# Patient Record
Sex: MALE | Race: WHITE | NOT HISPANIC OR LATINO | Employment: OTHER | URBAN - METROPOLITAN AREA
[De-identification: names, ages, dates, MRNs, and addresses within clinical notes are randomized per-mention and may not be internally consistent; named-entity substitution may affect disease eponyms.]

---

## 2017-01-12 ENCOUNTER — ANTICOAGULATION THERAPY VISIT (OUTPATIENT)
Dept: NURSING | Facility: CLINIC | Age: 77
End: 2017-01-12
Payer: COMMERCIAL

## 2017-01-12 DIAGNOSIS — Z79.01 LONG-TERM (CURRENT) USE OF ANTICOAGULANTS: Primary | ICD-10-CM

## 2017-01-12 DIAGNOSIS — I48.20 CHRONIC ATRIAL FIBRILLATION (H): ICD-10-CM

## 2017-01-12 LAB — INR POINT OF CARE: 3 (ref 0.86–1.14)

## 2017-01-12 PROCEDURE — 99207 ZZC NO CHARGE NURSE ONLY: CPT

## 2017-01-12 PROCEDURE — 85610 PROTHROMBIN TIME: CPT | Mod: QW

## 2017-01-12 PROCEDURE — 36416 COLLJ CAPILLARY BLOOD SPEC: CPT

## 2017-01-12 NOTE — MR AVS SNAPSHOT
Chidi Dubon   1/12/2017 11:15 AM   Anticoagulation Therapy Visit    Description:  76 year old male   Provider:  JOSE E ANTI COAG   Department:  Be Nurse           INR as of 1/12/2017     Selected INR 3.0 (1/12/2017)      Anticoagulation Summary as of 1/12/2017     INR goal 2.0-3.0   Selected INR 3.0 (1/12/2017)   Full instructions 2.5 mg every day   Next INR check 2/23/2017    Indications   Long-term (current) use of anticoagulants [Z79.01] [Z79.01]  Atrial Fibrillation [I48.2]         Your next Anticoagulation Clinic appointment(s)     Feb 23, 2017 11:15 AM   Anticoagulation Visit with JOSE E ANTI LY   [facility] [place] [facility] [place]    [address]  [place] [address] [phone]              Contact Numbers     [facility]  Please call [phone] with any problems or questions regarding your therapy, or to cancel or reschedule your appointment.          January 2017 Details    Sun Mon Tue Wed Thu Fri Sat     1               2               3               4               5               6               7                 8               9               10               11               12      2.5 mg   See details      13      2.5 mg         14      2.5 mg           15      2.5 mg         16      2.5 mg         17      2.5 mg         18      2.5 mg         19      2.5 mg         20      2.5 mg         21      2.5 mg           22      2.5 mg         23      2.5 mg         24      2.5 mg         25      2.5 mg         26      2.5 mg         27      2.5 mg         28      2.5 mg           29      2.5 mg         30      2.5 mg         31      2.5 mg              Date Details   01/12 This INR check               How to take your warfarin dose     To take:  2.5 mg Take 0.5 of a 5 mg tablet.           February 2017 Details    Sun Mon Tue Wed Thu Fri Sat        1      2.5 mg         2      2.5 mg         3      2.5 mg         4      2.5 mg           5      2.5 mg         6       2.5 mg         7      2.5 mg         8      2.5 mg         9      2.5 mg         10      2.5 mg         11      2.5 mg           12      2.5 mg         13      2.5 mg         14      2.5 mg         15      2.5 mg         16      2.5 mg         17      2.5 mg         18      2.5 mg           19      2.5 mg         20      2.5 mg         21      2.5 mg         22      2.5 mg         23            24               25                 26               27               28                    Date Details   No additional details    Date of next INR:  2/23/2017         How to take your warfarin dose     To take:  2.5 mg Take 0.5 of a 5 mg tablet.

## 2017-01-12 NOTE — PROGRESS NOTES
ANTICOAGULATION FOLLOW-UP CLINIC VISIT    Patient Name:  Chidi Dubon  Date:  1/12/2017  Contact Type:  Face to Face    SUBJECTIVE:     Patient Findings     Positives No Problem Findings           OBJECTIVE    INR PROTIME   Date Value Ref Range Status   01/12/2017 3.0* 0.86 - 1.14 Final       ASSESSMENT / PLAN  INR assessment THER    Recheck INR In: 6 WEEKS    INR Location Clinic      Anticoagulation Summary as of 1/12/2017     INR goal 2.0-3.0   Selected INR 3.0 (1/12/2017)   Maintenance plan 2.5 mg (5 mg x 0.5) every day   Full instructions 2.5 mg every day   Weekly total 17.5 mg   Plan last modified Hyacinth Wilson (1/12/2017)   Next INR check 2/23/2017   Target end date Indefinite    Indications   Long-term (current) use of anticoagulants [Z79.01] [Z79.01]  Atrial Fibrillation [I48.2]         Anticoagulation Episode Summary     INR check location     Preferred lab     Send INR reminders to [facility]    Comments       Anticoagulation Care Providers     Provider Role Specialty Phone number    Martin Schultz PA-C Responsible Physician Assistant [phone]            See the Encounter Report to view Anticoagulation Flowsheet and Dosing Calendar (Go to Encounters tab in chart review, and find the Anticoagulation Therapy Visit)        HYACINTH WILSON

## 2017-02-13 DIAGNOSIS — I10 BENIGN ESSENTIAL HYPERTENSION: Primary | ICD-10-CM

## 2017-02-13 RX ORDER — VALSARTAN AND HYDROCHLOROTHIAZIDE 80; 12.5 MG/1; MG/1
1 TABLET, FILM COATED ORAL DAILY
Qty: 30 TABLET | Refills: 0 | Status: SHIPPED | OUTPATIENT
Start: 2017-02-13 | End: 2017-03-13

## 2017-02-13 NOTE — TELEPHONE ENCOUNTER
Routing refill request to provider for review/approval because:  Labs out of range:  potassium

## 2017-02-13 NOTE — TELEPHONE ENCOUNTER
Valsartan-HCTZ      Last Written Prescription Date: 11/14/16  Last Fill Quantity: 90, # refills: 1  Last Office Visit with [facility], [facility] or [facility] prescribing provider: 10/04/16       Potassium   Date Value Ref Range Status   10/04/2016 3.2 (L) 3.4 - 5.3 mmol/L Final     Creatinine   Date Value Ref Range Status   10/04/2016 0.96 0.66 - 1.25 mg/dL Final     BP Readings from Last 3 Encounters:   10/04/16 128/68   05/31/16 134/80   02/29/16 131/68

## 2017-02-14 NOTE — TELEPHONE ENCOUNTER
LVM for patient to return call to clinic.    Refill request approved. Patient need due for repeat labs. Needs lab appointment.       Zuleyka Duncan CMA

## 2017-02-23 ENCOUNTER — ANTICOAGULATION THERAPY VISIT (OUTPATIENT)
Dept: NURSING | Facility: CLINIC | Age: 77
End: 2017-02-23
Payer: COMMERCIAL

## 2017-02-23 DIAGNOSIS — Z79.01 LONG-TERM (CURRENT) USE OF ANTICOAGULANTS: ICD-10-CM

## 2017-02-23 DIAGNOSIS — I48.20 CHRONIC ATRIAL FIBRILLATION (H): ICD-10-CM

## 2017-02-23 LAB — INR POINT OF CARE: 2.6 (ref 0.86–1.14)

## 2017-02-23 PROCEDURE — 36416 COLLJ CAPILLARY BLOOD SPEC: CPT

## 2017-02-23 PROCEDURE — 85610 PROTHROMBIN TIME: CPT | Mod: QW

## 2017-02-23 PROCEDURE — 99207 ZZC NO CHARGE NURSE ONLY: CPT

## 2017-02-23 NOTE — PROGRESS NOTES
ANTICOAGULATION FOLLOW-UP CLINIC VISIT    Patient Name:  Chidi Dubon  Date:  2/23/2017  Contact Type:  Face to Face    SUBJECTIVE:     Patient Findings     Positives No Problem Findings           OBJECTIVE    INR Protime   Date Value Ref Range Status   02/23/2017 2.6 (A) 0.86 - 1.14 Final       ASSESSMENT / PLAN  INR assessment THER    Recheck INR In: 6 WEEKS    INR Location Clinic      Anticoagulation Summary as of 2/23/2017     INR goal 2.0-3.0   Today's INR 2.6   Maintenance plan 2.5 mg (5 mg x 0.5) every day   Full instructions 2.5 mg every day   Weekly total 17.5 mg   No change documented Mayte Rebolledo RN   Plan last modified Hyacinth Montano (1/12/2017)   Next INR check 4/6/2017   Target end date Indefinite    Indications   Long-term (current) use of anticoagulants [Z79.01] [Z79.01]  Atrial Fibrillation [I48.2]         Anticoagulation Episode Summary     INR check location     Preferred lab     Send INR reminders to [facility]    Comments       Anticoagulation Care Providers     Provider Role Specialty Phone number    Martin Schultz PA-C Responsible Physician Assistant [phone]            See the Encounter Report to view Anticoagulation Flowsheet and Dosing Calendar (Go to Encounters tab in chart review, and find the Anticoagulation Therapy Visit)        Mayte Rebolledo RN

## 2017-02-23 NOTE — MR AVS SNAPSHOT
Chidi Dubon   2/23/2017 11:15 AM   Anticoagulation Therapy Visit    Description:  76 year old male   Provider:  JOSE E ANTI COAG   Department:  Be Nurse           INR as of 2/23/2017     Today's INR 2.6      Anticoagulation Summary as of 2/23/2017     INR goal 2.0-3.0   Today's INR 2.6   Full instructions 2.5 mg every day   Next INR check 4/6/2017    Indications   Long-term (current) use of anticoagulants [Z79.01] [Z79.01]  Atrial Fibrillation [I48.2]         Your next Anticoagulation Clinic appointment(s)     Apr 06, 2017 11:30 AM CDT   Anticoagulation Visit with JOSE E ANTI COARUPERTO   [facility] [place] ([facility])    [address]  [place] [address] [phone]              Contact Numbers     [facility]  Please call [phone] with any problems or questions regarding your therapy, or to cancel or reschedule your appointment.          February 2017 Details    Sun Mon Tue Wed Thu Fri Sat        1               2               3               4                 5               6               7               8               9               10               11                 12               13               14               15               16               17               18                 19               20               21               22               23      2.5 mg   See details      24      2.5 mg         25      2.5 mg           26      2.5 mg         27      2.5 mg         28      2.5 mg              Date Details   02/23 This INR check               How to take your warfarin dose     To take:  2.5 mg Take 0.5 of a 5 mg tablet.           March 2017 Details    Sun Mon Tue Wed Thu Fri Sat        1      2.5 mg         2      2.5 mg         3      2.5 mg         4      2.5 mg           5      2.5 mg         6      2.5 mg         7      2.5 mg         8      2.5 mg         9      2.5 mg         10      2.5 mg         11      2.5 mg           12      2.5 mg         13       2.5 mg         14      2.5 mg         15      2.5 mg         16      2.5 mg         17      2.5 mg         18      2.5 mg           19      2.5 mg         20      2.5 mg         21      2.5 mg         22      2.5 mg         23      2.5 mg         24      2.5 mg         25      2.5 mg           26      2.5 mg         27      2.5 mg         28      2.5 mg         29      2.5 mg         30      2.5 mg         31      2.5 mg           Date Details   No additional details            How to take your warfarin dose     To take:  2.5 mg Take 0.5 of a 5 mg tablet.           April 2017 Details    Sun Mon Tue Wed Thu Fri Sat           1      2.5 mg           2      2.5 mg         3      2.5 mg         4      2.5 mg         5      2.5 mg         6            7               8                 9               10               11               12               13               14               15                 16               17               18               19               20               21               22                 23               24               25               26               27               28               29                 30                      Date Details   No additional details    Date of next INR:  4/6/2017         How to take your warfarin dose     To take:  2.5 mg Take 0.5 of a 5 mg tablet.

## 2017-03-13 DIAGNOSIS — I10 BENIGN ESSENTIAL HYPERTENSION: ICD-10-CM

## 2017-03-13 RX ORDER — VALSARTAN AND HYDROCHLOROTHIAZIDE 80; 12.5 MG/1; MG/1
1 TABLET, FILM COATED ORAL DAILY
Qty: 30 TABLET | Refills: 1 | Status: SHIPPED | OUTPATIENT
Start: 2017-03-13 | End: 2017-04-20

## 2017-03-13 NOTE — TELEPHONE ENCOUNTER
Renee      Last Written Prescription Date: 02/14/17  Last Fill Quantity: 30, # refills: 0  Last Office Visit with [facility], [facility] or [facility] prescribing provider: 10/04/16       Potassium   Date Value Ref Range Status   10/04/2016 3.2 (L) 3.4 - 5.3 mmol/L Final     Creatinine   Date Value Ref Range Status   10/04/2016 0.96 0.66 - 1.25 mg/dL Final     BP Readings from Last 3 Encounters:   10/04/16 128/68   05/31/16 134/80   02/29/16 131/68

## 2017-04-06 ENCOUNTER — ANTICOAGULATION THERAPY VISIT (OUTPATIENT)
Dept: NURSING | Facility: CLINIC | Age: 77
End: 2017-04-06
Payer: COMMERCIAL

## 2017-04-06 DIAGNOSIS — I48.20 CHRONIC ATRIAL FIBRILLATION (H): ICD-10-CM

## 2017-04-06 DIAGNOSIS — Z79.01 LONG-TERM (CURRENT) USE OF ANTICOAGULANTS: ICD-10-CM

## 2017-04-06 LAB — INR POINT OF CARE: 2.5 (ref 0.86–1.14)

## 2017-04-06 PROCEDURE — 36416 COLLJ CAPILLARY BLOOD SPEC: CPT

## 2017-04-06 PROCEDURE — 85610 PROTHROMBIN TIME: CPT | Mod: QW

## 2017-04-06 PROCEDURE — 99207 ZZC NO CHARGE NURSE ONLY: CPT

## 2017-04-06 NOTE — PROGRESS NOTES
ANTICOAGULATION FOLLOW-UP CLINIC VISIT    Patient Name:  Chidi Dbuon  Date:  4/6/2017  Contact Type:  Face to Face    SUBJECTIVE:     Patient Findings     Positives No Problem Findings           OBJECTIVE    INR Protime   Date Value Ref Range Status   04/06/2017 2.5 (A) 0.86 - 1.14 Final       ASSESSMENT / PLAN  INR assessment THER    Recheck INR In: 6 WEEKS    INR Location Clinic      Anticoagulation Summary as of 4/6/2017     INR goal 2.0-3.0   Today's INR 2.5   Maintenance plan 2.5 mg (5 mg x 0.5) every day   Full instructions 2.5 mg every day   Weekly total 17.5 mg   No change documented Hyacinth Wilson   Plan last modified Hyacinth Wilson (1/12/2017)   Next INR check 5/18/2017   Target end date Indefinite    Indications   Long-term (current) use of anticoagulants [Z79.01] [Z79.01]  Atrial Fibrillation [I48.2]         Anticoagulation Episode Summary     INR check location     Preferred lab     Send INR reminders to [facility]    Comments       Anticoagulation Care Providers     Provider Role Specialty Phone number    Martin Schultz PA-C Responsible Physician Assistant [phone]            See the Encounter Report to view Anticoagulation Flowsheet and Dosing Calendar (Go to Encounters tab in chart review, and find the Anticoagulation Therapy Visit)        HYACINTH WILSON

## 2017-04-06 NOTE — MR AVS SNAPSHOT
Chidi Dubon   4/6/2017 11:30 AM   Anticoagulation Therapy Visit    Description:  76 year old male   Provider:  JOSE E ANTI COAG   Department:  Be Nurse           INR as of 4/6/2017     Today's INR 2.5      Anticoagulation Summary as of 4/6/2017     INR goal 2.0-3.0   Today's INR 2.5   Full instructions 2.5 mg every day   Next INR check 5/18/2017    Indications   Long-term (current) use of anticoagulants [Z79.01] [Z79.01]  Atrial Fibrillation [I48.2]         Your next Anticoagulation Clinic appointment(s)     May 18, 2017 11:30 AM CDT   Anticoagulation Visit with BE ANTI COAG   [facility] [place] ([facility]    [address]  [place] [address] [phone]              Contact Numbers     [facility]  Please call [phone] with any problems or questions regarding your therapy, or to cancel or reschedule your appointment.          April 2017 Details    Sun Mon Tue Wed Thu Fri Sat           1                 2               3               4               5               6      2.5 mg   See details      7      2.5 mg         8      2.5 mg           9      2.5 mg         10      2.5 mg         11      2.5 mg         12      2.5 mg         13      2.5 mg         14      2.5 mg         15      2.5 mg           16      2.5 mg         17      2.5 mg         18      2.5 mg         19      2.5 mg         20      2.5 mg         21      2.5 mg         22      2.5 mg           23      2.5 mg         24      2.5 mg         25      2.5 mg         26      2.5 mg         27      2.5 mg         28      2.5 mg         29      2.5 mg           30      2.5 mg                Date Details   04/06 This INR check               How to take your warfarin dose     To take:  2.5 mg Take 0.5 of a 5 mg tablet.           May 2017 Details    Sun Mon Tue Wed Thu Fri Sat      1      2.5 mg         2      2.5 mg         3      2.5 mg         4      2.5 mg         5      2.5 mg         6      2.5 mg            7      2.5 mg         8      2.5 mg         9      2.5 mg         10      2.5 mg         11      2.5 mg         12      2.5 mg         13      2.5 mg           14      2.5 mg         15      2.5 mg         16      2.5 mg         17      2.5 mg         18            19               20                 21               22               23               24               25               26               27                 28               29               30               31                   Date Details   No additional details    Date of next INR:  5/18/2017         How to take your warfarin dose     To take:  2.5 mg Take 0.5 of a 5 mg tablet.

## 2017-04-13 ENCOUNTER — RADIANT APPOINTMENT (OUTPATIENT)
Dept: GENERAL RADIOLOGY | Facility: CLINIC | Age: 77
End: 2017-04-13
Attending: PHYSICIAN ASSISTANT
Payer: COMMERCIAL

## 2017-04-13 ENCOUNTER — OFFICE VISIT (OUTPATIENT)
Dept: ORTHOPEDICS | Facility: CLINIC | Age: 77
End: 2017-04-13
Payer: COMMERCIAL

## 2017-04-13 VITALS — WEIGHT: 283.6 LBS | BODY MASS INDEX: 35.26 KG/M2 | RESPIRATION RATE: 16 BRPM | HEIGHT: 75 IN

## 2017-04-13 DIAGNOSIS — M16.11 PRIMARY OSTEOARTHRITIS OF RIGHT HIP: Primary | ICD-10-CM

## 2017-04-13 DIAGNOSIS — M16.11 PRIMARY OSTEOARTHRITIS OF RIGHT HIP: ICD-10-CM

## 2017-04-13 PROCEDURE — 73502 X-RAY EXAM HIP UNI 2-3 VIEWS: CPT

## 2017-04-13 PROCEDURE — 99214 OFFICE O/P EST MOD 30 MIN: CPT | Performed by: ORTHOPAEDIC SURGERY

## 2017-04-13 ASSESSMENT — PAIN SCALES - GENERAL: PAINLEVEL: SEVERE PAIN (7)

## 2017-04-13 NOTE — LETTER
"4/13/2017      RE: Chidi Dubon  [address] [facility]  [place] [address]       Chief Complaint:   Chief Complaint   Patient presents with     RECHECK     Right hip OA. Has got much worse sine we saw him last July, 2016. He hasn't been in due to 5 eye surgeries after complications from cataract surgery.     Surgical Followup     Right TKA. DOS 7/14/15. Doing well.        HISTORY OF PRESENT ILLNESS    Chidi Dubon is a 76 year old male seen for follow up evaluation of ongoing right hip pain with no known injury. has known advanced, end-stage right hip arthritis. Pain has been present for a couple of years. Today, patient reports severe pain. He rates pain a 7/10. Since last visit, July 2016, he reports pain has worsened. He states that everything is out of \"wack\". Presents with his wife today. Reports quality of life has gone down. He reprots his right total knee is doing great. History of 5 eye surgeries. Total eyesight loss for a period. Cataract surgery. It is because of these surgeries that have prevented him from coming in sooner to discuss his right hip.    Pain with walking, stairs, and bending.     Present symptoms: severe pain right hip.  Pain severity: 7/10  Pain quality: dull  Frequency of symptoms: constant.  Exacerbating Factors: weight bearing, stairs, certain positions: bending  Relieving Factors: not sure  Night Pain: No  Pain while at rest: No   Associated numbness or tingling: No     Orthopedic PMH: right TKA (7/2015), right hip degenerative osteoarthritis.    Other PMH:  has a past medical history of CAD (coronary artery disease); Dyslipidemia; and HTN (hypertension).  Patient Active Problem List    Diagnosis Date Noted     Benign essential hypertension 02/29/2016     Priority: Medium     Long-term (current) use of anticoagulants [Z79.01] 02/16/2016     Priority: Medium     Atrial Fibrillation 05/03/2013     Priority: Medium     Wafarin therapy, managed by [facility]  November 25, 2014 - CHADS2 " 2.8%.  HAS-BLED 1.8%.       Restless leg syndrome 12/09/2014     Begin with gabapentin and supplement with iron daily for borderline ferritin.        Fatigue 05/09/2014     HGB     14.9   10/21/2013. TSH     1.73   8/5/2013 LDL      104   8/20/2013.  TSH     1.73   8/5/2013.        Diverticulosis 08/10/2012     Noted on CT but no inflammation 8/12.       Urolithiasis 08/10/2012     Noted 8/8/12.  Lemon juice as uric acid.  Will hold Flomax as wanting to minimize medications and possibly help fatigue.        Osteoarthritis, knee 07/06/2011     Synvisc with some benefit.  Getting shots.  Trying to lose weight and exercise. Minimal benefit from injections.        CAD S/P percutaneous coronary angioplasty 06/25/2010     Stents x 2.  Stable.  Flipped T's inferiorly.  On good medical management. 8/13 restended. Diffuse disease. Medical management.        Hyperlipidemia LDL goal <70 12/14/2009     Using half dose Crestor due to cost. May benefit from atorvastatin when generic available.  Accepting that goal will not be met. Simvastatin 40 mg prescription.   LDL      100   5/9/2014 - will review benefit for atorvastatin 40 every other day.        Obesity 12/14/2009     Strongly encouraged exercise.  Weight Watchers might help as well. Considering.        Mild major depression (H) 12/14/2009     Increase to 20 mg.          Surgical Hx:  has a past surgical history that includes angiogram (age 61 ); arthroscopy knee rt/lt; angiogram (age 66); and angiogram (11/2013).    Medications:   Current Outpatient Prescriptions:      valsartan-hydrochlorothiazide (DIOVAN-HCT) 80-12.5 MG per tablet, Take 1 tablet by mouth daily, Disp: 30 tablet, Rfl: 1     potassium chloride 20 MEQ TBCR, Take 1 tablet (20 mEq) by mouth daily, Disp: 90 tablet, Rfl: 3     atorvastatin (LIPITOR) 80 MG tablet, Take 1 tablet (80 mg) by mouth every other day, Disp: 90 tablet, Rfl: 1     nitroglycerin (NITROSTAT) 0.4 MG SL tablet, Place 1 tablet (0.4 mg) under  "the tongue every 5 minutes as needed for chest pain, Disp: 25 tablet, Rfl: 0     warfarin (COUMADIN) 5 MG tablet, Take 1 tablet (5 mg) by mouth daily Or as directed by [facility] clinic. Current dose is 2.5 mg daily, Disp: 90 tablet, Rfl: 2     aspirin 81 MG tablet, Take 1 tablet (81 mg) by mouth daily, Disp: 30 tablet, Rfl:      [DISCONTINUED] simvastatin (ZOCOR) 80 MG tablet, Take 0.5 tablets (40 mg) by mouth At Bedtime, Disp: 45 tablet, Rfl: 3    Allergies:   Allergies   Allergen Reactions     No Known Allergies        Social Hx: retired.  reports that he has never smoked. He has never used smokeless tobacco. He reports that he does not drink alcohol or use illicit drugs.    Family Hx: family history includes HEART DISEASE in his paternal grandfather.     This document serves as a record of the services and decisions personally performed and made by Ismael Bowling MD. It was created on his behalf by Rosmery Lewis, a trained medical scribe. The creation of this document is based the provider's statements to the medical scribe.    Scribe Rosmery Lewis 1:59 PM 4/13/2017     REVIEW OF SYSTEMS:  10 point ROS neg other than the symptoms noted above in the HPI and PAST MEDICAL HISTORY. Notables,  CONSTITUTIONAL:NEGATIVE for fever, chills, change in weight  INTEGUMENTARY/SKIN: NEGATIVE for worrisome rashes, moles or lesions  MUSCULOSKELETAL:See HPI above  NEURO: NEGATIVE for weakness, dizziness or paresthesias    PHYSICAL EXAM:  Resp 16  Ht 1.905 m (6' 3\")  Wt 128.6 kg (283 lb 9.6 oz)  BMI 35.45 kg/m2   GENERAL APPEARANCE: healthy, alert, no distress, presents with wife today.  SKIN: no suspicious lesions or rashes  NEURO: Normal strength and tone, mentation intact and speech normal  PSYCH:  mentation appears normal and affect normal  RESPIRATORY: No increased work of breathing.    BILATERAL LOWER EXTREMITIES:  Gait: trendelenberg right.  No gross deformities or masses.  Bilateral Quad atrophy, strength weak bilateral right " more than the left..  Intact sensation deep peroneal nerve, superficial peroneal nerve, med/lat tibial nerve, sural nerve, saphenous nerve  Intact EHL, EDL, TA, FHL, GS, quadriceps hamstrings and hip flexors  Toes warm and well perfused, brisk capillary refill. Palpable 2+ dp pulses.  Bilateral calf soft and nttp or squeeze.  Edema: trace    Lumbar range of motion: flexion to touch mid shin - tightness in back, extension decreased, side bend: adequate, no discomfort.    LEFT HIP EXAM:    Tender:  None.  Nontender: left greater trochanter, left gluteus medius, left ASIS, left iliac crest, left proximal hamstring attachment  Strength:  4+/5 hip flexion, extension  Special tests:  Irritability (flexion/adduction/internal rotation) mild, negative straight leg raise  Hip range of motion: Internal rotation: neutral, External rotation: 35 degrees, Flexion 100 degrees    RIGHT HIP EXAM:     Tender:   Greater trochanter, medius, tensor, lower back/buttock  Nontender: right ASIS, right iliac crest, right proximal hamstring attachment  Strength:  4-/5 hip flexion, extension.  Special tests:  Irritability (flexion/adduction/internal rotation) positive.  Hip range of motion: Internal rotation: lacks 10 degrees from neutral, External rotation: 30, Flexion 95 degrees with obligatory external rotation with hip flexion.    right  KNEE EXAM:    Incision: healed well, Dry. No erythema or ecchymosis.  Skin: intact, no ecchymosis or erythema  ROM: 0 degrees extension to 120 degrees flexion  Effusion: none  Tender:  None.      X-RAY: AP pelvis and AP/Lateral views right hip from 4/13/2017 were reviewed in clinic today. No obvious fractures or dislocations. Advanced degenerative changes in the right hip with bone on bone loss of the joint space. Since the previous study progressive subchondral degenerative cyst formation has developed within the acetabulum and the femoral head, with sclerosis. Marginal osteophytes. Left hip with moderate  "degenerative changes.    Impression: 76 year old male with right hip pain, advanced bone on bone primary osteoarthritis, trochanteric bursitis, muscle pain.       Plan:   * discussed pain in groin/hip likely from advanced hip arthritis, bone on bone. This is wearing of the cartilage within the hip joint either due to normal \"wear and tear\" or following an injury. Any low back / buttock / radiating pain likely coming from the low back.  *  treatment options for hip arthritis and pain include: do nothing, NSAIDS, activity modification, Physical Therapy, injections, total hip arthroplasty. Risks and benefits of each discussed.   * did discuss patient is a candidate for total hip arthroplasty, and offered total hip arthroplasty to patient. Total hip arthroplasty will only attempt to provide pain relief from hip related arthritis only and not any pain from the low back. Any low back pain likely to continue.  *  Discussed risks of surgery include, but not limited to: bleeding, infection, pain, scar, damage to adjacent structures (e.g. Nerves, blood vessels, bone, cartilage), temporary or permanent nerve damage, recurrence of symptoms, implant dislocation, implant failure, implant infection, unequal limb lengths, stiffness, need for further surgery, blood clots, pulmonary embolism, risks of anesthesia, and death.  * patient would like to proceed with surgery: right total hip arthroplasty.  * will arrange right total hip arthroplasty at a mutual convenience in the near future  * discussed will plan hospitalization ~ 2-3 days, with discharge to home or short term rehabilitation facility, depending on postoperative recovery and progress during hospitalization.  * will plan postoperative deep vein thrombosis prophylaxis x4 weeks.  Additionally, graduated compression stockings x4 weeks, and SCDs while in the hospital.  * postoperative pain control will be oral medications upon discharge from hospital  * postoperative Physical " Therapy will be discussed, if needed, at first postoperative visit at 2 weeks  * patient will need longterm prophylactic antibiotics use with dental procedures or other invasive procedures (2 g amoxicilin or 450mg ([id number]) clindamycin) 1 hour prior to dental procedures.  * patient will need preoperative H+P prior to surgery from PCP.  * will see patient 2 weeks postoperative, sooner as needed. Will obtain lowset AP pelvis and lateral right hip xray at that time.  * 3 months of Posterior Hip Precautions: No hip flexion beyond 90 degrees; No hip adduction cross midline; No hip internal rotation.    The information in this document, created by a scribe for me, accurately reflects the services I personally performed and the decisions made by me. I have reviewed and approved this document for accuracy.      Ismael Bowling M.D., M.S.  Dept. of Orthopaedic Surgery  [facility]          Ismael Bowling MD

## 2017-04-13 NOTE — MR AVS SNAPSHOT
"              After Visit Summary   4/13/2017    Chidi Dubon    MRN: [id number]           Patient Information     Date Of Birth          1940        Visit Information        Provider Department      4/13/2017 1:30 PM Ismael Bowling MD [facility] [place]        Today's Diagnoses     Primary osteoarthritis of right hip    -  1       Follow-ups after your visit        Your next 10 appointments already scheduled     May 18, 2017 11:30 AM CDT   Anticoagulation Visit with BE ANTI COAG   [facility] [place] ([facility] [place])    [address] [facility]  [place] [id number] [phone]              Who to contact     If you have questions or need follow up information about today's clinic visit or your schedule please contact [facility] [place] directly at [phone].  Normal or non-critical lab and imaging results will be communicated to you by Tripwolfhart, letter or phone within 4 business days after the clinic has received the results. If you do not hear from us within 7 days, please contact the clinic through MyChart or phone. If you have a critical or abnormal lab result, we will notify you by phone as soon as possible.  Submit refill requests through BigBarn or call your pharmacy and they will forward the refill request to us. Please allow 3 business days for your refill to be completed.          Additional Information About Your Visit        MyChart Information     BigBarn lets you send messages to your doctor, view your test results, renew your prescriptions, schedule appointments and more. To sign up, go to [email or web address] . Click on \"Log in\" on the left side of the screen, which will take you to the Welcome page. Then click on \"Sign up Now\" on the right side of the page.     You will be asked to enter the access code listed below, as well as some personal information. Please follow the directions to create your username and " "password.     Your access code is: [email or web address]  Expires: 2017  1:47 PM     Your access code will  in 90 days. If you need help or a new code, please call your [facility] or [phone].        Care EveryWhere ID     This is your Care EveryWhere ID. This could be used by other organizations to access your [facility] medical records  [id number]        Your Vitals Were     Respirations Height BMI (Body Mass Index)             16 1.905 m (6' 3\") 35.45 kg/m2          Blood Pressure from Last 3 Encounters:   10/04/16 128/68   16 134/80   16 131/68    Weight from Last 3 Encounters:   17 128.6 kg (283 lb 9.6 oz)   10/04/16 124.7 kg (275 lb)   16 121.1 kg (267 lb)               Primary Care Provider Office Phone # Fax #    Martin Schultz PA-C [phone]       [facility] [place] [address]  [place] [address]        Goals        Diet    Reduce portion size     Related Problems    Hyperlipidemia LDL goal <70    Notes - Note created  2014  3:10 PM by Oliver Veliz MD    Everything in moderation.         General    Medication 1 (pt-stated)     Related Problems    Urolithiasis    Notes - Note created  2014  3:11 PM by Oliver Veliz MD    Stop the flomax and pay attention to your urine flow.  If not worse, ok to stay off of it. If worse, restart.       Taper off medication (pt-stated)     Related Problems    Fatigue    Notes - Note created  2014  3:08 PM by Oliver Veliz MD    Tapering Schedule for citalopram;    Week 1:  1/2 alternating with 1 every other day  Week 2:  1/2 daily  Week 3:  1/2 every other day  Week 4:  Stop    If at any point you experience significant side effects from tapering, we may need to slow the taper a bit.    Once you are off of this, pay attention to the mood and if you are not feeling better, we can try using bupropion which is a medication that is less likely to cause fatigue and weight gain.         Thank you! "     Thank you for choosing [facility] [place]  for your care. Our goal is always to provide you with excellent care. Hearing back from our patients is one way we can continue to improve our services. Please take a few minutes to complete the written survey that you may receive in the mail after your visit with us. Thank you!             Your Updated Medication List - Protect others around you: Learn how to safely use, store and throw away your medicines at www.disposemymeds.org.          This list is accurate as of: 4/13/17  1:47 PM.  Always use your most recent med list.                   Brand Name Dispense Instructions for use    aspirin 81 MG tablet     30 tablet    Take 1 tablet (81 mg) by mouth daily       atorvastatin 80 MG tablet    LIPITOR    90 tablet    Take 1 tablet (80 mg) by mouth every other day       nitroglycerin 0.4 MG sublingual tablet    NITROSTAT    25 tablet    Place 1 tablet (0.4 mg) under the tongue every 5 minutes as needed for chest pain       Potassium Chloride ER 20 MEQ Tbcr     90 tablet    Take 1 tablet (20 mEq) by mouth daily       valsartan-hydrochlorothiazide 80-12.5 MG per tablet    DIOVAN-HCT    30 tablet    Take 1 tablet by mouth daily       warfarin 5 MG tablet    COUMADIN    90 tablet    Take 1 tablet (5 mg) by mouth daily Or as directed by [facility]. Current dose is 2.5 mg daily

## 2017-04-13 NOTE — Clinical Note
"  4/13/2017       RE: Chidi Dubon  [address] [facility]  [place] [address]           Dear Colleague,    Thank you for referring your patient, Chidi Dubon, to the [facility]. Please see a copy of my visit note below.    Chief Complaint:   Chief Complaint   Patient presents with     RECHECK     Right hip OA. Has got much worse sine we saw him last July, 2016. He hasn't been in due to 5 eye surgeries after complications from cataract surgery.     Surgical Followup     Right TKA. DOS 7/14/15. Doing well.        HISTORY OF PRESENT ILLNESS    Chidi Dubon is a 76 year old male seen for follow up evaluation of ongoing right hip pain with no known injury. Pain has been present for a couple of years. Today, patient reports severe pain. He rates pain a 7/10. Since last visit, July 2016, he reports pain has worsened. He states that everything is out of \"wack\". Presents with his wife today. Reports quality of life has gone down. He reprots his knee is doing great. History of 5 eye surgeries. Total eyesight loss. Cataract surgery. It is because of these surgeries that have prevented him from coming in sooner to discuss his right hip.    Recall: Notes that pain is more noticeable because he had a right total knee arthroplasty in 7/2015, 8 months ago. Notes that pain is in the outer hip. Pain with walking, stairs, and bending. He had an injection with Martin Schultz on 2/29/2016, 4 weeks ago, this provided relief for about 3 days. He states that he would like to hold off on surgery until his knee is really good. He is still dealing with recovery from his total knee arthroplasty. Doesn't really have much knee pain, just some stiffness.    Present symptoms: mild pain.  Lateral hip.  Pain severity: 7/10  Pain quality: dull  Frequency of symptoms: occasionally  Exacerbating Factors: weight bearing, stairs, certain positions: bending  Relieving Factors: not sure  Night Pain: No  Pain while at " rest: No   Associated numbness or tingling: No     Orthopedic PMH: right TKA (7/2015)    Other PMH:  has a past medical history of CAD (coronary artery disease); Dyslipidemia; and HTN (hypertension).  Patient Active Problem List    Diagnosis Date Noted     Benign essential hypertension 02/29/2016     Priority: Medium     Long-term (current) use of anticoagulants [Z79.01] 02/16/2016     Priority: Medium     Atrial Fibrillation 05/03/2013     Priority: Medium     Wafarin therapy, managed by [facility]  November 25, 2014 - CHADS2 2.8%.  HAS-BLED 1.8%.       Restless leg syndrome 12/09/2014     Begin with gabapentin and supplement with iron daily for borderline ferritin.        Fatigue 05/09/2014     HGB     14.9   10/21/2013. TSH     1.73   8/5/2013 LDL      104   8/20/2013.  TSH     1.73   8/5/2013.        Diverticulosis 08/10/2012     Noted on CT but no inflammation 8/12.       Urolithiasis 08/10/2012     Noted 8/8/12.  Lemon juice as uric acid.  Will hold Flomax as wanting to minimize medications and possibly help fatigue.        Osteoarthritis, knee 07/06/2011     Synvisc with some benefit.  Getting shots.  Trying to lose weight and exercise. Minimal benefit from injections.        CAD S/P percutaneous coronary angioplasty 06/25/2010     Stents x 2.  Stable.  Flipped T's inferiorly.  On good medical management. 8/13 restended. Diffuse disease. Medical management.        Hyperlipidemia LDL goal <70 12/14/2009     Using half dose Crestor due to cost. May benefit from atorvastatin when generic available.  Accepting that goal will not be met. Simvastatin 40 mg prescription.   LDL      100   5/9/2014 - will review benefit for atorvastatin 40 every other day.        Obesity 12/14/2009     Strongly encouraged exercise.  Weight Watchers might help as well. Considering.        Mild major depression (H) 12/14/2009     Increase to 20 mg.          Surgical Hx:  has a past surgical history that includes angiogram (age 61 );  arthroscopy knee rt/lt; angiogram (age 66); and angiogram (11/2013).    Medications:   Current Outpatient Prescriptions:      valsartan-hydrochlorothiazide (DIOVAN-HCT) 80-12.5 MG per tablet, Take 1 tablet by mouth daily, Disp: 30 tablet, Rfl: 1     potassium chloride 20 MEQ TBCR, Take 1 tablet (20 mEq) by mouth daily, Disp: 90 tablet, Rfl: 3     atorvastatin (LIPITOR) 80 MG tablet, Take 1 tablet (80 mg) by mouth every other day, Disp: 90 tablet, Rfl: 1     nitroglycerin (NITROSTAT) 0.4 MG SL tablet, Place 1 tablet (0.4 mg) under the tongue every 5 minutes as needed for chest pain, Disp: 25 tablet, Rfl: 0     warfarin (COUMADIN) 5 MG tablet, Take 1 tablet (5 mg) by mouth daily Or as directed by [facility] clinic. Current dose is 2.5 mg daily, Disp: 90 tablet, Rfl: 2     aspirin 81 MG tablet, Take 1 tablet (81 mg) by mouth daily, Disp: 30 tablet, Rfl:      [DISCONTINUED] simvastatin (ZOCOR) 80 MG tablet, Take 0.5 tablets (40 mg) by mouth At Bedtime, Disp: 45 tablet, Rfl: 3    Allergies:   Allergies   Allergen Reactions     No Known Allergies        Social Hx:  reports that he has never smoked. He has never used smokeless tobacco. He reports that he does not drink alcohol or use illicit drugs.    Family Hx: family history includes HEART DISEASE in his paternal grandfather.     This document serves as a record of the services and decisions personally performed and made by Ismael Bowling MD. It was created on his behalf by Rosmery Lewis, a trained medical scribe. The creation of this document is based the provider's statements to the medical scribe.    Scribe Rosmery Lewis 1:59 PM 4/13/2017     REVIEW OF SYSTEMS:  10 point ROS neg other than the symptoms noted above in the HPI and PAST MEDICAL HISTORY. Notables,  CONSTITUTIONAL:NEGATIVE for fever, chills, change in weight  INTEGUMENTARY/SKIN: NEGATIVE for worrisome rashes, moles or lesions  MUSCULOSKELETAL:See HPI above  NEURO: NEGATIVE for weakness, dizziness or  "paresthesias    PHYSICAL EXAM:  Resp 16  Ht 1.905 m (6' 3\")  Wt 128.6 kg (283 lb 9.6 oz)  BMI 35.45 kg/m2   GENERAL APPEARANCE: healthy, alert, no distress, presents with wife today.  SKIN: no suspicious lesions or rashes  NEURO: Normal strength and tone, mentation intact and speech normal  PSYCH:  mentation appears normal and affect normal/bright  RESPIRATORY: No increased work of breathing.    BILATERAL LOWER EXTREMITIES:  Gait: slight quad avoidance on right  Neutral slight valgus on right slight varus on left  No gross deformities or masses.  Bilateral Quad atrophy, strength weak.  Intact sensation deep peroneal nerve, superficial peroneal nerve, med/lat tibial nerve, sural nerve, saphenous nerve  Intact EHL, EDL, TA, FHL, GS, quadriceps hamstrings and hip flexors  Toes warm and well perfused, brisk capillary refill. Palpable 2+ dp pulses.  Bilateral calf soft and nttp or squeeze.  Edema: trace    Lumbar range of motion: flexion to touch mid shin - tightness in back, extension decreased, side bend: adequate, no discomfort.    LEFT HIP EXAM:    Tender:  None.  Nontender: left greater trochanter, left gluteus medius, left ASIS, left iliac crest, left proximal hamstring attachment  Strength:  4+/5 hip flexion, extension  Special tests:  Irritability (flexion/adduction/internal rotation) mild, negative straight leg raise  Hip range of motion: Internal rotation: neutral, External rotation: 35 degrees, Flexion 100 degrees    RIGHT HIP EXAM:     Tender:   Greater trochanter, medius tensor, lower back  Nontender: right ASIS, right iliac crest, right proximal hamstring attachment  Strength:  4/5 hip flexion, extension.  Special tests:  Irritability (flexion/adduction/internal rotation) positive.  Hip range of motion: Internal rotation: lacks 10 degrees from neutral, External rotation: 30, Flexion 95 degrees with obligatory external rotation with hip flexion.    right  KNEE EXAM:    Incision: healed well, Dry. No " "erythema or ecchymosis.  Skin: intact, no ecchymosis or erythema  ROM: 0 degrees extension to 120 degrees flexion  Effusion: none  Tender: over anterior incision.  NTTP posterior knee      X-RAY: AP pelvis and AP/Lateral views right hip from 4/13/2017 were reviewed in clinic today. No obvious fractures or dislocations. Advanced degenerative changes in the right hip with marked narrowing of the joint space. Since the previous study progressive subchondral degenerative cyst formation has developed within the acetabulum and the femoral head. Left hip with mild degenerative changes.    Impression: 76 year old male with right hip pain, advanced primary osteoarthritis, trochanteric bursitis, muscle pain.       Plan:   * discussed pain in groin/hip likely from advanced hip arthritis. This is wearing of the cartilage within the hip joint either due to normal \"wear and tear\" or following an injury. Any low back / buttock / radiating pain likely coming from the low back.  *  treatment options for hip arthritis and pain include: do nothing, NSAIDS, activity modification, Physical Therapy, injections, total hip arthroplasty. Risks and benefits of each discussed.   * did discuss patient is a candidate for total hip arthroplasty, and offered total hip arthroplasty to patient. Total hip arthroplasty will only attempt to provide pain relief from hip related arthritis only and not any pain from the low back. Any low back pain likely to continue.  *  Discussed risks of surgery include, but not limited to: bleeding, infection, pain, scar, damage to adjacent structures (e.g. Nerves, blood vessels, bone, cartilage), temporary or permanent nerve damage, recurrence of symptoms, implant dislocation, implant failure, implant infection, unequal limb lengths, stiffness, need for further surgery, blood clots, pulmonary embolism, risks of anesthesia, and death.  * patient would like to proceed with surgery: right total hip arthroplasty.  * will " arrange right total hip arthroplasty at a mutual convenience in the near future  * discussed will plan hospitalization ~ 3 days, with discharge to home or short term rehabilitation facility, depending on postoperative recovery and progress during hospitalization.  * will plan postoperative deep vein thrombosis prophylaxis x4 weeks.  Additionally, graduated compression stockings x4 weeks, and SCDs while in the hospital.  * postoperative pain control will be oral medications upon discharge from hospital  * postoperative Physical Therapy will be discussed, if needed, at first postoperative visit at 2 weeks  * patient will need longterm prophylactic antibiotics use with dental procedures or other invasive procedures (2 g amoxicilin or 450mg ([id number]) clindamycin) 1 hour prior to dental procedures.  * patient will need preoperative H+P prior to surgery from PCP.  * will see patient 2 weeks postoperative, sooner as needed. Will obtain lowset AP pelvis and lateral right hip xray at that time.  * 3 months of Posterior Hip Precautions: No hip flexion beyond 90 degrees; No hip adduction cross midline; No hip internal rotation.    The information in this document, created by a scribe for me, accurately reflects the services I personally performed and the decisions made by me. I have reviewed and approved this document for accuracy.      Ismael Bowling M.D., M.S.  Dept. of Orthopaedic Surgery  [facility]    Again, thank you for allowing me to participate in the care of your patient.        Sincerely,              Ismael Bowling MD

## 2017-04-13 NOTE — PROGRESS NOTES
"Chief Complaint:   Chief Complaint   Patient presents with     RECHECK     Right hip OA. Has got much worse sine we saw him last July, 2016. He hasn't been in due to 5 eye surgeries after complications from cataract surgery.     Surgical Followup     Right TKA. DOS 7/14/15. Doing well.        HISTORY OF PRESENT ILLNESS    Chidi Dubon is a 76 year old male seen for follow up evaluation of ongoing right hip pain with no known injury. has known advanced, end-stage right hip arthritis. Pain has been present for a couple of years. Today, patient reports severe pain. He rates pain a 7/10. Since last visit, July 2016, he reports pain has worsened. He states that everything is out of \"wack\". Presents with his wife today. Reports quality of life has gone down. He reprots his right total knee is doing great. History of 5 eye surgeries. Total eyesight loss for a period. Cataract surgery. It is because of these surgeries that have prevented him from coming in sooner to discuss his right hip.    Pain with walking, stairs, and bending.     Present symptoms: severe pain right hip.  Pain severity: 7/10  Pain quality: dull  Frequency of symptoms: constant.  Exacerbating Factors: weight bearing, stairs, certain positions: bending  Relieving Factors: not sure  Night Pain: No  Pain while at rest: No   Associated numbness or tingling: No     Orthopedic PMH: right TKA (7/2015), right hip degenerative osteoarthritis.    Other PMH:  has a past medical history of CAD (coronary artery disease); Dyslipidemia; and HTN (hypertension).  Patient Active Problem List    Diagnosis Date Noted     Benign essential hypertension 02/29/2016     Priority: Medium     Long-term (current) use of anticoagulants [Z79.01] 02/16/2016     Priority: Medium     Atrial Fibrillation 05/03/2013     Priority: Medium     Wafarin therapy, managed by [facility]  November 25, 2014 - CHADS2 2.8%.  HAS-BLED 1.8%.       Restless leg syndrome 12/09/2014     Begin with gabapentin " and supplement with iron daily for borderline ferritin.        Fatigue 05/09/2014     HGB     14.9   10/21/2013. TSH     1.73   8/5/2013 LDL      104   8/20/2013.  TSH     1.73   8/5/2013.        Diverticulosis 08/10/2012     Noted on CT but no inflammation 8/12.       Urolithiasis 08/10/2012     Noted 8/8/12.  Lemon juice as uric acid.  Will hold Flomax as wanting to minimize medications and possibly help fatigue.        Osteoarthritis, knee 07/06/2011     Synvisc with some benefit.  Getting shots.  Trying to lose weight and exercise. Minimal benefit from injections.        CAD S/P percutaneous coronary angioplasty 06/25/2010     Stents x 2.  Stable.  Flipped T's inferiorly.  On good medical management. 8/13 restended. Diffuse disease. Medical management.        Hyperlipidemia LDL goal <70 12/14/2009     Using half dose Crestor due to cost. May benefit from atorvastatin when generic available.  Accepting that goal will not be met. Simvastatin 40 mg prescription.   LDL      100   5/9/2014 - will review benefit for atorvastatin 40 every other day.        Obesity 12/14/2009     Strongly encouraged exercise.  Weight Watchers might help as well. Considering.        Mild major depression (H) 12/14/2009     Increase to 20 mg.          Surgical Hx:  has a past surgical history that includes angiogram (age 61 ); arthroscopy knee rt/lt; angiogram (age 66); and angiogram (11/2013).    Medications:   Current Outpatient Prescriptions:      valsartan-hydrochlorothiazide (DIOVAN-HCT) 80-12.5 MG per tablet, Take 1 tablet by mouth daily, Disp: 30 tablet, Rfl: 1     potassium chloride 20 MEQ TBCR, Take 1 tablet (20 mEq) by mouth daily, Disp: 90 tablet, Rfl: 3     atorvastatin (LIPITOR) 80 MG tablet, Take 1 tablet (80 mg) by mouth every other day, Disp: 90 tablet, Rfl: 1     nitroglycerin (NITROSTAT) 0.4 MG SL tablet, Place 1 tablet (0.4 mg) under the tongue every 5 minutes as needed for chest pain, Disp: 25 tablet, Rfl: 0      "warfarin (COUMADIN) 5 MG tablet, Take 1 tablet (5 mg) by mouth daily Or as directed by [facility]. Current dose is 2.5 mg daily, Disp: 90 tablet, Rfl: 2     aspirin 81 MG tablet, Take 1 tablet (81 mg) by mouth daily, Disp: 30 tablet, Rfl:      [DISCONTINUED] simvastatin (ZOCOR) 80 MG tablet, Take 0.5 tablets (40 mg) by mouth At Bedtime, Disp: 45 tablet, Rfl: 3    Allergies:   Allergies   Allergen Reactions     No Known Allergies        Social Hx: retired.  reports that he has never smoked. He has never used smokeless tobacco. He reports that he does not drink alcohol or use illicit drugs.    Family Hx: family history includes HEART DISEASE in his paternal grandfather.     This document serves as a record of the services and decisions personally performed and made by Ismael Bowling MD. It was created on his behalf by Rosmery Lewis, a trained medical scribe. The creation of this document is based the provider's statements to the medical scribe.    Scribe Rosmery Lewis 1:59 PM 4/13/2017     REVIEW OF SYSTEMS:  10 point ROS neg other than the symptoms noted above in the HPI and PAST MEDICAL HISTORY. Notables,  CONSTITUTIONAL:NEGATIVE for fever, chills, change in weight  INTEGUMENTARY/SKIN: NEGATIVE for worrisome rashes, moles or lesions  MUSCULOSKELETAL:See HPI above  NEURO: NEGATIVE for weakness, dizziness or paresthesias    PHYSICAL EXAM:  Resp 16  Ht 1.905 m (6' 3\")  Wt 128.6 kg (283 lb 9.6 oz)  BMI 35.45 kg/m2   GENERAL APPEARANCE: healthy, alert, no distress, presents with wife today.  SKIN: no suspicious lesions or rashes  NEURO: Normal strength and tone, mentation intact and speech normal  PSYCH:  mentation appears normal and affect normal  RESPIRATORY: No increased work of breathing.    BILATERAL LOWER EXTREMITIES:  Gait: trendelenberg right.  No gross deformities or masses.  Bilateral Quad atrophy, strength weak bilateral right more than the left..  Intact sensation deep peroneal nerve, superficial peroneal " nerve, med/lat tibial nerve, sural nerve, saphenous nerve  Intact EHL, EDL, TA, FHL, GS, quadriceps hamstrings and hip flexors  Toes warm and well perfused, brisk capillary refill. Palpable 2+ dp pulses.  Bilateral calf soft and nttp or squeeze.  Edema: trace    Lumbar range of motion: flexion to touch mid shin - tightness in back, extension decreased, side bend: adequate, no discomfort.    LEFT HIP EXAM:    Tender:  None.  Nontender: left greater trochanter, left gluteus medius, left ASIS, left iliac crest, left proximal hamstring attachment  Strength:  4+/5 hip flexion, extension  Special tests:  Irritability (flexion/adduction/internal rotation) mild, negative straight leg raise  Hip range of motion: Internal rotation: neutral, External rotation: 35 degrees, Flexion 100 degrees    RIGHT HIP EXAM:     Tender:   Greater trochanter, medius, tensor, lower back/buttock  Nontender: right ASIS, right iliac crest, right proximal hamstring attachment  Strength:  4-/5 hip flexion, extension.  Special tests:  Irritability (flexion/adduction/internal rotation) positive.  Hip range of motion: Internal rotation: lacks 10 degrees from neutral, External rotation: 30, Flexion 95 degrees with obligatory external rotation with hip flexion.    right  KNEE EXAM:    Incision: healed well, Dry. No erythema or ecchymosis.  Skin: intact, no ecchymosis or erythema  ROM: 0 degrees extension to 120 degrees flexion  Effusion: none  Tender:  None.      X-RAY: AP pelvis and AP/Lateral views right hip from 4/13/2017 were reviewed in clinic today. No obvious fractures or dislocations. Advanced degenerative changes in the right hip with bone on bone loss of the joint space. Since the previous study progressive subchondral degenerative cyst formation has developed within the acetabulum and the femoral head, with sclerosis. Marginal osteophytes. Left hip with moderate degenerative changes.    Impression: 76 year old male with right hip pain,  "advanced bone on bone primary osteoarthritis, trochanteric bursitis, muscle pain.       Plan:   * discussed pain in groin/hip likely from advanced hip arthritis, bone on bone. This is wearing of the cartilage within the hip joint either due to normal \"wear and tear\" or following an injury. Any low back / buttock / radiating pain likely coming from the low back.  *  treatment options for hip arthritis and pain include: do nothing, NSAIDS, activity modification, Physical Therapy, injections, total hip arthroplasty. Risks and benefits of each discussed.   * did discuss patient is a candidate for total hip arthroplasty, and offered total hip arthroplasty to patient. Total hip arthroplasty will only attempt to provide pain relief from hip related arthritis only and not any pain from the low back. Any low back pain likely to continue.  *  Discussed risks of surgery include, but not limited to: bleeding, infection, pain, scar, damage to adjacent structures (e.g. Nerves, blood vessels, bone, cartilage), temporary or permanent nerve damage, recurrence of symptoms, implant dislocation, implant failure, implant infection, unequal limb lengths, stiffness, need for further surgery, blood clots, pulmonary embolism, risks of anesthesia, and death.  * patient would like to proceed with surgery: right total hip arthroplasty.  * will arrange right total hip arthroplasty at a mutual convenience in the near future  * discussed will plan hospitalization ~ 2-3 days, with discharge to home or short term rehabilitation facility, depending on postoperative recovery and progress during hospitalization.  * will plan postoperative deep vein thrombosis prophylaxis x4 weeks.  Additionally, graduated compression stockings x4 weeks, and SCDs while in the hospital.  * postoperative pain control will be oral medications upon discharge from hospital  * postoperative Physical Therapy will be discussed, if needed, at first postoperative visit at 2 " weeks  * patient will need longterm prophylactic antibiotics use with dental procedures or other invasive procedures (2 g amoxicilin or 450mg ([id number]) clindamycin) 1 hour prior to dental procedures.  * patient will need preoperative H+P prior to surgery from PCP.  * will see patient 2 weeks postoperative, sooner as needed. Will obtain lowset AP pelvis and lateral right hip xray at that time.  * 3 months of Posterior Hip Precautions: No hip flexion beyond 90 degrees; No hip adduction cross midline; No hip internal rotation.    The information in this document, created by a scribe for me, accurately reflects the services I personally performed and the decisions made by me. I have reviewed and approved this document for accuracy.      Ismael Bowling M.D., M.S.  Dept. of Orthopaedic Surgery  [facility]

## 2017-04-20 ENCOUNTER — RADIANT APPOINTMENT (OUTPATIENT)
Dept: ULTRASOUND IMAGING | Facility: CLINIC | Age: 77
End: 2017-04-20
Attending: PHYSICIAN ASSISTANT
Payer: COMMERCIAL

## 2017-04-20 ENCOUNTER — OFFICE VISIT (OUTPATIENT)
Dept: FAMILY MEDICINE | Facility: CLINIC | Age: 77
End: 2017-04-20
Payer: COMMERCIAL

## 2017-04-20 VITALS
DIASTOLIC BLOOD PRESSURE: 78 MMHG | WEIGHT: 283 LBS | SYSTOLIC BLOOD PRESSURE: 128 MMHG | BODY MASS INDEX: 35.19 KG/M2 | TEMPERATURE: 98 F | RESPIRATION RATE: 18 BRPM | HEIGHT: 75 IN | HEART RATE: 80 BPM | OXYGEN SATURATION: 97 %

## 2017-04-20 DIAGNOSIS — I10 BENIGN ESSENTIAL HYPERTENSION: ICD-10-CM

## 2017-04-20 DIAGNOSIS — Z79.01 LONG-TERM (CURRENT) USE OF ANTICOAGULANTS: ICD-10-CM

## 2017-04-20 DIAGNOSIS — R09.89 DECREASED PULSES IN FEET: ICD-10-CM

## 2017-04-20 DIAGNOSIS — M16.11 PRIMARY OSTEOARTHRITIS OF RIGHT HIP: ICD-10-CM

## 2017-04-20 DIAGNOSIS — F33.0 MAJOR DEPRESSIVE DISORDER, RECURRENT EPISODE, MILD (H): ICD-10-CM

## 2017-04-20 DIAGNOSIS — I48.20 CHRONIC ATRIAL FIBRILLATION (H): ICD-10-CM

## 2017-04-20 DIAGNOSIS — E78.5 HYPERLIPIDEMIA LDL GOAL <70: ICD-10-CM

## 2017-04-20 DIAGNOSIS — I25.10 CAD S/P PERCUTANEOUS CORONARY ANGIOPLASTY: ICD-10-CM

## 2017-04-20 DIAGNOSIS — Z01.818 PREOP GENERAL PHYSICAL EXAM: Primary | ICD-10-CM

## 2017-04-20 DIAGNOSIS — Z98.61 CAD S/P PERCUTANEOUS CORONARY ANGIOPLASTY: ICD-10-CM

## 2017-04-20 LAB
ANION GAP SERPL CALCULATED.3IONS-SCNC: 9 MMOL/L (ref 3–14)
BASOPHILS # BLD AUTO: 0 10E9/L (ref 0–0.2)
BASOPHILS NFR BLD AUTO: 0.5 %
BUN SERPL-MCNC: 11 MG/DL (ref 7–30)
CALCIUM SERPL-MCNC: 9.1 MG/DL (ref 8.5–10.1)
CHLORIDE SERPL-SCNC: 105 MMOL/L (ref 94–109)
CO2 SERPL-SCNC: 27 MMOL/L (ref 20–32)
CREAT SERPL-MCNC: 0.97 MG/DL (ref 0.66–1.25)
DIFFERENTIAL METHOD BLD: NORMAL
EOSINOPHIL # BLD AUTO: 0.1 10E9/L (ref 0–0.7)
EOSINOPHIL NFR BLD AUTO: 1.8 %
ERYTHROCYTE [DISTWIDTH] IN BLOOD BY AUTOMATED COUNT: 14.4 % (ref 10–15)
GFR SERPL CREATININE-BSD FRML MDRD: 76 ML/MIN/1.7M2
GLUCOSE SERPL-MCNC: 104 MG/DL (ref 70–99)
HCT VFR BLD AUTO: 47.1 % (ref 40–53)
HGB BLD-MCNC: 16.3 G/DL (ref 13.3–17.7)
INR PPP: 2.4 (ref 0.86–1.14)
LYMPHOCYTES # BLD AUTO: 1.8 10E9/L (ref 0.8–5.3)
LYMPHOCYTES NFR BLD AUTO: 29.8 %
MCH RBC QN AUTO: 29.6 PG (ref 26.5–33)
MCHC RBC AUTO-ENTMCNC: 34.6 G/DL (ref 31.5–36.5)
MCV RBC AUTO: 86 FL (ref 78–100)
MONOCYTES # BLD AUTO: 0.5 10E9/L (ref 0–1.3)
MONOCYTES NFR BLD AUTO: 7.5 %
NEUTROPHILS # BLD AUTO: 3.6 10E9/L (ref 1.6–8.3)
NEUTROPHILS NFR BLD AUTO: 60.4 %
PLATELET # BLD AUTO: 207 10E9/L (ref 150–450)
POTASSIUM SERPL-SCNC: 3.9 MMOL/L (ref 3.4–5.3)
RBC # BLD AUTO: 5.51 10E12/L (ref 4.4–5.9)
SODIUM SERPL-SCNC: 141 MMOL/L (ref 133–144)
WBC # BLD AUTO: 6 10E9/L (ref 4–11)

## 2017-04-20 PROCEDURE — 99214 OFFICE O/P EST MOD 30 MIN: CPT | Performed by: PHYSICIAN ASSISTANT

## 2017-04-20 PROCEDURE — 36415 COLL VENOUS BLD VENIPUNCTURE: CPT | Performed by: PHYSICIAN ASSISTANT

## 2017-04-20 PROCEDURE — 85610 PROTHROMBIN TIME: CPT | Performed by: PHYSICIAN ASSISTANT

## 2017-04-20 PROCEDURE — 93922 UPR/L XTREMITY ART 2 LEVELS: CPT

## 2017-04-20 PROCEDURE — 80048 BASIC METABOLIC PNL TOTAL CA: CPT | Performed by: PHYSICIAN ASSISTANT

## 2017-04-20 PROCEDURE — 85025 COMPLETE CBC W/AUTO DIFF WBC: CPT | Performed by: PHYSICIAN ASSISTANT

## 2017-04-20 PROCEDURE — 93000 ELECTROCARDIOGRAM COMPLETE: CPT | Performed by: PHYSICIAN ASSISTANT

## 2017-04-20 RX ORDER — VALSARTAN AND HYDROCHLOROTHIAZIDE 80; 12.5 MG/1; MG/1
1 TABLET, FILM COATED ORAL DAILY
Qty: 90 TABLET | Refills: 3 | Status: SHIPPED | OUTPATIENT
Start: 2017-04-20 | End: 2018-05-02

## 2017-04-20 ASSESSMENT — ANXIETY QUESTIONNAIRES
2. NOT BEING ABLE TO STOP OR CONTROL WORRYING: NOT AT ALL
6. BECOMING EASILY ANNOYED OR IRRITABLE: SEVERAL DAYS
IF YOU CHECKED OFF ANY PROBLEMS ON THIS QUESTIONNAIRE, HOW DIFFICULT HAVE THESE PROBLEMS MADE IT FOR YOU TO DO YOUR WORK, TAKE CARE OF THINGS AT HOME, OR GET ALONG WITH OTHER PEOPLE: SOMEWHAT DIFFICULT
5. BEING SO RESTLESS THAT IT IS HARD TO SIT STILL: NOT AT ALL
3. WORRYING TOO MUCH ABOUT DIFFERENT THINGS: SEVERAL DAYS
1. FEELING NERVOUS, ANXIOUS, OR ON EDGE: SEVERAL DAYS
7. FEELING AFRAID AS IF SOMETHING AWFUL MIGHT HAPPEN: NOT AT ALL
GAD7 TOTAL SCORE: 3

## 2017-04-20 ASSESSMENT — PATIENT HEALTH QUESTIONNAIRE - PHQ9: 5. POOR APPETITE OR OVEREATING: NOT AT ALL

## 2017-04-20 NOTE — PROGRESS NOTES
[facility]  [address]  [place] [address]  [phone]  Dept: [phone]    PRE-OP EVALUATION:  Today's date: 2017    Chidi Dubon (: 1940) presents for pre-operative evaluation assessment as requested by Dr. Bowling.  He requires evaluation and anesthesia risk assessment prior to undergoing surgery/procedure for treatment of hip pain right .  Proposed procedure: right hip total replacement    Date of Surgery/ Procedure: 17  Time of Surgery/ Procedure: [facility]  Hospital/Surgical Facility: [place]  Fax number for surgical facility:   Primary Physician: Martin Schultz  Type of Anesthesia Anticipated: to be determined    Patient has a Health Care Directive or Living Will:  NO    1. YES - DO YOU HAVE A HISTORY OF HEART ATTACK, STROKE, STENT, BYPASS OR SURGERY ON AN ARTERY IN THE HEAD, NECK, HEART OR LEG? Stent placement  2. NO - Do you ever have any pain or discomfort in your chest?  3. NO - Do you have a history of  Heart Failure?  4. NO - Are you troubled by shortness of breath when: walking on the level, up a slight hill or at night?  5. NO - Do you currently have a cold, bronchitis or other respiratory infection?  6. NO - Do you have a cough, shortness of breath or wheezing?  7. NO - Do you sometimes get pains in the calves of your legs when you walk?  8. NO - Do you or anyone in your family have previous history of blood clots?  9. NO - Do you or does anyone in your family have a serious bleeding problem such as prolonged bleeding following surgeries or cuts?  10. NO - Have you ever had problems with anemia or been told to take iron pills?  11. NO - Have you had any abnormal blood loss such as black, tarry or bloody stools, or abnormal vaginal bleeding?  12. YES - HAVE YOU EVER HAD A BLOOD TRANSFUSION? Due to kidney stones-  13. NO - Have you or any of your relatives ever had problems with anesthesia?  14. NO - Do you have sleep apnea, excessive  snoring or daytime drowsiness?  15. NO - Do you have any prosthetic heart valves?  16. NO - Do you have prosthetic joints?  17. NO - Is there any chance that you may be pregnant?      HPI:                                                            See problem list for active medical problems.  Problems all longstanding and stable, except as noted/documented.  See ROS for pertinent symptoms related to these conditions.                                                                                                  .    MEDICAL HISTORY:                                                      Patient Active Problem List    Diagnosis Date Noted     Benign essential hypertension 02/29/2016     Priority: Medium     Long-term (current) use of anticoagulants [Z79.01] 02/16/2016     Priority: Medium     Restless leg syndrome 12/09/2014     Priority: Medium     Begin with gabapentin and supplement with iron daily for borderline ferritin.        Fatigue 05/09/2014     Priority: Medium     HGB     14.9   10/21/2013. TSH     1.73   8/5/2013 LDL      104   8/20/2013.  TSH     1.73   8/5/2013.        Atrial Fibrillation 05/03/2013     Priority: Medium     Wafarin therapy, managed by [facility]  November 25, 2014 - CHADS2 2.8%.  HAS-BLED 1.8%.       Diverticulosis 08/10/2012     Priority: Medium     Noted on CT but no inflammation 8/12.       Urolithiasis 08/10/2012     Priority: Medium     Noted 8/8/12.  Lemon juice as uric acid.  Will hold Flomax as wanting to minimize medications and possibly help fatigue.        Osteoarthritis, knee 07/06/2011     Priority: Medium     Synvisc with some benefit.  Getting shots.  Trying to lose weight and exercise. Minimal benefit from injections.        CAD S/P percutaneous coronary angioplasty 06/25/2010     Priority: Medium     Stents x 2.  Stable.  Flipped T's inferiorly.  On good medical management. 8/13 restended. Diffuse disease. Medical management.        Hyperlipidemia LDL goal <70 12/14/2009      Priority: Medium     Using half dose Crestor due to cost. May benefit from atorvastatin when generic available.  Accepting that goal will not be met. Simvastatin 40 mg prescription.   LDL      100   5/9/2014 - will review benefit for atorvastatin 40 every other day.        Obesity 12/14/2009     Priority: Medium     Strongly encouraged exercise.  Weight Watchers might help as well. Considering.        Mild major depression (H) 12/14/2009     Priority: Medium     Increase to 20 mg.         Past Medical History:   Diagnosis Date     CAD (coronary artery disease)      Dyslipidemia      HTN (hypertension)      Past Surgical History:   Procedure Laterality Date     ANGIOGRAM  age 61     3 cornary artery stents, [facility]     ANGIOGRAM  age 66    1 coronary artery stent, [facility]     ANGIOGRAM  11/2013    1 coronary artery stent, [facility]      ARTHROSCOPY KNEE RT/LT       Current Outpatient Prescriptions   Medication Sig Dispense Refill     valsartan-hydrochlorothiazide (DIOVAN-HCT) 80-12.5 MG per tablet Take 1 tablet by mouth daily 90 tablet 3     atorvastatin (LIPITOR) 80 MG tablet Take 1 tablet (80 mg) by mouth every other day 90 tablet 1     nitroglycerin (NITROSTAT) 0.4 MG SL tablet Place 1 tablet (0.4 mg) under the tongue every 5 minutes as needed for chest pain 25 tablet 0     warfarin (COUMADIN) 5 MG tablet Take 1 tablet (5 mg) by mouth daily Or as directed by [facility]. Current dose is 2.5 mg daily 90 tablet 2     aspirin 81 MG tablet Take 1 tablet (81 mg) by mouth daily 30 tablet      [DISCONTINUED] valsartan-hydrochlorothiazide (DIOVAN-HCT) 80-12.5 MG per tablet Take 1 tablet by mouth daily 30 tablet 1     [DISCONTINUED] simvastatin (ZOCOR) 80 MG tablet Take 0.5 tablets (40 mg) by mouth At Bedtime 45 tablet 3     OTC products: None, except as noted above    Allergies   Allergen Reactions     No Known Allergies       Latex Allergy: NO    Social History   Substance Use Topics     Smoking status:  "Never Smoker     Smokeless tobacco: Never Used      Comment: never smoker; non-smoking household     Alcohol use No      Comment: none     History   Drug Use No       REVIEW OF SYSTEMS:                                                    Constitutional, neuro, ENT, endocrine, pulmonary, cardiac, gastrointestinal, genitourinary, musculoskeletal, integument and psychiatric systems are negative, except as otherwise noted.    EXAM:                                                    /78  Pulse 80  Temp 98  F (36.7  C) (Tympanic)  Resp 18  Ht 6' 3\" (1.905 m)  Wt 283 lb (128.4 kg)  SpO2 97%  BMI 35.37 kg/m2    GENERAL APPEARANCE: healthy, alert and no distress     EYES: EOMI,  PERRL     HENT: ear canals and TM's normal and nose and mouth without ulcers or lesions     NECK: no adenopathy, no asymmetry, masses, or scars and thyroid normal to palpation     RESP: lungs clear to auscultation - no rales, rhonchi or wheezes     CV: no murmur, click or rub and irregularly irregular rhythm     ABDOMEN:  soft, nontender, no HSM or masses and bowel sounds normal     MS: extremities normal- no gross deformities noted, no evidence of inflammation in joints, FROM in all extremities.     SKIN: no suspicious lesions or rashes     NEURO: Normal strength and tone, sensory exam grossly normal, mentation intact and speech normal     PSYCH: mentation appears normal. and affect normal/bright     LYMPHATICS: No axillary, cervical, or supraclavicular nodes  Modestly decreased pulses in both feet.   DIAGNOSTICS:                                                        Recent Labs   Lab Test 04/06/17 02/23/17   10/04/16   1144   02/29/16   1214   07/03/15   1344   10/21/13   0841   HGB   --    --    --    --    --    --    --   16.5   --   14.9   PLT   --    --    --    --    --    --    --   210   --   176   INR  2.5*  2.6*   < >  2.30*   < >   --    < >   --    < >  1.86*   NA   --    --    --   138   --   138   --   139   < >  139 "   POTASSIUM   --    --    --   3.2*   --   3.6   --   3.8   < >  3.5   CR   --    --    --   0.96   --   1.08   --   1.01   < >  0.89    < > = values in this interval not displayed.        IMPRESSION:                                                    Chidi was seen today for pre-op exam.    Diagnoses and all orders for this visit:    Preop general physical exam  -     CBC with platelets and differential  -     EKG 12-lead complete w/read - Clinics    Atrial Fibrillation  -     CARDIOLOGY EVAL ADULT REFERRAL  -     Discontinue: enoxaparin (LOVENOX) 150 MG/ML injection; Administer 1 ml daily    Long-term (current) use of anticoagulants [Z79.01]  -     INR    Benign essential hypertension  -     DEPRESSION ACTION PLAN (DAP)  -     Basic metabolic panel  (Ca, Cl, CO2, Creat, Gluc, K, Na, BUN)  -     valsartan-hydrochlorothiazide (DIOVAN-HCT) 80-12.5 MG per tablet; Take 1 tablet by mouth daily    Hyperlipidemia LDL goal <70    Mild major depression (H)    CAD S/P percutaneous coronary angioplasty  -     CARDIOLOGY EVAL ADULT REFERRAL    Primary osteoarthritis of right hip    Decreased pulses in feet  -     US SOUTH Doppler No Exercise; Future          The proposed surgical procedure is considered INTERMEDIATE risk.        INTERPRETATION: 3 risks: Class IV (high risk - >11% complication rate)    The patient has the following additional risks for perioperative complications:  The 10-year ASCVD risk score (Lynn SHERLY Jr, et al., 2013) is: 28%    Values used to calculate the score:      Age: 76 years      Sex: Male      Is Non- : No      Diabetic: No      Tobacco smoker: No      Systolic Blood Pressure: 128 mmHg      Is BP treated: Yes      HDL Cholesterol: 41 mg/dL      Total Cholesterol: 157 mg/dL      ICD-10-CM    1. Preop general physical exam Z01.818 CBC with platelets and differential     EKG 12-lead complete w/read - Clinics   2. Atrial Fibrillation I48.2 CARDIOLOGY EVAL ADULT REFERRAL      DISCONTINUED: enoxaparin (LOVENOX) 150 MG/ML injection   3. Long-term (current) use of anticoagulants [Z79.01] Z79.01 INR   4. Benign essential hypertension I10 DEPRESSION ACTION PLAN (DAP)     Basic metabolic panel  (Ca, Cl, CO2, Creat, Gluc, K, Na, BUN)     valsartan-hydrochlorothiazide (DIOVAN-HCT) 80-12.5 MG per tablet   5. Hyperlipidemia LDL goal <70 E78.5    6. Mild major depression (H) F32.0    7. CAD S/P percutaneous coronary angioplasty I25.10 CARDIOLOGY EVAL ADULT REFERRAL    Z98.61    8. Primary osteoarthritis of right hip M16.11    9. Decreased pulses in feet R09.89 US SOUTH Doppler No Exercise       RECOMMENDATIONS:                                                      --Consult hospital rounder / IM to assist post-op medical management        APPROVAL GIVEN to proceed with proposed procedure, without further diagnostic evaluation       Signed Electronically by: Martin Schultz PA-C    Copy of this evaluation report is provided to requesting physician.    [facility] Preop Guidelines

## 2017-04-20 NOTE — PATIENT INSTRUCTIONS
Before Your Surgery      Call your surgeon if there is any change in your health. This includes signs of a cold or flu (such as a sore throat, runny nose, cough, rash or fever).    Do not smoke, drink alcohol or take over the counter medicine (unless your surgeon or primary care doctor tells you to) for the 24 hours before and after surgery.    If you take prescribed drugs: Follow your doctor s orders about which medicines to take and which to stop until after surgery.    Eating and drinking prior to surgery: follow the instructions from your surgeon    Take a shower or bath the night before surgery. Use the soap your surgeon gave you to gently clean your skin. If you do not have soap from your surgeon, use your regular soap. Do not shave or scrub the surgery site.  Wear clean pajamas and have clean sheets on your bed.           Hold your warfarin 5 days prior to surgery. No bridging with lovenox, unless cardiology recommends it.  Hold aspirin 7 days prior to surgery.

## 2017-04-20 NOTE — MR AVS SNAPSHOT
After Visit Summary   4/20/2017    Chidi Dubon    MRN: [id number]           Patient Information     Date Of Birth          1940        Visit Information        Provider Department      4/20/2017 1:00 PM Martin Schultz PA-C [facility] [place]        Today's Diagnoses     Preop general physical exam    -  1    Atrial Fibrillation        Long-term (current) use of anticoagulants [Z79.01]        Benign essential hypertension        Hyperlipidemia LDL goal <70        Mild major depression (H)        CAD S/P percutaneous coronary angioplasty        Primary osteoarthritis of right hip        Decreased pulses in feet          Care Instructions      Before Your Surgery      Call your surgeon if there is any change in your health. This includes signs of a cold or flu (such as a sore throat, runny nose, cough, rash or fever).    Do not smoke, drink alcohol or take over the counter medicine (unless your surgeon or primary care doctor tells you to) for the 24 hours before and after surgery.    If you take prescribed drugs: Follow your doctor s orders about which medicines to take and which to stop until after surgery.    Eating and drinking prior to surgery: follow the instructions from your surgeon    Take a shower or bath the night before surgery. Use the soap your surgeon gave you to gently clean your skin. If you do not have soap from your surgeon, use your regular soap. Do not shave or scrub the surgery site.  Wear clean pajamas and have clean sheets on your bed.           Hold your warfarin 5 days prior to surgery. No bridging with lovenox, unless cardiology recommends it.  Hold aspirin 7 days prior to surgery.        Follow-ups after your visit        Additional Services     CARDIOLOGY EVAL ADULT REFERRAL       Your provider has referred you to:  [facility]: [facility] [phone]   [email or web address]    Please be aware  that coverage of these services is subject to the terms and limitations of your health insurance plan.  Call member services at your health plan with any benefit or coverage questions.      Type of Referral:  Cardiology Follow Up  History of cad. Requesting cardiac clearance for hip replacement surgery.  Timeframe requested:  1 Week    Please bring the following to your appointment:  >>   Any x-rays, CTs or MRIs which have been performed.  Contact the facility where they were done to arrange for  prior to your scheduled appointment.    >>   List of current medications  >>   This referral request   >>   Any documents/labs given to you for this referral                  Your next 10 appointments already scheduled     May 18, 2017 11:30 AM CDT   Anticoagulation Visit with BE ANTI COAG   [facility] [place] ([facility]    [address]  [place] [address] [phone]            May 24, 2017 10:45 AM CDT   Return Visit with Ismael Bowling MD   [facility]    [address]  [place] [address] [phone]              Future tests that were ordered for you today     Open Future Orders        Priority Expected Expires Ordered    US SOUTH Doppler No Exercise Routine  4/20/2018 4/20/2017            Who to contact     Normal or non-critical lab and imaging results will be communicated to you by Lee Silberhart, letter or phone within 4 business days after the clinic has received the results. If you do not hear from us within 7 days, please contact the clinic through Lee Silberhart or phone. If you have a critical or abnormal lab result, we will notify you by phone as soon as possible.  Submit refill requests through [employer] or call your pharmacy and they will forward the refill request to us. Please allow 3 business days for your refill to be completed.          If you need to speak with a  for additional information , please  "call: [phone]             Additional Information About Your Visit        [employer] Information     [employer] lets you send messages to your doctor, view your test results, renew your prescriptions, schedule appointments and more. To sign up, go to [email or web address] . Click on \"Log in\" on the left side of the screen, which will take you to the Welcome page. Then click on \"Sign up Now\" on the right side of the page.     You will be asked to enter the access code listed below, as well as some personal information. Please follow the directions to create your username and password.     Your access code is: [email or web address]  Expires: 2017  1:47 PM     Your access code will  in 90 days. If you need help or a new code, please call your [facility] clinic or [phone].        Care EveryWhere ID     This is your [facility] EveryWhere ID. This could be used by other organizations to access your [facility] medical records  [id number]        Your Vitals Were     Pulse Temperature Respirations Height Pulse Oximetry BMI (Body Mass Index)    80 98  F (36.7  C) (Tympanic) 18 6' 3\" (1.905 m) 97% 35.37 kg/m2       Blood Pressure from Last 3 Encounters:   17 128/78   10/04/16 128/68   16 134/80    Weight from Last 3 Encounters:   17 283 lb (128.4 kg)   17 283 lb 9.6 oz (128.6 kg)   10/04/16 275 lb (124.7 kg)              We Performed the Following     Basic metabolic panel  (Ca, Cl, CO2, Creat, Gluc, K, Na, BUN)     CARDIOLOGY EVAL ADULT REFERRAL     CBC with platelets and differential     DEPRESSION ACTION PLAN (DAP)     EKG 12-lead complete w/read - Clinics     INR          Today's Medication Changes          These changes are accurate as of: 17  2:19 PM.  If you have any questions, ask your nurse or doctor.               Stop taking these medicines if you haven't already. Please contact your care team if you have questions.     Potassium Chloride ER 20 MEQ Tbcr   Stopped by:  Martin Schultz, " MINDY                Where to get your medicines      These medications were sent to [id number] - [place], [place] - [address]  [address], [place] [address]     Phone:  [phone]     valsartan-hydrochlorothiazide 80-12.5 MG per tablet                Primary Care Provider Office Phone # Fax #    Martin Rashid MINDY Schultz [phone]       [facility] [address]  [place] [address]        Goals        Diet    Reduce portion size     Related Problems    Hyperlipidemia LDL goal <70    Notes - Note created  5/9/2014  3:10 PM by Oliver Veliz MD    Everything in moderation.         General    Medication 1 (pt-stated)     Related Problems    Urolithiasis    Notes - Note created  5/9/2014  3:11 PM by Oliver Veliz MD    Stop the flomax and pay attention to your urine flow.  If not worse, ok to stay off of it. If worse, restart.       Taper off medication (pt-stated)     Related Problems    Fatigue    Notes - Note created  5/9/2014  3:08 PM by Oliver Veliz MD    Tapering Schedule for citalopram;    Week 1:  1/2 alternating with 1 every other day  Week 2:  1/2 daily  Week 3:  1/2 every other day  Week 4:  Stop    If at any point you experience significant side effects from tapering, we may need to slow the taper a bit.    Once you are off of this, pay attention to the mood and if you are not feeling better, we can try using bupropion which is a medication that is less likely to cause fatigue and weight gain.         Thank you!     Thank you for choosing [facility]  for your care. Our goal is always to provide you with excellent care. Hearing back from our patients is one way we can continue to improve our services. Please take a few minutes to complete the written survey that you may receive in the mail after your visit with us. Thank you!             Your Updated Medication List - Protect others around you: Learn how to safely use, store  and throw away your medicines at www.disposemymeds.org.          This list is accurate as of: 4/20/17  2:19 PM.  Always use your most recent med list.                   Brand Name Dispense Instructions for use    aspirin 81 MG tablet     30 tablet    Take 1 tablet (81 mg) by mouth daily       atorvastatin 80 MG tablet    LIPITOR    90 tablet    Take 1 tablet (80 mg) by mouth every other day       nitroglycerin 0.4 MG sublingual tablet    NITROSTAT    25 tablet    Place 1 tablet (0.4 mg) under the tongue every 5 minutes as needed for chest pain       valsartan-hydrochlorothiazide 80-12.5 MG per tablet    DIOVAN-HCT    90 tablet    Take 1 tablet by mouth daily       warfarin 5 MG tablet    COUMADIN    90 tablet    Take 1 tablet (5 mg) by mouth daily Or as directed by [facility]. Current dose is 2.5 mg daily

## 2017-04-20 NOTE — LETTER
My Depression Action Plan  Name: Chidi Dubon   Date of Birth 1940  Date: 4/20/2017    My doctor: Martin Schultz   My clinic: [facility]  [address]  [place] [id number] [phone]          GREEN    ZONE   Good Control    What it looks like:     Things are going generally well. You have normal up s and down s. You may even feel depressed from time to time, but bad moods usually last less than a day.   What you need to do:  1. Continue to care for yourself (see self care plan)  2. Check your depression survival kit and update it as needed  3. Follow your physician s recommendations including any medication.  4. Do not stop taking medication unless you consult with your physician first.           YELLOW         ZONE Getting Worse    What it looks like:     Depression is starting to interfere with your life.     It may be hard to get out of bed; you may be starting to isolate yourself from others.    Symptoms of depression are starting to last most all day and this has happened for several days.     You may have suicidal thoughts but they are not constant.   What you need to do:     1. Call your care team, your response to treatment will improve if you keep your care team informed of your progress. Yellow periods are signs an adjustment may need to be made.     2. Continue your self-care, even if you have to fake it!    3. Talk to someone in your support network    4. Open up your depression survival kit           RED    ZONE Medical Alert - Get Help    What it looks like:     Depression is seriously interfering with your life.     You may experience these or other symptoms: You can t get out of bed most days, can t work or engage in other necessary activities, you have trouble taking care of basic hygiene, or basic responsibilities, thoughts of suicide or death that will not go away, self-injurious behavior.     What you need to do:  1. Call your care team  and request a same-day appointment. If they are not available (weekends or after hours) call your local crisis line, emergency room or 911.      Electronically signed by: Kerry Osborn, April 20, 2017    Depression Self Care Plan / Survival Kit    Self-Care for Depression  Here s the deal. Your body and mind are really not as separate as most people think.  What you do and think affects how you feel and how you feel influences what you do and think. This means if you do things that people who feel good do, it will help you feel better.  Sometimes this is all it takes.  There is also a place for medication and therapy depending on how severe your depression is, so be sure to consult with your medical provider and/ or Behavioral Health Consultant if your symptoms are worsening or not improving.     In order to better manage my stress, I will:    Exercise  Get some form of exercise, every day. This will help reduce pain and release endorphins, the  feel good  chemicals in your brain. This is almost as good as taking antidepressants!  This is not the same as joining a gym and then never going! (they count on that by the way ) It can be as simple as just going for a walk or doing some gardening, anything that will get you moving.      Hygiene   Maintain good hygiene (Get out of bed in the morning, Make your bed, Brush your teeth, Take a shower, and Get dressed like you were going to work, even if you are unemployed).  If your clothes don't fit try to get ones that do.    Diet  I will strive to eat foods that are good for me, drink plenty of water, and avoid excessive sugar, caffeine, alcohol, and other mood-altering substances.  Some foods that are helpful in depression are: complex carbohydrates, B vitamins, flaxseed, fish or fish oil, fresh fruits and vegetables.    Psychotherapy  I agree to participate in Individual Therapy (if recommended).    Medication  If prescribed medications, I agree to take them.  Missing  doses can result in serious side effects.  I understand that drinking alcohol, or other illicit drug use, may cause potential side effects.  I will not stop my medication abruptly without first discussing it with my provider.    Staying Connected With Others  I will stay in touch with my friends, family members, and my primary care provider/team.    Use your imagination  Be creative.  We all have a creative side; it doesn t matter if it s oil painting, sand castles, or mud pies! This will also kick up the endorphins.    Witness Beauty  (AKA stop and smell the roses) Take a look outside, even in mid-winter. Notice colors, textures. Watch the squirrels and birds.     Service to others  Be of service to others.  There is always someone else in need.  By helping others we can  get out of ourselves  and remember the really important things.  This also provides opportunities for practicing all the other parts of the program.    Humor  Laugh and be silly!  Adjust your TV habits for less news and crime-drama and more comedy.    Control your stress  Try breathing deep, massage therapy, biofeedback, and meditation. Find time to relax each day.     My support system    Clinic Contact:  Phone number:    Contact 1:  Phone number:    Contact 2:  Phone number:    [facility]/:  Phone number:    Therapist:  Phone number:    Local crisis center:    Phone number:    Other community support:  Phone number:

## 2017-04-20 NOTE — LETTER
[facility] ([facility] [place]  [address] [place] [id number] [phone]        May 1, 2017      Chidi Dubon  [address] [place] [address]        Chidi,     Your recent preop lab work came back nice and stable.       Results for orders placed or performed in visit on 04/20/17   CBC with platelets and differential   Result Value Ref Range    WBC 6.0 4.0 - 11.0 10e9/L    RBC Count 5.51 4.4 - 5.9 10e12/L    Hemoglobin 16.3 13.3 - 17.7 g/dL    Hematocrit 47.1 40.0 - 53.0 %    MCV 86 78 - 100 fl    MCH 29.6 26.5 - 33.0 pg    MCHC 34.6 31.5 - 36.5 g/dL    RDW 14.4 10.0 - 15.0 %    Platelet Count 207 150 - 450 10e9/L    Diff Method Automated Method     % Neutrophils 60.4 %    % Lymphocytes 29.8 %    % Monocytes 7.5 %    % Eosinophils 1.8 %    % Basophils 0.5 %    Absolute Neutrophil 3.6 1.6 - 8.3 10e9/L    Absolute Lymphocytes 1.8 0.8 - 5.3 10e9/L    Absolute Monocytes 0.5 0.0 - 1.3 10e9/L    Absolute Eosinophils 0.1 0.0 - 0.7 10e9/L    Absolute Basophils 0.0 0.0 - 0.2 10e9/L   Basic metabolic panel  (Ca, Cl, CO2, Creat, Gluc, K, Na, BUN)   Result Value Ref Range    Sodium 141 133 - 144 mmol/L    Potassium 3.9 3.4 - 5.3 mmol/L    Chloride 105 94 - 109 mmol/L    Carbon Dioxide 27 20 - 32 mmol/L    Anion Gap 9 3 - 14 mmol/L    Glucose 104 (H) 70 - 99 mg/dL    Urea Nitrogen 11 7 - 30 mg/dL    Creatinine 0.97 0.66 - 1.25 mg/dL    GFR Estimate 76 >60 mL/min/1.7m2    GFR Estimate If Black >90   GFR Calc   >60 mL/min/1.7m2    Calcium 9.1 8.5 - 10.1 mg/dL   INR   Result Value Ref Range    INR 2.40 (H) 0.86 - 1.14           If you have any questions or concerns, please call myself or my nurse at [phone].    Sincerely,    Martin Schultz PA-C/jocelyn

## 2017-04-21 ASSESSMENT — PATIENT HEALTH QUESTIONNAIRE - PHQ9: SUM OF ALL RESPONSES TO PHQ QUESTIONS 1-9: 3

## 2017-04-21 ASSESSMENT — ANXIETY QUESTIONNAIRES: GAD7 TOTAL SCORE: 3

## 2017-04-26 ENCOUNTER — OFFICE VISIT (OUTPATIENT)
Dept: CARDIOLOGY | Facility: CLINIC | Age: 77
End: 2017-04-26
Payer: COMMERCIAL

## 2017-04-26 VITALS
WEIGHT: 282 LBS | BODY MASS INDEX: 35.25 KG/M2 | DIASTOLIC BLOOD PRESSURE: 82 MMHG | HEART RATE: 76 BPM | OXYGEN SATURATION: 95 % | SYSTOLIC BLOOD PRESSURE: 133 MMHG

## 2017-04-26 DIAGNOSIS — Z13.6 SCREENING FOR CARDIOVASCULAR CONDITION: Primary | ICD-10-CM

## 2017-04-26 DIAGNOSIS — I48.21 PERMANENT ATRIAL FIBRILLATION (H): ICD-10-CM

## 2017-04-26 PROCEDURE — 99214 OFFICE O/P EST MOD 30 MIN: CPT | Mod: 25 | Performed by: INTERNAL MEDICINE

## 2017-04-26 PROCEDURE — 93000 ELECTROCARDIOGRAM COMPLETE: CPT | Performed by: INTERNAL MEDICINE

## 2017-04-26 ASSESSMENT — PAIN SCALES - GENERAL: PAINLEVEL: MODERATE PAIN (4)

## 2017-04-26 NOTE — NURSING NOTE
Cardiac Testing: Patient given instructions regarding Lexiscan at [facility]. Discussed purpose, preparation, procedure and when to expect results reported back to the patient. Patient demonstrated understanding of this information and agreed to call with further questions or concerns.    Med Reconcile: Reviewed and verified all current medications with the patient. The updated medication list was printed and given to the patient.    Return Appointment: Patient given instructions regarding scheduling next clinic visit, depending on results of lexiscan. Patient demonstrated understanding of this information and agreed to call with further questions or concerns.    Patient stated he understood all health information given and agreed to call with further questions or concerns.    Harjinder Camacho RN

## 2017-04-26 NOTE — NURSING NOTE
"Chief Complaint   Patient presents with     Pre Op Exam     Former Dr. Mckeon pt, here for Cardiac clearance for hip surgery on 5-9-2017. Pt reports some chest \"pinch\" feeling will last a feew seconds then go away, not there now.  Is less active now until hip gets fixed.       Initial /82 (BP Location: Right arm, Patient Position: Chair, Cuff Size: Adult Large)  Pulse 76  Wt 282 lb (127.9 kg)  SpO2 95%  BMI 35.25 kg/m2 Estimated body mass index is 35.25 kg/(m^2) as calculated from the following:    Height as of 4/20/17: 6' 3\" (1.905 m).    Weight as of this encounter: 282 lb (127.9 kg)..  BP completed using cuff size: kusum Ann CMA    "

## 2017-04-26 NOTE — PATIENT INSTRUCTIONS
1.  Prep for obdulio-nuc stress test: This is scheduled at [facility] on Thursday, May 4th, 2017 at 8:30am.    PREP:  NPO 3 hours prior, Water is ok and encouraged.  NO alcohol, smoking, caffeine, or decaf products 12 hours prior.  TAKE ALL medications as prescribed, hold the following medications if they pertain to you:   If you take Aggrenox or dipyridamole (Persantine, Permole), stop taking it 48 hours before your test.   If you take Viagra, Cialis or Levitra, stop taking it 48 hours before your test.   If you take theophylline or aminophylline, stop taking it 12 hours before your test.   For patients with diabetes:If you take insulin, call your diabetes care team. Ask if you should take a 1/2 dose the morning of your test.   If you take diabetes medicine by mouth, don t take it on the morning of your test. Bring it with you to take after the test. (If you have questions, call your diabetes care team.)  Do not take nitrates on the day of your test. Do not wear your Nitro-Patch    2. We will follow up with you regarding the results and determine your clearance for surgery.    [facility] - [place] [facility]    If you have any questions regarding to your visit please contact your care team:     Cardiology  Telephone Number   Harjinder Jerry  Cardiology RN's.    Alanna Ann CMA [phone]    After hours: [phone].  [phone]   For scheduling appts:     [phone] or  [phone]    After hours: [phone]   For the Device Clinic (Pacemakers and ICD's)  RN's :  Angela Paz   During business hours: [phone]  After business hours:  [phone]- select option 4.      If you need a medication refill please contact your pharmacy.  Please allow 3 business days for your refill to be completed.    As always, Thank you for trusting us with your health care needs!  _____________________________________________________________________

## 2017-04-26 NOTE — PROGRESS NOTES
SUBJECTIVE:  Chidi Dubon is a 76 year old male who presents for pre-op evaluation.Planning for right hip surgery on May 9th. Had Rt knee replacement in 2015.    Chronic afib on coumadin. Stent to LCx in 2003 and D2 in 2013. No symptoms prior to stent. HTN+. On statin.    Not very active. Denied symptoms.    Patient Active Problem List    Diagnosis Date Noted     Major depressive disorder, recurrent episode, mild (H) 04/20/2017     Priority: Medium     Benign essential hypertension 02/29/2016     Priority: Medium     Long-term (current) use of anticoagulants [Z79.01] 02/16/2016     Priority: Medium     Restless leg syndrome 12/09/2014     Priority: Medium     Begin with gabapentin and supplement with iron daily for borderline ferritin.        Fatigue 05/09/2014     Priority: Medium     HGB     14.9   10/21/2013. TSH     1.73   8/5/2013 LDL      104   8/20/2013.  TSH     1.73   8/5/2013.        Atrial Fibrillation 05/03/2013     Priority: Medium     Wafarin therapy, managed by [facility]  November 25, 2014 - CHADS2 2.8%.  HAS-BLED 1.8%.       Diverticulosis 08/10/2012     Priority: Medium     Noted on CT but no inflammation 8/12.       Urolithiasis 08/10/2012     Priority: Medium     Noted 8/8/12.  Lemon juice as uric acid.  Will hold Flomax as wanting to minimize medications and possibly help fatigue.        Osteoarthritis, knee 07/06/2011     Priority: Medium     Synvisc with some benefit.  Getting shots.  Trying to lose weight and exercise. Minimal benefit from injections.        CAD S/P percutaneous coronary angioplasty 06/25/2010     Priority: Medium     Stents x 2.  Stable.  Flipped T's inferiorly.  On good medical management. 8/13 restended. Diffuse disease. Medical management.        Hyperlipidemia LDL goal <70 12/14/2009     Priority: Medium     Using half dose Crestor due to cost. May benefit from atorvastatin when generic available.  Accepting that goal will not be met. Simvastatin 40 mg prescription.    LDL      100   5/9/2014 - will review benefit for atorvastatin 40 every other day.        Obesity 12/14/2009     Priority: Medium     Strongly encouraged exercise.  Weight Watchers might help as well. Considering.        Mild major depression (H) 12/14/2009     Priority: Medium     Increase to 20 mg.       .  Current Outpatient Prescriptions   Medication Sig     valsartan-hydrochlorothiazide (DIOVAN-HCT) 80-12.5 MG per tablet Take 1 tablet by mouth daily     atorvastatin (LIPITOR) 80 MG tablet Take 1 tablet (80 mg) by mouth every other day     warfarin (COUMADIN) 5 MG tablet Take 1 tablet (5 mg) by mouth daily Or as directed by [facility] clinic. Current dose is 2.5 mg daily     aspirin 81 MG tablet Take 1 tablet (81 mg) by mouth daily     nitroglycerin (NITROSTAT) 0.4 MG SL tablet Place 1 tablet (0.4 mg) under the tongue every 5 minutes as needed for chest pain (Patient not taking: Reported on 4/26/2017)     [DISCONTINUED] simvastatin (ZOCOR) 80 MG tablet Take 0.5 tablets (40 mg) by mouth At Bedtime     No current facility-administered medications for this visit.      Past Medical History:   Diagnosis Date     CAD (coronary artery disease)      Dyslipidemia      HTN (hypertension)      Past Surgical History:   Procedure Laterality Date     ANGIOGRAM  age 61     3 cornary artery stents, [facility]     ANGIOGRAM  age 66    1 coronary artery stent, [facility]     ANGIOGRAM  11/2013    1 coronary artery stent, [facility]      ARTHROSCOPY KNEE RT/LT       Allergies   Allergen Reactions     No Known Allergies      Social History     Social History     Marital status:      Spouse name: N/A     Number of children: N/A     Years of education: N/A     Occupational History     Not on file.     Social History Main Topics     Smoking status: Never Smoker     Smokeless tobacco: Never Used      Comment: never smoker; non-smoking household     Alcohol use No      Comment: none     Drug use: No     Sexual activity: Yes      Partners: Female     Other Topics Concern     Parent/Sibling W/ Cabg, Mi Or Angioplasty Before 65f 55m? No     Social History Narrative     Family History   Problem Relation Age of Onset     HEART DISEASE Paternal Grandfather           REVIEW OF SYSTEMS:  General: negative, fever, chills, night sweats  Skin: negative, acne, rash and scaling  Eyes: negative, double vision, eye pain and photophobia  Ears/Nose/Throat: negative, nasal congestion and purulent rhinorrhea  Respiratory: No dyspnea on exertion, No cough, No hemoptysis and negative  Cardiovascular: negative, palpitations, tachycardia, irregular heart beat, chest pain, exertional chest pain or pressure, paroxysmal nocturnal dyspnea, dyspnea on exertion and orthopnea         OBJECTIVE:  Blood pressure 133/82, pulse 76, weight 127.9 kg (282 lb), SpO2 95 %.  General Appearance: alert and no distress  Head: Normocephalic. No masses, lesions, tenderness or abnormalities  Eyes: conjuctiva clear, PERRL, EOM intact  Ears: External ears normal. Canals clear. TM's normal.  Nose: Nares normal  Mouth: normal  Neck: Supple, no cervical adenopathy, no thyromegaly  Lungs: clear to auscultation  Cardiac: regular rate and rhythm, normal S1 and S2, no murmur       ASSESSMENT/PLAN:  76 yr old male for pre-op eval prior to rt hip replacement.  Not very active. No complaints.  Stent to LCx in 2003 and D2 in 2013.  Afib on coumadin.  HTN. HLD.  EKG done today reviewed. Afib. Controlled rate. LVH with strain. RBBB.  Due to prior stents and uinactivity, will plan for a stress MPI.  Hold ASA 7 days prior to surgery if needed.  Hold coumadin for 4 days prior to surgery and restart ASAP post surgery.  Per orders.   Return to Clinic PRN.    Reviewed stress MPI. Normal. Patient may proceed with surgery. Hold coumadin for 4 days and re-start ASAP post surgery.

## 2017-04-26 NOTE — LETTER
4/26/2017      RE: Chidi Dubon  [address]  [place] [address]       Dear Colleague,    Thank you for the opportunity to participate in the care of your patient, Chidi Dubon, at the [facility]. Please see a copy of my visit note below.       SUBJECTIVE:  Chidi Dubon is a 76 year old male who presents for pre-op evaluation.Planning for right hip surgery on May 9th. Had Rt knee replacement in 2015.    Chronic afib on coumadin. Stent to LCx in 2003 and D2 in 2013. No symptoms prior to stent. HTN+. On statin.    Not very active. Denied symptoms.    Patient Active Problem List    Diagnosis Date Noted     Major depressive disorder, recurrent episode, mild (H) 04/20/2017     Priority: Medium     Benign essential hypertension 02/29/2016     Priority: Medium     Long-term (current) use of anticoagulants [Z79.01] 02/16/2016     Priority: Medium     Restless leg syndrome 12/09/2014     Priority: Medium     Begin with gabapentin and supplement with iron daily for borderline ferritin.        Fatigue 05/09/2014     Priority: Medium     HGB     14.9   10/21/2013. TSH     1.73   8/5/2013 LDL      104   8/20/2013.  TSH     1.73   8/5/2013.        Atrial Fibrillation 05/03/2013     Priority: Medium     Wafarin therapy, managed by [facility]  November 25, 2014 - CHADS2 2.8%.  HAS-BLED 1.8%.       Diverticulosis 08/10/2012     Priority: Medium     Noted on CT but no inflammation 8/12.       Urolithiasis 08/10/2012     Priority: Medium     Noted 8/8/12.  Lemon juice as uric acid.  Will hold Flomax as wanting to minimize medications and possibly help fatigue.        Osteoarthritis, knee 07/06/2011     Priority: Medium     Synvisc with some benefit.  Getting shots.  Trying to lose weight and exercise. Minimal benefit from injections.        CAD S/P percutaneous coronary angioplasty 06/25/2010     Priority: Medium      Stents x 2.  Stable.  Flipped T's inferiorly.  On good medical management. 8/13 restended. Diffuse disease. Medical management.        Hyperlipidemia LDL goal <70 12/14/2009     Priority: Medium     Using half dose Crestor due to cost. May benefit from atorvastatin when generic available.  Accepting that goal will not be met. Simvastatin 40 mg prescription.   LDL      100   5/9/2014 - will review benefit for atorvastatin 40 every other day.        Obesity 12/14/2009     Priority: Medium     Strongly encouraged exercise.  Weight Watchers might help as well. Considering.        Mild major depression (H) 12/14/2009     Priority: Medium     Increase to 20 mg.       .  Current Outpatient Prescriptions   Medication Sig     valsartan-hydrochlorothiazide (DIOVAN-HCT) 80-12.5 MG per tablet Take 1 tablet by mouth daily     atorvastatin (LIPITOR) 80 MG tablet Take 1 tablet (80 mg) by mouth every other day     warfarin (COUMADIN) 5 MG tablet Take 1 tablet (5 mg) by mouth daily Or as directed by [facility]. Current dose is 2.5 mg daily     aspirin 81 MG tablet Take 1 tablet (81 mg) by mouth daily     nitroglycerin (NITROSTAT) 0.4 MG SL tablet Place 1 tablet (0.4 mg) under the tongue every 5 minutes as needed for chest pain (Patient not taking: Reported on 4/26/2017)     [DISCONTINUED] simvastatin (ZOCOR) 80 MG tablet Take 0.5 tablets (40 mg) by mouth At Bedtime     No current facility-administered medications for this visit.      Past Medical History:   Diagnosis Date     CAD (coronary artery disease)      Dyslipidemia      HTN (hypertension)      Past Surgical History:   Procedure Laterality Date     ANGIOGRAM  age 61     3 cornary artery stents, [facility]     ANGIOGRAM  age 66    1 coronary artery stent, [facility]     ANGIOGRAM  11/2013    1 coronary artery stent, [facility]      ARTHROSCOPY KNEE RT/LT       Allergies   Allergen Reactions     No Known Allergies      Social History     Social History     Marital  status:      Spouse name: N/A     Number of children: N/A     Years of education: N/A     Occupational History     Not on file.     Social History Main Topics     Smoking status: Never Smoker     Smokeless tobacco: Never Used      Comment: never smoker; non-smoking household     Alcohol use No      Comment: none     Drug use: No     Sexual activity: Yes     Partners: Female     Other Topics Concern     Parent/Sibling W/ Cabg, Mi Or Angioplasty Before 65f 55m? No     Social History Narrative     Family History   Problem Relation Age of Onset     HEART DISEASE Paternal Grandfather           REVIEW OF SYSTEMS:  General: negative, fever, chills, night sweats  Skin: negative, acne, rash and scaling  Eyes: negative, double vision, eye pain and photophobia  Ears/Nose/Throat: negative, nasal congestion and purulent rhinorrhea  Respiratory: No dyspnea on exertion, No cough, No hemoptysis and negative  Cardiovascular: negative, palpitations, tachycardia, irregular heart beat, chest pain, exertional chest pain or pressure, paroxysmal nocturnal dyspnea, dyspnea on exertion and orthopnea         OBJECTIVE:  Blood pressure 133/82, pulse 76, weight 127.9 kg (282 lb), SpO2 95 %.  General Appearance: alert and no distress  Head: Normocephalic. No masses, lesions, tenderness or abnormalities  Eyes: conjuctiva clear, PERRL, EOM intact  Ears: External ears normal. Canals clear. TM's normal.  Nose: Nares normal  Mouth: normal  Neck: Supple, no cervical adenopathy, no thyromegaly  Lungs: clear to auscultation  Cardiac: regular rate and rhythm, normal S1 and S2, no murmur       ASSESSMENT/PLAN:  76 yr old male for pre-op eval prior to rt hip replacement.  Not very active. No complaints.  Stent to LCx in 2003 and D2 in 2013.  Afib on coumadin.  HTN. HLD.  EKG done today reviewed. Afib. Controlled rate. LVH with strain. RBBB.  Due to prior stents and uinactivity, will plan for a stress MPI.  Hold ASA 7 days prior to surgery if  needed.  Hold coumadin for 4 days prior to surgery and restart ASAP post surgery.  Per orders.   Return to Clinic PRN.    Please do not hesitate to contact me if you have any questions/concerns.     Sincerely,     GEORGINA Pierre MD

## 2017-04-26 NOTE — MR AVS SNAPSHOT
After Visit Summary   4/26/2017    Chidi Dubon    MRN: [id number]           Patient Information     Date Of Birth          1940        Visit Information        Provider Department      4/26/2017 11:30 AM GEORGINA Pierre MD [facility]YS[facility] AT [facility]        Today's Diagnoses     Atrial fibrillation (H)    -  1    Screening for cardiovascular condition          Care Instructions    1.  Prep for obdulio-nuc stress test: This is scheduled at [facility] on Thursday, May 4th, 2017 at 8:30am.    PREP:  NPO 3 hours prior, Water is ok and encouraged.  NO alcohol, smoking, caffeine, or decaf products 12 hours prior.  TAKE ALL medications as prescribed, hold the following medications if they pertain to you:   If you take Aggrenox or dipyridamole (Persantine, Permole), stop taking it 48 hours before your test.   If you take Viagra, Cialis or Levitra, stop taking it 48 hours before your test.   If you take theophylline or aminophylline, stop taking it 12 hours before your test.   For patients with diabetes:If you take insulin, call your diabetes care team. Ask if you should take a 1/2 dose the morning of your test.   If you take diabetes medicine by mouth, don t take it on the morning of your test. Bring it with you to take after the test. (If you have questions, call your diabetes care team.)  Do not take nitrates on the day of your test. Do not wear your Nitro-Patch    2. We will follow up with you regarding the results and determine your clearance for surgery.    [facility] Cardiology - [place] Location    If you have any questions regarding to your visit please contact your care team:     Cardiology  Telephone Number   Harjinder Jerry  Cardiology RN's.    Alnana Ann CMA [phone]    After hours: [phone].  [phone]   For scheduling appts:     [phone] or  [phone]    After hours: [phone]   For the Device Clinic  (Pacemakers and ICD's)  RN's :  Angela Paz   During business hours: [phone]  After business hours:  [phone]- select option 4.      If you need a medication refill please contact your pharmacy.  Please allow 3 business days for your refill to be completed.    As always, Thank you for trusting us with your health care needs!  _____________________________________________________________________            Follow-ups after your visit        Your next 10 appointments already scheduled     May 04, 2017  8:30 AM CDT   NM INJECTION with MGNMINJ1   [facility])    [address] [place], [place] [address]   [phone]            May 04, 2017  9:15 AM CDT   NM SCAN3 with MGNM1   [facility])    [address] [place], [place] [address]   [phone]            May 04, 2017  9:45 AM CDT   Ekg Stress Nm Lexiscan with MG STRESS RM, MG IMAGING NURSE, MG CARD, MG CV TECH   [facility])    [address] [place] [address]   [phone]            May 04, 2017 10:15 AM CDT   NM MPI WITH LEXISCAN with MGNM1   [facility])    [address] [place] [address]   [phone]           For a ONE day exam: Allow 3-4 hours for test. For a TWO day exam: Allow 2 hours PER day for test.  You may need to stop some medicines before the test. Follow your doctor s orders. - If you take a beta blocker: Follow your doctor s specific instructions on taking it prior to and on the day of your exam. - If you take Aggrenox or dipyridamole (Persantine, Permole), stop taking it 48 hours before your test. - If you take Viagra, Cialis or Levitra, stop taking it 48 hours before your test. - If you take theophylline or aminophylline, stop taking it 12 hours before your test.  For patients with diabetes: - If you  take insulin, call your diabetes care team. Ask if you should take a 1/2 dose the morning of your test. - If you take diabetes medicine by mouth, don t take it on the morning of your test. Bring it with you to take after the test. (If you have questions, call your diabetes care team.)  Do not take nitrates on the day of your test. Do not wear your Nitro-Patch.  Stop all caffeine 12 hours before the test. This includes coffee, tea, soda pop, chocolate and certain medicines (such as Anacin, Excedrin and NoDoz). Also avoid decaf coffee and tea, as these contain small amounts of caffeine.  No alcohol, smoking or other tobacco for 12 hours before the test.  Stop eating 3 hours before the test. You may drink water.  Please wear a loose two-piece outfit. If you will have an exercise test, bring rubber-soled walking shoes.  When you arrive, please tell us if you: - Have diabetes - Are breastfeeding - May be pregnant - Have a pacemaker of ICD (implantable defibrillator).  Please call your Imaging Department at your exam site with any questions.            May 24, 2017 10:45 AM CDT   Return Visit with Ismael Bowling MD   [facility] ([facility])    [address] [address] [phone]              Future tests that were ordered for you today     Open Future Orders        Priority Expected Expires Ordered    Stress NM Lexiscan Routine  4/26/2018 4/26/2017            Who to contact     If you have questions or need follow up information about today's clinic visit or your schedule please contact [facility] PHYSICIANS HEART AT [facility] directly at [phone].  Normal or non-critical lab and imaging results will be communicated to you by MyChart, letter or phone within 4 business days after the clinic has received the results. If you do not hear from us within 7 days, please contact the clinic through MyChart or phone. If you have a critical or abnormal lab  "result, we will notify you by phone as soon as possible.  Submit refill requests through [employer] or call your pharmacy and they will forward the refill request to us. Please allow 3 business days for your refill to be completed.          Additional Information About Your Visit        Clinkedhart Information     Nanalysis lets you send messages to your doctor, view your test results, renew your prescriptions, schedule appointments and more. To sign up, go to [email or web address] . Click on \"Log in\" on the left side of the screen, which will take you to the Welcome page. Then click on \"Sign up Now\" on the right side of the page.     You will be asked to enter the access code listed below, as well as some personal information. Please follow the directions to create your username and password.     Your access code is: [email or web address]  Expires: 2017  1:47 PM     Your access code will  in 90 days. If you need help or a new code, please call your [place] clinic or [phone].        Care EveryWhere ID     This is your Care EveryWhere ID. This could be used by other organizations to access your [place] medical records  [id number]        Your Vitals Were     Pulse Pulse Oximetry BMI (Body Mass Index)             76 95% 35.25 kg/m2          Blood Pressure from Last 3 Encounters:   17 133/82   17 128/78   10/04/16 128/68    Weight from Last 3 Encounters:   17 127.9 kg (282 lb)   17 128.4 kg (283 lb)   17 128.6 kg (283 lb 9.6 oz)              We Performed the Following     EKG 12-lead complete w/read - Clinics        Primary Care Provider Office Phone # Fax #    Martin Schultz PA-C [phone]       [facility] [place] [address] [place] [address]        Goals        Diet    Reduce portion size     Related Problems    Hyperlipidemia LDL goal <70    Notes - Note created  2014  3:10 PM by Oliver Veliz MD    Everything in moderation.         General    " Medication 1 (pt-stated)     Related Problems    Urolithiasis    Notes - Note created  5/9/2014  3:11 PM by Oliver Veliz MD    Stop the flomax and pay attention to your urine flow.  If not worse, ok to stay off of it. If worse, restart.       Taper off medication (pt-stated)     Related Problems    Fatigue    Notes - Note created  5/9/2014  3:08 PM by Oliver Veliz MD    Tapering Schedule for citalopram;    Week 1:  1/2 alternating with 1 every other day  Week 2:  1/2 daily  Week 3:  1/2 every other day  Week 4:  Stop    If at any point you experience significant side effects from tapering, we may need to slow the taper a bit.    Once you are off of this, pay attention to the mood and if you are not feeling better, we can try using bupropion which is a medication that is less likely to cause fatigue and weight gain.         Thank you!     Thank you for choosing [facility] AT [facility]  for your care. Our goal is always to provide you with excellent care. Hearing back from our patients is one way we can continue to improve our services. Please take a few minutes to complete the written survey that you may receive in the mail after your visit with us. Thank you!             Your Updated Medication List - Protect others around you: Learn how to safely use, store and throw away your medicines at www.disposemymeds.org.          This list is accurate as of: 4/26/17 12:18 PM.  Always use your most recent med list.                   Brand Name Dispense Instructions for use    aspirin 81 MG tablet     30 tablet    Take 1 tablet (81 mg) by mouth daily       atorvastatin 80 MG tablet    LIPITOR    90 tablet    Take 1 tablet (80 mg) by mouth every other day       nitroglycerin 0.4 MG sublingual tablet    NITROSTAT    25 tablet    Place 1 tablet (0.4 mg) under the tongue every 5 minutes as needed for chest pain       valsartan-hydrochlorothiazide 80-12.5 MG per tablet    DIOVAN-HCT     90 tablet    Take 1 tablet by mouth daily       warfarin 5 MG tablet    COUMADIN    90 tablet    Take 1 tablet (5 mg) by mouth daily Or as directed by [facility] clinic. Current dose is 2.5 mg daily

## 2017-05-02 ENCOUNTER — TELEPHONE (OUTPATIENT)
Dept: FAMILY MEDICINE | Facility: CLINIC | Age: 77
End: 2017-05-02

## 2017-05-02 ENCOUNTER — TELEPHONE (OUTPATIENT)
Dept: NEUROSURGERY | Facility: CLINIC | Age: 77
End: 2017-05-02

## 2017-05-02 DIAGNOSIS — I48.20 CHRONIC ATRIAL FIBRILLATION (H): Primary | ICD-10-CM

## 2017-05-02 DIAGNOSIS — R07.89 ATYPICAL CHEST PAIN: ICD-10-CM

## 2017-05-02 NOTE — TELEPHONE ENCOUNTER
Called and left a vm for the patient letting him know that his surgery has been moved up to 8 am on 5/9/17, need to arrive by 6:30 AM. I left our number for call back if he had questions.  Kaelyn Keller Certified Medical Assistant

## 2017-05-02 NOTE — TELEPHONE ENCOUNTER
No insurance referral needed for this insurance. Maybe they are just looking for and order from a provider.     Thank you,   Yudelka Pettit   Referral Department  [phone]

## 2017-05-02 NOTE — TELEPHONE ENCOUNTER
Current order for a NM stress test will not be covered with the Dx as listed. Please place a new order for a Stress NM Lexiscan [[id number]] with a Dx of I48.2 and sign order.

## 2017-05-02 NOTE — TELEPHONE ENCOUNTER
Patient calling, he has a Nuclear stress test scheduled for Thursday, insurance is requiring a referral.    They have been talking with Sheeba from [place] ph. [phone]. They aren't sure which office she is from. She has been trying to get in touch with the cardiologist for the referral, but has been unable to reach him, wondering if Martin Schultz can sign off on the referral for nuclear stress test.

## 2017-05-04 ENCOUNTER — RADIANT APPOINTMENT (OUTPATIENT)
Dept: NUCLEAR MEDICINE | Facility: CLINIC | Age: 77
End: 2017-05-04
Attending: INTERNAL MEDICINE
Payer: COMMERCIAL

## 2017-05-04 ENCOUNTER — OFFICE VISIT (OUTPATIENT)
Dept: CARDIOLOGY | Facility: CLINIC | Age: 77
End: 2017-05-04
Attending: INTERNAL MEDICINE
Payer: COMMERCIAL

## 2017-05-04 VITALS — SYSTOLIC BLOOD PRESSURE: 153 MMHG | DIASTOLIC BLOOD PRESSURE: 88 MMHG | HEART RATE: 73 BPM

## 2017-05-04 DIAGNOSIS — Z13.6 SCREENING FOR CARDIOVASCULAR CONDITION: ICD-10-CM

## 2017-05-04 DIAGNOSIS — I25.119 CORONARY ARTERY DISEASE INVOLVING NATIVE HEART WITH ANGINA PECTORIS, UNSPECIFIED VESSEL OR LESION TYPE (H): Primary | ICD-10-CM

## 2017-05-04 DIAGNOSIS — I48.21 PERMANENT ATRIAL FIBRILLATION (H): ICD-10-CM

## 2017-05-04 PROCEDURE — 93017 CV STRESS TEST TRACING ONLY: CPT | Performed by: INTERNAL MEDICINE

## 2017-05-04 PROCEDURE — 93016 CV STRESS TEST SUPVJ ONLY: CPT | Performed by: INTERNAL MEDICINE

## 2017-05-04 PROCEDURE — A9502 TC99M TETROFOSMIN: HCPCS | Performed by: INTERNAL MEDICINE

## 2017-05-04 PROCEDURE — 78452 HT MUSCLE IMAGE SPECT MULT: CPT | Performed by: INTERNAL MEDICINE

## 2017-05-04 PROCEDURE — 93018 CV STRESS TEST I&R ONLY: CPT | Performed by: INTERNAL MEDICINE

## 2017-05-04 RX ORDER — REGADENOSON 0.08 MG/ML
0.4 INJECTION, SOLUTION INTRAVENOUS ONCE
Status: COMPLETED | OUTPATIENT
Start: 2017-05-04 | End: 2017-05-04

## 2017-05-04 RX ADMIN — REGADENOSON 0.4 MG: 0.08 INJECTION, SOLUTION INTRAVENOUS at 09:41

## 2017-05-04 NOTE — PROGRESS NOTES
IV was started in the left AC with a 22g Jelco catheter and resting images were completed.      Patient's IV site was injected by RN with 0.4mg's of Lexiscan (Regadenoson) over a 15 second period, followed by a 5-mL saline flush.  The Nuclear Tech injected the Myoview tracer through the IV site 20 seconds later.      Patient offered C/O: short of breath      Total dose of Lexiscan was 0.4mg's.   Lexiscan NDC# 2119-1780-35   Total dose of Aminophylline was 0 mg  Aminophylline NDC# 4821-5764-88  Total dose of Metoprolol was 0 mg  Metoprolol NDC#  [address]-10  Total dose of Labetalol was 0 mg   Labetalol NDC# 6364-0857-96    Dr. De Oliveira provided supervision of the tests performed today.

## 2017-05-04 NOTE — MR AVS SNAPSHOT
MRN:[id number]                      After Visit Summary   5/4/2017    Chidi Dubon    MRN: [id number]           Visit Information        Provider Department      5/4/2017 9:45 AM MG CV TECH; MG CARD; MG IMAGING NURSE; MG STRESS RM [facility]        Your next 10 appointments already scheduled     May 04, 2017 10:15 AM CDT   NM MPI WITH LEXISCAN with MGNM1   [facility] ([facility])    [address] [address] [place] [address] [phone]           For a ONE day exam: Allow 3-4 hours for test. For a TWO day exam: Allow 2 hours PER day for test.  You may need to stop some medicines before the test. Follow your doctor s orders. - If you take a beta blocker: Follow your doctor s specific instructions on taking it prior to and on the day of your exam. - If you take Aggrenox or dipyridamole (Persantine, Permole), stop taking it 48 hours before your test. - If you take Viagra, Cialis or Levitra, stop taking it 48 hours before your test. - If you take theophylline or aminophylline, stop taking it 12 hours before your test.  For patients with diabetes: - If you take insulin, call your diabetes care team. Ask if you should take a 1/2 dose the morning of your test. - If you take diabetes medicine by mouth, don t take it on the morning of your test. Bring it with you to take after the test. (If you have questions, call your diabetes care team.)  Do not take nitrates on the day of your test. Do not wear your Nitro-Patch.  Stop all caffeine 12 hours before the test. This includes coffee, tea, soda pop, chocolate and certain medicines (such as Anacin, Excedrin and NoDoz). Also avoid decaf coffee and tea, as these contain small amounts of caffeine.  No alcohol, smoking or other tobacco for 12 hours before the test.  Stop eating 3 hours before the test. You may drink water.  Please wear a loose two-piece outfit. If you will have an exercise test, bring  rubber-soled walking shoes.  When you arrive, please tell us if you: - Have diabetes - Are breastfeeding - May be pregnant - Have a pacemaker of ICD (implantable defibrillator).  Please call your Imaging Department at your exam site with any questions.            May 24, 2017 10:45 AM CDT   [facility]   [facility]    [address] [address] [phone]              MyChart Information     Digidentity is an electronic gateway that provides easy, online access to your medical records. With Dig[employer], you can request a clinic appointment, read your test results, renew a prescription or communicate with your care team.     To sign up for RooTt visit the website at [email or web address]   You will be asked to enter the access code listed below, as well as some personal information. Please follow the directions to create your username and password.     Your access code is: [email or web address]  Expires: 2017  1:47 PM     Your access code will  in 90 days. If you need help or a new code, please contact your [facility] or call [phone] for assistance.        Care EveryWhere ID     This is your Care EveryWhere ID. This could be used by other organizations to access your [place] medical records  [id number]

## 2017-05-05 ENCOUNTER — TELEPHONE (OUTPATIENT)
Dept: CARDIOLOGY | Facility: CLINIC | Age: 77
End: 2017-05-05

## 2017-05-05 NOTE — TELEPHONE ENCOUNTER
Reviewed stress test results with Dr. Palmer. Pt cleared for upcoming hip surgery.    Pt to hold Coumadin for 4 days prior to surgery.    Left message for pt to call clinic back to discuss.    Harjinder Camacho RN

## 2017-05-05 NOTE — TELEPHONE ENCOUNTER
Discussed info below, per Dr. ALEXANDREA Palmer.   Patient verbalizes understanding and will comply.  States he received a print out from his other doc to stop Coumadin 7 days prior, so he states he has already stopped coumadin and aspirin for a couple of days now.  Will call back with any other questions.    Shi Ann CMA.

## 2017-05-09 ENCOUNTER — TRANSFERRED RECORDS (OUTPATIENT)
Dept: HEALTH INFORMATION MANAGEMENT | Facility: CLINIC | Age: 77
End: 2017-05-09

## 2017-05-24 ENCOUNTER — OFFICE VISIT (OUTPATIENT)
Dept: ORTHOPEDICS | Facility: CLINIC | Age: 77
End: 2017-05-24
Payer: COMMERCIAL

## 2017-05-24 ENCOUNTER — RADIANT APPOINTMENT (OUTPATIENT)
Dept: GENERAL RADIOLOGY | Facility: CLINIC | Age: 77
End: 2017-05-24
Attending: PHYSICIAN ASSISTANT
Payer: COMMERCIAL

## 2017-05-24 VITALS — HEIGHT: 75 IN | WEIGHT: 281 LBS | RESPIRATION RATE: 16 BRPM | BODY MASS INDEX: 34.94 KG/M2

## 2017-05-24 DIAGNOSIS — Z96.641 S/P HIP REPLACEMENT, RIGHT: ICD-10-CM

## 2017-05-24 DIAGNOSIS — Z96.641 S/P HIP REPLACEMENT, RIGHT: Primary | ICD-10-CM

## 2017-05-24 PROCEDURE — 99024 POSTOP FOLLOW-UP VISIT: CPT | Performed by: ORTHOPAEDIC SURGERY

## 2017-05-24 PROCEDURE — 73502 X-RAY EXAM HIP UNI 2-3 VIEWS: CPT | Mod: RT

## 2017-05-24 RX ORDER — OXYCODONE AND ACETAMINOPHEN 5; 325 MG/1; MG/1
1-2 TABLET ORAL EVERY 8 HOURS PRN
Qty: 40 TABLET | Refills: 0 | Status: SHIPPED | OUTPATIENT
Start: 2017-05-24 | End: 2018-05-01

## 2017-05-24 ASSESSMENT — PAIN SCALES - GENERAL: PAINLEVEL: MILD PAIN (2)

## 2017-05-24 NOTE — LETTER
5/24/2017       RE: Chidi Dubon  [address]  [place] [address]           Dear Colleague,    Thank you for referring your patient, Chidi Dubon, to the [facility]. Please see a copy of my visit note below.    Chief Complaint   Patient presents with     Surgical Followup     Right JUSTO. DOS: 5/9/17 doing well with walker.        SURGERY: Total hip arthroplasty, right hip ( [facility])    DATE OF SURGERY: 5/9/2017    HISTORY OF PRESENT ILLNESS: Chidi Dubon is a 76 year old male seen for postoperative evaluation of right total hip arthroplasty. It has been 2 weeks since surgery. Returns today stating he is doing well. Pain is mild today, rated 2/10. While at [facility], he had a hard time with urinary and bowel movements. Has returned home. Patient is drinking a lot of water. Urinating frequently now. Flomax really helped him. Patient notes he still does not have his appetite back. He is doing well with his walker today. He notes that he does still have swelling in his right foot, but both legs actually. Denies any wound problems. Denies numbness, tingling, or weakness in the affected extremity. Denies fevers chills night sweats. Continues to take warfarin for anti-coagulation for deep vein thrombosis prophylaxis as well for his heart conditions longterm. Use of pain medications has been minimal oxycodone. Has been following posterior hip precautions. Concerns today include: none.       Present symptoms: mild pain.    Pain severity: 3/10  Pain quality: dull and aching  Frequency of symptoms: frequently  Exacerbating Factors: weight bearing  Relieving Factors: rest, sitting  Night Pain: No  Pain while at rest: No   Associated numbness or tingling: No       PAST MEDICAL HISTORY:   Past Medical History:   Diagnosis Date     CAD (coronary artery disease)      Dyslipidemia      HTN (hypertension)      Patient Active Problem List    Diagnosis Date Noted     Major depressive  disorder, recurrent episode, mild (H) 04/20/2017     Priority: Medium     Benign essential hypertension 02/29/2016     Priority: Medium     Long-term (current) use of anticoagulants [Z79.01] 02/16/2016     Priority: Medium     Restless leg syndrome 12/09/2014     Priority: Medium     Begin with gabapentin and supplement with iron daily for borderline ferritin.        Fatigue 05/09/2014     Priority: Medium     HGB     14.9   10/21/2013. TSH     1.73   8/5/2013 LDL      104   8/20/2013.  TSH     1.73   8/5/2013.        Atrial Fibrillation 05/03/2013     Priority: Medium     Wafarin therapy, managed by [facility]  November 25, 2014 - CHADS2 2.8%.  HAS-BLED 1.8%.       Diverticulosis 08/10/2012     Priority: Medium     Noted on CT but no inflammation 8/12.       Urolithiasis 08/10/2012     Priority: Medium     Noted 8/8/12.  Lemon juice as uric acid.  Will hold Flomax as wanting to minimize medications and possibly help fatigue.        Osteoarthritis, knee 07/06/2011     Priority: Medium     Synvisc with some benefit.  Getting shots.  Trying to lose weight and exercise. Minimal benefit from injections.        CAD S/P percutaneous coronary angioplasty 06/25/2010     Priority: Medium     Stents x 2.  Stable.  Flipped T's inferiorly.  On good medical management. 8/13 restended. Diffuse disease. Medical management.        Hyperlipidemia LDL goal <70 12/14/2009     Priority: Medium     Using half dose Crestor due to cost. May benefit from atorvastatin when generic available.  Accepting that goal will not be met. Simvastatin 40 mg prescription.   LDL      100   5/9/2014 - will review benefit for atorvastatin 40 every other day.        Obesity 12/14/2009     Priority: Medium     Strongly encouraged exercise.  Weight Watchers might help as well. Considering.        Mild major depression (H) 12/14/2009     Priority: Medium     Increase to 20 mg.          PAST SURGICAL HISTORY:   Past Surgical History:   Procedure Laterality Date      "ANGIOGRAM  age 61     3 cornary artery stents, [facility]     ANGIOGRAM  age 66    1 coronary artery stent, [facility]     ANGIOGRAM  11/2013    1 coronary artery stent, [facility]      ARTHROSCOPY KNEE RT/LT         Medications:   Current Outpatient Prescriptions:      valsartan-hydrochlorothiazide (DIOVAN-HCT) 80-12.5 MG per tablet, Take 1 tablet by mouth daily, Disp: 90 tablet, Rfl: 3     atorvastatin (LIPITOR) 80 MG tablet, Take 1 tablet (80 mg) by mouth every other day, Disp: 90 tablet, Rfl: 1     nitroglycerin (NITROSTAT) 0.4 MG SL tablet, Place 1 tablet (0.4 mg) under the tongue every 5 minutes as needed for chest pain (Patient not taking: Reported on 4/26/2017), Disp: 25 tablet, Rfl: 0     warfarin (COUMADIN) 5 MG tablet, Take 1 tablet (5 mg) by mouth daily Or as directed by [facility] clinic. Current dose is 2.5 mg daily, Disp: 90 tablet, Rfl: 2     aspirin 81 MG tablet, Take 1 tablet (81 mg) by mouth daily, Disp: 30 tablet, Rfl:      [DISCONTINUED] simvastatin (ZOCOR) 80 MG tablet, Take 0.5 tablets (40 mg) by mouth At Bedtime, Disp: 45 tablet, Rfl: 3    Allergies:   Allergies   Allergen Reactions     No Known Allergies      This document serves as a record of the services and decisions personally performed and made by Ismael Bowling MD. It was created on his behalf by Rosmery Lewis, a trained medical scribe. The creation of this document is based the provider's statements to the medical scribe.    Scribe Rosmery Lewis 11:14 AM 5/24/2017     REVIEW OF SYSTEMS:  CONSTITUTIONAL:NEGATIVE for fever, chills, night sweats, change in weight  INTEGUMENTARY/SKIN: NEGATIVE for worrisome rashes, moles or lesions  MUSCULOSKELETAL:See HPI above  NEURO: NEGATIVE for weakness, dizziness or paresthesias    PHYSICAL EXAM:  Resp 16  Ht 1.905 m (6' 3\")  Wt 127.5 kg (281 lb)  BMI 35.12 kg/m2   GENERAL APPEARANCE: healthy, alert, no distress; accompanied by his wife  SKIN: no suspicious lesions or rashes  NEURO: Normal strength " and tone, mentation intact and speech normal  PSYCH:  mentation appears normal and affect normal  RESPIRATORY: No increased work of breathing.    BILATERAL LOWER EXTREMITIES:  Gait: using walker for support  No gross deformities or masses.  No Quad atrophy, strength normal.  Intact sensation deep peroneal nerve, superficial peroneal nerve, med/lat tibial nerve, sural nerve, saphenous nerve  Intact EHL, EDL, TA, FHL, GS, quadriceps hamstrings and hip flexors  Toes warm and well perfused, brisk capillary refill. Palpable 2+ dp pulses.  Bilateral calf soft and nttp or squeeze.  Edema: pitting, bilateral, 1-2+      RIGHT HIP EXAM:    Incision: healing well, skin glue, dry blood.  Palpation: Tender:  Nontender to palpation   Strength: intact. Weak.  Hip range of motion: normal, minimal discomfort.  Leg lengths: grossly symmetric at medial malleolus.      X-RAY:  AP pelvis, lateral views right hip from 5/24/2017 were reviewed in clinic today. No obvious fractures or dislocations. Hip arthroplasty components in place without obvious failure, complication, fracture, or dislocation.    Impression: 76 year old male seen for postoperative evaluation of right total hip arthroplasty. It has been 2 weeks since surgery. Doing well.    Plan:   * reviewed xrays with patient today showing total hip arthroplasty implants.  * also keep an eye on scab of knee.   * continue warfarin for your heart longterm as prior, regular INR check as prior.  * continue with posterior hip precautions x3 months postoperative  * physical therapy for stretching, range of motion and strengthening.  * wean off all pain medications as tolerated  * xrays next visit: lowset AP pelvis and lateral hip  * return to clinic 4 weeks   * all questions addressed and answered prior to discharge from clinic today to patient's satisfaction.  * patient will need longterm prophylactic antibiotics use with dental procedures or other invasive procedures (2 g amoxicilin or  450mg ([id number]) clindamycin) 1 hour prior to dental procedures.    The information in this document, created by a scribe for me, accurately reflects the services I personally performed and the decisions made by me. I have reviewed and approved this document for accuracy.       Ismael Bowling M.D., M.S.  Dept. of Orthopaedic Surgery  [facility]            Again, thank you for allowing me to participate in the care of your patient.        Sincerely,              Ismael Bowling MD

## 2017-05-24 NOTE — PROGRESS NOTES
Chief Complaint   Patient presents with     Surgical Followup     Right JUSTO. DOS: 5/9/17 doing well with walker.        SURGERY: Total hip arthroplasty, right hip ( [facility])    DATE OF SURGERY: 5/9/2017    HISTORY OF PRESENT ILLNESS: Chidi Dubon is a 76 year old male seen for postoperative evaluation of right total hip arthroplasty. It has been 2 weeks since surgery. Returns today stating he is doing well. Pain is mild today, rated 2/10. While at [facility], he had a hard time with urinary and bowel movements. Has returned home. Patient is drinking a lot of water. Urinating frequently now. Flomax really helped him. Patient notes he still does not have his appetite back. He is doing well with his walker today. He notes that he does still have swelling in his right foot, but both legs actually. Denies any wound problems. Denies numbness, tingling, or weakness in the affected extremity. Denies fevers chills night sweats. Continues to take warfarin for anti-coagulation for deep vein thrombosis prophylaxis as well for his heart conditions longterm. Use of pain medications has been minimal oxycodone. Has been following posterior hip precautions. Concerns today include: none.       Present symptoms: mild pain.    Pain severity: 3/10  Pain quality: dull and aching  Frequency of symptoms: frequently  Exacerbating Factors: weight bearing  Relieving Factors: rest, sitting  Night Pain: No  Pain while at rest: No   Associated numbness or tingling: No       PAST MEDICAL HISTORY:   Past Medical History:   Diagnosis Date     CAD (coronary artery disease)      Dyslipidemia      HTN (hypertension)      Patient Active Problem List    Diagnosis Date Noted     Major depressive disorder, recurrent episode, mild (H) 04/20/2017     Priority: Medium     Benign essential hypertension 02/29/2016     Priority: Medium     Long-term (current) use of anticoagulants [Z79.01] 02/16/2016     Priority: Medium     Restless leg  syndrome 12/09/2014     Priority: Medium     Begin with gabapentin and supplement with iron daily for borderline ferritin.        Fatigue 05/09/2014     Priority: Medium     HGB     14.9   10/21/2013. TSH     1.73   8/5/2013 LDL      104   8/20/2013.  TSH     1.73   8/5/2013.        Atrial Fibrillation 05/03/2013     Priority: Medium     Wafarin therapy, managed by [facility]  November 25, 2014 - CHADS2 2.8%.  HAS-BLED 1.8%.       Diverticulosis 08/10/2012     Priority: Medium     Noted on CT but no inflammation 8/12.       Urolithiasis 08/10/2012     Priority: Medium     Noted 8/8/12.  Lemon juice as uric acid.  Will hold Flomax as wanting to minimize medications and possibly help fatigue.        Osteoarthritis, knee 07/06/2011     Priority: Medium     Synvisc with some benefit.  Getting shots.  Trying to lose weight and exercise. Minimal benefit from injections.        CAD S/P percutaneous coronary angioplasty 06/25/2010     Priority: Medium     Stents x 2.  Stable.  Flipped T's inferiorly.  On good medical management. 8/13 restended. Diffuse disease. Medical management.        Hyperlipidemia LDL goal <70 12/14/2009     Priority: Medium     Using half dose Crestor due to cost. May benefit from atorvastatin when generic available.  Accepting that goal will not be met. Simvastatin 40 mg prescription.   LDL      100   5/9/2014 - will review benefit for atorvastatin 40 every other day.        Obesity 12/14/2009     Priority: Medium     Strongly encouraged exercise.  Weight Watchers might help as well. Considering.        Mild major depression (H) 12/14/2009     Priority: Medium     Increase to 20 mg.          PAST SURGICAL HISTORY:   Past Surgical History:   Procedure Laterality Date     ANGIOGRAM  age 61     3 cornary artery stents, [facility]     ANGIOGRAM  age 66    1 coronary artery stent, [facility]     ANGIOGRAM  11/2013    1 coronary artery stent, [facility]      ARTHROSCOPY KNEE RT/LT         Medications:  "  Current Outpatient Prescriptions:      valsartan-hydrochlorothiazide (DIOVAN-HCT) 80-12.5 MG per tablet, Take 1 tablet by mouth daily, Disp: 90 tablet, Rfl: 3     atorvastatin (LIPITOR) 80 MG tablet, Take 1 tablet (80 mg) by mouth every other day, Disp: 90 tablet, Rfl: 1     nitroglycerin (NITROSTAT) 0.4 MG SL tablet, Place 1 tablet (0.4 mg) under the tongue every 5 minutes as needed for chest pain (Patient not taking: Reported on 4/26/2017), Disp: 25 tablet, Rfl: 0     warfarin (COUMADIN) 5 MG tablet, Take 1 tablet (5 mg) by mouth daily Or as directed by [facility]. Current dose is 2.5 mg daily, Disp: 90 tablet, Rfl: 2     aspirin 81 MG tablet, Take 1 tablet (81 mg) by mouth daily, Disp: 30 tablet, Rfl:      [DISCONTINUED] simvastatin (ZOCOR) 80 MG tablet, Take 0.5 tablets (40 mg) by mouth At Bedtime, Disp: 45 tablet, Rfl: 3    Allergies:   Allergies   Allergen Reactions     No Known Allergies      This document serves as a record of the services and decisions personally performed and made by Ismael Bowling MD. It was created on his behalf by Rosmery Lewis, a trained medical scribe. The creation of this document is based the provider's statements to the medical scribe.    Mone Lewis 11:14 AM 5/24/2017     REVIEW OF SYSTEMS:  CONSTITUTIONAL:NEGATIVE for fever, chills, night sweats, change in weight  INTEGUMENTARY/SKIN: NEGATIVE for worrisome rashes, moles or lesions  MUSCULOSKELETAL:See HPI above  NEURO: NEGATIVE for weakness, dizziness or paresthesias    PHYSICAL EXAM:  Resp 16  Ht 1.905 m (6' 3\")  Wt 127.5 kg (281 lb)  BMI 35.12 kg/m2   GENERAL APPEARANCE: healthy, alert, no distress; accompanied by his wife  SKIN: no suspicious lesions or rashes  NEURO: Normal strength and tone, mentation intact and speech normal  PSYCH:  mentation appears normal and affect normal  RESPIRATORY: No increased work of breathing.    BILATERAL LOWER EXTREMITIES:  Gait: using walker for support  No gross deformities or " masses.  No Quad atrophy, strength normal.  Intact sensation deep peroneal nerve, superficial peroneal nerve, med/lat tibial nerve, sural nerve, saphenous nerve  Intact EHL, EDL, TA, FHL, GS, quadriceps hamstrings and hip flexors  Toes warm and well perfused, brisk capillary refill. Palpable 2+ dp pulses.  Bilateral calf soft and nttp or squeeze.  Edema: pitting, bilateral, 1-2+      RIGHT HIP EXAM:    Incision: healing well, skin glue, dry blood.  Palpation: Tender:  Nontender to palpation   Strength: intact. Weak.  Hip range of motion: normal, minimal discomfort.  Leg lengths: grossly symmetric at medial malleolus.      X-RAY:  AP pelvis, lateral views right hip from 5/24/2017 were reviewed in clinic today. No obvious fractures or dislocations. Hip arthroplasty components in place without obvious failure, complication, fracture, or dislocation.    Impression: 76 year old male seen for postoperative evaluation of right total hip arthroplasty. It has been 2 weeks since surgery. Doing well.    Plan:   * reviewed xrays with patient today showing total hip arthroplasty implants.  * also keep an eye on scab of knee.   * continue warfarin for your heart longterm as prior, regular INR check as prior.  * continue with posterior hip precautions x3 months postoperative  * physical therapy for stretching, range of motion and strengthening.  * wean off all pain medications as tolerated  * xrays next visit: lowset AP pelvis and lateral hip  * return to clinic 4 weeks   * all questions addressed and answered prior to discharge from clinic today to patient's satisfaction.  * patient will need longterm prophylactic antibiotics use with dental procedures or other invasive procedures (2 g amoxicilin or 450mg ([id number]) clindamycin) 1 hour prior to dental procedures.    The information in this document, created by a scribe for me, accurately reflects the services I personally performed and the decisions made by me. I have reviewed and  approved this document for accuracy.       Ismael Bowling M.D., M.S.  Dept. of Orthopaedic Surgery  [facility]

## 2017-05-24 NOTE — NURSING NOTE
"No chief complaint on file.      Initial Resp 16  Ht 1.905 m (6' 3\")  Wt 127.5 kg (281 lb)  BMI 35.12 kg/m2 Estimated body mass index is 35.12 kg/(m^2) as calculated from the following:    Height as of this encounter: 1.905 m (6' 3\").    Weight as of this encounter: 127.5 kg (281 lb).  Medication Reconciliation: complete   Nikolai Boyer PA-C, CAQ (Ortho)  Supervising Physician: Ismael Bowling M.D., M.S.  Dept. of Orthopaedic Surgery  [facility]      "

## 2017-05-24 NOTE — MR AVS SNAPSHOT
"              After Visit Summary   2017    Chidi Dubon    MRN: [id number]           Patient Information     Date Of Birth          1940        Visit Information        Provider Department      2017 10:45 AM Ismael Bowling MD [facility]        Today's Diagnoses     S/P hip replacement, right    -  1       Follow-ups after your visit        Who to contact     If you have questions or need follow up information about today's clinic visit or your schedule please contact [facility] directly at [phone].  Normal or non-critical lab and imaging results will be communicated to you by [employer], letter or phone within 4 business days after the clinic has received the results. If you do not hear from us within 7 days, please contact the clinic through [employer] or phone. If you have a critical or abnormal lab result, we will notify you by phone as soon as possible.  Submit refill requests through [employer] or call your pharmacy and they will forward the refill request to us. Please allow 3 business days for your refill to be completed.          Additional Information About Your Visit        MyChart Information     [employer] lets you send messages to your doctor, view your test results, renew your prescriptions, schedule appointments and more. To sign up, go to [email or web address] . Click on \"Log in\" on the left side of the screen, which will take you to the Welcome page. Then click on \"Sign up Now\" on the right side of the page.     You will be asked to enter the access code listed below, as well as some personal information. Please follow the directions to create your username and password.     Your access code is: [email or web address]  Expires: 2017  1:47 PM     Your access code will  in 90 days. If you need help or a new code, please call your [facility] or [phone].        Care EveryWhere ID     This is your Care EveryWhere ID. This could be used by other " "organizations to access your [place] medical records  [id number]        Your Vitals Were     Respirations Height BMI (Body Mass Index)             16 1.905 m (6' 3\") 35.12 kg/m2          Blood Pressure from Last 3 Encounters:   05/04/17 153/88   04/26/17 133/82   04/20/17 128/78    Weight from Last 3 Encounters:   05/24/17 127.5 kg (281 lb)   04/26/17 127.9 kg (282 lb)   04/20/17 128.4 kg (283 lb)               Primary Care Provider Office Phone # Fax #    Martin Gay Schultz PA-C [phone]       [facility] [place] [address]  [place] [address]        Goals        Diet    Reduce portion size     Related Problems    Hyperlipidemia LDL goal <70    Notes - Note created  5/9/2014  3:10 PM by Oliver Veliz MD    Everything in moderation.         General    Medication 1 (pt-stated)     Related Problems    Urolithiasis    Notes - Note created  5/9/2014  3:11 PM by Oliver Veliz MD    Stop the flomax and pay attention to your urine flow.  If not worse, ok to stay off of it. If worse, restart.       Taper off medication (pt-stated)     Related Problems    Fatigue    Notes - Note created  5/9/2014  3:08 PM by Oliver Veliz MD    Tapering Schedule for citalopram;    Week 1:  1/2 alternating with 1 every other day  Week 2:  1/2 daily  Week 3:  1/2 every other day  Week 4:  Stop    If at any point you experience significant side effects from tapering, we may need to slow the taper a bit.    Once you are off of this, pay attention to the mood and if you are not feeling better, we can try using bupropion which is a medication that is less likely to cause fatigue and weight gain.         Thank you!     Thank you for choosing [facility]  for your care. Our goal is always to provide you with excellent care. Hearing back from our patients is one way we can continue to improve our services. Please take a few minutes to complete the written survey that you may receive in the mail " after your visit with us. Thank you!             Your Updated Medication List - Protect others around you: Learn how to safely use, store and throw away your medicines at www.disposemymeds.org.          This list is accurate as of: 5/24/17 11:01 AM.  Always use your most recent med list.                   Brand Name Dispense Instructions for use    aspirin 81 MG tablet     30 tablet    Take 1 tablet (81 mg) by mouth daily       atorvastatin 80 MG tablet    LIPITOR    90 tablet    Take 1 tablet (80 mg) by mouth every other day       nitroglycerin 0.4 MG sublingual tablet    NITROSTAT    25 tablet    Place 1 tablet (0.4 mg) under the tongue every 5 minutes as needed for chest pain       valsartan-hydrochlorothiazide 80-12.5 MG per tablet    DIOVAN-HCT    90 tablet    Take 1 tablet by mouth daily       warfarin 5 MG tablet    COUMADIN    90 tablet    Take 1 tablet (5 mg) by mouth daily Or as directed by [facility]. Current dose is 2.5 mg daily

## 2017-05-26 ENCOUNTER — ANTICOAGULATION THERAPY VISIT (OUTPATIENT)
Dept: NURSING | Facility: CLINIC | Age: 77
End: 2017-05-26
Payer: COMMERCIAL

## 2017-05-26 DIAGNOSIS — Z79.01 LONG-TERM (CURRENT) USE OF ANTICOAGULANTS: ICD-10-CM

## 2017-05-26 DIAGNOSIS — I48.20 CHRONIC ATRIAL FIBRILLATION (H): ICD-10-CM

## 2017-05-26 LAB — INR POINT OF CARE: 2.7 (ref 0.86–1.14)

## 2017-05-26 PROCEDURE — 99207 ZZC NO CHARGE NURSE ONLY: CPT

## 2017-05-26 PROCEDURE — 85610 PROTHROMBIN TIME: CPT | Mod: QW

## 2017-05-26 PROCEDURE — 36416 COLLJ CAPILLARY BLOOD SPEC: CPT

## 2017-05-26 NOTE — MR AVS SNAPSHOT
Chidi Dubon   5/26/2017 1:00 PM   Anticoagulation Therapy Visit    Description:  76 year old male   Provider:  JOSE E ANTI COAG   Department:  Be Nurse           INR as of 5/26/2017     Today's INR 2.7      Anticoagulation Summary as of 5/26/2017     INR goal 2.0-3.0   Today's INR 2.7   Full instructions 2.5 mg every day   Next INR check 6/27/2017    Indications   Long-term (current) use of anticoagulants [Z79.01] [Z79.01]  Atrial Fibrillation [I48.2]         Your next Anticoagulation Clinic appointment(s)     Jun 27, 2017 11:30 AM CDT   Anticoagulation Visit with BE ANTI COARUPERTO   [place] Mindy Walker ([facility] [place])    [address]  [place] [address] [phone]              Contact Numbers     [facility]  Please call [phone] with any problems or questions regarding your therapy, or to cancel or reschedule your appointment.          May 2017 Details    Sun Mon Tue Wed Thu Fri Sat      1               2               3               4               5               6                 7               8               9               10               11               12               13                 14               15               16               17               18               19               20                 21               22               23               24               25               26      2.5 mg   See details      27      2.5 mg           28      2.5 mg         29      2.5 mg         30      2.5 mg         31      2.5 mg             Date Details   05/26 This INR check               How to take your warfarin dose     To take:  2.5 mg Take 0.5 of a 5 mg tablet.           June 2017 Details    Sun Mon Tue Wed Thu Fri Sat         1      2.5 mg         2      2.5 mg         3      2.5 mg           4      2.5 mg         5      2.5 mg         6      2.5 mg         7      2.5 mg         8      2.5 mg         9      2.5 mg         10      2.5 mg           11       2.5 mg         12      2.5 mg         13      2.5 mg         14      2.5 mg         15      2.5 mg         16      2.5 mg         17      2.5 mg           18      2.5 mg         19      2.5 mg         20      2.5 mg         21      2.5 mg         22      2.5 mg         23      2.5 mg         24      2.5 mg           25      2.5 mg         26      2.5 mg         27            28               29               30                 Date Details   No additional details    Date of next INR:  6/27/2017         How to take your warfarin dose     To take:  2.5 mg Take 0.5 of a 5 mg tablet.

## 2017-05-26 NOTE — PROGRESS NOTES
ANTICOAGULATION FOLLOW-UP CLINIC VISIT    Patient Name:  Chidi Dubon  Date:  5/26/2017  Contact Type:  Face to Face    SUBJECTIVE:     Patient Findings     Positives No Problem Findings           OBJECTIVE    INR [facility]time   Date Value Ref Range Status   05/26/2017 2.7 (A) 0.86 - 1.14 Final       ASSESSMENT / PLAN  INR assessment THER    Recheck INR In: 4 WEEKS    INR Location Clinic      Anticoagulation Summary as of 5/26/2017     INR goal 2.0-3.0   Today's INR 2.7   Maintenance plan 2.5 mg (5 mg x 0.5) every day   Full instructions 2.5 mg every day   Weekly total 17.5 mg   No change documented Lissy Nichols, RN   Plan last modified Hyacinth Montano (1/12/2017)   Next INR check 6/27/2017   Target end date Indefinite    Indications   Long-term (current) use of anticoagulants [Z79.01] [Z79.01]  Atrial Fibrillation [I48.2]         Anticoagulation Episode Summary     INR check location     Preferred lab     Send INR reminders to [facility]    Comments       Anticoagulation Care Providers     Provider Role Specialty Phone number    Martin Schultz PA-C Responsible Physician Assistant [phone]            See the Encounter Report to view Anticoagulation Flowsheet and Dosing Calendar (Go to Encounters tab in chart review, and find the Anticoagulation Therapy Visit)        Lissy Nichols RN

## 2017-06-06 ENCOUNTER — THERAPY VISIT (OUTPATIENT)
Dept: PHYSICAL THERAPY | Facility: CLINIC | Age: 77
End: 2017-06-06
Payer: COMMERCIAL

## 2017-06-06 DIAGNOSIS — M25.551 HIP PAIN, RIGHT: Primary | ICD-10-CM

## 2017-06-06 DIAGNOSIS — Z47.1 AFTERCARE FOLLOWING RIGHT HIP JOINT REPLACEMENT SURGERY: ICD-10-CM

## 2017-06-06 DIAGNOSIS — Z96.641 AFTERCARE FOLLOWING RIGHT HIP JOINT REPLACEMENT SURGERY: ICD-10-CM

## 2017-06-06 DIAGNOSIS — Z96.649 HIP JOINT REPLACEMENT STATUS: ICD-10-CM

## 2017-06-06 DIAGNOSIS — I48.91 NEW ONSET ATRIAL FIBRILLATION (H): ICD-10-CM

## 2017-06-06 PROCEDURE — 97110 THERAPEUTIC EXERCISES: CPT | Mod: GP | Performed by: PHYSICAL THERAPIST

## 2017-06-06 PROCEDURE — 97161 PT EVAL LOW COMPLEX 20 MIN: CPT | Mod: GP | Performed by: PHYSICAL THERAPIST

## 2017-06-06 RX ORDER — WARFARIN SODIUM 5 MG/1
5 TABLET ORAL DAILY
Qty: 45 TABLET | Refills: 0 | Status: SHIPPED | OUTPATIENT
Start: 2017-06-06 | End: 2017-10-04

## 2017-06-06 NOTE — MR AVS SNAPSHOT
After Visit Summary   6/6/2017    Chidi Dubon    MRN: [id number]           Patient Information     Date Of Birth          1940        Visit Information        Provider Department      6/6/2017 11:40 AM Jean Marie Bermudez PT [facility] [place] [facility]        Today's Diagnoses     Hip pain, right    -  1    Hip joint replacement status        Aftercare following right hip joint replacement surgery           Follow-ups after your visit        Your next 10 appointments already scheduled     Jun 08, 2017  3:10 PM CDT   TANK Extremity with Jean Marie Hinojosa PTA   [facility] [place] [facility] ([facility] [place])    [address]  [address]  [place] [address]   [phone]            Jun 12, 2017  1:20 PM CDT   TANK Extremity with Jean Marie Bermudez PT   [facility] [place] [facility] ([facility] [place])    [address]  [address]  [place] [address]   [phone]            Jose 15, 2017  3:10 PM CDT   TANK Extremity with Jean Marie Bermudez, PT   [facility] [place] [facility] ([facility] [place])    [address]  [address]  [place] [address]   [phone]            Jun 19, 2017  1:20 PM CDT   TANK Extremity with Jean Marie Bermudez, PT   [facility] [place] [facility] ([facility] [place])    [address]  [address]  [place] [address]   [phone]            Jun 22, 2017 12:30 PM CDT   TANK Extremity with Jean Marie Hinojosa PTA   [facility] [place] [facility] ([facility] [place])    [address]  [address]  [place] [address]   [phone]            Jun 26, 2017 12:10 PM CDT   TANK Extremity with Jean Marie Hinojosa PTA   [facility] [place] [facility] ([facility] [place])    [address]  [address]  [place] [address]   [phone]            Jun 27, 2017 11:30 AM CDT   Anticoagulation Visit with [facility]  "[place] ([facility] [place])    [address]  [place] [address]   [phone]            2017 12:40 PM CDT   TANK Extremity with Jean Marie Bermudez PT   [facility] [place] [facility] ([facility] [place])    [address]  [address]  [place] [address]   [phone]              Who to contact     If you have questions or need follow up information about today's clinic visit or your schedule please contact [facility] [place] [facility] directly at [phone].  Normal or non-critical lab and imaging results will be communicated to you by MyChart, letter or phone within 4 business days after the clinic has received the results. If you do not hear from us within 7 days, please contact the clinic through MyChart or phone. If you have a critical or abnormal lab result, we will notify you by phone as soon as possible.  Submit refill requests through [employer] or call your pharmacy and they will forward the refill request to us. Please allow 3 business days for your refill to be completed.          Additional Information About Your Visit        [employer] Information     [employer] lets you send messages to your doctor, view your test results, renew your prescriptions, schedule appointments and more. To sign up, go to [email or web address] . Click on \"Log in\" on the left side of the screen, which will take you to the Welcome page. Then click on \"Sign up Now\" on the right side of the page.     You will be asked to enter the access code listed below, as well as some personal information. Please follow the directions to create your username and password.     Your access code is: [email or web address]  Expires: 2017  1:47 PM     Your access code will  in 90 days. If you need help or a new code, please call your [place] clinic or [phone].        Care EveryWhere ID     This is your Care EveryWhere ID. This could be used by other organizations to access your " [facility] medical records  [id number]         Blood Pressure from Last 3 Encounters:   05/04/17 153/88   04/26/17 133/82   04/20/17 128/78    Weight from Last 3 Encounters:   05/24/17 127.5 kg (281 lb)   04/26/17 127.9 kg (282 lb)   04/20/17 128.4 kg (283 lb)              We Performed the Following     HC PT EVAL, LOW COMPLEXITY     THERAPEUTIC EXERCISES          Where to get your medicines      These medications were sent to [id number] - [place], [place] - [address]  [address] [place] [address]     Phone:  [phone]     warfarin 5 MG tablet          Primary Care Provider Office Phone # Fax #    Martin Schultz PA-C [phone]       [facility] [place] [address]  [place] [address]        Goals        Diet    Reduce portion size     Related Problems    Hyperlipidemia LDL goal <70    Notes - Note created  5/9/2014  3:10 PM by Oliver Veliz MD    Everything in moderation.         General    Medication 1 (pt-stated)     Related Problems    Urolithiasis    Notes - Note created  5/9/2014  3:11 PM by Oliver Veliz MD    Stop the flomax and pay attention to your urine flow.  If not worse, ok to stay off of it. If worse, restart.       Taper off medication (pt-stated)     Related Problems    Fatigue    Notes - Note created  5/9/2014  3:08 PM by Oliver Veliz MD    Tapering Schedule for citalopram;    Week 1:  1/2 alternating with 1 every other day  Week 2:  1/2 daily  Week 3:  1/2 every other day  Week 4:  Stop    If at any point you experience significant side effects from tapering, we may need to slow the taper a bit.    Once you are off of this, pay attention to the mood and if you are not feeling better, we can try using bupropion which is a medication that is less likely to cause fatigue and weight gain.         Thank you!     Thank you for choosing [facility] [place] [facility]  for your care. Our goal is always to provide you with  excellent care. Hearing back from our patients is one way we can continue to improve our services. Please take a few minutes to complete the written survey that you may receive in the mail after your visit with us. Thank you!             Your Updated Medication List - Protect others around you: Learn how to safely use, store and throw away your medicines at www.disposemymeds.org.          This list is accurate as of: 6/6/17  3:21 PM.  Always use your most recent med list.                   Brand Name Dispense Instructions for use    aspirin 81 MG tablet     30 tablet    Take 1 tablet (81 mg) by mouth daily       atorvastatin 80 MG tablet    LIPITOR    90 tablet    Take 1 tablet (80 mg) by mouth every other day       nitroglycerin 0.4 MG sublingual tablet    NITROSTAT    25 tablet    Place 1 tablet (0.4 mg) under the tongue every 5 minutes as needed for chest pain       oxyCODONE-acetaminophen 5-325 MG per tablet    PERCOCET    40 tablet    Take 1-2 tablets by mouth every 8 hours as needed for pain maximum 6 tablet(s) per day       valsartan-hydrochlorothiazide 80-12.5 MG per tablet    DIOVAN-HCT    90 tablet    Take 1 tablet by mouth daily       warfarin 5 MG tablet    COUMADIN    45 tablet    Take 1 tablet (5 mg) by mouth daily Or as directed by [facility] clinic. Current dose is 2.5 mg daily

## 2017-06-06 NOTE — PROGRESS NOTES
[facility] Initial Evaluation    Subjective:    Patient is a 76 year old male presenting with rehab right hip hpi.   Chidi Dubon is a 76 year old male with a right hip condition.  Condition occurred with:  Degenerative joint disease.  Condition occurred: for unknown reasons.  This is a chronic condition  S/p JUSTO- DOS: 5/9/17.    Patient reports pain:  Lateral.    Pain is described as aching and is intermittent and reported as 2/10.  Associated symptoms:  Loss of strength and loss of motion/stiffness. Pain is the same all the time.  Symptoms are exacerbated by weight bearing, walking, ascending stairs and descending stairs and relieved by rest.  Since onset symptoms are gradually improving.  Special tests:  X-ray.  Previous treatment includes surgery.  There was significant improvement following previous treatment.  General health as reported by patient is good.  Pertinent medical history includes:  Overweight, depression and heart problems.  Medical allergies: no.  Other surgeries include:  Orthopedic surgery.  Current medications:  Pain medication and heparin/coumadin.  Current occupation is retired.        Barriers include:  None as reported by the patient.    Red flags:  None as reported by the patient.                        Objective:      Gait:    Weight Bearing Status:  WBAT   Assistive Devices:  Walker  Deviations:  Lumbar:  Trunk flexionHip:  Decr dynamic control R                                                 Hip Evaluation  Hip PROM:    Flexion: Left: 110   Right: 85        Internal Rotation: Left: 15    Right: NT  External Rotation: Left: 35    Right: 30              Hip Strength:  : Poor control of SLR, almost 10 deg ext lag.                                   Knee Evaluation:  ROM:            Strength:         Quad Set Left:  Good    Pain: -   Quad Set Right:  Fair    Pain: -                  General     ROS    Assessment/Plan:      Patient is a 76 year old male with right  hip complaints.    Patient has the following significant findings with corresponding treatment plan.                Diagnosis 1:  R JUSTO  Pain -  self management, education and home program  Decreased ROM/flexibility - therapeutic exercise and home program  Decreased strength - therapeutic exercise, therapeutic activities and home program  Impaired gait - gait training and home program  Impaired muscle performance - neuro re-education and home program  Decreased function - therapeutic activities and home program    Therapy Evaluation Codes:   1) History comprised of:   Personal factors that impact the plan of care:      Coping style, Overall behavior pattern and Past/current experiences.    Comorbidity factors that impact the plan of care are:      Osteoarthritis, Overweight and Pain at night/rest.     Medications impacting care: None.  2) Examination of Body Systems comprised of:   Body structures and functions that impact the plan of care:      Hip and Knee.   Activity limitations that impact the plan of care are:      Driving, Dressing, Squatting/kneeling, Stairs and Walking.  3) Clinical presentation characteristics are:   Stable/Uncomplicated.  4) Decision-Making    Low complexity using standardized patient assessment instrument and/or measureable assessment of functional outcome.  Cumulative Therapy Evaluation is: Low complexity.    Previous and current functional limitations:  (See Goal Flow Sheet for this information)    Short term and Long term goals: (See Goal Flow Sheet for this information)     Communication ability:  Patient appears to be able to clearly communicate and understand verbal and written communication and follow directions correctly.  Treatment Explanation - The following has been discussed with the patient:   RX ordered/plan of care  Anticipated outcomes  Possible risks and side effects  This patient would benefit from PT intervention to resume normal activities.   Rehab potential is  good.    Frequency:  2 X week, once daily  Duration:  for 4 weeks  Discharge Plan:  Achieve all LTG.  Independent in home treatment program.  Reach maximal therapeutic benefit.    Please refer to the daily flowsheet for treatment today, total treatment time and time spent performing 1:1 timed codes.

## 2017-06-06 NOTE — TELEPHONE ENCOUNTER
Prescription approved per [facility] Refill Protocol.  Hyacinth Montano RN

## 2017-06-06 NOTE — TELEPHONE ENCOUNTER
Warfarin    Last Written Prescription Date: 2/13/17  Last Fill Qty: 90, # refills: 2  Last Office Visit with [facility], [facility] or [facility] prescribing provider: 4/20/17       Date and Result of Last PT/INR:   Lab Results   Component Value Date    INR 2.7 05/26/2017    INR 2.40 04/20/2017    INR 2.5 04/06/2017    INR 2.30 10/04/2016

## 2017-06-08 ENCOUNTER — THERAPY VISIT (OUTPATIENT)
Dept: PHYSICAL THERAPY | Facility: CLINIC | Age: 77
End: 2017-06-08
Payer: COMMERCIAL

## 2017-06-08 DIAGNOSIS — M25.551 HIP PAIN, RIGHT: ICD-10-CM

## 2017-06-08 DIAGNOSIS — Z47.1 AFTERCARE FOLLOWING RIGHT HIP JOINT REPLACEMENT SURGERY: ICD-10-CM

## 2017-06-08 DIAGNOSIS — Z96.641 AFTERCARE FOLLOWING RIGHT HIP JOINT REPLACEMENT SURGERY: ICD-10-CM

## 2017-06-08 DIAGNOSIS — Z96.649 HIP JOINT REPLACEMENT STATUS: ICD-10-CM

## 2017-06-08 PROCEDURE — 97110 THERAPEUTIC EXERCISES: CPT | Mod: GP | Performed by: PHYSICAL THERAPY ASSISTANT

## 2017-06-12 ENCOUNTER — THERAPY VISIT (OUTPATIENT)
Dept: PHYSICAL THERAPY | Facility: CLINIC | Age: 77
End: 2017-06-12
Payer: COMMERCIAL

## 2017-06-12 DIAGNOSIS — Z47.1 AFTERCARE FOLLOWING RIGHT HIP JOINT REPLACEMENT SURGERY: ICD-10-CM

## 2017-06-12 DIAGNOSIS — M25.551 HIP PAIN, RIGHT: ICD-10-CM

## 2017-06-12 DIAGNOSIS — Z96.649 HIP JOINT REPLACEMENT STATUS: ICD-10-CM

## 2017-06-12 DIAGNOSIS — Z96.641 AFTERCARE FOLLOWING RIGHT HIP JOINT REPLACEMENT SURGERY: ICD-10-CM

## 2017-06-12 PROCEDURE — 97530 THERAPEUTIC ACTIVITIES: CPT | Mod: GP | Performed by: PHYSICAL THERAPIST

## 2017-06-12 PROCEDURE — 97110 THERAPEUTIC EXERCISES: CPT | Mod: GP | Performed by: PHYSICAL THERAPIST

## 2017-06-12 NOTE — PROGRESS NOTES
Subjective:    HPI                    Objective:    System    Physical Exam    General     ROS    Assessment/Plan:      SUBJECTIVE  Subjective: Just started trying to use a cane and it's going well so far.   Current Pain level: 2/10   Changes in function:  Yes (See Goal flowsheet attached for changes in current functional level)     Adverse reaction to treatment or activity:  None    OBJECTIVE  Objective: Pt able to safely use cane instead of walker for household distances based on amount ambulated in clinic today     ASSESSMENT  Chidi continues to require intervention to meet STG and LTG's: PT  Patient is progressing as expected.  Response to therapy has shown an improvement in  strength and gait  Progress made towards STG/LTG?  Yes (See Goal flowsheet attached for updates on achievement of STG and LTG)    PLAN  Current treatment program is being advanced to more complex exercises.    PTA/ATC plan:  N/A    Please refer to the daily flowsheet for treatment today, total treatment time and time spent performing 1:1 timed codes.

## 2017-06-12 NOTE — MR AVS SNAPSHOT
After Visit Summary   6/12/2017    Chidi Dubon    MRN: [id number]           Patient Information     Date Of Birth          1940        Visit Information        Provider Department      6/12/2017 1:20 PM Jean Marie Bermudez PT [facility] [place] [facility]        Today's Diagnoses     Aftercare following right hip joint replacement surgery        Hip pain, right        Hip joint replacement status           Follow-ups after your visit        Your next 10 appointments already scheduled     Jose 15, 2017  3:10 PM CDT   TANK Extremity with Jean Marie Bermudez PT   [facility] [place] [facility] ([facility] [place])    [address]  [place] [address]   [phone]            Jun 19, 2017  1:20 PM CDT   TANK Extremity with Jean Marie Bermudez PT   [facility] [place] [facility] ([facility] [place])    [address]  [place] [address]   [phone]            Jun 22, 2017 12:30 PM CDT   TANK Extremity with Jean Marie Hinojosa PTA   [facility] [place] [facility] ([facility] [place])    [address]  [address]  [place] [address]   [phone]            Jun 26, 2017 12:10 PM CDT   TANK Extremity with Jean Marie Hinojosa PTA   [facility] [place] [facility] ([facility] [place])    [address]  [address]  [place] [address]   [phone]            Jun 27, 2017 11:30 AM CDT   Anticoagulation Visit with BE ANTI COAG   [facility] [place] [facility] [place]    [address]  [place] [address]   [phone]            Jun 29, 2017 12:40 PM CDT   TANK Extremity with Jean Marie Bermudez PT   [facility] [place] [facility] ([facility] [place])    [address]  [address]  [place] [address]   [phone]              Who to contact     If you have questions or need follow up information about today's clinic visit or your  "schedule please contact [facility] directly at [phone].  Normal or non-critical lab and imaging results will be communicated to you by MyChart, letter or phone within 4 business days after the clinic has received the results. If you do not hear from us within 7 days, please contact the clinic through MyChart or phone. If you have a critical or abnormal lab result, we will notify you by phone as soon as possible.  Submit refill requests through [employer] or call your pharmacy and they will forward the refill request to us. Please allow 3 business days for your refill to be completed.          Additional Information About Your Visit        [employer] [facility] Information     [employer] lets you send messages to your doctor, view your test results, renew your prescriptions, schedule appointments and more. To sign up, go to [email or web address] . Click on \"Log in\" on the left side of the screen, which will take you to the Welcome page. Then click on \"Sign up Now\" on the right side of the page.     You will be asked to enter the access code listed below, as well as some personal information. Please follow the directions to create your username and password.     Your access code is: [email or web address]  Expires: 2017  1:47 PM     Your access code will  in 90 days. If you need help or a new code, please call your [facility] clinic or [phone].        Care EveryWhere ID     This is your Care EveryWhere ID. This could be used by other organizations to access your [facility] medical records  [id number]         Blood Pressure from Last 3 Encounters:   17 153/88   17 133/82   17 128/78    Weight from Last 3 Encounters:   17 127.5 kg (281 lb)   17 127.9 kg (282 lb)   17 128.4 kg (283 lb)              We Performed the Following     THERAPEUTIC ACTIVITIES     THERAPEUTIC EXERCISES        Primary Care Provider Office Phone # Fax #    Martin cShultz PA-C [phone] " [phone]       [facility] [place] [address]  [place] [address]        Goals        Diet    Reduce portion size     Related Problems    Hyperlipidemia LDL goal <70    Notes - Note created  5/9/2014  3:10 PM by Oliver Veliz MD    Everything in moderation.         General    Medication 1 (pt-stated)     Related Problems    Urolithiasis    Notes - Note created  5/9/2014  3:11 PM by Oliver Veliz MD    Stop the flomax and pay attention to your urine flow.  If not worse, ok to stay off of it. If worse, restart.       Taper off medication (pt-stated)     Related Problems    Fatigue    Notes - Note created  5/9/2014  3:08 PM by Oliver Veliz MD    Tapering Schedule for citalopram;    Week 1:  1/2 alternating with 1 every other day  Week 2:  1/2 daily  Week 3:  1/2 every other day  Week 4:  Stop    If at any point you experience significant side effects from tapering, we may need to slow the taper a bit.    Once you are off of this, pay attention to the mood and if you are not feeling better, we can try using bupropion which is a medication that is less likely to cause fatigue and weight gain.         Thank you!     Thank you for choosing [facility]  for your care. Our goal is always to provide you with excellent care. Hearing back from our patients is one way we can continue to improve our services. Please take a few minutes to complete the written survey that you may receive in the mail after your visit with us. Thank you!             Your Updated Medication List - Protect others around you: Learn how to safely use, store and throw away your medicines at www.disposemymeds.org.          This list is accurate as of: 6/12/17  3:10 PM.  Always use your most recent med list.                   Brand Name Dispense Instructions for use    aspirin 81 MG tablet     30 tablet    Take 1 tablet (81 mg) by mouth daily       atorvastatin 80 MG tablet    LIPITOR    90 tablet     Take 1 tablet (80 mg) by mouth every other day       nitroglycerin 0.4 MG sublingual tablet    NITROSTAT    25 tablet    Place 1 tablet (0.4 mg) under the tongue every 5 minutes as needed for chest pain       oxyCODONE-acetaminophen 5-325 MG per tablet    PERCOCET    40 tablet    Take 1-2 tablets by mouth every 8 hours as needed for pain maximum 6 tablet(s) per day       valsartan-hydrochlorothiazide 80-12.5 MG per tablet    DIOVAN-HCT    90 tablet    Take 1 tablet by mouth daily       warfarin 5 MG tablet    COUMADIN    45 tablet    Take 1 tablet (5 mg) by mouth daily Or as directed by [facility] clinic. Current dose is 2.5 mg daily

## 2017-06-15 ENCOUNTER — THERAPY VISIT (OUTPATIENT)
Dept: PHYSICAL THERAPY | Facility: CLINIC | Age: 77
End: 2017-06-15
Payer: COMMERCIAL

## 2017-06-15 DIAGNOSIS — Z47.1 AFTERCARE FOLLOWING RIGHT HIP JOINT REPLACEMENT SURGERY: ICD-10-CM

## 2017-06-15 DIAGNOSIS — M25.551 HIP PAIN, RIGHT: ICD-10-CM

## 2017-06-15 DIAGNOSIS — Z96.641 AFTERCARE FOLLOWING RIGHT HIP JOINT REPLACEMENT SURGERY: ICD-10-CM

## 2017-06-15 DIAGNOSIS — Z96.649 HIP JOINT REPLACEMENT STATUS: ICD-10-CM

## 2017-06-15 PROCEDURE — 97110 THERAPEUTIC EXERCISES: CPT | Mod: GP | Performed by: PHYSICAL THERAPY ASSISTANT

## 2017-06-15 PROCEDURE — 97530 THERAPEUTIC ACTIVITIES: CPT | Mod: GP | Performed by: PHYSICAL THERAPY ASSISTANT

## 2017-06-19 ENCOUNTER — THERAPY VISIT (OUTPATIENT)
Dept: PHYSICAL THERAPY | Facility: CLINIC | Age: 77
End: 2017-06-19
Payer: COMMERCIAL

## 2017-06-19 DIAGNOSIS — Z96.649 HIP JOINT REPLACEMENT STATUS: ICD-10-CM

## 2017-06-19 DIAGNOSIS — M25.551 HIP PAIN, RIGHT: ICD-10-CM

## 2017-06-19 DIAGNOSIS — Z47.1 AFTERCARE FOLLOWING RIGHT HIP JOINT REPLACEMENT SURGERY: ICD-10-CM

## 2017-06-19 DIAGNOSIS — Z96.641 AFTERCARE FOLLOWING RIGHT HIP JOINT REPLACEMENT SURGERY: ICD-10-CM

## 2017-06-19 PROCEDURE — 97530 THERAPEUTIC ACTIVITIES: CPT | Mod: GP | Performed by: PHYSICAL THERAPIST

## 2017-06-19 PROCEDURE — 97110 THERAPEUTIC EXERCISES: CPT | Mod: GP | Performed by: PHYSICAL THERAPIST

## 2017-06-19 NOTE — MR AVS SNAPSHOT
After Visit Summary   6/19/2017    Chiid Dubon    MRN: [id number]           Patient Information     Date Of Birth          1940        Visit Information        Provider Department      6/19/2017 1:20 PM Jean Marie Bermudez, PT [facility] [place] [facility]        Today's Diagnoses     Aftercare following right hip joint replacement surgery        Hip pain, right        Hip joint replacement status           Follow-ups after your visit        Your next 10 appointments already scheduled     Jun 22, 2017 12:30 PM CDT   TANK Extremity with Jean Marie Hinojosa PTA   [facility] [place] [facility] ([facility] [place])    [address]  [place] [address]   [phone]            Jun 26, 2017 12:10 PM CDT   TANK Extremity with Jean Marie Hinojosa PTA   [facility] [place] [facility] ([facility] [place])    [address]  [place] [address]   [phone]            Jun 27, 2017 11:30 AM CDT   Anticoagulation Visit with BE ANTI COAG   [facility] [place] ([facility] [place])    [address]  [place] [address]   [phone]            Jun 29, 2017 12:40 PM CDT   TANK Extremity with Jean Marie Bermudez PT   [facility] [place] [facility] ([facility] [place])    [address]  [place] [address]   [phone]              Who to contact     If you have questions or need follow up information about today's clinic visit or your schedule please contact [facility] directly at [phone].  Normal or non-critical lab and imaging results will be communicated to you by MyChart, letter or phone within 4 business days after the clinic has received the results. If you do not hear from us within 7 days, please contact the clinic through MyChart or phone. If you have a critical or abnormal lab result, we will notify you by phone as soon as  "possible.  Submit refill requests through "[employer] or call your pharmacy and they will forward the refill request to us. Please allow 3 business days for your refill to be completed.          Additional Information About Your Visit        TSAT Gr[employer]ph[employer]less Information     "[employer] lets you send messages to your doctor, view your test results, renew your prescriptions, schedule appointments and more. To sign up, go to [email or web address]/"[employer] . Click on \"Log in\" on the left side of the screen, which will take you to the Welcome page. Then click on \"Sign up Now\" on the right side of the page.     You will be asked to enter the access code listed below, as well as some personal information. Please follow the directions to create your username and password.     Your access code is: [email or web address]  Expires: 2017  1:47 PM     Your access code will  in 90 days. If you need help or a new code, please call your [facility] clinic or [phone].        Care EveryWhere ID     This is your Care EveryWhere ID. This could be used by other organizations to access your [facility] medical records  [id number]         Blood Pressure from Last 3 Encounters:   17 153/88   17 133/82   17 128/78    Weight from Last 3 Encounters:   17 127.5 kg (281 lb)   17 127.9 kg (282 lb)   17 128.4 kg (283 lb)              We Performed the Following     THERAPEUTIC ACTIVITIES     THERAPEUTIC EXERCISES        Primary Care Provider Office Phone # Fax #    Martin Schultz PA-C [phone]       [facility] [place] [address]  [place] [address]        Goals        Diet    Reduce portion size     Related Problems    Hyperlipidemia LDL goal <70    Notes - Note created  2014  3:10 PM by Oliver Veliz MD    Everything in moderation.         General    Medication 1 (pt-stated)     Related Problems    Urolithiasis    Notes - Note created  2014  3:11 PM by Oliver Veliz MD    Stop the flomax " and pay attention to your urine flow.  If not worse, ok to stay off of it. If worse, restart.       Taper off medication (pt-stated)     Related Problems    Fatigue    Notes - Note created  5/9/2014  3:08 PM by Oliver Veliz MD    Tapering Schedule for citalopram;    Week 1:  1/2 alternating with 1 every other day  Week 2:  1/2 daily  Week 3:  1/2 every other day  Week 4:  Stop    If at any point you experience significant side effects from tapering, we may need to slow the taper a bit.    Once you are off of this, pay attention to the mood and if you are not feeling better, we can try using bupropion which is a medication that is less likely to cause fatigue and weight gain.         Thank you!     Thank you for choosing [facility]  for your care. Our goal is always to provide you with excellent care. Hearing back from our patients is one way we can continue to improve our services. Please take a few minutes to complete the written survey that you may receive in the mail after your visit with us. Thank you!             Your Updated Medication List - Protect others around you: Learn how to safely use, store and throw away your medicines at www.disposemymeds.org.          This list is accurate as of: 6/19/17  2:35 PM.  Always use your most recent med list.                   Brand Name Dispense Instructions for use    aspirin 81 MG tablet     30 tablet    Take 1 tablet (81 mg) by mouth daily       atorvastatin 80 MG tablet    LIPITOR    90 tablet    Take 1 tablet (80 mg) by mouth every other day       nitroglycerin 0.4 MG sublingual tablet    NITROSTAT    25 tablet    Place 1 tablet (0.4 mg) under the tongue every 5 minutes as needed for chest pain       oxyCODONE-acetaminophen 5-325 MG per tablet    PERCOCET    40 tablet    Take 1-2 tablets by mouth every 8 hours as needed for pain maximum 6 tablet(s) per day       valsartan-hydrochlorothiazide 80-12.5 MG per tablet    DIOVAN-HCT     90 tablet    Take 1 tablet by mouth daily       warfarin 5 MG tablet    COUMADIN    45 tablet    Take 1 tablet (5 mg) by mouth daily Or as directed by [facility] clinic. Current dose is 2.5 mg daily

## 2017-06-26 ENCOUNTER — THERAPY VISIT (OUTPATIENT)
Dept: PHYSICAL THERAPY | Facility: CLINIC | Age: 77
End: 2017-06-26
Payer: COMMERCIAL

## 2017-06-26 DIAGNOSIS — Z96.641 AFTERCARE FOLLOWING RIGHT HIP JOINT REPLACEMENT SURGERY: ICD-10-CM

## 2017-06-26 DIAGNOSIS — M25.551 HIP PAIN, RIGHT: ICD-10-CM

## 2017-06-26 DIAGNOSIS — Z96.649 HIP JOINT REPLACEMENT STATUS: ICD-10-CM

## 2017-06-26 DIAGNOSIS — Z47.1 AFTERCARE FOLLOWING RIGHT HIP JOINT REPLACEMENT SURGERY: ICD-10-CM

## 2017-06-26 PROCEDURE — 97530 THERAPEUTIC ACTIVITIES: CPT | Mod: GP | Performed by: PHYSICAL THERAPY ASSISTANT

## 2017-06-26 PROCEDURE — 97110 THERAPEUTIC EXERCISES: CPT | Mod: GP | Performed by: PHYSICAL THERAPY ASSISTANT

## 2017-06-26 NOTE — PROGRESS NOTES
Subjective:    HPI                    Objective:    System    Physical Exam    General     ROS    Assessment/Plan:      SUBJECTIVE  Subjective changes as noted by pt:   Pt was ill last week with sinus infection. Not been doing his HEP like he could due to the sinus infection.    Current pain level:  1/10   Changes in function:  Yes (See Goal flowsheet attached for changes in current functional level)     Adverse reaction to treatment or activity:  None    OBJECTIVE  Changes in objective findings:  Pt arrives into PT with cane on left side but, does not fully bend the right knee during swing phase, still hip hikes on the right.      ASSESSMENT  Chidi continues to require intervention to meet STG and LTG's: PT  Patient's symptoms are resolving.  Patient is progressing as expected.  Response to therapy has shown an improvement in  pain level  Progress made towards STG/LTG?  Yes (See Goal flowsheet attached for updates on achievement of STG and LTG)    PLAN  Continue current treatment plan until patient demonstrates readiness to progress to higher level exercises.    PTA/ATC plan:  Will continue with present plan of care.    Please refer to the daily flowsheet for treatment today, total treatment time and time spent performing 1:1 timed codes.

## 2017-06-26 NOTE — MR AVS SNAPSHOT
"              After Visit Summary   6/26/2017    Chidi Dubon    MRN: [id number]           Patient Information     Date Of Birth          1940        Visit Information        Provider Department      6/26/2017 12:10 PM Jean Marie Hinojosa PTA [facility] [place] [facility]        Today's Diagnoses     Aftercare following right hip joint replacement surgery        Hip pain, right        Hip joint replacement status           Follow-ups after your visit        Your next 10 appointments already scheduled     Jun 27, 2017 11:30 AM CDT   Anticoagulation Visit with BE ANTI COAG   [facility] [place] [facility] [place]    [address]  [place] [address]   [phone]            Jun 29, 2017 12:40 PM CDT   TANK Extremity with Jean Marie Bermudez, PT   [facility] [place] [facility] ([facility] [place])    [address]  [address]  [place] [address] [phone]              Who to contact     If you have questions or need follow up information about today's clinic visit or your schedule please contact [facility] directly at [phone].  Normal or non-critical lab and imaging results will be communicated to you by [employer], letter or phone within 4 business days after the clinic has received the results. If you do not hear from us within 7 days, please contact the clinic through [employer] or phone. If you have a critical or abnormal lab result, we will notify you by phone as soon as possible.  Submit refill requests through [employer] or call your pharmacy and they will forward the refill request to us. Please allow 3 business days for your refill to be completed.          Additional Information About Your Visit        MyChart Information     [employer] lets you send messages to your doctor, view your test results, renew your prescriptions, schedule appointments and more. To sign up, go to [email or web address] . Click on \"Log " "in\" on the left side of the screen, which will take you to the Welcome page. Then click on \"Sign up Now\" on the right side of the page.     You will be asked to enter the access code listed below, as well as some personal information. Please follow the directions to create your username and password.     Your access code is: [email or web address]  Expires: 2017  1:47 PM     Your access code will  in 90 days. If you need help or a new code, please call your [facility] or [phone].        Care EveryWhere ID     This is your Care EveryWhere ID. This could be used by other organizations to access your [facility] medical records  [id number]         Blood Pressure from Last 3 Encounters:   17 153/88   17 133/82   17 128/78    Weight from Last 3 Encounters:   17 127.5 kg (281 lb)   17 127.9 kg (282 lb)   17 128.4 kg (283 lb)              We Performed the Following     Therapeutic Activities     Therapeutic Exercises        Primary Care Provider Office Phone # Fax #    Martin Schultz PA-C [phone]       [facility] [place] [address] [facility] [address]        Goals        Diet    Reduce portion size     Related Problems    Hyperlipidemia LDL goal <70    Notes - Note created  2014  3:10 PM by Oliver Veliz MD    Everything in moderation.         General    Medication 1 (pt-stated)     Related Problems    Urolithiasis    Notes - Note created  2014  3:11 PM by Oliver Veliz MD    Stop the flomax and pay attention to your urine flow.  If not worse, ok to stay off of it. If worse, restart.       Taper off medication (pt-stated)     Related Problems    Fatigue    Notes - Note created  2014  3:08 PM by Oliver Vleiz MD    Tapering Schedule for citalopram;    Week 1:  1/2 alternating with 1 every other day  Week 2:  1/2 daily  Week 3:  1/2 every other day  Week 4:  Stop    If at any point you experience significant side effects from " tapering, we may need to slow the taper a bit.    Once you are off of this, pay attention to the mood and if you are not feeling better, we can try using bupropion which is a medication that is less likely to cause fatigue and weight gain.         Equal Access to Services     NORMAN MILNER : Melly Herrera, waanette anguiano, qacarolynjennyfer kaaniagurpreet blankenship, darin meneses. So [facility] [phone].    ATENCIÓN: Si habla español, tiene a brown disposición servicios gratuitos de asistencia lingüística. Llame al [phone].    We comply with applicable federal civil rights laws and [place] laws. We do not discriminate on the basis of race, color, national origin, age, disability sex, sexual orientation or gender identity.            Thank you!     Thank you for choosing [facility] [place]   for your care. Our goal is always to provide you with excellent care. Hearing back from our patients is one way we can continue to improve our services. Please take a few minutes to complete the written survey that you may receive in the mail after your visit with us. Thank you!             Your Updated Medication List - Protect others around you: Learn how to safely use, store and throw away your medicines at www.disposemymeds.org.          This list is accurate as of: 6/26/17  1:10 PM.  Always use your most recent med list.                   Brand Name Dispense Instructions for use Diagnosis    aspirin 81 MG tablet     30 tablet    Take 1 tablet (81 mg) by mouth daily    CAD (coronary artery disease)       atorvastatin 80 MG tablet    LIPITOR    90 tablet    Take 1 tablet (80 mg) by mouth every other day    Hyperlipidemia LDL goal <70       nitroglycerin 0.4 MG sublingual tablet    NITROSTAT    25 tablet    Place 1 tablet (0.4 mg) under the tongue every 5 minutes as needed for chest pain    Hyperlipidemia LDL goal <70       oxyCODONE-acetaminophen 5-325 MG per tablet    PERCOCET     40 tablet    Take 1-2 tablets by mouth every 8 hours as needed for pain maximum 6 tablet(s) per day    S/P hip replacement, right       valsartan-hydrochlorothiazide 80-12.5 MG per tablet    DIOVAN-HCT    90 tablet    Take 1 tablet by mouth daily    Benign essential hypertension       warfarin 5 MG tablet    COUMADIN    45 tablet    Take 1 tablet (5 mg) by mouth daily Or as directed by [facility] clinic. Current dose is 2.5 mg daily    New onset atrial fibrillation (H)

## 2017-06-27 ENCOUNTER — ANTICOAGULATION THERAPY VISIT (OUTPATIENT)
Dept: NURSING | Facility: CLINIC | Age: 77
End: 2017-06-27
Payer: COMMERCIAL

## 2017-06-27 DIAGNOSIS — I48.20 CHRONIC ATRIAL FIBRILLATION (H): ICD-10-CM

## 2017-06-27 DIAGNOSIS — Z79.01 LONG-TERM (CURRENT) USE OF ANTICOAGULANTS: ICD-10-CM

## 2017-06-27 LAB — INR POINT OF CARE: 2.7 (ref 0.86–1.14)

## 2017-06-27 PROCEDURE — 85610 PROTHROMBIN TIME: CPT | Mod: QW

## 2017-06-27 PROCEDURE — 99207 ZZC NO CHARGE NURSE ONLY: CPT

## 2017-06-27 PROCEDURE — 36416 COLLJ CAPILLARY BLOOD SPEC: CPT

## 2017-06-27 NOTE — PROGRESS NOTES
ANTICOAGULATION FOLLOW-UP CLINIC VISIT    Patient Name:  Chidi Dubon  Date:  6/27/2017  Contact Type:  Face to Face    SUBJECTIVE:     Patient Findings     Positives No Problem Findings           OBJECTIVE    INR [facility]   Date Value Ref Range Status   06/27/2017 2.7 (A) 0.86 - 1.14 Final       ASSESSMENT / PLAN  INR assessment THER    Recheck INR In: 4 WEEKS    INR Location Clinic      Anticoagulation Summary as of 6/27/2017     INR goal 2.0-3.0   Today's INR 2.7   Maintenance plan 2.5 mg (5 mg x 0.5) every day   Full instructions 2.5 mg every day   Weekly total 17.5 mg   No change documented Jill Bearden   Plan last modified Hyacinth Montano (1/12/2017)   Next INR check 7/25/2017   Target end date Indefinite    Indications   Long-term (current) use of anticoagulants [Z79.01] [Z79.01]  Atrial Fibrillation [I48.2]         Anticoagulation Episode Summary     INR check location     Preferred lab     Send INR reminders to [facility]    Comments       Anticoagulation Care Providers     Provider Role Specialty Phone number    Martin Schultz PA-C Responsible Physician Assistant [phone]            See the Encounter Report to view Anticoagulation Flowsheet and Dosing Calendar (Go to Encounters tab in chart review, and find the Anticoagulation Therapy Visit)        Jill Bearden

## 2017-06-27 NOTE — MR AVS SNAPSHOT
Chidi Dubon   6/27/2017 11:30 AM   Anticoagulation Therapy Visit    Description:  76 year old male   Provider:  JOSE E ANTI COAG   Department:  [facility]           INR as of 6/27/2017     Today's INR 2.7      Anticoagulation Summary as of 6/27/2017     INR goal 2.0-3.0   Today's INR 2.7   Full instructions 2.5 mg every day   Next INR check 7/25/2017    Indications   Long-term (current) use of anticoagulants [Z79.01] [Z79.01]  Atrial Fibrillation [I48.2]         Your next Anticoagulation Clinic appointment(s)     Jul 25, 2017 11:30 AM CDT   Anticoagulation Visit with JOSE E ANTI COARUPERTO   [facility] [place] ([facility])    [address]  [place] [address] [phone]              Contact Numbers     [facility]  Please call [phone] with any problems or questions regarding your therapy, or to cancel or reschedule your appointment.          June 2017 Details    Sun Mon Tue Wed Thu Fri Sat         1               2               3                 4               5               6               7               8               9               10                 11               12               13               14               15               16               17                 18               19               20               21               22               23               24                 25               26               27      2.5 mg   See details      28      2.5 mg         29      2.5 mg         30      2.5 mg           Date Details   06/27 This INR check               How to take your warfarin dose     To take:  2.5 mg Take 0.5 of a 5 mg tablet.           July 2017 Details    Sun Mon Tue Wed Thu Fri Sat           1      2.5 mg           2      2.5 mg         3      2.5 mg         4      2.5 mg         5      2.5 mg         6      2.5 mg         7      2.5 mg         8      2.5 mg           9      2.5 mg         10      2.5 mg         11      2.5 mg         12       2.5 mg         13      2.5 mg         14      2.5 mg         15      2.5 mg           16      2.5 mg         17      2.5 mg         18      2.5 mg         19      2.5 mg         20      2.5 mg         21      2.5 mg         22      2.5 mg           23      2.5 mg         24      2.5 mg         25            26               27               28               29                 30               31                     Date Details   No additional details    Date of next INR:  7/25/2017         How to take your warfarin dose     To take:  2.5 mg Take 0.5 of a 5 mg tablet.

## 2017-06-29 ENCOUNTER — THERAPY VISIT (OUTPATIENT)
Dept: PHYSICAL THERAPY | Facility: CLINIC | Age: 77
End: 2017-06-29
Payer: COMMERCIAL

## 2017-06-29 DIAGNOSIS — Z96.641 AFTERCARE FOLLOWING RIGHT HIP JOINT REPLACEMENT SURGERY: ICD-10-CM

## 2017-06-29 DIAGNOSIS — Z96.649 HIP JOINT REPLACEMENT STATUS: ICD-10-CM

## 2017-06-29 DIAGNOSIS — M25.551 HIP PAIN, RIGHT: ICD-10-CM

## 2017-06-29 DIAGNOSIS — Z47.1 AFTERCARE FOLLOWING RIGHT HIP JOINT REPLACEMENT SURGERY: ICD-10-CM

## 2017-06-29 PROCEDURE — 97110 THERAPEUTIC EXERCISES: CPT | Mod: GP | Performed by: PHYSICAL THERAPIST

## 2017-06-29 PROCEDURE — 97530 THERAPEUTIC ACTIVITIES: CPT | Mod: GP | Performed by: PHYSICAL THERAPIST

## 2017-07-06 ENCOUNTER — OFFICE VISIT (OUTPATIENT)
Dept: FAMILY MEDICINE | Facility: CLINIC | Age: 77
End: 2017-07-06
Payer: COMMERCIAL

## 2017-07-06 DIAGNOSIS — M54.50 ACUTE RIGHT-SIDED LOW BACK PAIN WITHOUT SCIATICA: Primary | ICD-10-CM

## 2017-07-06 PROCEDURE — 99213 OFFICE O/P EST LOW 20 MIN: CPT | Performed by: PHYSICIAN ASSISTANT

## 2017-07-06 RX ORDER — CYCLOBENZAPRINE HCL 5 MG
2.5-5 TABLET ORAL 3 TIMES DAILY PRN
Qty: 30 TABLET | Refills: 0 | Status: SHIPPED | OUTPATIENT
Start: 2017-07-06 | End: 2018-05-01

## 2017-07-06 RX ORDER — PREDNISONE 20 MG/1
20 TABLET ORAL 2 TIMES DAILY
Qty: 14 TABLET | Refills: 0 | Status: SHIPPED | OUTPATIENT
Start: 2017-07-06 | End: 2017-07-13

## 2017-07-06 NOTE — MR AVS SNAPSHOT
After Visit Summary   7/6/2017    Chidi Dubon    MRN: [id number]           Patient Information     Date Of Birth          1940        Visit Information        Provider Department      7/6/2017 2:40 PM Martin Schultz PA-C [facility] [place]        Today's Diagnoses     Acute right-sided low back pain without sciatica    -  1       Follow-ups after your visit        Your next 10 appointments already scheduled     Jul 13, 2017 12:40 PM CDT   TANK Extremity with Jean Marie Bermudez PT   [facility] [place] [facility] ([facility] [place])    [address]  [place] [address]   [phone]            Jul 20, 2017 12:40 PM CDT   TANK Extremity with Jean Marie Bermudez PT   [facility] [place] [facility] ([facility] [place])    [address]  [place] [address]   [phone]            Jul 25, 2017 11:30 AM CDT   Anticoagulation Visit with BE ANTI COAG   [facility] [place] ([facility] [place])    [address]  [place] [address]   [phone]            Jul 27, 2017 12:40 PM CDT   TANK Extremity with Jean Marie Bermudez PT   [facility] [place] [facility] ([facility] [place])    [address]  [place] [address]   [phone]              Who to contact     Normal or non-critical lab and imaging results will be communicated to you by MyChart, letter or phone within 4 business days after the clinic has received the results. If you do not hear from us within 7 days, please contact the clinic through MyChart or phone. If you have a critical or abnormal lab result, we will notify you by phone as soon as possible.  Submit refill requests through Zenph or call your pharmacy and they will forward the refill request to us. Please allow 3 business days for your refill to be completed.          If you need to speak with a  for additional information ,  "please call: [phone]             Additional Information About Your Visit        [employer] Information     [employer] lets you send messages to your doctor, view your test results, renew your prescriptions, schedule appointments and more. To sign up, go to [email or web address] . Click on \"Log in\" on the left side of the screen, which will take you to the Welcome page. Then click on \"Sign up Now\" on the right side of the page.     You will be asked to enter the access code listed below, as well as some personal information. Please follow the directions to create your username and password.     Your access code is: [email or web address]  Expires: 2017  1:47 PM     Your access code will  in 90 days. If you need help or a new code, please call your [place] clinic or [phone].        Care EveryWhere ID     This is your Care EveryWhere ID. This could be used by other organizations to access your [place] medical records  [id number]         Blood Pressure from Last 3 Encounters:   17 153/88   17 133/82   17 128/78    Weight from Last 3 Encounters:   17 281 lb (127.5 kg)   17 282 lb (127.9 kg)   17 283 lb (128.4 kg)              Today, you had the following     No orders found for display         Today's Medication Changes          These changes are accurate as of: 17  3:11 PM.  If you have any questions, ask your nurse or doctor.               Start taking these medicines.        Dose/Directions    cyclobenzaprine 5 MG tablet   Commonly known as:  FLEXERIL   Used for:  Acute right-sided low back pain without sciatica   Started by:  Martin Schultz PA-C        Dose:  2.5-5 mg   Take 0.5-1 tablets (2.5-5 mg) by mouth 3 times daily as needed for muscle spasms   Quantity:  30 tablet   Refills:  0       predniSONE 20 MG tablet   Commonly known as:  DELTASONE   Used for:  Acute right-sided low back pain without sciatica   Started by:  Martin Schultz PA-C        Dose:  20 " mg   Take 1 tablet (20 mg) by mouth 2 times daily for 7 days   Quantity:  14 tablet   Refills:  0            Where to get your medicines      These medications were sent to [id number] - [place], [place] - [address]  [address] [place] [address]     Phone:  [phone]     predniSONE 20 MG tablet         Call your pharmacy to confirm that your medication is ready for pickup. It may take up to 24 hours for them to receive the prescription. If the prescription is not ready within 3 business days, please contact your clinic or your provider.     We will let you know when these medications are ready. If you don't hear back within 3 business days, please contact us.     cyclobenzaprine 5 MG tablet                Primary Care Provider Office Phone # Fax #    Martin Schultz PA-C [phone]       [facility] [place] [address]  [place] [address]        Goals        Diet    Reduce portion size     Related Problems    Hyperlipidemia LDL goal <70    Notes - Note created  5/9/2014  3:10 PM by Oliver Veliz MD    Everything in moderation.         General    Medication 1 (pt-stated)     Related Problems    Urolithiasis    Notes - Note created  5/9/2014  3:11 PM by Oliver Veliz MD    Stop the flomax and pay attention to your urine flow.  If not worse, ok to stay off of it. If worse, restart.       Taper off medication (pt-stated)     Related Problems    Fatigue    Notes - Note created  5/9/2014  3:08 PM by Oliver Veliz MD    Tapering Schedule for citalopram;    Week 1:  1/2 alternating with 1 every other day  Week 2:  1/2 daily  Week 3:  1/2 every other day  Week 4:  Stop    If at any point you experience significant side effects from tapering, we may need to slow the taper a bit.    Once you are off of this, pay attention to the mood and if you are not feeling better, we can try using bupropion which is a medication that is less likely to cause fatigue and  weight gain.         Equal Access to Services     [facility] : Hadii santana norton skylarmichael Harveyali, waaxda luqadaha, qaybta kaalmada pattie, darin meneses. So [facility] [phone].    ATENCIÓN: Si habla español, tiene a brown disposición servicios gratuitos de asistencia lingüística. Llame al [phone].    We comply with applicable federal civil rights laws and [place] laws. We do not discriminate on the basis of race, color, national origin, age, disability sex, sexual orientation or gender identity.            Thank you!     Thank you for choosing [facility]  for your care. Our goal is always to provide you with excellent care. Hearing back from our patients is one way we can continue to improve our services. Please take a few minutes to complete the written survey that you may receive in the mail after your visit with us. Thank you!             Your Updated Medication List - Protect others around you: Learn how to safely use, store and throw away your medicines at www.disposemymeds.org.          This list is accurate as of: 7/6/17  3:11 PM.  Always use your most recent med list.                   Brand Name Dispense Instructions for use Diagnosis    aspirin 81 MG tablet     30 tablet    Take 1 tablet (81 mg) by mouth daily    CAD (coronary artery disease)       atorvastatin 80 MG tablet    LIPITOR    90 tablet    Take 1 tablet (80 mg) by mouth every other day    Hyperlipidemia LDL goal <70       cyclobenzaprine 5 MG tablet    FLEXERIL    30 tablet    Take 0.5-1 tablets (2.5-5 mg) by mouth 3 times daily as needed for muscle spasms    Acute right-sided low back pain without sciatica       nitroGLYcerin 0.4 MG sublingual tablet    NITROSTAT    25 tablet    Place 1 tablet (0.4 mg) under the tongue every 5 minutes as needed for chest pain    Hyperlipidemia LDL goal <70       oxyCODONE-acetaminophen 5-325 MG per tablet    PERCOCET    40 tablet    Take 1-2 tablets by mouth every 8  hours as needed for pain maximum 6 tablet(s) per day    S/P hip replacement, right       predniSONE 20 MG tablet    DELTASONE    14 tablet    Take 1 tablet (20 mg) by mouth 2 times daily for 7 days    Acute right-sided low back pain without sciatica       valsartan-hydrochlorothiazide 80-12.5 MG per tablet    DIOVAN-HCT    90 tablet    Take 1 tablet by mouth daily    Benign essential hypertension       warfarin 5 MG tablet    COUMADIN    45 tablet    Take 1 tablet (5 mg) by mouth daily Or as directed by [facility]. Current dose is 2.5 mg daily    New onset atrial fibrillation (H)

## 2017-07-06 NOTE — PROGRESS NOTES
SUBJECTIVE:                                                    Chidi Dubon is a 76 year old male who presents to clinic today for the following health issues:      Back Pain       Duration: 2 days        Specific cause: none    Description:   Location of pain: middle of back right  Character of pain: spasm  Pain radiation:none  New numbness or weakness in legs, not attributed to pain:  no     Intensity: Currently 10/10    History:   Pain interferes with job: Not applicable  History of back problems: no prior back problems  Any previous MRI or X-rays: None  Sees a specialist for back pain:  No  Therapies tried without relief:     Alleviating factors:   Improved by: pain medication helped slightly      Precipitating factors:  Worsened by: Lifting, Bending, Standing, Sitting, Lying Flat and Walking    Functional and Psychosocial Screen (Jd STarT Back):      Not performed today          Accompanying Signs & Symptoms:  Risk of Fracture:  None  Risk of Cauda Equina:  None  Risk of Infection:  None  Risk of Cancer:  None  Risk of Ankylosing Spondylitis:  Onset at age <35, male, AND morning back stiffness. no         Right low back pain . No rash. Pain with movement. No bowel or bladder symptoms. No radicular pain symptoms.           Problem list and histories reviewed & adjusted, as indicated.  Additional history: as documented        Reviewed and updated as needed this visit by clinical staff  Allergies       Reviewed and updated as needed this visit by Provider         All other systems negative except as outline above  OBJECTIVE:  BACK: Lumbosacral spine area reveals local tenderness.  Painful and reduced LS ROM noted. Straight leg raise is negative  DTR's, motor strength and sensation normal, including heel and toe gait.  Perifpheral pulses are palpable.  Hipes and knees have full range of motion without pain.  No abdominal tenderness, mass or organomegaly.    Chidi was seen today for back  pain.    Diagnoses and all orders for this visit:    Acute right-sided low back pain without sciatica  -     predniSONE (DELTASONE) 20 MG tablet; Take 1 tablet (20 mg) by mouth 2 times daily for 7 days  -     cyclobenzaprine (FLEXERIL) 5 MG tablet; Take 0.5-1 tablets (2.5-5 mg) by mouth 3 times daily as needed for muscle spasms      Advised supportive and symptomatic treatment.  Follow up with Provider - if condition persists or worsens.

## 2017-07-13 ENCOUNTER — THERAPY VISIT (OUTPATIENT)
Dept: PHYSICAL THERAPY | Facility: CLINIC | Age: 77
End: 2017-07-13
Payer: COMMERCIAL

## 2017-07-13 DIAGNOSIS — Z96.641 AFTERCARE FOLLOWING RIGHT HIP JOINT REPLACEMENT SURGERY: ICD-10-CM

## 2017-07-13 DIAGNOSIS — Z96.649 HIP JOINT REPLACEMENT STATUS: ICD-10-CM

## 2017-07-13 DIAGNOSIS — M25.551 HIP PAIN, RIGHT: ICD-10-CM

## 2017-07-13 DIAGNOSIS — Z47.1 AFTERCARE FOLLOWING RIGHT HIP JOINT REPLACEMENT SURGERY: ICD-10-CM

## 2017-07-13 PROCEDURE — 97530 THERAPEUTIC ACTIVITIES: CPT | Mod: GP | Performed by: PHYSICAL THERAPIST

## 2017-07-13 PROCEDURE — 97110 THERAPEUTIC EXERCISES: CPT | Mod: GP | Performed by: PHYSICAL THERAPIST

## 2017-07-13 NOTE — MR AVS SNAPSHOT
After Visit Summary   7/13/2017    Chidi Dubon    MRN: [id number]           Patient Information     Date Of Birth          1940        Visit Information        Provider Department      7/13/2017 12:40 PM Jean Marie Bermudez PT [facility] [place] [facility]        Today's Diagnoses     Aftercare following right hip joint replacement surgery        Hip pain, right        Hip joint replacement status           Follow-ups after your visit        Your next 10 appointments already scheduled     Jul 20, 2017 12:40 PM CDT   TANK Extremity with Jean Marie Bermudez PT   [facility] [place] [facility] ([facility] [place]    [address]  [place] [address]   [phone]            Jul 25, 2017 11:30 AM CDT   Anticoagulation Visit with BE ANTI COAG   [facility] [place] ([facility] [place])    [address]  [place] [address]   [phone]            Jul 27, 2017 12:40 PM CDT   TANK Extremity with Jean Marie Bermudez PT   [facility] [place] [facility] ([facility] [place])    [address]  [place] [address]   [phone]              Who to contact     If you have questions or need follow up information about today's clinic visit or your schedule please contact [facility] [place] [facility] directly at [phone].  Normal or non-critical lab and imaging results will be communicated to you by MyChart, letter or phone within 4 business days after the clinic has received the results. If you do not hear from us within 7 days, please contact the clinic through MyChart or phone. If you have a critical or abnormal lab result, we will notify you by phone as soon as possible.  Submit refill requests through [employer]Pump or call your pharmacy and they will forward the refill request to us. Please allow 3 business days for your refill to be completed.          Additional Information About Your  "Visit        [employer] Information     [employer] lets you send messages to your doctor, view your test results, renew your prescriptions, schedule appointments and more. To sign up, go to [email or web address] . Click on \"Log in\" on the left side of the screen, which will take you to the Welcome page. Then click on \"Sign up Now\" on the right side of the page.     You will be asked to enter the access code listed below, as well as some personal information. Please follow the directions to create your username and password.     Your access code is: [email or web address]  Expires: 10/11/2017  1:37 PM     Your access code will  in 90 days. If you need help or a new code, please call your [place] clinic or [phone].        Care EveryWhere ID     This is your Care EveryWhere ID. This could be used by other organizations to access your [place] medical records  [id number]         Blood Pressure from Last 3 Encounters:   17 153/88   17 133/82   17 128/78    Weight from Last 3 Encounters:   17 127.5 kg (281 lb)   17 127.9 kg (282 lb)   17 128.4 kg (283 lb)              We Performed the Following     THERAPEUTIC ACTIVITIES     THERAPEUTIC EXERCISES        Primary Care Provider Office Phone # Fax #    Martinmagi Schultz PA-C [phone]       [facility] [place] [address]  [place] [address]        Goals        Diet    Reduce portion size     Related Problems    Hyperlipidemia LDL goal <70    Notes - Note created  2014  3:10 PM by Oliver Veliz MD    Everything in moderation.         General    Medication 1 (pt-stated)     Related Problems    Urolithiasis    Notes - Note created  2014  3:11 PM by Oliver Veliz MD    Stop the flomax and pay attention to your urine flow.  If not worse, ok to stay off of it. If worse, restart.       Taper off medication (pt-stated)     Related Problems    Fatigue    Notes - Note created  2014  3:08 PM by Jose Alfredo" MD Oliver    Tapering Schedule for citalopram;    Week 1:  1/2 alternating with 1 every other day  Week 2:  1/2 daily  Week 3:  1/2 every other day  Week 4:  Stop    If at any point you experience significant side effects from tapering, we may need to slow the taper a bit.    Once you are off of this, pay attention to the mood and if you are not feeling better, we can try using bupropion which is a medication that is less likely to cause fatigue and weight gain.         Equal Access to Services     NORMAN MIL[facility] : Hadii santana chengo [place], waaxda luqadaha, qaybta kaalmada pattie, darin meneses. So [facility] [phone].    ATENCIÓN: Si habla español, tiene a brown disposición servicios gratuitos de asistencia lingüística. Ll[facility] [phone].    We comply with applicable federal civil rights laws and [place] laws. We do not discriminate on the basis of race, color, national origin, age, disability sex, sexual orientation or gender identity.            Thank you!     Thank you for choosing [facility] [place] [facility]  for your care. Our goal is always to provide you with excellent care. Hearing back from our patients is one way we can continue to improve our services. Please take a few minutes to complete the written survey that you may receive in the mail after your visit with us. Thank you!             Your Updated Medication List - Protect others around you: Learn how to safely use, store and throw away your medicines at www.disposemymeds.org.          This list is accurate as of: 7/13/17  1:37 PM.  Always use your most recent med list.                   Brand Name Dispense Instructions for use Diagnosis    aspirin 81 MG tablet     30 tablet    Take 1 tablet (81 mg) by mouth daily    CAD (coronary artery disease)       atorvastatin 80 MG tablet    LIPITOR    90 tablet    Take 1 tablet (80 mg) by mouth every other day    Hyperlipidemia LDL goal <70        cyclobenzaprine 5 MG tablet    FLEXERIL    30 tablet    Take 0.5-1 tablets (2.5-5 mg) by mouth 3 times daily as needed for muscle spasms    Acute right-sided low back pain without sciatica       nitroGLYcerin 0.4 MG sublingual tablet    NITROSTAT    25 tablet    Place 1 tablet (0.4 mg) under the tongue every 5 minutes as needed for chest pain    Hyperlipidemia LDL goal <70       oxyCODONE-acetaminophen 5-325 MG per tablet    PERCOCET    40 tablet    Take 1-2 tablets by mouth every 8 hours as needed for pain maximum 6 tablet(s) per day    S/P hip replacement, right       predniSONE 20 MG tablet    DELTASONE    14 tablet    Take 1 tablet (20 mg) by mouth 2 times daily for 7 days    Acute right-sided low back pain without sciatica       valsartan-hydrochlorothiazide 80-12.5 MG per tablet    DIOVAN-HCT    90 tablet    Take 1 tablet by mouth daily    Benign essential hypertension       warfarin 5 MG tablet    COUMADIN    45 tablet    Take 1 tablet (5 mg) by mouth daily Or as directed by [facility]. Current dose is 2.5 mg daily    New onset atrial fibrillation (H)

## 2017-07-13 NOTE — PROGRESS NOTES
Subjective:    HPI                    Objective:    System    Physical Exam    General     ROS    Assessment/Plan:      PROGRESS  REPORT    Progress reporting period is from 6/6/17 to 7/13/17.       SUBJECTIVE  Subjective: Had a little bit of a setback last week when he strained his back.  Got muscle relaxers and that helped. But he had to back off on his exercises so he feels he didn't gain any ground    Current Pain level: 1/10.     Initial Pain level: 2/10.   Changes in function:  Yes (See Goal flowsheet attached for changes in current functional level)  Adverse reaction to treatment or activity: None    OBJECTIVE  Objective: Gait: without cane limp is only mild now.  Stairs: Doing well with reciprocal pattern with railing- ascending is harder than descending     ASSESSMENT/PLAN  Updated problem list and treatment plan: Diagnosis 1:  R JUSTO  Pain -  self management, education and home program  Decreased strength - therapeutic exercise, therapeutic activities and home program  Impaired gait - gait training and home program  Decreased function - therapeutic activities and home program  STG/LTGs have been met or progress has been made towards goals:  Yes (See Goal flow sheet completed today.)  Assessment of Progress: The patient's condition is improving.  Patient is meeting short term goals and is progressing towards long term goals.  Self Management Plans:  Patient has been instructed in a home treatment program.  Chidi continues to require the following intervention to meet STG and LTG's:  PT    Recommendations:  This patient would benefit from continued therapy.     Frequency:  1 X week, once daily  Duration:  for 2 weeks        Please refer to the daily flowsheet for treatment today, total treatment time and time spent performing 1:1 timed codes.

## 2017-07-20 ENCOUNTER — THERAPY VISIT (OUTPATIENT)
Dept: PHYSICAL THERAPY | Facility: CLINIC | Age: 77
End: 2017-07-20
Payer: COMMERCIAL

## 2017-07-20 DIAGNOSIS — Z96.641 AFTERCARE FOLLOWING RIGHT HIP JOINT REPLACEMENT SURGERY: ICD-10-CM

## 2017-07-20 DIAGNOSIS — Z47.1 AFTERCARE FOLLOWING RIGHT HIP JOINT REPLACEMENT SURGERY: ICD-10-CM

## 2017-07-20 DIAGNOSIS — Z96.649 HIP JOINT REPLACEMENT STATUS: ICD-10-CM

## 2017-07-20 DIAGNOSIS — M25.551 HIP PAIN, RIGHT: ICD-10-CM

## 2017-07-20 PROCEDURE — 97530 THERAPEUTIC ACTIVITIES: CPT | Mod: GP | Performed by: PHYSICAL THERAPY ASSISTANT

## 2017-07-20 PROCEDURE — 97110 THERAPEUTIC EXERCISES: CPT | Mod: GP | Performed by: PHYSICAL THERAPY ASSISTANT

## 2017-07-20 NOTE — PROGRESS NOTES
Subjective:    HPI                    Objective:    System    Physical Exam    General     ROS    Assessment/Plan:      SUBJECTIVE  Subjective changes as noted by pt:  Pt had back spasms last week but, only along the R side now.   He stopped using the muscle relaxers now, states he was getting too constipated.    Current pain level: 1/10   Changes in function:  None     Adverse reaction to treatment or activity:  None    OBJECTIVE  Changes in objective findings:  Gait: pt ambulates on level surfaces (trying to not use the cane) mild limp on R leg 25-50% of the time. Ascends regular flight of stairs with use of rail on R side. For descending regular flight of stairs, at times he does a 1-step technique. Descending: If he leads with left leg first, he then can alternate steps.     ASSESSMENT  Chidi continues to require intervention to meet STG and LTG's: PT  Patient is progressing as expected.  Response to therapy has shown an improvement in  gait  Progress made towards STG/LTG?  STG made for stairs and gait distances, LTG met for stairs.   LTG not fully met for gait distances.     PLAN  Current treatment program is being advanced to more complex exercises.    PTA/ATC plan:  Will continue with present plan of care.    Please refer to the daily flowsheet for treatment today, total treatment time and time spent performing 1:1 timed codes.

## 2017-07-20 NOTE — MR AVS SNAPSHOT
"              After Visit Summary   7/20/2017    Chidi Dubon    MRN: [id number]           Patient Information     Date Of Birth          1940        Visit Information        Provider Department      7/20/2017 12:30 PM Jean Marie Hinojosa PTA [facility] [place] [facility]        Today's Diagnoses     Aftercare following right hip joint replacement surgery        Hip pain, right        Hip joint replacement status           Follow-ups after your visit        Your next 10 appointments already scheduled     Jul 25, 2017 11:30 AM CDT   Anticoagulation Visit with BE ANTI COAG   [facility] [place] ([facility] [place])    [address]  [place] [address]   [phone]            Jul 27, 2017 12:40 PM CDT   TANK Extremity with Jean Marie Bermudez, PT   [facility] [place] [facility] ([facility] [place])    [address]  [address]  [place] [address] [phone]              Who to contact     If you have questions or need follow up information about today's clinic visit or your schedule please contact [facility] directly at [phone].  Normal or non-critical lab and imaging results will be communicated to you by MyChart, letter or phone within 4 business days after the clinic has received the results. If you do not hear from us within 7 days, please contact the clinic through Conductivhart or phone. If you have a critical or abnormal lab result, we will notify you by phone as soon as possible.  Submit refill requests through Project 10K or call your pharmacy and they will forward the refill request to us. Please allow 3 business days for your refill to be completed.          Additional Information About Your Visit        MyChart Information     Project 10K lets you send messages to your doctor, view your test results, renew your prescriptions, schedule appointments and more. To sign up, go to [email or web address] . Click on \"Log " "in\" on the left side of the screen, which will take you to the Welcome page. Then click on \"Sign up Now\" on the right side of the page.     You will be asked to enter the access code listed below, as well as some personal information. Please follow the directions to create your username and password.     Your access code is: [email or web address]  Expires: 10/11/2017  1:37 PM     Your access code will  in 90 days. If you need help or a new code, please call your [place] clinic or [phone].        Care EveryWhere ID     This is your Care EveryWhere ID. This could be used by other organizations to access your [place] medical records  [id number]         Blood Pressure from Last 3 Encounters:   17 153/88   17 133/82   17 128/78    Weight from Last 3 Encounters:   17 127.5 kg (281 lb)   17 127.9 kg (282 lb)   17 128.4 kg (283 lb)              We Performed the Following     Therapeutic Activities     Therapeutic Exercises        Primary Care Provider Office Phone # Fax #    Martin Schultz PA-C [phone]       [facility] [place] [address]  [place] [address]        Goals        Diet    Reduce portion size     Related Problems    Hyperlipidemia LDL goal <70    Notes - Note created  2014  3:10 PM by Oliver Veliz MD    Everything in moderation.         General    Medication 1 (pt-stated)     Related Problems    Urolithiasis    Notes - Note created  2014  3:11 PM by Oliver Veliz MD    Stop the flomax and pay attention to your urine flow.  If not worse, ok to stay off of it. If worse, restart.       Taper off medication (pt-stated)     Related Problems    Fatigue    Notes - Note created  2014  3:08 PM by Oliver Veliz MD    Tapering Schedule for citalopram;    Week 1:  1/2 alternating with 1 every other day  Week 2:  1/2 daily  Week 3:  1/2 every other day  Week 4:  Stop    If at any point you experience significant side effects from " tapering, we may need to slow the taper a bit.    Once you are off of this, pay attention to the mood and if you are not feeling better, we can try using bupropion which is a medication that is less likely to cause fatigue and weight gain.         Equal Access to Services     NORMAN MILNER : Melly Herrera, marifer anguiano, mayjennyfer kaaniagurpreet blankenship, darin meneses. So [facility] [phone].    ATENCIÓN: Si habla español, tiene a brown disposición servicios gratuitos de asistencia lingüística. Llame al [phone].    We comply with applicable federal civil rights laws and [place] laws. We do not discriminate on the basis of race, color, national origin, age, disability sex, sexual orientation or gender identity.            Thank you!     Thank you for choosing [facility]   for your care. Our goal is always to provide you with excellent care. Hearing back from our patients is one way we can continue to improve our services. Please take a few minutes to complete the written survey that you may receive in the mail after your visit with us. Thank you!             Your Updated Medication List - Protect others around you: Learn how to safely use, store and throw away your medicines at www.disposemymeds.org.          This list is accurate as of: 7/20/17  1:41 PM.  Always use your most recent med list.                   Brand Name Dispense Instructions for use Diagnosis    aspirin 81 MG tablet     30 tablet    Take 1 tablet (81 mg) by mouth daily    CAD (coronary artery disease)       atorvastatin 80 MG tablet    LIPITOR    90 tablet    Take 1 tablet (80 mg) by mouth every other day    Hyperlipidemia LDL goal <70       cyclobenzaprine 5 MG tablet    FLEXERIL    30 tablet    Take 0.5-1 tablets (2.5-5 mg) by mouth 3 times daily as needed for muscle spasms    Acute right-sided low back pain without sciatica       nitroGLYcerin 0.4 MG sublingual tablet     NITROSTAT    25 tablet    Place 1 tablet (0.4 mg) under the tongue every 5 minutes as needed for chest pain    Hyperlipidemia LDL goal <70       oxyCODONE-acetaminophen 5-325 MG per tablet    PERCOCET    40 tablet    Take 1-2 tablets by mouth every 8 hours as needed for pain maximum 6 tablet(s) per day    S/P hip replacement, right       valsartan-hydrochlorothiazide 80-12.5 MG per tablet    DIOVAN-HCT    90 tablet    Take 1 tablet by mouth daily    Benign essential hypertension       warfarin 5 MG tablet    COUMADIN    45 tablet    Take 1 tablet (5 mg) by mouth daily Or as directed by [facility] clinic. Current dose is 2.5 mg daily    New onset atrial fibrillation (H)

## 2017-07-25 ENCOUNTER — ANTICOAGULATION THERAPY VISIT (OUTPATIENT)
Dept: NURSING | Facility: CLINIC | Age: 77
End: 2017-07-25
Payer: COMMERCIAL

## 2017-07-25 DIAGNOSIS — I48.20 CHRONIC ATRIAL FIBRILLATION (H): ICD-10-CM

## 2017-07-25 DIAGNOSIS — Z79.01 LONG-TERM (CURRENT) USE OF ANTICOAGULANTS: ICD-10-CM

## 2017-07-25 LAB — INR POINT OF CARE: 3 (ref 0.86–1.14)

## 2017-07-25 PROCEDURE — 85610 PROTHROMBIN TIME: CPT | Mod: QW

## 2017-07-25 PROCEDURE — 99207 ZZC NO CHARGE NURSE ONLY: CPT

## 2017-07-25 PROCEDURE — 36416 COLLJ CAPILLARY BLOOD SPEC: CPT

## 2017-07-25 NOTE — PROGRESS NOTES
ANTICOAGULATION FOLLOW-UP CLINIC VISIT    Patient Name:  Chidi Dubon  Date:  7/25/2017  Contact Type:  Face to Face    SUBJECTIVE:     Patient Findings     Positives No Problem Findings           OBJECTIVE    INR Protime   Date Value Ref Range Status   07/25/2017 3.0 (A) 0.86 - 1.14 Final       ASSESSMENT / PLAN  INR assessment THER    Recheck INR In: 4 WEEKS    INR Location Clinic      Anticoagulation Summary as of 7/25/2017     INR goal 2.0-3.0   Today's INR 3.0   Maintenance plan 2.5 mg (5 mg x 0.5) every day   Full instructions 2.5 mg every day   Weekly total 17.5 mg   No change documented Hyacinth Wilson   Plan last modified Hyacinth Wilson (1/12/2017)   Next INR check 8/22/2017   Target end date Indefinite    Indications   Long-term (current) use of anticoagulants [Z79.01] [Z79.01]  Atrial Fibrillation [I48.2]         Anticoagulation Episode Summary     INR check location     Preferred lab     Send INR reminders to [facility]    Comments       Anticoagulation Care Providers     Provider Role Specialty Phone number    Martni Schultz PA-C Responsible Physician Assistant [phone]            See the Encounter Report to view Anticoagulation Flowsheet and Dosing Calendar (Go to Encounters tab in chart review, and find the Anticoagulation Therapy Visit)        HYACINTH WILSON

## 2017-07-25 NOTE — MR AVS SNAPSHOT
Chidi Dubon   7/25/2017 11:30 AM   Anticoagulation Therapy Visit    Description:  76 year old male   Provider:  JOSE E ANTI COAG   Department:  Be Nurse           INR as of 7/25/2017     Today's INR 3.0      Anticoagulation Summary as of 7/25/2017     INR goal 2.0-3.0   Today's INR 3.0   Full instructions 2.5 mg every day   Next INR check 8/22/2017    Indications   Long-term (current) use of anticoagulants [Z79.01] [Z79.01]  Atrial Fibrillation [I48.2]         Your next Anticoagulation Clinic appointment(s)     Aug 22, 2017 11:30 AM CDT   Anticoagulation Visit with JOSE E ANTI COARUPERTO   [facility] [place] ([facility])    [address]  [place] [address] [phone]              Contact Numbers     [facility]  Please call [phone] with any problems or questions regarding your therapy, or to cancel or reschedule your appointment.          July 2017 Details    Sun Mon Tue Wed Thu Fri Sat           1                 2               3               4               5               6               7               8                 9               10               11               12               13               14               15                 16               17               18               19               20               21               22                 23               24               25      2.5 mg   See details      26      2.5 mg         27      2.5 mg         28      2.5 mg         29      2.5 mg           30      2.5 mg         31      2.5 mg               Date Details   07/25 This INR check               How to take your warfarin dose     To take:  2.5 mg Take 0.5 of a 5 mg tablet.           August 2017 Details    Sun Mon Tue Wed Thu Fri Sat       1      2.5 mg         2      2.5 mg         3      2.5 mg         4      2.5 mg         5      2.5 mg           6      2.5 mg         7      2.5 mg         8      2.5 mg         9      2.5 mg         10      2.5 mg          11      2.5 mg         12      2.5 mg           13      2.5 mg         14      2.5 mg         15      2.5 mg         16      2.5 mg         17      2.5 mg         18      2.5 mg         19      2.5 mg           20      2.5 mg         21      2.5 mg         22            23               24               25               26                 27               28               29               30               31                  Date Details   No additional details    Date of next INR:  8/22/2017         How to take your warfarin dose     To take:  2.5 mg Take 0.5 of a 5 mg tablet.

## 2017-07-27 ENCOUNTER — THERAPY VISIT (OUTPATIENT)
Dept: PHYSICAL THERAPY | Facility: CLINIC | Age: 77
End: 2017-07-27
Payer: COMMERCIAL

## 2017-07-27 DIAGNOSIS — Z96.641 AFTERCARE FOLLOWING RIGHT HIP JOINT REPLACEMENT SURGERY: ICD-10-CM

## 2017-07-27 DIAGNOSIS — M25.551 HIP PAIN, RIGHT: ICD-10-CM

## 2017-07-27 DIAGNOSIS — Z96.649 HIP JOINT REPLACEMENT STATUS: ICD-10-CM

## 2017-07-27 DIAGNOSIS — Z47.1 AFTERCARE FOLLOWING RIGHT HIP JOINT REPLACEMENT SURGERY: ICD-10-CM

## 2017-07-27 PROCEDURE — 97530 THERAPEUTIC ACTIVITIES: CPT | Mod: GP | Performed by: PHYSICAL THERAPIST

## 2017-07-27 PROCEDURE — 97112 NEUROMUSCULAR REEDUCATION: CPT | Mod: GP | Performed by: PHYSICAL THERAPIST

## 2017-07-27 PROCEDURE — 97110 THERAPEUTIC EXERCISES: CPT | Mod: GP | Performed by: PHYSICAL THERAPIST

## 2017-07-27 NOTE — PROGRESS NOTES
Subjective:    HPI                    Objective:    System    Physical Exam    General     ROS    Assessment/Plan:      DISCHARGE REPORT    Progress reporting period is from 6/6/17 to 7/26/17.       SUBJECTIVE  Subjective: Can tell the hip is still weak at times but it's getting progressively better.  Can walk distance around his house and neighborhood without the cane    Current Pain level: 1/10.     Initial Pain level: 2/10.   Changes in function:  Yes (See Goal flowsheet attached for changes in current functional level)  Adverse reaction to treatment or activity: None    OBJECTIVE  Objective: Still has a mild limp without cane but not like it used to be.  Looks to be more related to happen than a significant weakness too.  With use of a railing pt is safe and demonstrates good control on stairs     ASSESSMENT/PLAN  Updated problem list and treatment plan: Diagnosis 1:  R JUSTO    STG/LTGs have been met or progress has been made towards goals:  Yes (See Goal flow sheet completed today.)  Assessment of Progress: The patient's condition is improving.  The patient has met all of their long term goals.  Self Management Plans:  Patient is independent in a home treatment program.  Chidi continues to require the following intervention to meet STG and LTG's:  PT intervention is no longer required to meet STG/LTG.    Recommendations:  This patient is ready to be discharged from therapy and continue their home treatment program.    Please refer to the daily flowsheet for treatment today, total treatment time and time spent performing 1:1 timed codes.

## 2017-07-27 NOTE — MR AVS SNAPSHOT
"              After Visit Summary   7/27/2017    Chidi Dubon    MRN: [id number]           Patient Information     Date Of Birth          1940        Visit Information        Provider Department      7/27/2017 12:40 PM Jean Marie Bermudez PT [facility] [place] [facility]        Today's Diagnoses     Aftercare following right hip joint replacement surgery        Hip pain, right        Hip joint replacement status           Follow-ups after your visit        Your next 10 appointments already scheduled     Aug 22, 2017 11:30 AM CDT   Anticoagulation Visit with BE ANTI COAG   [facility] [place] ([facility])    [address]  [place] [address] [phone]              Who to contact     If you have questions or need follow up information about today's clinic visit or your schedule please contact [facility] directly at [phone].  Normal or non-critical lab and imaging results will be communicated to you by MyChart, letter or phone within 4 business days after the clinic has received the results. If you do not hear from us within 7 days, please contact the clinic through MyChart or phone. If you have a critical or abnormal lab result, we will notify you by phone as soon as possible.  Submit refill requests through Dyyno or call your pharmacy and they will forward the refill request to us. Please allow 3 business days for your refill to be completed.          Additional Information About Your Visit        MyChart Information     Dyyno lets you send messages to your doctor, view your test results, renew your prescriptions, schedule appointments and more. To sign up, go to [email or web address] . Click on \"Log in\" on the left side of the screen, which will take you to the Welcome page. Then click on \"Sign up Now\" on the right side of the page.     You will be asked to enter the access code listed below, as well as some " personal information. Please follow the directions to create your username and password.     Your access code is: [email or web address]  Expires: 10/11/2017  1:37 PM     Your access code will  in 90 days. If you need help or a new code, please call your [place] clinic or [phone].        Care EveryWhere ID     This is your Care EveryWhere ID. This could be used by other organizations to access your [place] medical records  [id number]         Blood Pressure from Last 3 Encounters:   17 153/88   17 133/82   17 128/78    Weight from Last 3 Encounters:   17 127.5 kg (281 lb)   17 127.9 kg (282 lb)   17 128.4 kg (283 lb)              We Performed the Following     NEUROMUSCULAR RE-EDUCATION     THERAPEUTIC ACTIVITIES     THERAPEUTIC EXERCISES        Primary Care Provider Office Phone # Fax #    Martin Gay Schultz PA-C [phone]       [facility] [place] [address]  [place] [address]        Goals        Diet    Reduce portion size     Related Problems    Hyperlipidemia LDL goal <70    Notes - Note created  2014  3:10 PM by Oliver Veliz MD    Everything in moderation.         General    Medication 1 (pt-stated)     Related Problems    Urolithiasis    Notes - Note created  2014  3:11 PM by Oliver Veliz MD    Stop the flomax and pay attention to your urine flow.  If not worse, ok to stay off of it. If worse, restart.       Taper off medication (pt-stated)     Related Problems    Fatigue    Notes - Note created  2014  3:08 PM by Oliver Veliz MD    Tapering Schedule for citalopram;    Week 1:  1/2 alternating with 1 every other day  Week 2:  1/2 daily  Week 3:  1/2 every other day  Week 4:  Stop    If at any point you experience significant side effects from tapering, we may need to slow the taper a bit.    Once you are off of this, pay attention to the mood and if you are not feeling better, we can try using bupropion which is a  medication that is less likely to cause fatigue and weight gain.         Equal Access to Services     NORMAN MILNER : Hadii santana Herrera, marifer anguiano, noelleallison stewardmadarin smith. So [facility] [phone].    ATENCIÓN: Si habla español, tiene a brown disposición servicios gratuitos de asistencia lingüística. Ll[facility] [phone].    We comply with applicable federal civil rights laws and [place] laws. We do not discriminate on the basis of race, color, national origin, age, disability sex, sexual orientation or gender identity.            Thank you!     Thank you for choosing [facility]  for your care. Our goal is always to provide you with excellent care. Hearing back from our patients is one way we can continue to improve our services. Please take a few minutes to complete the written survey that you may receive in the mail after your visit with us. Thank you!             Your Updated Medication List - Protect others around you: Learn how to safely use, store and throw away your medicines at www.disposemymeds.org.          This list is accurate as of: 7/27/17  1:21 PM.  Always use your most recent med list.                   Brand Name Dispense Instructions for use Diagnosis    aspirin 81 MG tablet     30 tablet    Take 1 tablet (81 mg) by mouth daily    CAD (coronary artery disease)       atorvastatin 80 MG tablet    LIPITOR    90 tablet    Take 1 tablet (80 mg) by mouth every other day    Hyperlipidemia LDL goal <70       cyclobenzaprine 5 MG tablet    FLEXERIL    30 tablet    Take 0.5-1 tablets (2.5-5 mg) by mouth 3 times daily as needed for muscle spasms    Acute right-sided low back pain without sciatica       nitroGLYcerin 0.4 MG sublingual tablet    NITROSTAT    25 tablet    Place 1 tablet (0.4 mg) under the tongue every 5 minutes as needed for chest pain    Hyperlipidemia LDL goal <70       oxyCODONE-acetaminophen 5-325 MG  per tablet    PERCOCET    40 tablet    Take 1-2 tablets by mouth every 8 hours as needed for pain maximum 6 tablet(s) per day    S/P hip replacement, right       valsartan-hydrochlorothiazide 80-12.5 MG per tablet    DIOVAN-HCT    90 tablet    Take 1 tablet by mouth daily    Benign essential hypertension       warfarin 5 MG tablet    COUMADIN    45 tablet    Take 1 tablet (5 mg) by mouth daily Or as directed by [facility] clinic. Current dose is 2.5 mg daily    New onset atrial fibrillation (H)

## 2017-08-22 ENCOUNTER — ANTICOAGULATION THERAPY VISIT (OUTPATIENT)
Dept: NURSING | Facility: CLINIC | Age: 77
End: 2017-08-22
Payer: COMMERCIAL

## 2017-08-22 DIAGNOSIS — Z79.01 LONG-TERM (CURRENT) USE OF ANTICOAGULANTS: ICD-10-CM

## 2017-08-22 DIAGNOSIS — I48.20 CHRONIC ATRIAL FIBRILLATION (H): ICD-10-CM

## 2017-08-22 LAB — INR POINT OF CARE: 2.2 (ref 0.86–1.14)

## 2017-08-22 PROCEDURE — 99207 ZZC NO CHARGE NURSE ONLY: CPT

## 2017-08-22 PROCEDURE — 36416 COLLJ CAPILLARY BLOOD SPEC: CPT

## 2017-08-22 PROCEDURE — 85610 PROTHROMBIN TIME: CPT | Mod: QW

## 2017-08-22 NOTE — PROGRESS NOTES
ANTICOAGULATION FOLLOW-UP CLINIC VISIT    Patient Name:  Chidi Dubon  Date:  8/22/2017  Contact Type:  Face to Face    SUBJECTIVE:     Patient Findings     Positives No Problem Findings           OBJECTIVE    INR Protime   Date Value Ref Range Status   08/22/2017 2.2 (A) 0.86 - 1.14 Final       ASSESSMENT / PLAN  INR assessment THER    Recheck INR In: 4 WEEKS    INR Location Clinic      Anticoagulation Summary as of 8/22/2017     INR goal 2.0-3.0   Today's INR 2.2   Maintenance plan 2.5 mg (5 mg x 0.5) every day   Full instructions 2.5 mg every day   Weekly total 17.5 mg   No change documented Jill Bearden   Plan last modified Hyacinth Montano (1/12/2017)   Next INR check 9/19/2017   Target end date Indefinite    Indications   Long-term (current) use of anticoagulants [Z79.01] [Z79.01]  Atrial Fibrillation [I48.2]         Anticoagulation Episode Summary     INR check location     Preferred lab     Send INR reminders to [facility]    Comments       Anticoagulation Care Providers     Provider Role Specialty Phone number    Martin Schultz PA-C Responsible Physician Assistant [phone]            See the Encounter Report to view Anticoagulation Flowsheet and Dosing Calendar (Go to Encounters tab in chart review, and find the Anticoagulation Therapy Visit)        Jill Bearden

## 2017-08-22 NOTE — MR AVS SNAPSHOT
Chidi Dubon   8/22/2017 11:30 AM   Anticoagulation Therapy Visit    Description:  76 year old male   Provider:  JOSE E ANTI COAG   Department:  Be Nurse           INR as of 8/22/2017     Today's INR 2.2      Anticoagulation Summary as of 8/22/2017     INR goal 2.0-3.0   Today's INR 2.2   Full instructions 2.5 mg every day   Next INR check 9/19/2017    Indications   Long-term (current) use of anticoagulants [Z79.01] [Z79.01]  Atrial Fibrillation [I48.2]         Your next Anticoagulation Clinic appointment(s)     Sep 19, 2017 11:30 AM CDT   Anticoagulation Visit with BE ANTI COARUPERTO   [facility] [place] [facility] [place]    [address]  [place] [address] [phone]              Contact Numbers     [facility]  Please call [phone] with any problems or questions regarding your therapy, or to cancel or reschedule your appointment.          August 2017 Details    Sun Mon Tue Wed Thu Fri Sat       1               2               3               4               5                 6               7               8               9               10               11               12                 13               14               15               16               17               18               19                 20               21               22      2.5 mg   See details      23      2.5 mg         24      2.5 mg         25      2.5 mg         26      2.5 mg           27      2.5 mg         28      2.5 mg         29      2.5 mg         30      2.5 mg         31      2.5 mg            Date Details   08/22 This INR check               How to take your warfarin dose     To take:  2.5 mg Take 0.5 of a 5 mg tablet.           September 2017 Details    Sun Mon Tue Wed Thu Fri Sat          1      2.5 mg         2      2.5 mg           3      2.5 mg         4      2.5 mg         5      2.5 mg         6      2.5 mg         7      2.5 mg         8      2.5 mg         9      2.5 mg            10      2.5 mg         11      2.5 mg         12      2.5 mg         13      2.5 mg         14      2.5 mg         15      2.5 mg         16      2.5 mg           17      2.5 mg         18      2.5 mg         19            20               21               22               23                 24               25               26               27               28               29               30                Date Details   No additional details    Date of next INR:  9/19/2017         How to take your warfarin dose     To take:  2.5 mg Take 0.5 of a 5 mg tablet.

## 2017-09-19 ENCOUNTER — TELEPHONE (OUTPATIENT)
Dept: NURSING | Facility: CLINIC | Age: 77
End: 2017-09-19

## 2017-09-19 ENCOUNTER — ANTICOAGULATION THERAPY VISIT (OUTPATIENT)
Dept: NURSING | Facility: CLINIC | Age: 77
End: 2017-09-19
Payer: COMMERCIAL

## 2017-09-19 DIAGNOSIS — Z79.01 LONG-TERM (CURRENT) USE OF ANTICOAGULANTS: ICD-10-CM

## 2017-09-19 DIAGNOSIS — I48.20 CHRONIC ATRIAL FIBRILLATION (H): Primary | ICD-10-CM

## 2017-09-19 DIAGNOSIS — I48.20 CHRONIC ATRIAL FIBRILLATION (H): ICD-10-CM

## 2017-09-19 LAB — INR POINT OF CARE: 2.2 (ref 0.86–1.14)

## 2017-09-19 PROCEDURE — 85610 PROTHROMBIN TIME: CPT | Mod: QW

## 2017-09-19 PROCEDURE — 36416 COLLJ CAPILLARY BLOOD SPEC: CPT

## 2017-09-19 PROCEDURE — 99207 ZZC NO CHARGE NURSE ONLY: CPT

## 2017-09-19 NOTE — PROGRESS NOTES
ANTICOAGULATION FOLLOW-UP CLINIC VISIT    Patient Name:  Chidi Dubon  Date:  9/19/2017  Contact Type:  Face to Face    SUBJECTIVE:     Patient Findings     Positives No Problem Findings           OBJECTIVE    INR Protime   Date Value Ref Range Status   09/19/2017 2.2 (A) 0.86 - 1.14 Final       ASSESSMENT / PLAN  INR assessment THER    Recheck INR In: 5 WEEKS    INR Location Clinic      Anticoagulation Summary as of 9/19/2017     INR goal 2.0-3.0   Today's INR 2.2   Maintenance plan 2.5 mg (5 mg x 0.5) every day   Full instructions 2.5 mg every day   Weekly total 17.5 mg   No change documented Hyacinth Wilson   Plan last modified Hyacinth Wilson (1/12/2017)   Next INR check 10/24/2017   Target end date Indefinite    Indications   Long-term (current) use of anticoagulants [Z79.01] [Z79.01]  Atrial Fibrillation [I48.2]         Anticoagulation Episode Summary     INR check location     Preferred lab     Send INR reminders to [facility]    Comments       Anticoagulation Care Providers     Provider Role Specialty Phone number    Martin Schultz PA-C Responsible Physician Assistant [phone]            See the Encounter Report to view Anticoagulation Flowsheet and Dosing Calendar (Go to Encounters tab in chart review, and find the Anticoagulation Therapy Visit)        HYACINTH WILSON

## 2017-09-19 NOTE — MR AVS SNAPSHOT
Chidi Dubon   9/19/2017 11:30 AM   Anticoagulation Therapy Visit    Description:  76 year old male   Provider:  BE ANTI COAG   Department:  Be Nurse           INR as of 9/19/2017     Today's INR 2.2      Anticoagulation Summary as of 9/19/2017     INR goal 2.0-3.0   Today's INR 2.2   Full instructions 2.5 mg every day   Next INR check 10/24/2017    Indications   Long-term (current) use of anticoagulants [Z79.01] [Z79.01]  Atrial Fibrillation [I48.2]         Your next Anticoagulation Clinic appointment(s)     Sep 19, 2017 11:30 AM CDT   Anticoagulation Visit with BE ANTI COAG   [facility] [place] ([facility])    [address]  [place] [address] [phone]            Oct 24, 2017 11:30 AM CDT   Anticoagulation Visit with BE ANTI COAG   [facility] [place] ([facility])    [address] [place] [address] [phone]              Contact Numbers     [facility]  Please call [phone] with any problems or questions regarding your therapy, or to cancel or reschedule your appointment.          September 2017 Details    Sun Mon Tue Wed Thu Fri Sat          1               2                 3               4               5               6               7               8               9                 10               11               12               13               14               15               16                 17               18               19      2.5 mg   See details      20      2.5 mg         21      2.5 mg         22      2.5 mg         23      2.5 mg           24      2.5 mg         25      2.5 mg         26      2.5 mg         27      2.5 mg         28      2.5 mg         29      2.5 mg         30      2.5 mg          Date Details   09/19 This INR check               How to take your warfarin dose     To take:  2.5 mg Take 0.5 of a 5 mg tablet.           October 2017 Details    Sun Mon Tue Wed Thu Fri Sat     1       2.5 mg         2      2.5 mg         3      2.5 mg         4      2.5 mg         5      2.5 mg         6      2.5 mg         7      2.5 mg           8      2.5 mg         9      2.5 mg         10      2.5 mg         11      2.5 mg         12      2.5 mg         13      2.5 mg         14      2.5 mg           15      2.5 mg         16      2.5 mg         17      2.5 mg         18      2.5 mg         19      2.5 mg         20      2.5 mg         21      2.5 mg           22      2.5 mg         23      2.5 mg         24            25               26               27               28                 29               30               31                    Date Details   No additional details    Date of next INR:  10/24/2017         How to take your warfarin dose     To take:  2.5 mg Take 0.5 of a 5 mg tablet.

## 2017-10-04 DIAGNOSIS — I48.91 NEW ONSET ATRIAL FIBRILLATION (H): ICD-10-CM

## 2017-10-05 RX ORDER — WARFARIN SODIUM 5 MG/1
TABLET ORAL
Qty: 45 TABLET | Refills: 3 | Status: SHIPPED | OUTPATIENT
Start: 2017-10-05 | End: 2018-11-20

## 2017-10-05 NOTE — TELEPHONE ENCOUNTER
WARFARIN    Last Written Prescription Date: 6-6-17  Last Fill Qty: 45, # refills: 0  Last Office Visit with [facility], [facility] or [facility] prescribing provider: 7-6-17       Date and Result of Last PT/INR:   Lab Results   Component Value Date    INR 2.2 09/19/2017    INR 2.2 08/22/2017    INR 2.40 04/20/2017    INR 2.30 10/04/2016

## 2017-10-24 ENCOUNTER — ALLIED HEALTH/NURSE VISIT (OUTPATIENT)
Dept: NURSING | Facility: CLINIC | Age: 77
End: 2017-10-24
Payer: COMMERCIAL

## 2017-10-24 ENCOUNTER — ANTICOAGULATION THERAPY VISIT (OUTPATIENT)
Dept: NURSING | Facility: CLINIC | Age: 77
End: 2017-10-24
Payer: COMMERCIAL

## 2017-10-24 DIAGNOSIS — I48.20 CHRONIC ATRIAL FIBRILLATION (H): ICD-10-CM

## 2017-10-24 DIAGNOSIS — Z79.01 LONG-TERM (CURRENT) USE OF ANTICOAGULANTS: ICD-10-CM

## 2017-10-24 DIAGNOSIS — Z23 NEED FOR PROPHYLACTIC VACCINATION AND INOCULATION AGAINST INFLUENZA: Primary | ICD-10-CM

## 2017-10-24 LAB — INR POINT OF CARE: 2.3 (ref 0.86–1.14)

## 2017-10-24 PROCEDURE — 99207 ZZC NO CHARGE NURSE ONLY: CPT

## 2017-10-24 PROCEDURE — 90662 IIV NO PRSV INCREASED AG IM: CPT

## 2017-10-24 PROCEDURE — 36416 COLLJ CAPILLARY BLOOD SPEC: CPT

## 2017-10-24 PROCEDURE — G0008 ADMIN INFLUENZA VIRUS VAC: HCPCS

## 2017-10-24 PROCEDURE — 85610 PROTHROMBIN TIME: CPT | Mod: QW

## 2017-10-24 NOTE — PROGRESS NOTES

## 2017-10-24 NOTE — MR AVS SNAPSHOT
"              After Visit Summary   10/24/2017    Chidi Dubon    MRN: [id number]           Patient Information     Date Of Birth          1940        Visit Information        Provider Department      10/24/2017 11:45 AM BE ANCILLARY [facility] [place]        Today's Diagnoses     Need for prophylactic vaccination and inoculation against influenza    -  1       Follow-ups after your visit        Your next 10 appointments already scheduled     Oct 24, 2017 11:45 AM CDT   Nurse Only with BE ANCILLARY   [facility] [place] ([facility]    [address]  [place] [address] [phone]              Who to contact     If you have questions or need follow up information about today's clinic visit or your schedule please contact [facility] directly at [phone].  Normal or non-critical lab and imaging results will be communicated to you by MyChart, letter or phone within 4 business days after the clinic has received the results. If you do not hear from us within 7 days, please contact the clinic through MyChart or phone. If you have a critical or abnormal lab result, we will notify you by phone as soon as possible.  Submit refill requests through [employer] or call your pharmacy and they will forward the refill request to us. Please allow 3 business days for your refill to be completed.          Additional Information About Your Visit        MyChart Information     [employer] lets you send messages to your doctor, view your test results, renew your prescriptions, schedule appointments and more. To sign up, go to [email or web address] . Click on \"Log in\" on the left side of the screen, which will take you to the Welcome page. Then click on \"Sign up Now\" on the right side of the page.     You will be asked to enter the access code listed below, as well as some personal information. Please follow the directions to create your username and password.     Your access " code is: [email or web address]  Expires: 2018 11:40 AM     Your access code will  in 90 days. If you need help or a new code, please call your [facility] clinic or [phone].        Care EveryWhere ID     This is your Care EveryWhere ID. This could be used by other organizations to access your [facility] medical records  [id number]         Blood Pressure from Last 3 Encounters:   17 153/88   17 133/82   17 128/78    Weight from Last 3 Encounters:   17 281 lb (127.5 kg)   17 282 lb (127.9 kg)   17 283 lb (128.4 kg)              We Performed the Following     ADMIN INFLUENZA (For MEDICARE Patients ONLY) []     FLU VACCINE, INCREASED ANTIGEN, PRESV FREE, AGE 65+ [[address]]        Primary Care Provider Office Phone # Fax #    Martin Schultz PA-C [phone]       [address]  [facility] [address]        Goals        Diet    Reduce portion size     Related Problems    Hyperlipidemia LDL goal <70    Notes - Note created  2014  3:10 PM by Oliver Veliz MD    Everything in moderation.         General    Medication 1 (pt-stated)     Related Problems    Urolithiasis    Notes - Note created  2014  3:11 PM by Oliver Veliz MD    Stop the flomax and pay attention to your urine flow.  If not worse, ok to stay off of it. If worse, restart.       Taper off medication (pt-stated)     Related Problems    Fatigue    Notes - Note created  2014  3:08 PM by Oliver Veliz MD    Tapering Schedule for citalopram;    Week 1:  1/2 alternating with 1 every other day  Week 2:  1/2 daily  Week 3:  1/2 every other day  Week 4:  Stop    If at any point you experience significant side effects from tapering, we may need to slow the taper a bit.    Once you are off of this, pay attention to the mood and if you are not feeling better, we can try using bupropion which is a medication that is less likely to cause fatigue and weight gain.         Equal Access to  Services     [facility]: Hadii aad ku hadtoshiamichael Solary, waaxda luqadaha, qaybta kaalmada abebefrankigurpreet, darin jordan . So [facility] [phone].    ATENCIÓN: Si domitila claire, tiene a brown disposición servicios gratuitos de asistencia lingüística. Llame al [phone].    We comply with applicable federal civil rights laws and [place] laws. We do not discriminate on the basis of race, color, national origin, age, disability, sex, sexual orientation, or gender identity.            Thank you!     Thank you for choosing [facility]  for your care. Our goal is always to provide you with excellent care. Hearing back from our patients is one way we can continue to improve our services. Please take a few minutes to complete the written survey that you may receive in the mail after your visit with us. Thank you!             Your Updated Medication List - Protect others around you: Learn how to safely use, store and throw away your medicines at www.disposemymeds.org.          This list is accurate as of: 10/24/17 11:40 AM.  Always use your most recent med list.                   Brand Name Dispense Instructions for use Diagnosis    aspirin 81 MG tablet     30 tablet    Take 1 tablet (81 mg) by mouth daily    CAD (coronary artery disease)       atorvastatin 80 MG tablet    LIPITOR    90 tablet    Take 1 tablet (80 mg) by mouth every other day    Hyperlipidemia LDL goal <70       cyclobenzaprine 5 MG tablet    FLEXERIL    30 tablet    Take 0.5-1 tablets (2.5-5 mg) by mouth 3 times daily as needed for muscle spasms    Acute right-sided low back pain without sciatica       nitroGLYcerin 0.4 MG sublingual tablet    NITROSTAT    25 tablet    Place 1 tablet (0.4 mg) under the tongue every 5 minutes as needed for chest pain    Hyperlipidemia LDL goal <70       oxyCODONE-acetaminophen 5-325 MG per tablet    PERCOCET    40 tablet    Take 1-2 tablets by mouth every 8 hours as needed for pain maximum  6 tablet(s) per day    S/P hip replacement, right       valsartan-hydrochlorothiazide 80-12.5 MG per tablet    DIOVAN-HCT    90 tablet    Take 1 tablet by mouth daily    Benign essential hypertension       warfarin 5 MG tablet    COUMADIN    45 tablet    TAKE BY MOUTH ONCE DAILY OR AS DIRECTED BY PROTIME CLINIC. CURRENT DOSE IS 2.5 MG DAILY    New onset atrial fibrillation (H)

## 2017-12-05 ENCOUNTER — ANTICOAGULATION THERAPY VISIT (OUTPATIENT)
Dept: NURSING | Facility: CLINIC | Age: 77
End: 2017-12-05
Payer: COMMERCIAL

## 2017-12-05 DIAGNOSIS — I48.20 CHRONIC ATRIAL FIBRILLATION (H): ICD-10-CM

## 2017-12-05 DIAGNOSIS — Z79.01 LONG-TERM (CURRENT) USE OF ANTICOAGULANTS: ICD-10-CM

## 2017-12-05 LAB — INR POINT OF CARE: 2.6 (ref 0.86–1.14)

## 2017-12-05 PROCEDURE — 85610 PROTHROMBIN TIME: CPT | Mod: QW

## 2017-12-05 PROCEDURE — 99207 ZZC NO CHARGE NURSE ONLY: CPT

## 2017-12-05 PROCEDURE — 36416 COLLJ CAPILLARY BLOOD SPEC: CPT

## 2017-12-05 NOTE — PROGRESS NOTES
ANTICOAGULATION FOLLOW-UP CLINIC VISIT    Patient Name:  Chidi Dubon  Date:  12/5/2017  Contact Type:  Face to Face    SUBJECTIVE:     Patient Findings     Positives No Problem Findings           OBJECTIVE    INR Protime   Date Value Ref Range Status   12/05/2017 2.6 (A) 0.86 - 1.14 Final       ASSESSMENT / PLAN  INR assessment THER    Recheck INR In: 4 WEEKS    INR Location Clinic      Anticoagulation Summary as of 12/5/2017     INR goal 2.0-3.0   Today's INR 2.6   Maintenance plan 2.5 mg (5 mg x 0.5) every day   Full instructions 2.5 mg every day   Weekly total 17.5 mg   No change documented Jill Bearden   Plan last modified Hyacinth Montano (1/12/2017)   Next INR check 1/2/2018   Target end date Indefinite    Indications   Long-term (current) use of anticoagulants [Z79.01] [Z79.01]  Atrial Fibrillation [I48.2]         Anticoagulation Episode Summary     INR check location     Preferred lab     Send INR reminders to [facility] CL[facility]    Comments       Anticoagulation Care Providers     Provider Role Specialty Phone number    Martin Schultz PA-C Responsible Physician Assistant [phone]            See the Encounter Report to view Anticoagulation Flowsheet and Dosing Calendar (Go to Encounters tab in chart review, and find the Anticoagulation Therapy Visit)        Jill Bearden

## 2017-12-05 NOTE — MR AVS SNAPSHOT
Chidi Dubon   12/5/2017 11:30 AM   Anticoagulation Therapy Visit    Description:  77 year old male   Provider:  JOSE E ANTI COAG   Department:  Be Nurse           INR as of 12/5/2017     Today's INR 2.6      Anticoagulation Summary as of 12/5/2017     INR goal 2.0-3.0   Today's INR 2.6   Full instructions 2.5 mg every day   Next INR check 1/2/2018    Indications   Long-term (current) use of anticoagulants [Z79.01] [Z79.01]  Atrial Fibrillation [I48.2]         Your next Anticoagulation Clinic appointment(s)     Jan 04, 2018 11:30 AM CST   Anticoagulation Visit with JOSE E ANTI COARUPERTO   [facility] [place] ([facility])    [address]  [place] [address] [phone]              Contact Numbers     [facility]  Please call [phone] with any problems or questions regarding your therapy, or to cancel or reschedule your appointment.          December 2017 Details    Sun Mon Tue Wed Thu Fri Sat          1               2                 3               4               5      2.5 mg   See details      6      2.5 mg         7      2.5 mg         8      2.5 mg         9      2.5 mg           10      2.5 mg         11      2.5 mg         12      2.5 mg         13      2.5 mg         14      2.5 mg         15      2.5 mg         16      2.5 mg           17      2.5 mg         18      2.5 mg         19      2.5 mg         20      2.5 mg         21      2.5 mg         22      2.5 mg         23      2.5 mg           24      2.5 mg         25      2.5 mg         26      2.5 mg         27      2.5 mg         28      2.5 mg         29      2.5 mg         30      2.5 mg           31      2.5 mg                Date Details   12/05 This INR check               How to take your warfarin dose     To take:  2.5 mg Take 0.5 of a 5 mg tablet.           January 2018 Details    Sun Mon Tue Wed Thu Fri Sat      1      2.5 mg         2            3               4               5               6                  7               8               9               10               11               12               13                 14               15               16               17               18               19               20                 21               22               23               24               25               26               27                 28               29               30               31                   Date Details   No additional details    Date of next INR:  1/2/2018         How to take your warfarin dose     To take:  2.5 mg Take 0.5 of a 5 mg tablet.

## 2017-12-28 DIAGNOSIS — E78.5 HYPERLIPIDEMIA LDL GOAL <70: ICD-10-CM

## 2017-12-28 DIAGNOSIS — J10.1 INFLUENZA A: Primary | ICD-10-CM

## 2017-12-28 RX ORDER — OSELTAMIVIR PHOSPHATE 75 MG/1
75 CAPSULE ORAL 2 TIMES DAILY
Qty: 10 CAPSULE | Refills: 0 | Status: SHIPPED | OUTPATIENT
Start: 2017-12-28 | End: 2018-02-20

## 2017-12-28 NOTE — PROGRESS NOTES
Patient  Wife was in ED DX flu A, they advised him to get Rx for tamiflu  For 5 days , he does have some same sx but not as bad, NKDA,she is being treated with Tamiflu also   Rx pended will consult Timur for ok  Ok to treat per Timur RX miguel Aguilar  Clinic  RN/Felix Rojas

## 2018-01-03 DIAGNOSIS — E78.5 HYPERLIPIDEMIA LDL GOAL <70: ICD-10-CM

## 2018-01-03 RX ORDER — ATORVASTATIN CALCIUM 80 MG/1
80 TABLET, FILM COATED ORAL EVERY OTHER DAY
Qty: 90 TABLET | Refills: 1 | Status: CANCELLED | OUTPATIENT
Start: 2018-01-03

## 2018-01-03 NOTE — TELEPHONE ENCOUNTER
Last Written Prescription Date:  6-12-17  Last Fill Quantity: 90,  # refills: 1   Last Office Visit with G, P or [facility] prescribing provider:  7-6-17   Future Office Visit:

## 2018-01-03 NOTE — TELEPHONE ENCOUNTER
Routing refill request to provider for review/approval because:  Labs out of range:    Labs not current:    Labs needed, possible break in medication, patient may not have increased dose? Old directions still on script  Hyacinth Montano RN      Notes Recorded by Martin Schultz PA-C on 10/10/2016 at 6:40 AM  Chidi,   Your recent labs looked pretty good. Your potassium was just a shade low. i want you to start a low dose potassium supplement (this was sent into your pharmacy). Also, your cholesterol numbers aren't quite to goal range. i'd like you to increase your lipitor to 1 full tab daily. Also, continue to work on a Lower fat, higher fiber diet and consistent exercise.    Martin

## 2018-01-04 ENCOUNTER — ANTICOAGULATION THERAPY VISIT (OUTPATIENT)
Dept: NURSING | Facility: CLINIC | Age: 78
End: 2018-01-04
Payer: COMMERCIAL

## 2018-01-04 DIAGNOSIS — Z79.01 LONG-TERM (CURRENT) USE OF ANTICOAGULANTS: ICD-10-CM

## 2018-01-04 DIAGNOSIS — I48.20 CHRONIC ATRIAL FIBRILLATION (H): ICD-10-CM

## 2018-01-04 LAB — INR POINT OF CARE: 3.2 (ref 0.86–1.14)

## 2018-01-04 PROCEDURE — 85610 PROTHROMBIN TIME: CPT | Mod: QW

## 2018-01-04 PROCEDURE — 36416 COLLJ CAPILLARY BLOOD SPEC: CPT

## 2018-01-04 PROCEDURE — 99207 ZZC NO CHARGE NURSE ONLY: CPT

## 2018-01-04 RX ORDER — ATORVASTATIN CALCIUM 80 MG/1
40 TABLET, FILM COATED ORAL DAILY
Qty: 15 TABLET | Refills: 0 | Status: SHIPPED | OUTPATIENT
Start: 2018-01-04 | End: 2018-02-05

## 2018-01-04 NOTE — MR AVS SNAPSHOT
Chidi Dubon   1/4/2018 11:30 AM   Anticoagulation Therapy Visit    Description:  77 year old male   Provider:  JOSE E ANTI COAG   Department:  Be Nurse           INR as of 1/4/2018     Today's INR 3.2!      Anticoagulation Summary as of 1/4/2018     INR goal 2.0-3.0   Today's INR 3.2!   Full instructions 2.5 mg every day   Next INR check 1/18/2018    Indications   Long-term (current) use of anticoagulants [Z79.01] [Z79.01]  Atrial Fibrillation [I48.2]         Your next Anticoagulation Clinic appointment(s)     Jan 18, 2018 11:15 AM CST   Anticoagulation Visit with JOSE E ANTI LY   [facility] [place] ([facility])    [address]  [place] [address] [phone]              Contact Numbers     [facility]  Please call [phone] with any problems or questions regarding your therapy, or to cancel or reschedule your appointment.          January 2018 Details    Sun Mon Tue Wed Thu Fri Sat      1               2               3               4      2.5 mg   See details      5      2.5 mg         6      2.5 mg           7      2.5 mg         8      2.5 mg         9      2.5 mg         10      2.5 mg         11      2.5 mg         12      2.5 mg         13      2.5 mg           14      2.5 mg         15      2.5 mg         16      2.5 mg         17      2.5 mg         18            19               20                 21               22               23               24               25               26               27                 28               29               30               31                   Date Details   01/04 This INR check       Date of next INR:  1/18/2018         How to take your warfarin dose     To take:  2.5 mg Take 0.5 of a 5 mg tablet.

## 2018-01-04 NOTE — PROGRESS NOTES
ANTICOAGULATION FOLLOW-UP CLINIC VISIT    Patient Name:  Chidi Dubon  Date:  1/4/2018  Contact Type:  Face to Face    SUBJECTIVE:     Patient Findings     Positives No Problem Findings           OBJECTIVE    INR Protime   Date Value Ref Range Status   01/04/2018 3.2 (A) 0.86 - 1.14 Final       ASSESSMENT / PLAN  INR assessment SUPRA    Recheck INR In: 2 WEEKS    INR Location Clinic      Anticoagulation Summary as of 1/4/2018     INR goal 2.0-3.0   Today's INR 3.2!   Maintenance plan 2.5 mg (5 mg x 0.5) every day   Full instructions 2.5 mg every day   Weekly total 17.5 mg   No change documented Hyacinth Wilson   Plan last modified Hyacinth Wilson (1/12/2017)   Next INR check 1/18/2018   Target end date Indefinite    Indications   Long-term (current) use of anticoagulants [Z79.01] [Z79.01]  Atrial Fibrillation [I48.2]         Anticoagulation Episode Summary     INR check location     Preferred lab     Send INR reminders to [facility]    Comments       Anticoagulation Care Providers     Provider Role Specialty Phone number    Martin Schultz PA-C Responsible Physician Assistant [phone]            See the Encounter Report to view Anticoagulation Flowsheet and Dosing Calendar (Go to Encounters tab in chart review, and find the Anticoagulation Therapy Visit)    Just finished tamiflu, feeling much better.     HYACINTH WILSON

## 2018-01-04 NOTE — TELEPHONE ENCOUNTER
Spoke with patient and he has been taking 1/2 tab tab (40 mg ) every other day he did not increase.  Please review last lipid (in goal range)done 10/2016, so due for labs.  Patient would like to stay at current dose of 40 mg every other day

## 2018-01-17 ENCOUNTER — TELEPHONE (OUTPATIENT)
Dept: FAMILY MEDICINE | Facility: CLINIC | Age: 78
End: 2018-01-17

## 2018-01-17 DIAGNOSIS — I10 BENIGN ESSENTIAL HYPERTENSION: ICD-10-CM

## 2018-01-17 DIAGNOSIS — E78.5 HYPERLIPIDEMIA LDL GOAL <70: Primary | ICD-10-CM

## 2018-01-18 ENCOUNTER — ANTICOAGULATION THERAPY VISIT (OUTPATIENT)
Dept: NURSING | Facility: CLINIC | Age: 78
End: 2018-01-18
Payer: COMMERCIAL

## 2018-01-18 DIAGNOSIS — I48.20 CHRONIC ATRIAL FIBRILLATION (H): ICD-10-CM

## 2018-01-18 DIAGNOSIS — Z79.01 LONG-TERM (CURRENT) USE OF ANTICOAGULANTS: ICD-10-CM

## 2018-01-18 LAB — INR POINT OF CARE: 3.5 (ref 0.86–1.14)

## 2018-01-18 PROCEDURE — 85610 PROTHROMBIN TIME: CPT | Mod: QW

## 2018-01-18 PROCEDURE — 36416 COLLJ CAPILLARY BLOOD SPEC: CPT

## 2018-01-18 PROCEDURE — 99207 ZZC NO CHARGE NURSE ONLY: CPT

## 2018-01-18 NOTE — MR AVS SNAPSHOT
Chidi Dubon   1/18/2018 11:15 AM   Anticoagulation Therapy Visit    Description:  77 year old male   Provider:  JOSE E ANTI COAG   Department:  Be Nurse           INR as of 1/18/2018     Today's INR 3.5!      Anticoagulation Summary as of 1/18/2018     INR goal 2.0-3.0   Today's INR 3.5!   Full instructions 1/18: Hold; Otherwise 2.5 mg every day   Next INR check 1/29/2018    Indications   Long-term (current) use of anticoagulants [Z79.01] [Z79.01]  Atrial Fibrillation [I48.2]         Your next Anticoagulation Clinic appointment(s)     Jan 29, 2018 11:15 AM CST   Anticoagulation Visit with BE ANTI COAG   [facility] [place] ([facility])    [address]  [place] [address] [phone]              Contact Numbers     [facility]  Please call [phone] with any problems or questions regarding your therapy, or to cancel or reschedule your appointment.          January 2018 Details    Sun Mon Tue Wed Thu Fri Sat      1               2               3               4               5               6                 7               8               9               10               11               12               13                 14               15               16               17               18      Hold   See details      19      2.5 mg         20      2.5 mg           21      2.5 mg         22      2.5 mg         23      2.5 mg         24      2.5 mg         25      2.5 mg         26      2.5 mg         27      2.5 mg           28      2.5 mg         29            30               31                   Date Details   01/18 This INR check       Date of next INR:  1/29/2018         How to take your warfarin dose     To take:  2.5 mg Take 0.5 of a 5 mg tablet.    Hold Do not take your warfarin dose. See the Details table to the right for additional instructions.

## 2018-01-18 NOTE — PROGRESS NOTES
ANTICOAGULATION FOLLOW-UP CLINIC VISIT    Patient Name:  Chidi Dubon  Date:  1/18/2018  Contact Type:  Face to Face    SUBJECTIVE:     Patient Findings     Positives Unexplained INR or factor level change (still recovering from flu)           OBJECTIVE    INR Protime   Date Value Ref Range Status   01/18/2018 3.5 (A) 0.86 - 1.14 Final       ASSESSMENT / PLAN  INR assessment SUPRA    Recheck INR In: 10 DAYS    INR Location Clinic      Anticoagulation Summary as of 1/18/2018     INR goal 2.0-3.0   Today's INR 3.5!   Maintenance plan 2.5 mg (5 mg x 0.5) every day   Full instructions 1/18: Hold; Otherwise 2.5 mg every day   Weekly total 17.5 mg   Plan last modified Hyacinth Wilson (1/12/2017)   Next INR check 1/29/2018   Target end date Indefinite    Indications   Long-term (current) use of anticoagulants [Z79.01] [Z79.01]  Atrial Fibrillation [I48.2]         Anticoagulation Episode Summary     INR check location     Preferred lab     Send INR reminders to [facility]    Comments       Anticoagulation Care Providers     Provider Role Specialty Phone number    Martin Schultz PA-C Responsible Physician Assistant [phone]            See the Encounter Report to view Anticoagulation Flowsheet and Dosing Calendar (Go to Encounters tab in chart review, and find the Anticoagulation Therapy Visit)        HYACINTH WILSON

## 2018-01-23 DIAGNOSIS — I10 BENIGN ESSENTIAL HYPERTENSION: ICD-10-CM

## 2018-01-23 LAB
ANION GAP SERPL CALCULATED.3IONS-SCNC: 8 MMOL/L (ref 3–14)
BUN SERPL-MCNC: 11 MG/DL (ref 7–30)
CALCIUM SERPL-MCNC: 8.8 MG/DL (ref 8.5–10.1)
CHLORIDE SERPL-SCNC: 104 MMOL/L (ref 94–109)
CO2 SERPL-SCNC: 29 MMOL/L (ref 20–32)
CREAT SERPL-MCNC: 1.05 MG/DL (ref 0.66–1.25)
GFR SERPL CREATININE-BSD FRML MDRD: 69 ML/MIN/1.7M2
GLUCOSE SERPL-MCNC: 112 MG/DL (ref 70–99)
POTASSIUM SERPL-SCNC: 3 MMOL/L (ref 3.4–5.3)
SODIUM SERPL-SCNC: 141 MMOL/L (ref 133–144)

## 2018-01-23 PROCEDURE — 36415 COLL VENOUS BLD VENIPUNCTURE: CPT | Performed by: PHYSICIAN ASSISTANT

## 2018-01-23 PROCEDURE — 80048 BASIC METABOLIC PNL TOTAL CA: CPT | Performed by: PHYSICIAN ASSISTANT

## 2018-01-29 ENCOUNTER — ANTICOAGULATION THERAPY VISIT (OUTPATIENT)
Dept: NURSING | Facility: CLINIC | Age: 78
End: 2018-01-29
Payer: COMMERCIAL

## 2018-01-29 DIAGNOSIS — Z79.01 LONG-TERM (CURRENT) USE OF ANTICOAGULANTS: ICD-10-CM

## 2018-01-29 DIAGNOSIS — I48.20 CHRONIC ATRIAL FIBRILLATION (H): ICD-10-CM

## 2018-01-29 LAB — INR POINT OF CARE: 3.1 (ref 0.86–1.14)

## 2018-01-29 PROCEDURE — 99207 ZZC NO CHARGE NURSE ONLY: CPT

## 2018-01-29 PROCEDURE — 36416 COLLJ CAPILLARY BLOOD SPEC: CPT

## 2018-01-29 PROCEDURE — 85610 PROTHROMBIN TIME: CPT | Mod: QW

## 2018-01-29 NOTE — MR AVS SNAPSHOT
Chidi Dubon   1/29/2018 11:15 AM   Anticoagulation Therapy Visit    Description:  77 year old male   Provider:  JOSE E ANTI COAG   Department:  Be Nurse           INR as of 1/29/2018     Today's INR 3.1!      Anticoagulation Summary as of 1/29/2018     INR goal 2.0-3.0   Today's INR 3.1!   Full instructions 2.5 mg every day   Next INR check 3/1/2018    Indications   Long-term (current) use of anticoagulants [Z79.01] [Z79.01]  Atrial Fibrillation [I48.2]         Your next Anticoagulation Clinic appointment(s)     Mar 01, 2018 11:15 AM CST   Anticoagulation Visit with JOSE E ANTI LY   [facility] [place] ([facility])    [address]  [place] [id number] [phone]              Contact Numbers     [facility]  Please call [phone] with any problems or questions regarding your therapy, or to cancel or reschedule your appointment.          January 2018 Details    Sun Mon Tue Wed Thu Fri Sat      1               2               3               4               5               6                 7               8               9               10               11               12               13                 14               15               16               17               18               19               20                 21               22               23               24               25               26               27                 28               29      2.5 mg   See details      30      2.5 mg         31      2.5 mg             Date Details   01/29 This INR check               How to take your warfarin dose     To take:  2.5 mg Take 0.5 of a 5 mg tablet.           February 2018 Details    Sun Mon Tue Wed Thu Fri Sat         1      2.5 mg         2      2.5 mg         3      2.5 mg           4      2.5 mg         5      2.5 mg         6      2.5 mg         7      2.5 mg         8      2.5 mg         9      2.5 mg         10      2.5 mg           11      2.5 mg          12      2.5 mg         13      2.5 mg         14      2.5 mg         15      2.5 mg         16      2.5 mg         17      2.5 mg           18      2.5 mg         19      2.5 mg         20      2.5 mg         21      2.5 mg         22      2.5 mg         23      2.5 mg         24      2.5 mg           25      2.5 mg         26      2.5 mg         27      2.5 mg         28      2.5 mg             Date Details   No additional details            How to take your warfarin dose     To take:  2.5 mg Take 0.5 of a 5 mg tablet.           March 2018 Details    Sun Mon Tue Wed Thu Fri Sat         1            2               3                 4               5               6               7               8               9               10                 11               12               13               14               15               16               17                 18               19               20               21               22               23               24                 25               26               27               28               29               30               31                Date Details   No additional details    Date of next INR:  3/1/2018         How to take your warfarin dose     To take:  2.5 mg Take 0.5 of a 5 mg tablet.

## 2018-01-29 NOTE — PROGRESS NOTES
ANTICOAGULATION FOLLOW-UP CLINIC VISIT    Patient Name:  Chidi Dubon  Date:  1/29/2018  Contact Type:  Face to Face    SUBJECTIVE:     Patient Findings     Positives No Problem Findings           OBJECTIVE    INR Protime   Date Value Ref Range Status   01/29/2018 3.1 (A) 0.86 - 1.14 Final       ASSESSMENT / PLAN  INR assessment THER    Recheck INR In: 4 WEEKS    INR Location Clinic      Anticoagulation Summary as of 1/29/2018     INR goal 2.0-3.0   Today's INR 3.1!   Maintenance plan 2.5 mg (5 mg x 0.5) every day   Full instructions 2.5 mg every day   Weekly total 17.5 mg   No change documented Hyacinth Wilson   Plan last modified Hyacinth Wilson (1/12/2017)   Next INR check 3/1/2018   Target end date Indefinite    Indications   Long-term (current) use of anticoagulants [Z79.01] [Z79.01]  Atrial Fibrillation [I48.2]         Anticoagulation Episode Summary     INR check location     Preferred lab     Send INR reminders to [facility]    Comments       Anticoagulation Care Providers     Provider Role Specialty Phone number    Martin Schultz PA-C Responsible Physician Assistant [phone]            See the Encounter Report to view Anticoagulation Flowsheet and Dosing Calendar (Go to Encounters tab in chart review, and find the Anticoagulation Therapy Visit)        HYACINTH WILSON

## 2018-02-05 DIAGNOSIS — E78.5 HYPERLIPIDEMIA LDL GOAL <70: ICD-10-CM

## 2018-02-05 NOTE — TELEPHONE ENCOUNTER
"Requested Prescriptions   Pending Prescriptions Disp Refills     atorvastatin (LIPITOR) 80 MG tablet [Pharmacy Med Name: Atorvastatin Calcium Oral Tablet 80 MG] 15 tablet 0    Last Written Prescription Date:  01/04/18  Last Fill Quantity: 15,  # refills: 0   Last Office Visit with [facility] provider:  07/06/17  Future Office Visit:   none   Sig: Take 0.5 tablets (40 mg) by mouth daily    Statins Protocol Failed    2/5/2018  3:14 PM       Failed - LDL on file in past 12 months    Recent Labs   Lab Test  10/04/16   1144   LDL  90            Passed - No abnormal creatine kinase in past 12 months    No lab results found.         Passed - Recent or future visit with authorizing provider    Patient had office visit in the last year or has a visit in the next 30 days with authorizing provider.  See \"Patient Info\" tab in inbasket, or \"Choose Columns\" in Meds & Orders section of the refill encounter.            Passed - Patient is age 18 or older          "

## 2018-02-06 RX ORDER — ATORVASTATIN CALCIUM 80 MG/1
TABLET, FILM COATED ORAL
Qty: 15 TABLET | Refills: 0 | Status: SHIPPED | OUTPATIENT
Start: 2018-02-06 | End: 2018-02-20

## 2018-02-20 ENCOUNTER — OFFICE VISIT (OUTPATIENT)
Dept: INTERNAL MEDICINE | Facility: CLINIC | Age: 78
End: 2018-02-20
Payer: COMMERCIAL

## 2018-02-20 VITALS
WEIGHT: 276 LBS | RESPIRATION RATE: 16 BRPM | DIASTOLIC BLOOD PRESSURE: 75 MMHG | BODY MASS INDEX: 34.32 KG/M2 | HEIGHT: 75 IN | TEMPERATURE: 97 F | HEART RATE: 67 BPM | SYSTOLIC BLOOD PRESSURE: 134 MMHG | OXYGEN SATURATION: 93 %

## 2018-02-20 DIAGNOSIS — M54.50 CHRONIC RIGHT-SIDED LOW BACK PAIN WITHOUT SCIATICA: ICD-10-CM

## 2018-02-20 DIAGNOSIS — G89.29 CHRONIC RIGHT-SIDED LOW BACK PAIN WITHOUT SCIATICA: ICD-10-CM

## 2018-02-20 DIAGNOSIS — R60.0 LEG EDEMA, LEFT: ICD-10-CM

## 2018-02-20 DIAGNOSIS — E78.5 HYPERLIPIDEMIA LDL GOAL <70: Primary | ICD-10-CM

## 2018-02-20 PROCEDURE — 99214 OFFICE O/P EST MOD 30 MIN: CPT | Performed by: INTERNAL MEDICINE

## 2018-02-20 RX ORDER — ATORVASTATIN CALCIUM 80 MG/1
TABLET, FILM COATED ORAL
Qty: 90 TABLET | Refills: 3 | Status: SHIPPED | OUTPATIENT
Start: 2018-02-20 | End: 2018-12-05

## 2018-02-20 NOTE — PROGRESS NOTES
"  SUBJECTIVE:   Chidi Dubon is a 77 year old male who presents to clinic today for the following health issues:  Chief Complaint   Patient presents with     Establish Care     Recheck Medication     Edema     Musculoskeletal Problem       Medication Followup     Taking Medication as prescribed: yes    Side Effects:  None    Medication Helping Symptoms:  Yes    Discuss lipitor dosage -- prescribed 80 mg tabs and told to take 0.5 tab daily in the past but had been taking every other day -- no recent LDL (\"bad\") cholesterol measurements.  Tolerating well.     Concern - Edema  Onset: 2 months    Description:   Left ankle swollen     Intensity: mild    Progression of Symptoms:  improving and constant    Accompanying Signs & Symptoms:  Blue veins    Previous history of similar problem:   none    Precipitating factors:   Worsened by: none known    Alleviating factors:  Improved by: ibuprofen    Therapies Tried and outcome: none  Low back       Duration: 6 months    Description (location/character/radiation): low back right side -- goes walking at hardware store and does do shoveling when wife thinks he shouldn't -- not interested in physical therapy     Intensity:  mild    Accompanying signs and symptoms: none    History (similar episodes/previous evaluation): None    Precipitating or alleviating factors: None    Therapies tried and outcome: has leg and hip stretches       1. Hyperlipidemia LDL goal <70        PMH: Updated and/or reviewed in chart.    PSH: Updated and/or reviewed in chart.    ROS:  Constitutional, HEENT, cardiovascular, pulmonary, gi and gu systems are otherwise negative.    OBJECTIVE:                                                    /75 (BP Location: Left arm, Patient Position: Sitting, Cuff Size: Adult Large)  Pulse 67  Temp 97  F (36.1  C) (Tympanic)  Resp 16  Ht 6' 3\" (1.905 m)  Wt 276 lb (125.2 kg)  SpO2 93%  BMI 34.5 kg/m2    GEN: No acute distress  EYES: No conjunctival injection " "or icterus, EOMI grossly intact  RESP: Unlabored, regular  NEURO: Normal gait, MAEx4, light touch sensation grossly intact  PSYCH: Normal mood and affect  CV/MSK/SKIN: normal skin, trace bilateral lower extremity edema, otherwise normal, no calf tenderness to palpation     Results for orders placed or performed in visit on 01/29/18   INR point of care   Result Value Ref Range    INR Protime 3.1 (A) 0.86 - 1.14      ASSESSMENT/PLAN:                                                    1. Hyperlipidemia LDL goal <70  Continue 40 mg daily TDD for now, check LDL (\"bad\") cholesterol and modify as indicated.  - Lipid panel reflex to direct LDL Fasting; Future  - atorvastatin (LIPITOR) 80 MG tablet; Take 0.5 tablets (40 mg) by mouth daily  Dispense: 90 tablet; Refill: 3    2. Leg edema, left  Minimal today -- likely dependent.  Low concern for other pathology (CHF, DVT, etc.).  Follow symptoms and follow-up in 3 months otherwise.  Patient agreed with plan and demonstrated understanding to contact us for help if not improving or sooner if worsening or if other questions or concerns arise.    3. Chronic right-sided low back pain without sciatica  I recommended acetaminophen and physical therapy.  Patient declined.  History right-sided s/p TKA and JUSTO.       Orders Placed This Encounter     Lipid panel reflex to direct LDL Fasting     atorvastatin (LIPITOR) 80 MG tablet              Phu Gonzalez MD      "

## 2018-02-20 NOTE — NURSING NOTE
"Chief Complaint   Patient presents with     Establish Care     Recheck Medication     Edema     Musculoskeletal Problem       Initial /75 (BP Location: Left arm, Patient Position: Sitting, Cuff Size: Adult Large)  Pulse 67  Temp 97  F (36.1  C) (Tympanic)  Resp 16  Ht 6' 3\" (1.905 m)  Wt 276 lb (125.2 kg)  SpO2 93%  BMI 34.5 kg/m2 Estimated body mass index is 34.5 kg/(m^2) as calculated from the following:    Height as of this encounter: 6' 3\" (1.905 m).    Weight as of this encounter: 276 lb (125.2 kg).  Medication Reconciliation: complete    "

## 2018-02-20 NOTE — MR AVS SNAPSHOT
"              After Visit Summary   2/20/2018    Chidi Dubon    MRN: [id number]           Patient Information     Date Of Birth          1940        Visit Information        Provider Department      2/20/2018 1:00 PM Phu Gonzalez MD [facility] [place]        Today's Diagnoses     Hyperlipidemia LDL goal <70    -  1       Follow-ups after your visit        Your next 10 appointments already scheduled     Mar 01, 2018 11:15 AM CST   Anticoagulation Visit with BE ANTI COAG   [facility] [place] ([facility])    [address]  [place] [address] [phone]              Future tests that were ordered for you today     Open Future Orders        Priority Expected Expires Ordered    Lipid panel reflex to direct LDL Fasting Routine 2/20/2018 2/20/2019 2/20/2018            Who to contact     Normal or non-critical lab and imaging results will be communicated to you by "[employer]hart, letter or phone within 4 business days after the clinic has received the results. If you do not hear from us within 7 days, please contact the clinic through MyChart or phone. If you have a critical or abnormal lab result, we will notify you by phone as soon as possible.  Submit refill requests through [employer] or call your pharmacy and they will forward the refill request to us. Please allow 3 business days for your refill to be completed.          If you need to speak with a  for additional information , please call: [phone]             Additional Information About Your Visit        [employer] Information     [employer] lets you send messages to your doctor, view your test results, renew your prescriptions, schedule appointments and more. To sign up, go to [email or web address] . Click on \"Log in\" on the left side of the screen, which will take you to the Welcome page. Then click on \"Sign up Now\" on the right side of the page.     You will be asked to enter the access code listed " "below, as well as some personal information. Please follow the directions to create your username and password.     Your access code is: [email or web address]  Expires: 2018  2:33 PM     Your access code will  in 90 days. If you need help or a new code, please call your [facility] or [phone].        Care EveryWhere ID     This is your Care EveryWhere ID. This could be used by other organizations to access your [facility] medical records  [id number]        Your Vitals Were     Pulse Temperature Respirations Height Pulse Oximetry BMI (Body Mass Index)    67 97  F (36.1  C) (Tympanic) 16 6' 3\" (1.905 m) 93% 34.5 kg/m2       Blood Pressure from Last 3 Encounters:   18 134/75   17 153/88   17 133/82    Weight from Last 3 Encounters:   18 276 lb (125.2 kg)   17 281 lb (127.5 kg)   17 282 lb (127.9 kg)                 Today's Medication Changes          These changes are accurate as of 18  2:33 PM.  If you have any questions, ask your nurse or doctor.               These medicines have changed or have updated prescriptions.        Dose/Directions    atorvastatin 80 MG tablet   Commonly known as:  LIPITOR   This may have changed:  See the new instructions.   Used for:  Hyperlipidemia LDL goal <70   Changed by:  Phu Gonzalez MD        Take 0.5 tablets (40 mg) by mouth daily   Quantity:  90 tablet   Refills:  3            Where to get your medicines      These medications were sent to [id number] - [place], [place] - [address]  [address] [facility] [place] [address]     Phone:  [phone]     atorvastatin 80 MG tablet                Primary Care Provider Office Phone # Fax #    Martin Schultz PA-C [phone]       [address] [place] [address]        Goals        Diet    Reduce portion size     Related Problems    Hyperlipidemia LDL goal <70    Notes - Note created  2014  3:10 PM by Oliver Veliz MD    Everything in " moderation.         General    Medication 1 (pt-stated)     Related Problems    Urolithiasis    Notes - Note created  5/9/2014  3:11 PM by Oliver Veliz MD    Stop the flomax and pay attention to your urine flow.  If not worse, ok to stay off of it. If worse, restart.       Taper off medication (pt-stated)     Related Problems    Fatigue    Notes - Note created  5/9/2014  3:08 PM by Oliver Veliz MD    Tapering Schedule for citalopram;    Week 1:  1/2 alternating with 1 every other day  Week 2:  1/2 daily  Week 3:  1/2 every other day  Week 4:  Stop    If at any point you experience significant side effects from tapering, we may need to slow the taper a bit.    Once you are off of this, pay attention to the mood and if you are not feeling better, we can try using bupropion which is a medication that is less likely to cause fatigue and weight gain.         Equal Access to Services     [facility] : Melly Herrera, marifer anguiano, dalia blankenship, darin jordan . So [facility] [phone].    ATENCIÓN: Si habla español, tiene a brown disposición servicios gratuitos de asistencia lingüística. Llame al [phone].    We comply with applicable federal civil rights laws and [place] laws. We do not discriminate on the basis of race, color, national origin, age, disability, sex, sexual orientation, or gender identity.            Thank you!     Thank you for choosing [facility]  for your care. Our goal is always to provide you with excellent care. Hearing back from our patients is one way we can continue to improve our services. Please take a few minutes to complete the written survey that you may receive in the mail after your visit with us. Thank you!             Your Updated Medication List - Protect others around you: Learn how to safely use, store and throw away your medicines at www.disposemymeds.org.          This list is accurate as of 2/20/18   2:33 PM.  Always use your most recent med list.                   Brand Name Dispense Instructions for use Diagnosis    aspirin 81 MG tablet     30 tablet    Take 1 tablet (81 mg) by mouth daily    CAD (coronary artery disease)       atorvastatin 80 MG tablet    LIPITOR    90 tablet    Take 0.5 tablets (40 mg) by mouth daily    Hyperlipidemia LDL goal <70       cyclobenzaprine 5 MG tablet    FLEXERIL    30 tablet    Take 0.5-1 tablets (2.5-5 mg) by mouth 3 times daily as needed for muscle spasms    Acute right-sided low back pain without sciatica       nitroGLYcerin 0.4 MG sublingual tablet    NITROSTAT    25 tablet    Place 1 tablet (0.4 mg) under the tongue every 5 minutes as needed for chest pain    Hyperlipidemia LDL goal <70       oxyCODONE-acetaminophen 5-325 MG per tablet    PERCOCET    40 tablet    Take 1-2 tablets by mouth every 8 hours as needed for pain maximum 6 tablet(s) per day    S/P hip replacement, right       valsartan-hydrochlorothiazide 80-12.5 MG per tablet    DIOVAN-HCT    90 tablet    Take 1 tablet by mouth daily    Benign essential hypertension       warfarin 5 MG tablet    COUMADIN    45 tablet    TAKE BY MOUTH ONCE DAILY OR AS DIRECTED BY PROTIME CLINIC. CURRENT DOSE IS 2.5 MG DAILY    New onset atrial fibrillation (H)

## 2018-03-01 ENCOUNTER — ANTICOAGULATION THERAPY VISIT (OUTPATIENT)
Dept: NURSING | Facility: CLINIC | Age: 78
End: 2018-03-01
Payer: COMMERCIAL

## 2018-03-01 DIAGNOSIS — I48.20 CHRONIC ATRIAL FIBRILLATION (H): ICD-10-CM

## 2018-03-01 DIAGNOSIS — Z79.01 LONG-TERM (CURRENT) USE OF ANTICOAGULANTS: ICD-10-CM

## 2018-03-01 LAB — INR POINT OF CARE: 2.7 (ref 0.86–1.14)

## 2018-03-01 PROCEDURE — 99207 ZZC NO CHARGE NURSE ONLY: CPT

## 2018-03-01 PROCEDURE — 85610 PROTHROMBIN TIME: CPT | Mod: QW

## 2018-03-01 PROCEDURE — 36416 COLLJ CAPILLARY BLOOD SPEC: CPT

## 2018-03-01 NOTE — MR AVS SNAPSHOT
Chidi Dubon   3/1/2018 11:15 AM   Anticoagulation Therapy Visit    Description:  77 year old male   Provider:  JOSE E ANTI COAG   Department:  Be Nurse           INR as of 3/1/2018     Today's INR 2.7      Anticoagulation Summary as of 3/1/2018     INR goal 2.0-3.0   Today's INR 2.7   Full instructions 2.5 mg every day   Next INR check 4/5/2018    Indications   Long-term (current) use of anticoagulants [Z79.01] [Z79.01]  Atrial Fibrillation [I48.2]         Your next Anticoagulation Clinic appointment(s)     Apr 05, 2018 11:15 AM CDT   Anticoagulation Visit with JOSE E ANTI COARUPERTO   [facility] [place] ([facility])    [address]  [place] [address] [phone]              Contact Numbers     [facility]  Please call [phone] with any problems or questions regarding your therapy, or to cancel or reschedule your appointment.          March 2018 Details    Sun Mon Tue Wed Thu Fri Sat         1      2.5 mg   See details      2      2.5 mg         3      2.5 mg           4      2.5 mg         5      2.5 mg         6      2.5 mg         7      2.5 mg         8      2.5 mg         9      2.5 mg         10      2.5 mg           11      2.5 mg         12      2.5 mg         13      2.5 mg         14      2.5 mg         15      2.5 mg         16      2.5 mg         17      2.5 mg           18      2.5 mg         19      2.5 mg         20      2.5 mg         21      2.5 mg         22      2.5 mg         23      2.5 mg         24      2.5 mg           25      2.5 mg         26      2.5 mg         27      2.5 mg         28      2.5 mg         29      2.5 mg         30      2.5 mg         31      2.5 mg          Date Details   03/01 This INR check               How to take your warfarin dose     To take:  2.5 mg Take 0.5 of a 5 mg tablet.           April 2018 Details    Sun Mon Tue Wed Thu Fri Sat     1      2.5 mg         2      2.5 mg         3      2.5 mg         4      2.5 mg          5            6               7                 8               9               10               11               12               13               14                 15               16               17               18               19               20               21                 22               23               24               25               26               27               28                 29               30                     Date Details   No additional details    Date of next INR:  4/5/2018         How to take your warfarin dose     To take:  2.5 mg Take 0.5 of a 5 mg tablet.

## 2018-03-01 NOTE — PROGRESS NOTES
ANTICOAGULATION FOLLOW-UP CLINIC VISIT    Patient Name:  Chidi Dubon  Date:  3/1/2018  Contact Type:  Face to Face    SUBJECTIVE:     Patient Findings     Positives No Problem Findings           OBJECTIVE    INR Protime   Date Value Ref Range Status   03/01/2018 2.7 (A) 0.86 - 1.14 Final       ASSESSMENT / PLAN  INR assessment THER    Recheck INR In: 5 WEEKS    INR Location Clinic      Anticoagulation Summary as of 3/1/2018     INR goal 2.0-3.0   Today's INR 2.7   Maintenance plan 2.5 mg (5 mg x 0.5) every day   Full instructions 2.5 mg every day   Weekly total 17.5 mg   No change documented Hyacinth Wilson   Plan last modified Hyacinth Wilson (1/12/2017)   Next INR check 4/5/2018   Target end date Indefinite    Indications   Long-term (current) use of anticoagulants [Z79.01] [Z79.01]  Atrial Fibrillation [I48.2]         Anticoagulation Episode Summary     INR check location     Preferred lab     Send INR reminders to [facility]    Comments       Anticoagulation Care Providers     Provider Role Specialty Phone number    Martin Schultz PA-C Responsible Physician Assistant [phone]            See the Encounter Report to view Anticoagulation Flowsheet and Dosing Calendar (Go to Encounters tab in chart review, and find the Anticoagulation Therapy Visit)        HYACINTH WILSON

## 2018-04-11 ENCOUNTER — ANTICOAGULATION THERAPY VISIT (OUTPATIENT)
Dept: NURSING | Facility: CLINIC | Age: 78
End: 2018-04-11
Payer: COMMERCIAL

## 2018-04-11 DIAGNOSIS — Z79.01 LONG-TERM (CURRENT) USE OF ANTICOAGULANTS: ICD-10-CM

## 2018-04-11 DIAGNOSIS — I48.20 CHRONIC ATRIAL FIBRILLATION (H): ICD-10-CM

## 2018-04-11 LAB — INR POINT OF CARE: 3.5 (ref 0.86–1.14)

## 2018-04-11 PROCEDURE — 85610 PROTHROMBIN TIME: CPT | Mod: QW

## 2018-04-11 PROCEDURE — 99207 ZZC NO CHARGE NURSE ONLY: CPT

## 2018-04-11 PROCEDURE — 36416 COLLJ CAPILLARY BLOOD SPEC: CPT

## 2018-04-11 NOTE — PROGRESS NOTES
ANTICOAGULATION FOLLOW-UP CLINIC VISIT    Patient Name:  Chidi Dubon  Date:  4/11/2018  Contact Type:  Face to Face    SUBJECTIVE:     Patient Findings     Comments Patient has a cold and his leg is stiff today.  INR 3.5 will hold today's dose of coumadin and bring him back in 10 days to recheck           OBJECTIVE    INR Protime   Date Value Ref Range Status   04/11/2018 3.5 (A) 0.86 - 1.14 Final       ASSESSMENT / PLAN  INR assessment SUPRA    Recheck INR In: 10 DAYS    INR Location Clinic      Anticoagulation Summary as of 4/11/2018     INR goal 2.0-3.0   Today's INR 3.5!   Maintenance plan 2.5 mg (5 mg x 0.5) every day   Full instructions 4/11: Hold; Otherwise 2.5 mg every day   Weekly total 17.5 mg   Plan last modified Hyacinth Montano (1/12/2017)   Next INR check 4/23/2018   Target end date Indefinite    Indications   Long-term (current) use of anticoagulants [Z79.01] [Z79.01]  Atrial Fibrillation [I48.2]         Anticoagulation Episode Summary     INR check location     Preferred lab     Send INR reminders to [facility]    Comments       Anticoagulation Care Providers     Provider Role Specialty Phone number    Martin Schultz PA-C Responsible Physician Assistant [phone]            See the Encounter Report to view Anticoagulation Flowsheet and Dosing Calendar (Go to Encounters tab in chart review, and find the Anticoagulation Therapy Visit)        Lissy Nichols RN

## 2018-04-11 NOTE — MR AVS SNAPSHOT
Chidi Dubon   4/11/2018 11:30 AM   Anticoagulation Therapy Visit    Description:  77 year old male   Provider:  JOSE E ANTI COAG   Department:  Be Nurse           INR as of 4/11/2018     Today's INR 3.5!      Anticoagulation Summary as of 4/11/2018     INR goal 2.0-3.0   Today's INR 3.5!   Full instructions 4/11: Hold; Otherwise 2.5 mg every day   Next INR check 4/23/2018    Indications   Long-term (current) use of anticoagulants [Z79.01] [Z79.01]  Atrial Fibrillation [I48.2]         Your next Anticoagulation Clinic appointment(s)     Apr 23, 2018 11:15 AM CDT   Anticoagulation Visit with BE ANTI COAG   [facility] [place] ([facility] [place])    [address]  [place] [id number] [phone]              Contact Numbers     [facility]  Please call [phone] with any problems or questions regarding your therapy, or to cancel or reschedule your appointment.          April 2018 Details    Sun Mon Tue Wed Thu Fri Sat     1               2               3               4               5               6               7                 8               9               10               11      Hold   See details      12      2.5 mg         13      2.5 mg         14      2.5 mg           15      2.5 mg         16      2.5 mg         17      2.5 mg         18      2.5 mg         19      2.5 mg         20      2.5 mg         21      2.5 mg           22      2.5 mg         23            24               25               26               27               28                 29               30                     Date Details   04/11 This INR check       Date of next INR:  4/23/2018         How to take your warfarin dose     To take:  2.5 mg Take 0.5 of a 5 mg tablet.    Hold Do not take your warfarin dose. See the Details table to the right for additional instructions.

## 2018-04-23 ENCOUNTER — ANTICOAGULATION THERAPY VISIT (OUTPATIENT)
Dept: NURSING | Facility: CLINIC | Age: 78
End: 2018-04-23
Payer: COMMERCIAL

## 2018-04-23 DIAGNOSIS — Z79.01 LONG-TERM (CURRENT) USE OF ANTICOAGULANTS: ICD-10-CM

## 2018-04-23 DIAGNOSIS — I48.20 CHRONIC ATRIAL FIBRILLATION (H): ICD-10-CM

## 2018-04-23 LAB — INR POINT OF CARE: 3.4 (ref 0.86–1.14)

## 2018-04-23 PROCEDURE — 36416 COLLJ CAPILLARY BLOOD SPEC: CPT

## 2018-04-23 PROCEDURE — 85610 PROTHROMBIN TIME: CPT | Mod: QW

## 2018-04-23 PROCEDURE — 99207 ZZC NO CHARGE NURSE ONLY: CPT

## 2018-04-23 RX ORDER — WARFARIN SODIUM 2.5 MG/1
2.5 TABLET ORAL DAILY
Qty: 80 TABLET | Refills: 0 | Status: SHIPPED | OUTPATIENT
Start: 2018-04-23 | End: 2018-04-23

## 2018-04-23 RX ORDER — WARFARIN SODIUM 2.5 MG/1
2.5 TABLET ORAL DAILY
Qty: 80 TABLET | Refills: 0 | Status: SHIPPED | OUTPATIENT
Start: 2018-04-23 | End: 2018-10-03

## 2018-04-23 NOTE — MR AVS SNAPSHOT
Chidi Dubon   4/23/2018 11:15 AM   Anticoagulation Therapy Visit    Description:  77 year old male   Provider:  JOSE E ANTI COAG   Department:  Be Nurse           INR as of 4/23/2018     Today's INR 3.4!      Anticoagulation Summary as of 4/23/2018     INR goal 2.0-3.0   Today's INR 3.4!   Full instructions 1.25 mg on Mon; 2.5 mg all other days   Next INR check 5/8/2018    Indications   Long-term (current) use of anticoagulants [Z79.01] [Z79.01]  Atrial Fibrillation [I48.2]         Your next Anticoagulation Clinic appointment(s)     May 08, 2018 11:15 AM CDT   Anticoagulation Visit with BE ANTI COAG   [facility] [place] ([facility] [place])    [address]  [place] [address] [phone]              Contact Numbers     [facility]  Please call [phone] with any problems or questions regarding your therapy, or to cancel or reschedule your appointment.          April 2018 Details    Sun Mon Tue Wed Thu Fri Sat     1               2               3               4               5               6               7                 8               9               10               11               12               13               14                 15               16               17               18               19               20               21                 22               23      1.25 mg   See details      24      2.5 mg         25      2.5 mg         26      2.5 mg         27      2.5 mg         28      2.5 mg           29      2.5 mg         30      1.25 mg               Date Details   04/23 This INR check               How to take your warfarin dose     To take:  1.25 mg Take 0.5 of a 2.5 mg tablet.    To take:  2.5 mg Take 1 of the 2.5 mg tablets.           May 2018 Details    Sun Mon Tue Wed Thu Fri Sat       1      2.5 mg         2      2.5 mg         3      2.5 mg         4      2.5 mg         5      2.5 mg           6      2.5 mg         7      1.25 mg         8             9               10               11               12                 13               14               15               16               17               18               19                 20               21               22               23               24               25               26                 27               28               29               30               31                  Date Details   No additional details    Date of next INR:  5/8/2018         How to take your warfarin dose     To take:  1.25 mg Take 0.5 of a 2.5 mg tablet.    To take:  2.5 mg Take 1 of the 2.5 mg tablets.

## 2018-04-23 NOTE — PROGRESS NOTES
ANTICOAGULATION FOLLOW-UP CLINIC VISIT    Patient Name:  Chidi Dubon  Date:  4/23/2018  Contact Type:  Face to Face    SUBJECTIVE:     Patient Findings     Positives No Problem Findings, Unexplained INR or factor level change           OBJECTIVE    INR Protime   Date Value Ref Range Status   04/23/2018 3.4 (A) 0.86 - 1.14 Final       ASSESSMENT / PLAN  INR assessment SUPRA unknown   Recheck INR In: 2 WEEKS maintenence dose changed   INR Location Clinic      Anticoagulation Summary as of 4/23/2018     INR goal 2.0-3.0   Today's INR 3.4!   Maintenance plan 1.25 mg (2.5 mg x 0.5) on Mon; 2.5 mg (2.5 mg x 1) all other days   Full instructions 1.25 mg on Mon; 2.5 mg all other days   Weekly total 16.25 mg   Plan last modified Hyacinth Wilson (4/23/2018)   Next INR check 5/8/2018   Target end date Indefinite    Indications   Long-term (current) use of anticoagulants [Z79.01] [Z79.01]  Atrial Fibrillation [I48.2]         Anticoagulation Episode Summary     INR check location     Preferred lab     Send INR reminders to [facility]    Comments       Anticoagulation Care Providers     Provider Role Specialty Phone number    Martin Schultz PA-C Responsible Physician Assistant [phone]            See the Encounter Report to view Anticoagulation Flowsheet and Dosing Calendar (Go to Encounters tab in chart review, and find the Anticoagulation Therapy Visit)        HYACINTH WILSON

## 2018-05-01 ENCOUNTER — OFFICE VISIT (OUTPATIENT)
Dept: INTERNAL MEDICINE | Facility: CLINIC | Age: 78
End: 2018-05-01
Payer: COMMERCIAL

## 2018-05-01 VITALS
HEIGHT: 75 IN | TEMPERATURE: 97.8 F | RESPIRATION RATE: 16 BRPM | SYSTOLIC BLOOD PRESSURE: 140 MMHG | OXYGEN SATURATION: 96 % | WEIGHT: 281 LBS | BODY MASS INDEX: 34.94 KG/M2 | HEART RATE: 66 BPM | DIASTOLIC BLOOD PRESSURE: 86 MMHG

## 2018-05-01 DIAGNOSIS — H26.493 POSTERIOR CAPSULAR OPACIFICATION OF BOTH EYES, OBSCURING VISION: ICD-10-CM

## 2018-05-01 DIAGNOSIS — Z01.818 PREOP GENERAL PHYSICAL EXAM: Primary | ICD-10-CM

## 2018-05-01 DIAGNOSIS — Z71.89 ADVANCED DIRECTIVES, COUNSELING/DISCUSSION: ICD-10-CM

## 2018-05-01 PROCEDURE — 99214 OFFICE O/P EST MOD 30 MIN: CPT | Performed by: INTERNAL MEDICINE

## 2018-05-01 ASSESSMENT — ANXIETY QUESTIONNAIRES
3. WORRYING TOO MUCH ABOUT DIFFERENT THINGS: SEVERAL DAYS
2. NOT BEING ABLE TO STOP OR CONTROL WORRYING: NOT AT ALL
GAD7 TOTAL SCORE: 3
1. FEELING NERVOUS, ANXIOUS, OR ON EDGE: SEVERAL DAYS
5. BEING SO RESTLESS THAT IT IS HARD TO SIT STILL: NOT AT ALL
6. BECOMING EASILY ANNOYED OR IRRITABLE: SEVERAL DAYS
IF YOU CHECKED OFF ANY PROBLEMS ON THIS QUESTIONNAIRE, HOW DIFFICULT HAVE THESE PROBLEMS MADE IT FOR YOU TO DO YOUR WORK, TAKE CARE OF THINGS AT HOME, OR GET ALONG WITH OTHER PEOPLE: EXTREMELY DIFFICULT
7. FEELING AFRAID AS IF SOMETHING AWFUL MIGHT HAPPEN: NOT AT ALL

## 2018-05-01 ASSESSMENT — PATIENT HEALTH QUESTIONNAIRE - PHQ9: 5. POOR APPETITE OR OVEREATING: NOT AT ALL

## 2018-05-01 NOTE — PROGRESS NOTES
"  [facility]  [address]  [place] [address]  [phone]  Dept: [phone]    PRE-OP EVALUATION:  Today's date: 2018    Chidi Dubon (: 1940) presents for pre-operative evaluation assessment as requested by Dr. Malena Garcia.  He requires evaluation and anesthesia risk assessment prior to undergoing surgery/procedure for treatment of eye .    Proposed Surgery/ Procedure: YAG Laser Posterior Capsulotomy  Date of Surgery/ Procedure: 2018  Time of Surgery/ Procedure: 12:30  Hospital/Surgical Facility: [facility] office  {SURGERY FAX NUMBER:[id number]::\"Fax number for surgical facility: [phone]  Primary Physician: Martin Schultz  Type of Anesthesia Anticipated:     Patient has a Health Care Directive or Living Will:  NO    1. YES - DO YOU HAVE A HISTORY OF HEART ATTACK, STROKE, STENT, BYPASS OR SURGERY ON AN ARTERY IN THE HEAD, NECK, HEART OR LEG? stent  2. NO - Do you ever have any pain or discomfort in your chest?  3. NO - Do you have a history of  Heart Failure?  4. YES - ARE YOUR TROUBLED BY SHORTNESS OF BREATH WHEN WALKING ON THE LEVEL, UP A SLIGHT HILL OR AT NIGHT?   5. YES - DO YOU CURRENTLY HAVE A COLD, BRONCHITIS OR OTHER RESPIRATORY INFECTION? Sinus infection  6. NO - Do you have a cough, shortness of breath or wheezing?  7. YES - DO YOU SOMETIMES GET PAINS IN THE CALVES OF YOUR LEGS WHEN YOU WALK?   8. NO - Do you or anyone in your family have previous history of blood clots?  9. NO - Do you or does anyone in your family have a serious bleeding problem such as prolonged bleeding following surgeries or cuts?  10. NO - Have you ever had problems with anemia or been told to take iron pills?  11. NO - Have you had any abnormal blood loss such as black, tarry or bloody stools, or abnormal vaginal bleeding?  12. NO - Have you ever had a blood transfusion?  13. NO - Have you or any of your relatives ever had problems with anesthesia?  14. " NO - Do you have sleep apnea, excessive snoring or daytime drowsiness?  15. NO - Do you have any prosthetic heart valves?  16. YES - DO YOU HAVE PROSTHETIC JOINTS? Right knee and right hip  17. NO - Is there any chance that you may be pregnant?      HPI:     HPI related to upcoming procedure: some blurring of vision over time -- rough winter for mood just hibernating here instead of in [place] but now that it's warm he's otherwise doing well.  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      A-FIB - Patient has a longstanding history of chronic A-fib currently on rate control. Current treatment regimen includes Warfarin and Aspirin for stroke prevention and denies significant symptoms of lightheadedness, palpitations or dyspnea.    Lower risk for PAD given deconditioning.                                                                                                                                                                 .  CAD - Patient has a longstanding history of stable CAD s/p stenting. Patient denies recent chest pain or NTG use, denies exercise induced dyspnea or PND.                                                                                                                                                    .  HYPERLIPIDEMIA - Patient has a long history of significant Hyperlipidemia requiring medication for treatment with recent good control. Patient reports no problems or side effects with the medication.                                                                                                                                                       .  HYPERTENSION - Patient has longstanding history of HTN , currently denies any symptoms referable to elevated blood pressure. Specifically denies chest pain, palpitations, dyspnea, orthopnea, PND or peripheral edema. Blood pressure readings have been in normal range. Current medication regimen is as  listed below. Patient denies any side effects of medication.                                                                                                                                                                                          .    MEDICAL HISTORY:     Patient Active Problem List    Diagnosis Date Noted     Posterior capsular opacification of both eyes, obscuring vision 05/01/2018     Priority: Medium     Aftercare following right hip joint replacement surgery 06/06/2017     Priority: Medium     Hip pain, right 06/06/2017     Priority: Medium     Hip joint replacement status 06/06/2017     Priority: Medium     Major depressive disorder, recurrent episode, mild (H) 04/20/2017     Priority: Medium     Benign essential hypertension 02/29/2016     Priority: Medium     Long-term (current) use of anticoagulants [Z79.01] 02/16/2016     Priority: Medium     Restless leg syndrome 12/09/2014     Priority: Medium     Begin with gabapentin and supplement with iron daily for borderline ferritin.        Fatigue 05/09/2014     Priority: Medium     HGB     14.9   10/21/2013. TSH     1.73   8/5/2013 LDL      104   8/20/2013.  TSH     1.73   8/5/2013.        Atrial Fibrillation 05/03/2013     Priority: Medium     Wafarin therapy, managed by [facility]  November 25, 2014 - CHADS2 2.8%.  HAS-BLED 1.8%.       Diverticulosis 08/10/2012     Priority: Medium     Noted on CT but no inflammation 8/12.       Urolithiasis 08/10/2012     Priority: Medium     Noted 8/8/12.  Lemon juice as uric acid.  Will hold Flomax as wanting to minimize medications and possibly help fatigue.        Osteoarthritis, knee 07/06/2011     Priority: Medium     Synvisc with some benefit.  Getting shots.  Trying to lose weight and exercise. Minimal benefit from injections.        CAD S/P percutaneous coronary angioplasty 06/25/2010     Priority: Medium     Stents x 2.  Stable.  Flipped T's inferiorly.  On good medical management. 8/13 restended. Diffuse  disease. Medical management.        Hyperlipidemia LDL goal <70 12/14/2009     Priority: Medium     Using half dose Crestor due to cost. May benefit from atorvastatin when generic available.  Accepting that goal will not be met. Simvastatin 40 mg prescription.   LDL      100   5/9/2014 - will review benefit for atorvastatin 40 every other day.        Obesity 12/14/2009     Priority: Medium     Strongly encouraged exercise.  Weight Watchers might help as well. Considering.        Mild major depression (H) 12/14/2009     Priority: Medium     Increase to 20 mg.         Past Medical History:   Diagnosis Date     CAD (coronary artery disease)      Dyslipidemia      HTN (hypertension)      Past Surgical History:   Procedure Laterality Date     ANGIOGRAM  age 61     3 cornary artery stents, [facility]     ANGIOGRAM  age 66    1 coronary artery stent, [facility]     ANGIOGRAM  11/2013    1 coronary artery stent, [facility]      ARTHROSCOPY KNEE RT/LT       Current Outpatient Prescriptions   Medication Sig Dispense Refill     aspirin 81 MG tablet Take 1 tablet (81 mg) by mouth daily 30 tablet      atorvastatin (LIPITOR) 80 MG tablet Take 0.5 tablets (40 mg) by mouth daily 90 tablet 3     valsartan-hydrochlorothiazide (DIOVAN-HCT) 80-12.5 MG per tablet Take 1 tablet by mouth daily 90 tablet 3     warfarin (COUMADIN) 2.5 MG tablet Take 1 tablet (2.5 mg) by mouth daily Or as directed by INR. Current dose is 1.25 mg Monday and 2.5 mg daily rest of week 80 tablet 0     nitroglycerin (NITROSTAT) 0.4 MG SL tablet Place 1 tablet (0.4 mg) under the tongue every 5 minutes as needed for chest pain (Patient not taking: Reported on 2/20/2018) 25 tablet 0     warfarin (COUMADIN) 5 MG tablet TAKE BY MOUTH ONCE DAILY OR AS DIRECTED BY PROTIME CLINIC. CURRENT DOSE IS 2.5 MG DAILY 45 tablet 3     [DISCONTINUED] simvastatin (ZOCOR) 80 MG tablet Take 0.5 tablets (40 mg) by mouth At Bedtime 45 tablet 3     OTC products: Aspirin    Allergies  "  Allergen Reactions     No Known Allergies       Latex Allergy: NO    Social History   Substance Use Topics     Smoking status: Never Smoker     Smokeless tobacco: Never Used      Comment: never smoker; non-smoking household     Alcohol use No      Comment: none     History   Drug Use No       REVIEW OF SYSTEMS:   CONSTITUTIONAL: NEGATIVE for fever, chills, change in weight  ENT/MOUTH: NEGATIVE for ear, mouth and throat problems  RESP: NEGATIVE for significant cough or SOB  CV: NEGATIVE for chest pain, palpitations or peripheral edema    EXAM:   /76  Pulse 86  Temp 97.8  F (36.6  C) (Tympanic)  Resp 16  Ht 6' 3\" (1.905 m)  Wt 281 lb (127.5 kg)  SpO2 96%  BMI 35.12 kg/m2  GENERAL APPEARANCE: healthy, alert and no distress  HENT: ear canals and TM's normal and nose and mouth without ulcers or lesions, Mallampati III  RESP: lungs clear to auscultation - no rales, rhonchi or wheezes  CV: ireg irregular rate and rhythm, normal S1 S2, no S3 or S4 and no murmur, click or rub   ABDOMEN: soft, nontender, no HSM or masses and bowel sounds normal  NEURO: Normal strength and tone, sensory exam grossly normal, mentation intact and speech normal    DIAGNOSTICS:   No labs or EKG required for low risk surgery (cataract, skin procedure, breast biopsy, etc)    Recent Labs   Lab Test 04/23/18 04/11/18 01/23/18   1126   04/20/17   1342   07/03/15   1344   HGB   --    --    --    --    --   16.3   --   16.5   PLT   --    --    --    --    --   207   --   210   INR  3.4*  3.5*   < >   --    < >  2.40*   < >   --    NA   --    --    --   141   --   141   < >  139   POTASSIUM   --    --    --   3.0*   --   3.9   < >  3.8   CR   --    --    --   1.05   --   0.97   < >  1.01    < > = values in this interval not displayed.        IMPRESSION:   Reason for surgery/procedure: secondary cataract  Diagnosis/reason for consult: preoperative management     The proposed surgical procedure is considered LOW risk.    REVISED CARDIAC " RISK INDEX  The patient has the following serious cardiovascular risks for perioperative complications such as (MI, PE, VFib and 3  AV Block):  No serious cardiac risks  INTERPRETATION: 0 risks: Class I (very low risk - 0.4% complication rate)    The patient has the following additional risks for perioperative complications:  No identified additional risks      ICD-10-CM    1. Preop general physical exam Z01.818    2. Posterior capsular opacification of both eyes, obscuring vision H26.493    3. Advanced directives, counseling/discussion Z71.89 HONORING CHOICES REFERRAL       RECOMMENDATIONS:     --Patient is to take all scheduled medications on the day of surgery.    APPROVAL GIVEN to proceed with proposed procedure, without further diagnostic evaluation       Signed Electronically by: Phu Gonzalez MD    Copy of this evaluation report is provided to requesting physician.    [facility] Preop Guidelines    Revised Cardiac Risk Index

## 2018-05-01 NOTE — MR AVS SNAPSHOT
After Visit Summary   5/1/2018    Chidi Dubon    MRN: [id number]           Patient Information     Date Of Birth          1940        Visit Information        Provider Department      5/1/2018 12:30 PM Phu Gonzalez MD [facility]        Today's Diagnoses     Preop general physical exam    -  1    Posterior capsular opacification of both eyes, obscuring vision        Advanced directives, counseling/discussion          Care Instructions      Before Your Surgery      Call your surgeon if there is any change in your health. This includes signs of a cold or flu (such as a sore throat, runny nose, cough, rash or fever).    Do not smoke, drink alcohol or take over the counter medicine (unless your surgeon or primary care doctor tells you to) for the 24 hours before and after surgery.    If you take prescribed drugs: Follow your doctor s orders about which medicines to take and which to stop until after surgery.    Eating and drinking prior to surgery: follow the instructions from your surgeon    Take a shower or bath the night before surgery. Use the soap your surgeon gave you to gently clean your skin. If you do not have soap from your surgeon, use your regular soap. Do not shave or scrub the surgery site.  Wear clean pajamas and have clean sheets on your bed.           Follow-ups after your visit        Additional Services     HONORING CHOICES REFERRAL       Your provider has referred you to Outpatient [facility] Advance Care Planning Facilitator or Serious illness clinic support staff. The facilitator or support staff will contact you to schedule the appointment or for the follow up call    Reason for Referral: Basic Advance Care Planning - 1:1 need                  Follow-up notes from your care team     Return in about 3 months (around 8/1/2018).      Your next 10 appointments already scheduled     May 08, 2018 11:15 AM CDT   Anticoagulation Visit with BE ANTI COAG  "  [facility] [place] ([facility])    [address]  [place] [id number] [phone]              Who to contact     Normal or non-critical lab and imaging results will be communicated to you by Film Freshhart, letter or phone within 4 business days after the clinic has received the results. If you do not hear from us within 7 days, please contact the clinic through MyChart or phone. If you have a critical or abnormal lab result, we will notify you by phone as soon as possible.  Submit refill requests through [facility] or call your pharmacy and they will forward the refill request to us. Please allow 3 business days for your refill to be completed.          If you need to speak with a  for additional information , please call: [phone]             Additional Information About Your Visit        [facility] Information     [facility] lets you send messages to your doctor, view your test results, renew your prescriptions, schedule appointments and more. To sign up, go to [email or web address] . Click on \"Log in\" on the left side of the screen, which will take you to the Welcome page. Then click on \"Sign up Now\" on the right side of the page.     You will be asked to enter the access code listed below, as well as some personal information. Please follow the directions to create your username and password.     Your access code is: [email or web address]  Expires: 2018  3:33 PM     Your access code will  in 90 days. If you need help or a new code, please call your [facility] clinic or [phone].        Care EveryWhere ID     This is your Care EveryWhere ID. This could be used by other organizations to access your [facility] medical records  [id number]        Your Vitals Were     Pulse Temperature Respirations Height Pulse Oximetry BMI (Body Mass Index)    86 97.8  F (36.6  C) (Tympanic) 16 6' 3\" (1.905 m) 96% 35.12 kg/m2       Blood Pressure from Last 3 Encounters:   18 " 143/76   02/20/18 134/75   05/04/17 153/88    Weight from Last 3 Encounters:   05/01/18 281 lb (127.5 kg)   02/20/18 276 lb (125.2 kg)   05/24/17 281 lb (127.5 kg)              We Performed the Following     HONORING CHOICES REFERRAL          Today's Medication Changes          These changes are accurate as of 5/1/18  1:20 PM.  If you have any questions, ask your nurse or doctor.               Stop taking these medicines if you haven't already. Please contact your care team if you have questions.     cyclobenzaprine 5 MG tablet   Commonly known as:  FLEXERIL   Stopped by:  Phu Gonzalez MD           oxyCODONE-acetaminophen 5-325 MG per tablet   Commonly known as:  PERCOCET   Stopped by:  Phu Gonzalez MD                    Primary Care Provider Office Phone # Fax #    Martin Gay Schultz PA-C [phone]       [address] [facility] [address]        Goals        Diet    Reduce portion size     Related Problems    Hyperlipidemia LDL goal <70    Notes - Note created  5/9/2014  3:10 PM by Oliver Veliz MD    Everything in moderation.         General    Medication 1 (pt-stated)     Related Problems    Urolithiasis    Notes - Note created  5/9/2014  3:11 PM by Oliver Veliz MD    Stop the flomax and pay attention to your urine flow.  If not worse, ok to stay off of it. If worse, restart.       Taper off medication (pt-stated)     Related Problems    Fatigue    Notes - Note created  5/9/2014  3:08 PM by Oliver Veliz MD    Tapering Schedule for citalopram;    Week 1:  1/2 alternating with 1 every other day  Week 2:  1/2 daily  Week 3:  1/2 every other day  Week 4:  Stop    If at any point you experience significant side effects from tapering, we may need to slow the taper a bit.    Once you are off of this, pay attention to the mood and if you are not feeling better, we can try using bupropion which is a medication that is less likely to cause fatigue and weight gain.         Equal Access  to Services     NORMAN MILNER : Melly Herrera, wafarrahda luqadaha, qaybta kaalmagurpreet blankenship, darin meneses. So [facility] [phone].    ATENCIÓN: Si adairla dakotah, tiene a brown disposición servicios gratuitos de asistencia lingüística. Llame al [phone].    We comply with applicable federal civil rights laws and [place] laws. We do not discriminate on the basis of race, color, national origin, age, disability, sex, sexual orientation, or gender identity.            Thank you!     Thank you for choosing [facility]  for your care. Our goal is always to provide you with excellent care. Hearing back from our patients is one way we can continue to improve our services. Please take a few minutes to complete the written survey that you may receive in the mail after your visit with us. Thank you!             Your Updated Medication List - Protect others around you: Learn how to safely use, store and throw away your medicines at www.disposemymeds.org.          This list is accurate as of 5/1/18  1:20 PM.  Always use your most recent med list.                   Brand Name Dispense Instructions for use Diagnosis    aspirin 81 MG tablet     30 tablet    Take 1 tablet (81 mg) by mouth daily    CAD (coronary artery disease)       atorvastatin 80 MG tablet    LIPITOR    90 tablet    Take 0.5 tablets (40 mg) by mouth daily    Hyperlipidemia LDL goal <70       nitroGLYcerin 0.4 MG sublingual tablet    NITROSTAT    25 tablet    Place 1 tablet (0.4 mg) under the tongue every 5 minutes as needed for chest pain    Hyperlipidemia LDL goal <70       valsartan-hydrochlorothiazide 80-12.5 MG per tablet    DIOVAN-HCT    90 tablet    Take 1 tablet by mouth daily    Benign essential hypertension       * warfarin 5 MG tablet    COUMADIN    45 tablet    TAKE BY MOUTH ONCE DAILY OR AS DIRECTED BY PROTIME CLINIC. CURRENT DOSE IS 2.5 MG DAILY    New onset atrial fibrillation (H)       *  warfarin 2.5 MG tablet    COUMADIN    80 tablet    Take 1 tablet (2.5 mg) by mouth daily Or as directed by INR. Current dose is 1.25 mg Monday and 2.5 mg daily rest of week    Long-term (current) use of anticoagulants, Chronic atrial fibrillation (H)       * Notice:  This list has 2 medication(s) that are the same as other medications prescribed for you. Read the directions carefully, and ask your doctor or other care provider to review them with you.

## 2018-05-02 ASSESSMENT — ANXIETY QUESTIONNAIRES: GAD7 TOTAL SCORE: 3

## 2018-05-02 ASSESSMENT — PATIENT HEALTH QUESTIONNAIRE - PHQ9: SUM OF ALL RESPONSES TO PHQ QUESTIONS 1-9: 1

## 2018-05-07 ENCOUNTER — TRANSFERRED RECORDS (OUTPATIENT)
Dept: HEALTH INFORMATION MANAGEMENT | Facility: CLINIC | Age: 78
End: 2018-05-07

## 2018-05-08 ENCOUNTER — ANTICOAGULATION THERAPY VISIT (OUTPATIENT)
Dept: NURSING | Facility: CLINIC | Age: 78
End: 2018-05-08
Payer: COMMERCIAL

## 2018-05-08 DIAGNOSIS — I48.20 CHRONIC ATRIAL FIBRILLATION (H): ICD-10-CM

## 2018-05-08 DIAGNOSIS — Z79.01 LONG-TERM (CURRENT) USE OF ANTICOAGULANTS: ICD-10-CM

## 2018-05-08 LAB — INR POINT OF CARE: 2.6 (ref 0.86–1.14)

## 2018-05-08 PROCEDURE — 99207 ZZC NO CHARGE NURSE ONLY: CPT

## 2018-05-08 PROCEDURE — 36416 COLLJ CAPILLARY BLOOD SPEC: CPT

## 2018-05-08 PROCEDURE — 85610 PROTHROMBIN TIME: CPT | Mod: QW

## 2018-05-08 NOTE — PROGRESS NOTES
ANTICOAGULATION FOLLOW-UP CLINIC VISIT    Patient Name:  Chidi Dubon  Date:  5/8/2018  Contact Type:  Face to Face    SUBJECTIVE:     Patient Findings     Positives No Problem Findings           OBJECTIVE    INR Protime   Date Value Ref Range Status   05/08/2018 2.6 (A) 0.86 - 1.14 Final       ASSESSMENT / PLAN  INR assessment THER    Recheck INR In: 4 WEEKS    INR Location Clinic      Anticoagulation Summary as of 5/8/2018     INR goal 2.0-3.0   Today's INR 2.6   Maintenance plan 1.25 mg (2.5 mg x 0.5) on Mon; 2.5 mg (2.5 mg x 1) all other days   Full instructions 1.25 mg on Mon; 2.5 mg all other days   Weekly total 16.25 mg   No change documented Hyacinth Wilson   Plan last modified Hyacinth Wilson (4/23/2018)   Next INR check 6/5/2018   Target end date Indefinite    Indications   Long-term (current) use of anticoagulants [Z79.01] [Z79.01]  Atrial Fibrillation [I48.2]         Anticoagulation Episode Summary     INR check location     Preferred lab     Send INR reminders to [facility]    Comments       Anticoagulation Care Providers     Provider Role Specialty Phone number    Martin Schultz PA-C Responsible Physician Assistant [phone]            See the Encounter Report to view Anticoagulation Flowsheet and Dosing Calendar (Go to Encounters tab in chart review, and find the Anticoagulation Therapy Visit)        HYACINTH WILSON

## 2018-05-08 NOTE — MR AVS SNAPSHOT
Chidi Dubon   5/8/2018 11:15 AM   Anticoagulation Therapy Visit    Description:  77 year old male   Provider:  JOSE E ANTI COAG   Department:  Be Nurse           INR as of 5/8/2018     Today's INR 2.6      Anticoagulation Summary as of 5/8/2018     INR goal 2.0-3.0   Today's INR 2.6   Full instructions 1.25 mg on Mon; 2.5 mg all other days   Next INR check 6/5/2018    Indications   Long-term (current) use of anticoagulants [Z79.01] [Z79.01]  Atrial Fibrillation [I48.2]         Your next Anticoagulation Clinic appointment(s)     Jun 05, 2018 11:15 AM CDT   Anticoagulation Visit with JOSE E ANTI LY   [facility] [place] ([facility] [place])    [address]  [place] [address] [phone]              Contact Numbers     [facility]  Please call [phone] with any problems or questions regarding your therapy, or to cancel or reschedule your appointment.          May 2018 Details    Sun Mon Tue Wed Thu Fri Sat       1               2               3               4               5                 6               7               8      2.5 mg   See details      9      2.5 mg         10      2.5 mg         11      2.5 mg         12      2.5 mg           13      2.5 mg         14      1.25 mg         15      2.5 mg         16      2.5 mg         17      2.5 mg         18      2.5 mg         19      2.5 mg           20      2.5 mg         21      1.25 mg         22      2.5 mg         23      2.5 mg         24      2.5 mg         25      2.5 mg         26      2.5 mg           27      2.5 mg         28      1.25 mg         29      2.5 mg         30      2.5 mg         31      2.5 mg            Date Details   05/08 This INR check               How to take your warfarin dose     To take:  1.25 mg Take 0.5 of a 2.5 mg tablet.    To take:  2.5 mg Take 1 of the 2.5 mg tablets.           June 2018 Details    Sun Mon Tue Wed Thu Fri Sat          1      2.5 mg         2      2.5 mg           3       2.5 mg         4      1.25 mg         5            6               7               8               9                 10               11               12               13               14               15               16                 17               18               19               20               21               22               23                 24               25               26               27               28               29               30                Date Details   No additional details    Date of next INR:  6/5/2018         How to take your warfarin dose     To take:  1.25 mg Take 0.5 of a 2.5 mg tablet.    To take:  2.5 mg Take 1 of the 2.5 mg tablets.

## 2018-06-05 ENCOUNTER — ANTICOAGULATION THERAPY VISIT (OUTPATIENT)
Dept: NURSING | Facility: CLINIC | Age: 78
End: 2018-06-05
Payer: COMMERCIAL

## 2018-06-05 DIAGNOSIS — Z79.01 LONG-TERM (CURRENT) USE OF ANTICOAGULANTS: ICD-10-CM

## 2018-06-05 DIAGNOSIS — I48.20 CHRONIC ATRIAL FIBRILLATION (H): ICD-10-CM

## 2018-06-05 LAB — INR POINT OF CARE: 2.3 (ref 0.86–1.14)

## 2018-06-05 PROCEDURE — 85610 PROTHROMBIN TIME: CPT | Mod: QW

## 2018-06-05 PROCEDURE — 36416 COLLJ CAPILLARY BLOOD SPEC: CPT

## 2018-06-05 PROCEDURE — 99207 ZZC NO CHARGE NURSE ONLY: CPT

## 2018-06-05 NOTE — MR AVS SNAPSHOT
Chidi Dubon   6/5/2018 11:15 AM   Anticoagulation Therapy Visit    Description:  77 year old male   Provider:  JOSE E ANTI COAG   Department:  Be Nurse           INR as of 6/5/2018     Today's INR 2.3      Anticoagulation Summary as of 6/5/2018     INR goal 2.0-3.0   Today's INR 2.3   Full warfarin instructions 1.25 mg on Mon; 2.5 mg all other days   Next INR check 7/17/2018    Indications   Long-term (current) use of anticoagulants [Z79.01] [Z79.01]  Atrial Fibrillation [I48.2]         Your next Anticoagulation Clinic appointment(s)     Jul 17, 2018 11:15 AM CDT   Anticoagulation Visit with BE ANTI COAG   [facility] [place] ([facility] [place])    [address]  [place] [address] [phone]              Contact Numbers     [facility]  Please call [phone] with any problems or questions regarding your therapy, or to cancel or reschedule your appointment.          June 2018 Details    Sun Mon Tue Wed Thu Fri Sat          1               2                 3               4               5      2.5 mg   See details      6      2.5 mg         7      2.5 mg         8      2.5 mg         9      2.5 mg           10      2.5 mg         11      1.25 mg         12      2.5 mg         13      2.5 mg         14      2.5 mg         15      2.5 mg         16      2.5 mg           17      2.5 mg         18      1.25 mg         19      2.5 mg         20      2.5 mg         21      2.5 mg         22      2.5 mg         23      2.5 mg           24      2.5 mg         25      1.25 mg         26      2.5 mg         27      2.5 mg         28      2.5 mg         29      2.5 mg         30      2.5 mg          Date Details   06/05 This INR check               How to take your warfarin dose     To take:  1.25 mg Take 0.5 of a 2.5 mg tablet.    To take:  2.5 mg Take 1 of the 2.5 mg tablets.           July 2018 Details    Sun Mon Tue Wed Thu Fri Sat     1      2.5 mg         2      1.25 mg         3       2.5 mg         4      2.5 mg         5      2.5 mg         6      2.5 mg         7      2.5 mg           8      2.5 mg         9      1.25 mg         10      2.5 mg         11      2.5 mg         12      2.5 mg         13      2.5 mg         14      2.5 mg           15      2.5 mg         16      1.25 mg         17            18               19               20               21                 22               23               24               25               26               27               28                 29               30               31                    Date Details   No additional details    Date of next INR:  7/17/2018         How to take your warfarin dose     To take:  1.25 mg Take 0.5 of a 2.5 mg tablet.    To take:  2.5 mg Take 1 of the 2.5 mg tablets.

## 2018-06-05 NOTE — PROGRESS NOTES
ANTICOAGULATION FOLLOW-UP CLINIC VISIT    Patient Name:  Chidi Dubon  Date:  6/5/2018  Contact Type:  Face to Face    SUBJECTIVE:     Patient Findings     Positives No Problem Findings           OBJECTIVE    INR Protime   Date Value Ref Range Status   06/05/2018 2.3 (A) 0.86 - 1.14 Final       ASSESSMENT / PLAN  INR assessment THER    Recheck INR In: 6 WEEKS    INR Location Clinic      Anticoagulation Summary as of 6/5/2018     INR goal 2.0-3.0   Today's INR 2.3   Warfarin maintenance plan 1.25 mg (2.5 mg x 0.5) on Mon; 2.5 mg (2.5 mg x 1) all other days   Full warfarin instructions 1.25 mg on Mon; 2.5 mg all other days   Weekly warfarin total 16.25 mg   No change documented Hyacinth Wilson   Plan last modified Hyacinth Wilson (4/23/2018)   Next INR check 7/17/2018   Target end date Indefinite    Indications   Long-term (current) use of anticoagulants [Z79.01] [Z79.01]  Atrial Fibrillation [I48.2]         Anticoagulation Episode Summary     INR check location     Preferred lab     Send INR reminders to [facility]    Comments       Anticoagulation Care Providers     Provider Role Specialty Phone number    Martin Schultz PA-C Responsible Physician Assistant [phone]            See the Encounter Report to view Anticoagulation Flowsheet and Dosing Calendar (Go to Encounters tab in chart review, and find the Anticoagulation Therapy Visit)        HYACINTH WILSON

## 2018-07-10 ENCOUNTER — TRANSFERRED RECORDS (OUTPATIENT)
Dept: HEALTH INFORMATION MANAGEMENT | Facility: CLINIC | Age: 78
End: 2018-07-10

## 2018-07-12 ENCOUNTER — ANTICOAGULATION THERAPY VISIT (OUTPATIENT)
Dept: NURSING | Facility: CLINIC | Age: 78
End: 2018-07-12
Payer: COMMERCIAL

## 2018-07-12 DIAGNOSIS — I48.20 CHRONIC ATRIAL FIBRILLATION (H): ICD-10-CM

## 2018-07-12 DIAGNOSIS — Z79.01 LONG-TERM (CURRENT) USE OF ANTICOAGULANTS: ICD-10-CM

## 2018-07-12 LAB — INR POINT OF CARE: 2.4 (ref 0.86–1.14)

## 2018-07-12 PROCEDURE — 99207 ZZC NO CHARGE NURSE ONLY: CPT

## 2018-07-12 PROCEDURE — 85610 PROTHROMBIN TIME: CPT | Mod: QW

## 2018-07-12 PROCEDURE — 36416 COLLJ CAPILLARY BLOOD SPEC: CPT

## 2018-07-12 NOTE — PROGRESS NOTES
ANTICOAGULATION FOLLOW-UP CLINIC VISIT    Patient Name:  Chidi Dubon  Date:  7/12/2018  Contact Type:  Face to Face    SUBJECTIVE:     Patient Findings     Positives No Problem Findings           OBJECTIVE    INR Protime   Date Value Ref Range Status   07/12/2018 2.4 (A) 0.86 - 1.14 Final       ASSESSMENT / PLAN  No question data found.  Anticoagulation Summary as of 7/12/2018     INR goal 2.0-3.0   Today's INR 2.4   Warfarin maintenance plan 1.25 mg (2.5 mg x 0.5) on Mon; 2.5 mg (2.5 mg x 1) all other days   Full warfarin instructions 1.25 mg on Mon; 2.5 mg all other days   Weekly warfarin total 16.25 mg   No change documented Jill Bearden   Plan last modified Hyacinth Montano (4/23/2018)   Next INR check 8/9/2018   Target end date Indefinite    Indications   Long-term (current) use of anticoagulants [Z79.01] [Z79.01]  Atrial Fibrillation [I48.2]         Anticoagulation Episode Summary     INR check location     Preferred lab     Send INR reminders to [facility]    Comments       Anticoagulation Care Providers     Provider Role Specialty Phone number    Martin Schultz PA-C Responsible Physician Assistant [phone]            See the Encounter Report to view Anticoagulation Flowsheet and Dosing Calendar (Go to Encounters tab in chart review, and find the Anticoagulation Therapy Visit)        Jill Bearden

## 2018-07-12 NOTE — MR AVS SNAPSHOT
Chidi Dubon   7/12/2018 12:00 PM   Anticoagulation Therapy Visit    Description:  77 year old male   Provider:  OJSE E ANTI COAG   Department:  Be Nurse           INR as of 7/12/2018     Today's INR 2.4      Anticoagulation Summary as of 7/12/2018     INR goal 2.0-3.0   Today's INR 2.4   Full warfarin instructions 1.25 mg on Mon; 2.5 mg all other days   Next INR check 8/9/2018    Indications   Long-term (current) use of anticoagulants [Z79.01] [Z79.01]  Atrial Fibrillation [I48.2]         Your next Anticoagulation Clinic appointment(s)     Aug 09, 2018 11:45 AM CDT   Anticoagulation Visit with JOSE E ANTI LY   [facility] [place] ([facility] [place]    [address]  [place] [id number] [phone]              Contact Numbers     [facility]  Please call [phone] with any problems or questions regarding your therapy, or to cancel or reschedule your appointment.          July 2018 Details    Sun Mon Tue Wed Thu Fri Sat     1               2               3               4               5               6               7                 8               9               10               11               12      2.5 mg   See details      13      2.5 mg         14      2.5 mg           15      2.5 mg         16      1.25 mg         17      2.5 mg         18      2.5 mg         19      2.5 mg         20      2.5 mg         21      2.5 mg           22      2.5 mg         23      1.25 mg         24      2.5 mg         25      2.5 mg         26      2.5 mg         27      2.5 mg         28      2.5 mg           29      2.5 mg         30      1.25 mg         31      2.5 mg              Date Details   07/12 This INR check               How to take your warfarin dose     To take:  1.25 mg Take 0.5 of a 2.5 mg tablet.    To take:  2.5 mg Take 1 of the 2.5 mg tablets.           August 2018 Details    Sun Mon Tue Wed Thu Fri Sat        1      2.5 mg         2      2.5 mg         3      2.5 mg          4      2.5 mg           5      2.5 mg         6      1.25 mg         7      2.5 mg         8      2.5 mg         9            10               11                 12               13               14               15               16               17               18                 19               20               21               22               23               24               25                 26               27               28               29               30               31                 Date Details   No additional details    Date of next INR:  8/9/2018         How to take your warfarin dose     To take:  1.25 mg Take 0.5 of a 2.5 mg tablet.    To take:  2.5 mg Take 1 of the 2.5 mg tablets.

## 2018-08-04 DIAGNOSIS — I10 BENIGN ESSENTIAL HYPERTENSION: ICD-10-CM

## 2018-08-06 NOTE — TELEPHONE ENCOUNTER
"Requested Prescriptions   Pending Prescriptions Disp Refills     valsartan-hydrochlorothiazide (DIOVAN-HCT) 80-12.5 MG per tablet [Pharmacy Med Name: Valsartan-Hydrochlorothiazide Oral Tablet 80-12.5 MG] 30 tablet 1    Last Written Prescription Date:  07/01/18  Last Fill Quantity: 30,  # refills: 1   Last office visit: 05/01/18 with prescribing provider:  KAMLESH Gonzalez Future Office Visit:     Sig: TAKE ONE TABLET BY MOUTH ONCE DAILY.    Angiotensin-II Receptors Failed    8/4/2018  7:00 AM       Failed - Blood pressure under 140/90 in past 12 months    BP Readings from Last 3 Encounters:   05/01/18 140/86   02/20/18 134/75   05/04/17 153/88                Failed - Normal serum potassium on file in past 12 months    Recent Labs   Lab Test  01/23/18   1126   POTASSIUM  3.0*                   Passed - Recent (12 mo) or future (30 days) visit within the authorizing provider's specialty    Patient had office visit in the last 12 months or has a visit in the next 30 days with authorizing provider or within the authorizing provider's specialty.  See \"Patient Info\" tab in inbasket, or \"Choose Columns\" in Meds & Orders section of the refill encounter.           Passed - Patient is age 18 or older       Passed - Normal serum creatinine on file in past 12 months    Recent Labs   Lab Test  01/23/18   1126   CR  1.05               "

## 2018-08-06 NOTE — TELEPHONE ENCOUNTER
Left message on voice mail for patient to call clinic. [phone]   Just want to verify if pt taking-  valsartan-hydrochlorothiazide (DIOVAN-HCT) 80-12.5 MG per tablet    Routing refill request to provider for review/approval because:  Labs out of range:  BP K+  Looked at cardiology note from 7/24/18 and med list just stated Diovan 80mg not Diovan-HCT  Updated Medication List From This Visit:    aspirin (ECOTRIN) 81 mg enteric coated tablet Take 81 mg by mouth once daily with a meal.     atorvastatin (LIPITOR) 80 mg tablet Take 40 mg by mouth once daily.     furosemide (LASIX) 20 mg tablet Take 1 tablet by mouth every morning. 90 tablet 3     nitroglycerin (NITROSTAT) 0.4 mg sublingual tablet Place 0.4 mg under the tongue every 5 minutes if needed for Chest Pain.     potassium chloride (KLOR-CON M20) 20 mEq Extended-Release tablet Take 1 tablet by mouth once daily with a meal. 90 tablet 3     valsartan (DIOVAN) 80 mg tablet Take 1 tablet by mouth once daily. 90 tablet 3     warfarin (COUMADIN) 5 mg tablet Take 1.25 mg Monday and 2.5 mg all other days of the week      Will route to pcp to look over, pt has only been prescribed valsartan-hydrochlorothiazide (DIOVAN-HCT) 80-12.5 MG per tablet from our clinic. Did leave message for pt to call back to verify his medication.

## 2018-08-07 ENCOUNTER — ANTICOAGULATION THERAPY VISIT (OUTPATIENT)
Dept: NURSING | Facility: CLINIC | Age: 78
End: 2018-08-07
Payer: COMMERCIAL

## 2018-08-07 DIAGNOSIS — I48.20 CHRONIC ATRIAL FIBRILLATION (H): ICD-10-CM

## 2018-08-07 DIAGNOSIS — Z79.01 LONG-TERM (CURRENT) USE OF ANTICOAGULANTS: ICD-10-CM

## 2018-08-07 LAB — INR POINT OF CARE: 2.8 (ref 0.86–1.14)

## 2018-08-07 PROCEDURE — 36416 COLLJ CAPILLARY BLOOD SPEC: CPT

## 2018-08-07 PROCEDURE — 85610 PROTHROMBIN TIME: CPT | Mod: QW

## 2018-08-07 PROCEDURE — 99207 ZZC NO CHARGE NURSE ONLY: CPT

## 2018-08-07 RX ORDER — VALSARTAN AND HYDROCHLOROTHIAZIDE 80; 12.5 MG/1; MG/1
TABLET, FILM COATED ORAL
Qty: 30 TABLET | Refills: 1 | OUTPATIENT
Start: 2018-08-07

## 2018-08-07 RX ORDER — VALSARTAN 80 MG/1
TABLET ORAL
Refills: 3 | COMMUNITY
Start: 2018-06-19 | End: 2018-08-07

## 2018-08-07 RX ORDER — VALSARTAN 80 MG/1
80 TABLET ORAL DAILY
Qty: 90 TABLET | Refills: 3 | Status: SHIPPED | OUTPATIENT
Start: 2018-08-07 | End: 2018-10-22

## 2018-08-07 NOTE — PROGRESS NOTES
ANTICOAGULATION FOLLOW-UP CLINIC VISIT    Patient Name:  Chidi Dubon  Date:  8/7/2018  Contact Type:  Face to Face    SUBJECTIVE:     Patient Findings     Positives No Problem Findings    Comments INR 2.8.  Patient will be having teeth extracted next week.  Seeing Dr Gonzalez tomorrow to discuss hold orders for coumadin.  Patient scheduled to return for recheck the week following extraction.           OBJECTIVE    INR Protime   Date Value Ref Range Status   08/07/2018 2.8 (A) 0.86 - 1.14 Final       ASSESSMENT / PLAN  INR assessment THER    Recheck INR In: 2 WEEKS post teeth extraction   INR Location Clinic      Anticoagulation Summary as of 8/7/2018     INR goal 2.0-3.0   Today's INR 2.8   Warfarin maintenance plan 1.25 mg (2.5 mg x 0.5) on Mon; 2.5 mg (2.5 mg x 1) all other days   Full warfarin instructions 1.25 mg on Mon; 2.5 mg all other days   Weekly warfarin total 16.25 mg   No change documented Lissy Nichols RN   Plan last modified Hyacinth Montano (4/23/2018)   Next INR check 8/21/2018   Target end date Indefinite    Indications   Long-term (current) use of anticoagulants [Z79.01] [Z79.01]  Atrial Fibrillation [I48.2]         Anticoagulation Episode Summary     INR check location     Preferred lab     Send INR reminders to [facility]    Comments       Anticoagulation Care Providers     Provider Role Specialty Phone number    Martin Schultz PA-C Responsible Physician Assistant [phone]            See the Encounter Report to view Anticoagulation Flowsheet and Dosing Calendar (Go to Encounters tab in chart review, and find the Anticoagulation Therapy Visit)        Lissy Nichols RN

## 2018-08-07 NOTE — MR AVS SNAPSHOT
Chidi Dubon   8/7/2018 2:15 PM   Anticoagulation Therapy Visit    Description:  77 year old male   Provider:  JOSE E ANTI COAG   Department:  Be Nurse           INR as of 8/7/2018     Today's INR 2.8      Anticoagulation Summary as of 8/7/2018     INR goal 2.0-3.0   Today's INR 2.8   Full warfarin instructions 1.25 mg on Mon; 2.5 mg all other days   Next INR check 8/21/2018    Indications   Long-term (current) use of anticoagulants [Z79.01] [Z79.01]  Atrial Fibrillation [I48.2]         Your next Anticoagulation Clinic appointment(s)     Aug 21, 2018 10:45 AM CDT   Anticoagulation Visit with BE ANTI COAG   [facility] [place] ([facility] [place])    [address]  [place] [id number] [phone]              Contact Numbers     [facility]  Please call [phone] with any problems or questions regarding your therapy, or to cancel or reschedule your appointment.          August 2018 Details    Sun Mon Tue Wed Thu Fri Sat        1               2               3               4                 5               6               7      2.5 mg   See details      8      2.5 mg         9      2.5 mg         10      2.5 mg         11      2.5 mg           12      2.5 mg         13      1.25 mg         14      2.5 mg         15      2.5 mg         16      2.5 mg         17      2.5 mg         18      2.5 mg           19      2.5 mg         20      1.25 mg         21            22               23               24               25                 26               27               28               29               30               31                 Date Details   08/07 This INR check       Date of next INR:  8/21/2018         How to take your warfarin dose     To take:  1.25 mg Take 0.5 of a 2.5 mg tablet.    To take:  2.5 mg Take 1 of the 2.5 mg tablets.

## 2018-08-08 ENCOUNTER — OFFICE VISIT (OUTPATIENT)
Dept: INTERNAL MEDICINE | Facility: CLINIC | Age: 78
End: 2018-08-08
Payer: COMMERCIAL

## 2018-08-08 VITALS
RESPIRATION RATE: 16 BRPM | HEIGHT: 75 IN | BODY MASS INDEX: 33.94 KG/M2 | HEART RATE: 77 BPM | TEMPERATURE: 97.2 F | DIASTOLIC BLOOD PRESSURE: 83 MMHG | WEIGHT: 273 LBS | SYSTOLIC BLOOD PRESSURE: 137 MMHG

## 2018-08-08 DIAGNOSIS — I48.20 CHRONIC ATRIAL FIBRILLATION (H): Primary | ICD-10-CM

## 2018-08-08 DIAGNOSIS — F33.0 MAJOR DEPRESSIVE DISORDER, RECURRENT EPISODE, MILD (H): ICD-10-CM

## 2018-08-08 DIAGNOSIS — Z95.0 S/P PLACEMENT OF CARDIAC PACEMAKER: ICD-10-CM

## 2018-08-08 DIAGNOSIS — I25.119 CORONARY ARTERY DISEASE INVOLVING NATIVE HEART WITH ANGINA PECTORIS, UNSPECIFIED VESSEL OR LESION TYPE (H): ICD-10-CM

## 2018-08-08 PROCEDURE — 99214 OFFICE O/P EST MOD 30 MIN: CPT | Performed by: INTERNAL MEDICINE

## 2018-08-08 RX ORDER — POTASSIUM CHLORIDE 1.5 G/1.58G
20 POWDER, FOR SOLUTION ORAL DAILY
COMMUNITY
End: 2018-10-22 | Stop reason: ALTCHOICE

## 2018-08-08 NOTE — PROGRESS NOTES
"  SUBJECTIVE:   Chidi Dubon is a 77 year old male who presents to clinic today for the following health issues:      Medication questions   Having dental work done with pulled teeth and putting in bridge or crown moving forward.  Otherwise feeling well.    PMH: Updated and/or reviewed in chart.    PSH: Updated and/or reviewed in chart.    ROS:  Constitutional, HEENT, cardiovascular, pulmonary, gi and gu systems are otherwise negative.    OBJECTIVE:                                                    /83  Pulse 77  Temp 97.2  F (36.2  C) (Tympanic)  Resp 16  Ht 6' 3\" (1.905 m)  Wt 273 lb (123.8 kg)  BMI 34.12 kg/m2    GEN: No acute distress  EYES: No conjunctival injection or icterus, EOMI grossly intact  EMT: adequate dentition, no mandibular lymphadenopathy, active infection appreciated   RESP: Unlabored, regular  NEURO: Normal gait, MAEx4, light touch sensation grossly intact  PSYCH: Normal mood and affect      Results for orders placed or performed in visit on 08/07/18   INR point of care   Result Value Ref Range    INR Protime 2.8 (A) 0.86 - 1.14      ASSESSMENT/PLAN:                                                    1. Atrial Fibrillation  On warfarin anticoagulation -- discussed whether to bridge and bridging strategies with vitamin K, enoxaparin and/or NOAC.  He will meet with oral surgeon and tell us when to discuss.  Will prescribe when timing known.    2. Major depressive disorder, recurrent episode, mild (H)  Stable, doing well    3. Coronary artery disease involving native heart with angina pectoris, unspecified vessel or lesion type (H)  4. S/P placement of cardiac pacemaker  Quiescent, continue regimen     I spent a total of 25 minutes with the patient of which greater than 50% were devoted to counseling and coordination of care: atrial fibrillation, warfarin anticoagulation.      Phu Gonzalez   "

## 2018-08-08 NOTE — MR AVS SNAPSHOT
After Visit Summary   8/8/2018    Chidi Dubon    MRN: [id number]           Patient Information     Date Of Birth          1940        Visit Information        Provider Department      8/8/2018 11:30 AM Phu Gonzalez MD [facility]        Today's Diagnoses     Atrial Fibrillation    -  1    Major depressive disorder, recurrent episode, mild (H)        Coronary artery disease involving native heart with angina pectoris, unspecified vessel or lesion type (H)        S/P placement of cardiac pacemaker          Care Instructions       Call us when you know your dental operation schedule (and any oral surgeon anticoagulation preferences)          Follow-ups after your visit        Your next 10 appointments already scheduled     Aug 21, 2018 10:45 AM CDT   Anticoagulation Visit with BE ANTI COAG   [facility] [place] ([facility])    [address]  [place] [address] [phone]              Who to contact     Normal or non-critical lab and imaging results will be communicated to you by MyChart, letter or phone within 4 business days after the clinic has received the results. If you do not hear from us within 7 days, please contact the clinic through MyChart or phone. If you have a critical or abnormal lab result, we will notify you by phone as soon as possible.  Submit refill requests through [employer] or call your pharmacy and they will forward the refill request to us. Please allow 3 business days for your refill to be completed.          If you need to speak with a  for additional information , please call: [phone]             Additional Information About Your Visit        Care EveryWhere ID     This is your Care EveryWhere ID. This could be used by other organizations to access your [place] medical records  [id number]        Your Vitals Were     Pulse Temperature Respirations Height BMI (Body Mass Index)       77 97.2  F (36.2  " C) (Tympanic) 16 6' 3\" (1.905 m) 34.12 kg/m2        Blood Pressure from Last 3 Encounters:   08/08/18 137/83   05/01/18 140/86   02/20/18 134/75    Weight from Last 3 Encounters:   08/08/18 273 lb (123.8 kg)   05/01/18 281 lb (127.5 kg)   02/20/18 276 lb (125.2 kg)              Today, you had the following     No orders found for display       Primary Care Provider Office Phone # Fax #    Phu Gonzalez [phone]        CL[facility] [place] [address]  [place] [address]        Goals        Diet    Reduce portion size     Related Problems    Hyperlipidemia LDL goal <70    Notes - Note created  5/9/2014  3:10 PM by Oliver Veliz MD    Everything in moderation.         General    Medication 1 (pt-stated)     Related Problems    Urolithiasis    Notes - Note created  5/9/2014  3:11 PM by Oliver Veliz MD    Stop the flomax and pay attention to your urine flow.  If not worse, ok to stay off of it. If worse, restart.       Taper off medication (pt-stated)     Related Problems    Fatigue    Notes - Note created  5/9/2014  3:08 PM by Oliver Veliz MD    Tapering Schedule for citalopram;    Week 1:  1/2 alternating with 1 every other day  Week 2:  1/2 daily  Week 3:  1/2 every other day  Week 4:  Stop    If at any point you experience significant side effects from tapering, we may need to slow the taper a bit.    Once you are off of this, pay attention to the mood and if you are not feeling better, we can try using bupropion which is a medication that is less likely to cause fatigue and weight gain.         Equal Access to Services     JOSE MILNER AH: marifer Griffin, darin lucas. So [facility] [phone].    ATENCIÓN: Si habla español, tiene a brown disposición servicios gratuitos de asistencia lingüística. Llame al [phone].    We comply with applicable federal civil rights laws and [place] laws. We do " not discriminate on the basis of race, color, national origin, age, disability, sex, sexual orientation, or gender identity.            Thank you!     Thank you for choosing [facility]  for your care. Our goal is always to provide you with excellent care. Hearing back from our patients is one way we can continue to improve our services. Please take a few minutes to complete the written survey that you may receive in the mail after your visit with us. Thank you!             Your Updated Medication List - Protect others around you: Learn how to safely use, store and throw away your medicines at www.disposemymeds.org.          This list is accurate as of 8/8/18 12:06 PM.  Always use your most recent med list.                   Brand Name Dispense Instructions for use Diagnosis    aspirin 81 MG tablet     30 tablet    Take 1 tablet (81 mg) by mouth daily    CAD (coronary artery disease)       atorvastatin 80 MG tablet    LIPITOR    90 tablet    Take 0.5 tablets (40 mg) by mouth daily    Hyperlipidemia LDL goal <70       FUROSEMIDE PO      Take 20 mg by mouth 2 times daily        nitroGLYcerin 0.4 MG sublingual tablet    NITROSTAT    25 tablet    Place 1 tablet (0.4 mg) under the tongue every 5 minutes as needed for chest pain    Hyperlipidemia LDL goal <70       potassium chloride 20 MEQ Packet    KLOR-CON     Take 20 mEq by mouth daily        valsartan 80 MG tablet    DIOVAN    90 tablet    Take 1 tablet (80 mg) by mouth daily    Benign essential hypertension       * warfarin 5 MG tablet    COUMADIN    45 tablet    TAKE BY MOUTH ONCE DAILY OR AS DIRECTED BY PROTIME CLINIC. CURRENT DOSE IS 2.5 MG DAILY    New onset atrial fibrillation (H)       * warfarin 2.5 MG tablet    COUMADIN    80 tablet    Take 1 tablet (2.5 mg) by mouth daily Or as directed by INR. Current dose is 1.25 mg Monday and 2.5 mg daily rest of week    Long-term (current) use of anticoagulants, Chronic atrial fibrillation (H)       *  Notice:  This list has 2 medication(s) that are the same as other medications prescribed for you. Read the directions carefully, and ask your doctor or other care provider to review them with you.

## 2018-08-08 NOTE — PATIENT INSTRUCTIONS
Call us when you know your dental operation schedule (and any oral surgeon anticoagulation preferences)

## 2018-08-15 ENCOUNTER — TELEPHONE (OUTPATIENT)
Dept: FAMILY MEDICINE | Facility: CLINIC | Age: 78
End: 2018-08-15

## 2018-08-15 DIAGNOSIS — I48.20 CHRONIC ATRIAL FIBRILLATION (H): Primary | ICD-10-CM

## 2018-08-15 DIAGNOSIS — Z79.01 LONG-TERM (CURRENT) USE OF ANTICOAGULANTS: ICD-10-CM

## 2018-08-15 NOTE — TELEPHONE ENCOUNTER
INR is closed, due to no staff the patient's on schedule are trying to be reschedule. Patient can get his INR done in the lab and lab can fax results to his dentist.  Lab orders placed, please schedule lab appt for patient and relay instructions when calls back, if RN not available.  Hyacinth Montano RN

## 2018-08-15 NOTE — TELEPHONE ENCOUNTER
He has INR scheduled for 8/21.  He is having oral surgery on 8/23.  Oral surgeon wants INR done 24hrs before surgery.

## 2018-08-15 NOTE — TELEPHONE ENCOUNTER
Chidi has INR appt 8/21.  He is having oral surgery on 8/23.  Surgeon wants him to do INR on 8/22 or 8/23.  INR schedule is closed 8/22 and booked on 8/23.  Please call to advise.

## 2018-08-16 NOTE — TELEPHONE ENCOUNTER
Per chart, patient has rescheduled his INR for AN anticoag. For this one time visit.  Hyacinth Montano RN

## 2018-08-17 ENCOUNTER — TELEPHONE (OUTPATIENT)
Dept: INTERNAL MEDICINE | Facility: CLINIC | Age: 78
End: 2018-08-17

## 2018-08-17 NOTE — TELEPHONE ENCOUNTER
Left message on voice mail for patient to call clinic. [phone]  Hyacinth Montano RN

## 2018-08-17 NOTE — TELEPHONE ENCOUNTER
Patient is calling stating having oral surgery next week and would like to discuss INR. Please call to discuss. Thank  You.

## 2018-08-20 NOTE — TELEPHONE ENCOUNTER
Spoke with patient and he reports the oral surgery is 8/23/18 and he just needs to be below 3.0 for his INR. He has an INR appt on 8/22/18 as requested by oral surgeon.  Patient is asking if he needs to do any holding of his coumadin?  Please advise.   Hyacinth Montano RN

## 2018-08-20 NOTE — TELEPHONE ENCOUNTER
Left message on voice mail for patient to call clinic. [phone]  Hyacinth Montano RN

## 2018-08-21 NOTE — TELEPHONE ENCOUNTER
FYI ONLY    Patient notified of below and voiced understanding and agreement.  Told he he wanted to have a salad or green beans with dinner tonight that would be fine.  Will route as FYI only to AN anticoag  Hyacinth Montano RN

## 2018-08-21 NOTE — TELEPHONE ENCOUNTER
If goal INR < 3.0, no need to hold warfarin.  Can always give small amount vitamin K 8/22 if INR > 3.0.

## 2018-08-22 ENCOUNTER — ANTICOAGULATION THERAPY VISIT (OUTPATIENT)
Dept: NURSING | Facility: CLINIC | Age: 78
End: 2018-08-22
Payer: COMMERCIAL

## 2018-08-22 DIAGNOSIS — I48.20 CHRONIC ATRIAL FIBRILLATION (H): ICD-10-CM

## 2018-08-22 DIAGNOSIS — Z79.01 LONG-TERM (CURRENT) USE OF ANTICOAGULANTS: ICD-10-CM

## 2018-08-22 LAB — INR POINT OF CARE: 3.7 (ref 0.86–1.14)

## 2018-08-22 PROCEDURE — 36416 COLLJ CAPILLARY BLOOD SPEC: CPT

## 2018-08-22 PROCEDURE — 99207 ZZC NO CHARGE NURSE ONLY: CPT

## 2018-08-22 PROCEDURE — 85610 PROTHROMBIN TIME: CPT | Mod: QW

## 2018-08-22 NOTE — MR AVS SNAPSHOT
Chidi Dubon   8/22/2018 3:00 PM   Anticoagulation Therapy Visit    Description:  77 year old male   Provider:  AN ANTI COAG   Department:  An Nurse           INR as of 8/22/2018     Today's INR 3.7!      Anticoagulation Summary as of 8/22/2018     INR goal 2.0-3.0   Today's INR 3.7!   Full warfarin instructions 8/22: Hold; Otherwise 1.25 mg on Mon, Thu; 2.5 mg all other days   Next INR check 8/23/2018    Indications   Long-term (current) use of anticoagulants [Z79.01] [Z79.01]  Atrial Fibrillation [I48.2]         Your next Anticoagulation Clinic appointment(s)     Aug 23, 2018  7:45 AM CDT   Anticoagulation Visit with AN ANTI COAG   [facility]    [address] [facility] [address] [facility] [id number] [phone]              Contact Numbers     [facility]  Please call  [phone] to cancel and/or reschedule your appointment, or with any problems or questions regarding your therapy.        August 2018 Details    Sun Mon Tue Wed Thu Fri Sat        1               2               3               4                 5               6               7               8               9               10               11                 12               13               14               15               16               17               18                 19               20               21               22      Hold   See details      23            24               25                 26               27               28               29               30               31                 Date Details   08/22 This INR check       Date of next INR:  8/23/2018         How to take your warfarin dose     To take:  1.25 mg Take 0.5 of a 2.5 mg tablet.    Hold Do not take your warfarin dose. See the Details table to the right for additional instructions.

## 2018-08-22 NOTE — PROGRESS NOTES
ANTICOAGULATION FOLLOW-UP CLINIC VISIT    Patient Name:  Chidi Dubon  Date:  8/22/2018  Contact Type:  Face to Face    SUBJECTIVE:     Patient Findings     Positives No Problem Findings    Comments Having oral surgery tomorrow, has not gone off coumadin but needs to be under 3.0  Will have patient hold dose tonight and eat a big salad/greens  Patient is scheduled for oral surgery at 10:30 tomorrow morning, will have patient come in at 7:45am to recheck INR.  Patient will also contact oral surgeon to let him know of INR and plan, patient will cancel appointment tomorrow if oral surgery is cancelled. Patient will then call clinic for further dosing instructions    Priyanka REEVES, RN, CPN             OBJECTIVE    INR Protime   Date Value Ref Range Status   08/22/2018 3.7 (A) 0.86 - 1.14 Final       ASSESSMENT / PLAN  INR assessment SUPRA    Recheck INR In: 1 DAY    INR Location Clinic      Anticoagulation Summary as of 8/22/2018     INR goal 2.0-3.0   Today's INR 3.7!   Warfarin maintenance plan 1.25 mg (2.5 mg x 0.5) on Mon; 2.5 mg (2.5 mg x 1) all other days   Full warfarin instructions 8/22: Hold; Otherwise 1.25 mg on Mon; 2.5 mg all other days   Weekly warfarin total 16.25 mg   Plan last modified Priyanka Narayanan RN (8/22/2018)   Next INR check 8/23/2018   Target end date Indefinite    Indications   Long-term (current) use of anticoagulants [Z79.01] [Z79.01]  Atrial Fibrillation [I48.2]         Anticoagulation Episode Summary     INR check location     Preferred lab     Send INR reminders to [facility]    Comments       Anticoagulation Care Providers     Provider Role Specialty Phone number    Martin Schultz PA-C Responsible Physician Assistant [phone]            See the Encounter Report to view Anticoagulation Flowsheet and Dosing Calendar (Go to Encounters tab in chart review, and find the Anticoagulation Therapy Visit)        Priyanka Narayanan RN

## 2018-08-23 ENCOUNTER — TELEPHONE (OUTPATIENT)
Dept: FAMILY MEDICINE | Facility: CLINIC | Age: 78
End: 2018-08-23

## 2018-08-23 ENCOUNTER — ANTICOAGULATION THERAPY VISIT (OUTPATIENT)
Dept: NURSING | Facility: CLINIC | Age: 78
End: 2018-08-23
Payer: COMMERCIAL

## 2018-08-23 DIAGNOSIS — Z79.01 LONG-TERM (CURRENT) USE OF ANTICOAGULANTS: ICD-10-CM

## 2018-08-23 DIAGNOSIS — I48.20 CHRONIC ATRIAL FIBRILLATION (H): ICD-10-CM

## 2018-08-23 LAB — INR POINT OF CARE: 3.2 (ref 0.86–1.14)

## 2018-08-23 PROCEDURE — 99207 ZZC NO CHARGE NURSE ONLY: CPT

## 2018-08-23 PROCEDURE — 36416 COLLJ CAPILLARY BLOOD SPEC: CPT

## 2018-08-23 PROCEDURE — 85610 PROTHROMBIN TIME: CPT | Mod: QW

## 2018-08-23 NOTE — MR AVS SNAPSHOT
Chidi Dubon   8/23/2018 7:45 AM   Anticoagulation Therapy Visit    Description:  77 year old male   Provider:  AN ANTI COAG   Department:  An Nurse           INR as of 8/23/2018     Today's INR 3.2!      Anticoagulation Summary as of 8/23/2018     INR goal 2.0-3.0   Today's INR 3.2!   Full warfarin instructions 1.25 mg on Mon, Thu; 2.5 mg all other days   Next INR check 9/6/2018    Indications   Long-term (current) use of anticoagulants [Z79.01] [Z79.01]  Atrial Fibrillation [I48.2]         Your next Anticoagulation Clinic appointment(s)     Sep 06, 2018 12:15 PM CDT   Anticoagulation Visit with AN ANTI COAG   [facility]    [address] [facility] [address] [phone]              Contact Numbers     [facility]  Please call  [phone] to cancel and/or reschedule your appointment, or with any problems or questions regarding your therapy.        August 2018 Details    Sun Mon Tue Wed Thu Fri Sat        1               2               3               4                 5               6               7               8               9               10               11                 12               13               14               15               16               17               18                 19               20               21               22               23      1.25 mg   See details      24      2.5 mg         25      2.5 mg           26      2.5 mg         27      1.25 mg         28      2.5 mg         29      2.5 mg         30      1.25 mg         31      2.5 mg           Date Details   08/23 This INR check               How to take your warfarin dose     To take:  1.25 mg Take 0.5 of a 2.5 mg tablet.    To take:  2.5 mg Take 1 of the 2.5 mg tablets.           September 2018 Details    Sun Mon Tue Wed Thu Fri Sat           1      2.5 mg           2      2.5 mg         3      1.25 mg         4      2.5 mg         5      2.5 mg          6            7               8                 9               10               11               12               13               14               15                 16               17               18               19               20               21               22                 23               24               25               26               27               28               29                 30                      Date Details   No additional details    Date of next INR:  9/6/2018         How to take your warfarin dose     To take:  1.25 mg Take 0.5 of a 2.5 mg tablet.    To take:  2.5 mg Take 1 of the 2.5 mg tablets.

## 2018-08-23 NOTE — PROGRESS NOTES
ANTICOAGULATION FOLLOW-UP CLINIC VISIT    Patient Name:  Chidi Dubon  Date:  8/23/2018  Contact Type:  Face to Face    SUBJECTIVE:     Patient Findings     Positives No Problem Findings    Comments Patient seeing oral surgeon today.   Priyanka REEVES, RN, CPN             OBJECTIVE    INR Protime   Date Value Ref Range Status   08/23/2018 3.2 (A) 0.86 - 1.14 Final       ASSESSMENT / PLAN  INR assessment SUPRA    Recheck INR In: 2 WEEKS    INR Location Clinic      Anticoagulation Summary as of 8/23/2018     INR goal 2.0-3.0   Today's INR 3.2!   Warfarin maintenance plan 1.25 mg (2.5 mg x 0.5) on Mon, Thu; 2.5 mg (2.5 mg x 1) all other days   Full warfarin instructions 1.25 mg on Mon, Thu; 2.5 mg all other days   Weekly warfarin total 15 mg   Plan last modified Priyanka Narayanan RN (8/23/2018)   Next INR check 9/6/2018   Target end date Indefinite    Indications   Long-term (current) use of anticoagulants [Z79.01] [Z79.01]  Atrial Fibrillation [I48.2]         Anticoagulation Episode Summary     INR check location     Preferred lab     Send INR reminders to [facility]    Comments       Anticoagulation Care Providers     Provider Role Specialty Phone number    Martin Schultz PA-C Responsible Physician Assistant [phone]            See the Encounter Report to view Anticoagulation Flowsheet and Dosing Calendar (Go to Encounters tab in chart review, and find the Anticoagulation Therapy Visit)        Priyanka Narayanan, MAGDY

## 2018-08-23 NOTE — TELEPHONE ENCOUNTER
Patient needed to reschedule his oral surgery due to his INR results today. He is to have them re-drawn 24 hours prior to surgery so is hoping to have INR on Thursday 08/30/18 in [place]. Which schedule can he be put on?

## 2018-08-23 NOTE — TELEPHONE ENCOUNTER
So on 8/30/18, there will be no INR nurse at [facility], we will have a float triage nurse that day, will have to schedule again at  if possible.  Will route to provider to ask if ok to hold coumadin the night before his next INR appt (8/29/18)? Then have patient schedule.  Hyacinth Montano RN

## 2018-08-23 NOTE — TELEPHONE ENCOUNTER
I apologize, patient wants to have the oral surgery on 08/30 so the INR will need to be Wednesday 08/29/18. He is asking INR nurse-can he come to [facility] and be worked in on Wednesday?

## 2018-08-24 NOTE — TELEPHONE ENCOUNTER
[place] ONLY    Spoke with patient, will work into INR BE schedule on Wed 8/29 (24 hours prior to dental procedure) and patient will hold his 8/28/18 coumadin dose.  Patient voiced understanding and agreement  Hyacinth Montano RN

## 2018-08-29 ENCOUNTER — ANTICOAGULATION THERAPY VISIT (OUTPATIENT)
Dept: NURSING | Facility: CLINIC | Age: 78
End: 2018-08-29
Payer: COMMERCIAL

## 2018-08-29 DIAGNOSIS — Z79.01 LONG-TERM (CURRENT) USE OF ANTICOAGULANTS: ICD-10-CM

## 2018-08-29 DIAGNOSIS — I48.20 CHRONIC ATRIAL FIBRILLATION (H): ICD-10-CM

## 2018-08-29 LAB — INR POINT OF CARE: 1.9 (ref 0.86–1.14)

## 2018-08-29 PROCEDURE — 99207 ZZC NO CHARGE NURSE ONLY: CPT

## 2018-08-29 PROCEDURE — 85610 PROTHROMBIN TIME: CPT | Mod: QW

## 2018-08-29 PROCEDURE — 36416 COLLJ CAPILLARY BLOOD SPEC: CPT

## 2018-08-29 NOTE — PROGRESS NOTES
ANTICOAGULATION FOLLOW-UP CLINIC VISIT    Patient Name:  Chidi Dubon  Date:  8/29/2018  Contact Type:  Face to Face    SUBJECTIVE:     Patient Findings     Comments Patient here to have INR checked prior to dental surgery tomorrow.   INR 1.9, per Dr Gonzalez (verbal order) hold coumadin tonight also.           OBJECTIVE    INR Protime   Date Value Ref Range Status   08/29/2018 1.9 (A) 0.86 - 1.14 Final       ASSESSMENT / PLAN  INR assessment THER    Recheck INR In: 1 WEEK    INR Location Clinic      Anticoagulation Summary as of 8/29/2018     INR goal 2.0-3.0   Today's INR 1.9!   Warfarin maintenance plan 1.25 mg (2.5 mg x 0.5) on Mon, Thu; 2.5 mg (2.5 mg x 1) all other days   Full warfarin instructions 8/29: Hold; Otherwise 1.25 mg on Mon, Thu; 2.5 mg all other days   Weekly warfarin total 15 mg   Plan last modified Priyanka Narayanan RN (8/23/2018)   Next INR check 9/6/2018   Target end date Indefinite    Indications   Long-term (current) use of anticoagulants [Z79.01] [Z79.01]  Atrial Fibrillation [I48.2]         Anticoagulation Episode Summary     INR check location     Preferred lab     Send INR reminders to [facility]    Comments       Anticoagulation Care Providers     Provider Role Specialty Phone number    Martin Schultz PA-C Responsible Physician Assistant [phone]            See the Encounter Report to view Anticoagulation Flowsheet and Dosing Calendar (Go to Encounters tab in chart review, and find the Anticoagulation Therapy Visit)        Lissy Nichols RN

## 2018-08-29 NOTE — MR AVS SNAPSHOT
Chidi Dubon   8/29/2018 11:45 AM   Anticoagulation Therapy Visit    Description:  77 year old male   Provider:  JOSE E ANTI COAG   Department:  Be Nurse           INR as of 8/29/2018     Today's INR 1.9!      Anticoagulation Summary as of 8/29/2018     INR goal 2.0-3.0   Today's INR 1.9!   Full warfarin instructions 8/29: Hold; Otherwise 1.25 mg on Mon, Thu; 2.5 mg all other days   Next INR check 9/6/2018    Indications   Long-term (current) use of anticoagulants [Z79.01] [Z79.01]  Atrial Fibrillation [I48.2]         Your next Anticoagulation Clinic appointment(s)     Sep 06, 2018 12:15 PM CDT   Anticoagulation Visit with AN ANTI COAG   [facility]    [address] [facility] [address] [phone]              Contact Numbers     [facility]  Please call [phone] with any problems or questions regarding your therapy, or to cancel or reschedule your appointment.          August 2018 Details    Sun Mon Tue Wed Thu Fri Sat        1               2               3               4                 5               6               7               8               9               10               11                 12               13               14               15               16               17               18                 19               20               21               22               23               24               25                 26               27               28               29      Hold   See details      30      1.25 mg         31      2.5 mg           Date Details   08/29 This INR check               How to take your warfarin dose     To take:  1.25 mg Take 0.5 of a 2.5 mg tablet.    To take:  2.5 mg Take 1 of the 2.5 mg tablets.    Hold Do not take your warfarin dose. See the Details table to the right for additional instructions.                September 2018 Details    Sun Mon Tue Wed Thu Fri Sat           1      2.5 mg            2      2.5 mg         3      1.25 mg         4      2.5 mg         5      2.5 mg         6            7               8                 9               10               11               12               13               14               15                 16               17               18               19               20               21               22                 23               24               25               26               27               28               29                 30                      Date Details   No additional details    Date of next INR:  9/6/2018         How to take your warfarin dose     To take:  1.25 mg Take 0.5 of a 2.5 mg tablet.    To take:  2.5 mg Take 1 of the 2.5 mg tablets.

## 2018-09-06 ENCOUNTER — ANTICOAGULATION THERAPY VISIT (OUTPATIENT)
Dept: NURSING | Facility: CLINIC | Age: 78
End: 2018-09-06
Payer: COMMERCIAL

## 2018-09-06 DIAGNOSIS — Z79.01 LONG-TERM (CURRENT) USE OF ANTICOAGULANTS: ICD-10-CM

## 2018-09-06 DIAGNOSIS — I48.20 CHRONIC ATRIAL FIBRILLATION (H): ICD-10-CM

## 2018-09-06 LAB — INR POINT OF CARE: 1.6 (ref 0.86–1.14)

## 2018-09-06 PROCEDURE — 99207 ZZC NO CHARGE NURSE ONLY: CPT

## 2018-09-06 PROCEDURE — 85610 PROTHROMBIN TIME: CPT | Mod: QW

## 2018-09-06 PROCEDURE — 36416 COLLJ CAPILLARY BLOOD SPEC: CPT

## 2018-09-06 NOTE — MR AVS SNAPSHOT
Chidi Dubon   9/6/2018 12:15 PM   Anticoagulation Therapy Visit    Description:  77 year old male   Provider:  AN ANTI COAG   Department:  An Nurse           INR as of 9/6/2018     Today's INR 1.6!      Anticoagulation Summary as of 9/6/2018     INR goal 2.0-3.0   Today's INR 1.6!   Full warfarin instructions 9/6: 2.5 mg; Otherwise 1.25 mg on Mon, Thu; 2.5 mg all other days   Next INR check 9/14/2018    Indications   Long-term (current) use of anticoagulants [Z79.01] [Z79.01]  Atrial Fibrillation [I48.2]         Your next Anticoagulation Clinic appointment(s)     Sep 14, 2018 11:45 AM CDT   Anticoagulation Visit with BE ANTI COARUPERTO   [facility] [place] ([facility] [place])    [address]  [place] [id number] [phone]              Contact Numbers     [facility]  Please call  [phone] to cancel and/or reschedule your appointment, or with any problems or questions regarding your therapy.        September 2018 Details    Sun Mon Tue Wed Thu Fri Sat           1                 2               3               4               5               6      2.5 mg   See details      7      2.5 mg         8      2.5 mg           9      2.5 mg         10      1.25 mg         11      2.5 mg         12      2.5 mg         13      1.25 mg         14            15                 16               17               18               19               20               21               22                 23               24               25               26               27               28               29                 30                      Date Details   09/06 This INR check       Date of next INR:  9/14/2018         How to take your warfarin dose     To take:  1.25 mg Take 0.5 of a 2.5 mg tablet.    To take:  2.5 mg Take 1 of the 2.5 mg tablets.

## 2018-09-06 NOTE — PROGRESS NOTES
ANTICOAGULATION FOLLOW-UP CLINIC VISIT    Patient Name:  Chidi Dubon  Date:  9/6/2018  Contact Type:  Face to Face    SUBJECTIVE:     Patient Findings     Positives Change in diet/appetite, Intentional hold of therapy    Comments Held 4 days for dental surgery. Not eating regular food yet, mostly soft food and soup. No bleeding concerns. Increase dose today and then resume previous dose.            OBJECTIVE    INR Protime   Date Value Ref Range Status   09/06/2018 1.6 (A) 0.86 - 1.14 Final       ASSESSMENT / PLAN  INR assessment SUB    Recheck INR In: 8 DAYS    INR Location Clinic      Anticoagulation Summary as of 9/6/2018     INR goal 2.0-3.0   Today's INR 1.6!   Warfarin maintenance plan 1.25 mg (2.5 mg x 0.5) on Mon, Thu; 2.5 mg (2.5 mg x 1) all other days   Full warfarin instructions 9/6: 2.5 mg; Otherwise 1.25 mg on Mon, Thu; 2.5 mg all other days   Weekly warfarin total 15 mg   Plan last modified Priyanka Narayanan RN (8/23/2018)   Next INR check 9/14/2018   Target end date Indefinite    Indications   Long-term (current) use of anticoagulants [Z79.01] [Z79.01]  Atrial Fibrillation [I48.2]         Anticoagulation Episode Summary     INR check location     Preferred lab     Send INR reminders to [facility]    Comments       Anticoagulation Care Providers     Provider Role Specialty Phone number    Martin Schultz PA-C Responsible Physician Assistant [phone]            See the Encounter Report to view Anticoagulation Flowsheet and Dosing Calendar (Go to Encounters tab in chart review, and find the Anticoagulation Therapy Visit)        Carolin Gongora RN

## 2018-09-14 ENCOUNTER — ANTICOAGULATION THERAPY VISIT (OUTPATIENT)
Dept: NURSING | Facility: CLINIC | Age: 78
End: 2018-09-14
Payer: COMMERCIAL

## 2018-09-14 DIAGNOSIS — Z79.01 LONG-TERM (CURRENT) USE OF ANTICOAGULANTS: ICD-10-CM

## 2018-09-14 DIAGNOSIS — I48.20 CHRONIC ATRIAL FIBRILLATION (H): ICD-10-CM

## 2018-09-14 LAB — INR POINT OF CARE: 2 (ref 0.86–1.14)

## 2018-09-14 PROCEDURE — 99207 ZZC NO CHARGE NURSE ONLY: CPT

## 2018-09-14 PROCEDURE — 36416 COLLJ CAPILLARY BLOOD SPEC: CPT

## 2018-09-14 PROCEDURE — 85610 PROTHROMBIN TIME: CPT | Mod: QW

## 2018-09-14 NOTE — MR AVS SNAPSHOT
Chidi Dubon   9/14/2018 11:45 AM   Anticoagulation Therapy Visit    Description:  77 year old male   Provider:  JOSE E ANTI COAG   Department:  Be Nurse           INR as of 9/14/2018     Today's INR 2.0      Anticoagulation Summary as of 9/14/2018     INR goal 2.0-3.0   Today's INR 2.0   Full warfarin instructions 1.25 mg on Mon, Thu; 2.5 mg all other days   Next INR check 10/5/2018    Indications   Long-term (current) use of anticoagulants [Z79.01] [Z79.01]  Atrial Fibrillation [I48.2]         Your next Anticoagulation Clinic appointment(s)     Sep 14, 2018 11:45 AM CDT   Anticoagulation Visit with BE ANTI COAG   [facility] [place] ([facility] [place])    [address]  [place] [address]   [phone]            Oct 05, 2018 11:30 AM CDT   Anticoagulation Visit with BE ANTI COAG   [place] [facility] ([facility] [place])    [address]  [place] [address]   [phone]              Contact Numbers     [facility]  Please call [phone] with any problems or questions regarding your therapy, or to cancel or reschedule your appointment.          September 2018 Details    Sun Mon Tue Wed Thu Fri Sat           1                 2               3               4               5               6               7               8                 9               10               11               12               13               14      2.5 mg   See details      15      2.5 mg           16      2.5 mg         17      1.25 mg         18      2.5 mg         19      2.5 mg         20      1.25 mg         21      2.5 mg         22      2.5 mg           23      2.5 mg         24      1.25 mg         25      2.5 mg         26      2.5 mg         27      1.25 mg         28      2.5 mg         29      2.5 mg           30      2.5 mg                Date Details   09/14 This INR check               How to take your warfarin dose     To take:  1.25 mg Take 0.5 of a 2.5 mg  tablet.    To take:  2.5 mg Take 1 of the 2.5 mg tablets.           October 2018 Details    Sun Mon Tue Wed Thu Fri Sat      1      1.25 mg         2      2.5 mg         3      2.5 mg         4      1.25 mg         5            6                 7               8               9               10               11               12               13                 14               15               16               17               18               19               20                 21               22               23               24               25               26               27                 28               29               30               31                   Date Details   No additional details    Date of next INR:  10/5/2018         How to take your warfarin dose     To take:  1.25 mg Take 0.5 of a 2.5 mg tablet.    To take:  2.5 mg Take 1 of the 2.5 mg tablets.

## 2018-09-14 NOTE — PROGRESS NOTES
ANTICOAGULATION FOLLOW-UP CLINIC VISIT    Patient Name:  Chidi Dubon  Date:  9/14/2018  Contact Type:  Face to Face    SUBJECTIVE:     Patient Findings     Positives No Problem Findings           OBJECTIVE    INR Protime   Date Value Ref Range Status   09/14/2018 2.0 (A) 0.86 - 1.14 Final       ASSESSMENT / PLAN  INR assessment THER    Recheck INR In: 3 WEEKS    INR Location Clinic      Anticoagulation Summary as of 9/14/2018     INR goal 2.0-3.0   Today's INR 2.0   Warfarin maintenance plan 1.25 mg (2.5 mg x 0.5) on Mon, Thu; 2.5 mg (2.5 mg x 1) all other days   Full warfarin instructions 1.25 mg on Mon, Thu; 2.5 mg all other days   Weekly warfarin total 15 mg   No change documented Lissy Nichols RN   Plan last modified Priyanka Narayanan RN (8/23/2018)   Next INR check 10/5/2018   Target end date Indefinite    Indications   Long-term (current) use of anticoagulants [Z79.01] [Z79.01]  Atrial Fibrillation [I48.2]         Anticoagulation Episode Summary     INR check location     Preferred lab     Send INR reminders to [facility]    Comments       Anticoagulation Care Providers     Provider Role Specialty Phone number    Martin Schultz PA-C Responsible Physician Assistant [phone]            See the Encounter Report to view Anticoagulation Flowsheet and Dosing Calendar (Go to Encounters tab in chart review, and find the Anticoagulation Therapy Visit)        Lissy Nichols RN

## 2018-10-03 DIAGNOSIS — I48.91 NEW ONSET ATRIAL FIBRILLATION (H): ICD-10-CM

## 2018-10-03 DIAGNOSIS — I48.20 CHRONIC ATRIAL FIBRILLATION (H): ICD-10-CM

## 2018-10-03 RX ORDER — WARFARIN SODIUM 5 MG/1
TABLET ORAL
Qty: 45 TABLET | Refills: 0 | OUTPATIENT
Start: 2018-10-03

## 2018-10-03 RX ORDER — WARFARIN SODIUM 2.5 MG/1
2.5 TABLET ORAL DAILY
Qty: 80 TABLET | Refills: 0 | Status: SHIPPED | OUTPATIENT
Start: 2018-10-03 | End: 2018-11-20

## 2018-10-03 NOTE — TELEPHONE ENCOUNTER
Routing refill request to provider for review/approval because:  Drug interaction warning Coumadin and Aspirin    Medication pended with current instructions for MD to sign.    Kari Montilla RN on 10/3/2018 at 9:24 AM

## 2018-10-03 NOTE — TELEPHONE ENCOUNTER
"Requested Prescriptions   Pending Prescriptions Disp Refills     warfarin (COUMADIN) 5 MG tablet [Pharmacy Med Name: Warfarin Sodium Oral Tablet 5 MG] 45 tablet 2    Last Written Prescription Date:  7-1-18  Last Fill Quantity: 45,  # refills: 2   Last office visit: 7/6/2017 with prescribing provider:  7-6-17   Future Office Visit:   Sig: TAKE BY MOUTH ONCE DAILY OR AS DIRECTED BY PROTIME CLINIC. CURRENT DOSE IS 2.5 MG (1/2 TAB) DAILY    Vitamin K Antagonists Failed    10/3/2018  7:10 AM       Failed - INR is within goal in the past 6 weeks    Confirm INR is within goal in the past 6 weeks.     Recent Labs   Lab Test 09/14/18   INR  2.0*                      Passed - Recent (12 mo) or future (30 days) visit within the authorizing provider's specialty    Patient had office visit in the last 12 months or has a visit in the next 30 days with authorizing provider or within the authorizing provider's specialty.  See \"Patient Info\" tab in inbasket, or \"Choose Columns\" in Meds & Orders section of the refill encounter.           Passed - Patient is 18 years of age or older        "

## 2018-10-05 ENCOUNTER — TELEPHONE (OUTPATIENT)
Dept: NURSING | Facility: CLINIC | Age: 78
End: 2018-10-05

## 2018-10-05 ENCOUNTER — ANTICOAGULATION THERAPY VISIT (OUTPATIENT)
Dept: NURSING | Facility: CLINIC | Age: 78
End: 2018-10-05
Payer: COMMERCIAL

## 2018-10-05 DIAGNOSIS — I48.20 CHRONIC ATRIAL FIBRILLATION (H): ICD-10-CM

## 2018-10-05 DIAGNOSIS — Z79.01 LONG TERM CURRENT USE OF ANTICOAGULANT THERAPY: Primary | ICD-10-CM

## 2018-10-05 LAB — INR POINT OF CARE: 1.5 (ref 0.86–1.14)

## 2018-10-05 PROCEDURE — 85610 PROTHROMBIN TIME: CPT | Mod: QW

## 2018-10-05 PROCEDURE — 36416 COLLJ CAPILLARY BLOOD SPEC: CPT

## 2018-10-05 PROCEDURE — 99207 ZZC NO CHARGE NURSE ONLY: CPT

## 2018-10-05 NOTE — TELEPHONE ENCOUNTER
Annual INR referral due.  Orders pended for approval.    Kari Montilla RN on 10/5/2018 at 12:01 PM

## 2018-10-05 NOTE — PROGRESS NOTES
ANTICOAGULATION FOLLOW-UP CLINIC VISIT    Patient Name:  Chidi Dubon  Date:  10/5/2018  Contact Type:  Face to Face    SUBJECTIVE:     Patient Findings     Positives No Problem Findings    Comments Per patient has been taking 5 mg tablets and has been quartering his pills, per patient it is not scored and he cuts it himself. Instructed patient that a prescription for 2.5 mg tablets was sent to his pharmacy. Instructed patient on correct dosing and medication use. Patient verbalized understanding and agrees with plan.              OBJECTIVE    INR Protime   Date Value Ref Range Status   10/05/2018 1.5 (A) 0.86 - 1.14 Final       ASSESSMENT / PLAN  No question data found.  Anticoagulation Summary as of 10/5/2018     INR goal 2.0-3.0   Today's INR 1.5!   Warfarin maintenance plan 1.25 mg (2.5 mg x 0.5) on Mon; 2.5 mg (2.5 mg x 1) all other days   Full warfarin instructions 10/5: 5 mg; Otherwise 1.25 mg on Mon; 2.5 mg all other days   Weekly warfarin total 16.25 mg   Plan last modified Kari Montilla RN (10/5/2018)   Next INR check 10/16/2018   Target end date Indefinite    Indications   Long-term (current) use of anticoagulants [Z79.01] [Z79.01]  Atrial Fibrillation [I48.2]         Anticoagulation Episode Summary     INR check location     Preferred lab     Send INR reminders to [facility]    Comments       Anticoagulation Care Providers     Provider Role Specialty Phone number    Martin Schultz PA-C Responsible Physician Assistant [phone]            See the Encounter Report to view Anticoagulation Flowsheet and Dosing Calendar (Go to Encounters tab in chart review, and find the Anticoagulation Therapy Visit)    Dosage adjustment made based on physician directed care plan.    Kari Montilla RN

## 2018-10-05 NOTE — MR AVS SNAPSHOT
Chidi Dubon   10/5/2018 11:30 AM   Anticoagulation Therapy Visit    Description:  77 year old male   Provider:  JOSE E ANTI COAG   Department:  Be Nurse           INR as of 10/5/2018     Today's INR 1.5!      Anticoagulation Summary as of 10/5/2018     INR goal 2.0-3.0   Today's INR 1.5!   Full warfarin instructions 10/5: 5 mg; Otherwise 1.25 mg on Mon; 2.5 mg all other days   Next INR check 10/16/2018    Indications   Long-term (current) use of anticoagulants [Z79.01] [Z79.01]  Atrial Fibrillation [I48.2]         Your next Anticoagulation Clinic appointment(s)     Oct 16, 2018 11:45 AM CDT   Anticoagulation Visit with BE ANTI COAG   [place] [facility] ([facility] [place])    [address] [facility]  [place] [address] [phone]              Contact Numbers     [facility]  Please call [phone] with any problems or questions regarding your therapy, or to cancel or reschedule your appointment.          October 2018 Details    Sun Mon Tue Wed Thu Fri Sat      1               2               3               4               5      5 mg   See details      6      2.5 mg           7      2.5 mg         8      1.25 mg         9      2.5 mg         10      2.5 mg         11      2.5 mg         12      2.5 mg         13      2.5 mg           14      2.5 mg         15      1.25 mg         16            17               18               19               20                 21               22               23               24               25               26               27                 28               29               30               31                   Date Details   10/05 This INR check       Date of next INR:  10/16/2018         How to take your warfarin dose     To take:  1.25 mg Take 0.5 of a 2.5 mg tablet.    To take:  2.5 mg Take 1 of the 2.5 mg tablets.    To take:  5 mg Take 2 of the 2.5 mg tablets.

## 2018-10-16 ENCOUNTER — ANTICOAGULATION THERAPY VISIT (OUTPATIENT)
Dept: NURSING | Facility: CLINIC | Age: 78
End: 2018-10-16
Payer: COMMERCIAL

## 2018-10-16 DIAGNOSIS — I48.20 CHRONIC ATRIAL FIBRILLATION (H): ICD-10-CM

## 2018-10-16 LAB — INR POINT OF CARE: 2.4 (ref 0.86–1.14)

## 2018-10-16 PROCEDURE — 85610 PROTHROMBIN TIME: CPT | Mod: QW

## 2018-10-16 PROCEDURE — 36416 COLLJ CAPILLARY BLOOD SPEC: CPT

## 2018-10-16 PROCEDURE — 99207 ZZC NO CHARGE NURSE ONLY: CPT

## 2018-10-16 NOTE — MR AVS SNAPSHOT
Chidi Dubon   10/16/2018 11:45 AM   Anticoagulation Therapy Visit    Description:  78 year old male   Provider:  BE ANTI COAG   Department:  Be Nurse           INR as of 10/16/2018     Today's INR 2.4      Anticoagulation Summary as of 10/16/2018     INR goal 2.0-3.0   Today's INR 2.4   Full warfarin instructions 1.25 mg on Mon; 2.5 mg all other days   Next INR check 11/13/2018    Indications   Long-term (current) use of anticoagulants [Z79.01] [Z79.01]  Atrial Fibrillation [I48.2]         Your next Anticoagulation Clinic appointment(s)     Oct 16, 2018 11:45 AM CDT   Anticoagulation Visit with BE ANTI COAG   [facility] [place] [facility]    [address]  [place] [address]   [phone]            Nov 13, 2018 11:45 AM CST   Anticoagulation Visit with BE ANTI COAG   [place] [facility]    [address]  [place] [address]   [phone]              Contact Numbers     [facility]  Please call [phone] with any problems or questions regarding your therapy, or to cancel or reschedule your appointment.          October 2018 Details    Sun Mon Tue Wed Thu Fri Sat      1               2               3               4               5               6                 7               8               9               10               11               12               13                 14               15               16      2.5 mg   See details      17      2.5 mg         18      2.5 mg         19      2.5 mg         20      2.5 mg           21      2.5 mg         22      1.25 mg         23      2.5 mg         24      2.5 mg         25      2.5 mg         26      2.5 mg         27      2.5 mg           28      2.5 mg         29      1.25 mg         30      2.5 mg         31      2.5 mg             Date Details   10/16 This INR check               How to take your warfarin dose     To take:  1.25 mg Take 0.5 of a 2.5 mg  tablet.    To take:  2.5 mg Take 1 of the 2.5 mg tablets.           November 2018 Details    Sun Mon Tue Wed Thu Fri Sat         1      2.5 mg         2      2.5 mg         3      2.5 mg           4      2.5 mg         5      1.25 mg         6      2.5 mg         7      2.5 mg         8      2.5 mg         9      2.5 mg         10      2.5 mg           11      2.5 mg         12      1.25 mg         13            14               15               16               17                 18               19               20               21               22               23               24                 25               26               27               28               29               30                 Date Details   No additional details    Date of next INR:  11/13/2018         How to take your warfarin dose     To take:  1.25 mg Take 0.5 of a 2.5 mg tablet.    To take:  2.5 mg Take 1 of the 2.5 mg tablets.

## 2018-10-16 NOTE — PROGRESS NOTES
"  ANTICOAGULATION FOLLOW-UP CLINIC VISIT    Patient Name:  Chidi Dubon  Date:  10/16/2018  Contact Type:  Face to Face    SUBJECTIVE:     Patient Findings     Positives No Problem Findings    Comments Still awaiting new dentures, will call his dentist, he thought he had some \"bone fragments\" fell out from where teeth removed a few days ago. No signs of bleeding or clotting           OBJECTIVE    INR Protime   Date Value Ref Range Status   10/16/2018 2.4 (A) 0.86 - 1.14 Final       ASSESSMENT / PLAN  INR assessment THER    Recheck INR In: 4 WEEKS    INR Location Clinic      Anticoagulation Summary as of 10/16/2018     INR goal 2.0-3.0   Today's INR 2.4   Warfarin maintenance plan 1.25 mg (2.5 mg x 0.5) on Mon; 2.5 mg (2.5 mg x 1) all other days   Full warfarin instructions 1.25 mg on Mon; 2.5 mg all other days   Weekly warfarin total 16.25 mg   No change documented Hyacinth Montano last modified Kari Montilla RN (10/5/2018)   Next INR check 11/13/2018   Target end date Indefinite    Indications   Long-term (current) use of anticoagulants [Z79.01] [Z79.01]  Atrial Fibrillation [I48.2]         Anticoagulation Episode Summary     INR check location     Preferred lab     Send INR reminders to [facility]    Comments       Anticoagulation Care Providers     Provider Role Specialty Phone number    Martin Schultz PA-C Responsible Physician Assistant [phone]            See the Encounter Report to view Anticoagulation Flowsheet and Dosing Calendar (Go to Encounters tab in chart review, and find the Anticoagulation Therapy Visit)    Dosage adjustment made based on physician directed care plan. Patient using the 2.5 mg tabs    HYACINTH MONTANO               "

## 2018-10-22 ENCOUNTER — TELEPHONE (OUTPATIENT)
Dept: INTERNAL MEDICINE | Facility: CLINIC | Age: 78
End: 2018-10-22

## 2018-10-22 ENCOUNTER — OFFICE VISIT (OUTPATIENT)
Dept: FAMILY MEDICINE | Facility: CLINIC | Age: 78
End: 2018-10-22
Payer: COMMERCIAL

## 2018-10-22 VITALS
TEMPERATURE: 97.4 F | SYSTOLIC BLOOD PRESSURE: 130 MMHG | OXYGEN SATURATION: 96 % | HEART RATE: 67 BPM | HEIGHT: 75 IN | WEIGHT: 268 LBS | BODY MASS INDEX: 33.32 KG/M2 | DIASTOLIC BLOOD PRESSURE: 80 MMHG

## 2018-10-22 DIAGNOSIS — I10 HTN, GOAL BELOW 140/90: Primary | ICD-10-CM

## 2018-10-22 PROCEDURE — 99213 OFFICE O/P EST LOW 20 MIN: CPT | Performed by: PHYSICIAN ASSISTANT

## 2018-10-22 RX ORDER — LOSARTAN POTASSIUM 50 MG/1
50 TABLET ORAL DAILY
Qty: 30 TABLET | Refills: 1 | Status: SHIPPED | OUTPATIENT
Start: 2018-10-22 | End: 2018-10-22 | Stop reason: ALTCHOICE

## 2018-10-22 RX ORDER — MULTIPLE VITAMINS W/ MINERALS TAB 9MG-400MCG
1 TAB ORAL DAILY
Qty: 100 TABLET | Refills: 3 | Status: SHIPPED | OUTPATIENT
Start: 2018-10-22 | End: 2019-08-14

## 2018-10-22 RX ORDER — AMLODIPINE BESYLATE 5 MG/1
5 TABLET ORAL DAILY
Qty: 30 TABLET | Refills: 1 | Status: SHIPPED | OUTPATIENT
Start: 2018-10-22 | End: 2018-12-05

## 2018-10-22 RX ORDER — LOSARTAN POTASSIUM AND HYDROCHLOROTHIAZIDE 12.5; 5 MG/1; MG/1
1 TABLET ORAL DAILY
Qty: 30 TABLET | Refills: 1 | Status: SHIPPED | OUTPATIENT
Start: 2018-10-22 | End: 2018-12-05

## 2018-10-22 NOTE — MR AVS SNAPSHOT
After Visit Summary   10/22/2018    Chidi Dubon    MRN: [id number]           Patient Information     Date Of Birth          1940        Visit Information        Provider Department      10/22/2018 1:00 PM Rickey Mercado PA-C [facility]        Today's Diagnoses     HTN, goal below 140/90    -  1      Care Instructions      Discharge Instructions for High Blood Pressure (Hypertension)  You have been diagnosed with high blood pressure (also called hypertension). This means the force of blood against your artery walls is too strong. It also means your heart is working hard to move blood. High blood pressure usually has no symptoms, but over time, it can cause serious health problems. High blood pressure raises your risk for heart attack, stroke, heart failure, kidney disease, and blindness. With help from your doctor, you can manage your blood pressure and protect your health.  Blood pressure measurements are given as 2 numbers. Systolic blood pressure is the upper number. This is the pressure when the heart contracts. Diastolic blood pressure is the lower number. This is the pressure when the heart relaxes between beats.  Blood pressure is categorized as normal, elevated, or stage 1 or stage 2 high blood pressure:    Normal blood pressure is systolic of less than 120 and diastolic of less than 80 (120/80)    Elevated blood pressure is systolic of 120 to 129 and diastolic less than 80    Stage 1 high blood pressure is systolic is 130 to 139 or diastolic between 80 to 89    Stage 2 high blood pressure is when systolic is 140 or higher or the diastolic is 90 or higher  Taking medicine    Learn to take your own blood pressure. Keep a record of your results. Ask your doctor which readings mean that you need medical attention.    Take your blood pressure medicine exactly as directed. Don t skip doses. Missing doses can cause your blood pressure to get out of control.    If you  "do miss a dose (or doses) check with your healthcare provider about what to do.    Don't take medicines that contain heart stimulants, including over-the-counter medicines. Check for warnings about high blood pressure on the label. Ask the pharmacist before purchasing something you haven't used before    Check with your doctor or pharmacist before taking a decongestant. Some decongestants can worsen high blood pressure.  Lifestyle changes    Maintain a healthy weight. Get help to lose any extra pounds.    Cut back on salt.  ? Limit canned, dried, packaged, and fast foods.  ? Don t add salt to your food at the table.  ? Season foods with herbs instead of salt when you cook.  ? Request no added salt when you go to a restaurant.  ? The American Heart Association (AHA) says the \"ideal\" amount of sodium is no more than 1,500 mg a day.  But because Americans eat so much salt, you can make a positive change by cutting back to even 2,400 mg of sodium a day.     Follow the DASH (Dietary Approaches to Stop Hypertension) eating plan. This plan recommends vegetables, fruits, whole gains, and other heart healthy foods.    Begin an exercise program. Ask your healthcare provider how to get started. The AHA recommends aerobic exercise 3 to 4 times a week for an average of 40 minutes at a time, with your provider's approval. Simple activities like walking or gardening can help.    Break the smoking habit. Enroll in a stop-smoking program to improve your chances of success. Ask your healthcare provider about programs and medicines to help you stop smoking.    Limit drinks that contain caffeine such as coffee, black or green tea, and cola to 2 per day.    Never take stimulants such as amphetamines or cocaine. These drugs can be deadly for someone with high blood pressure.    Control your stress. Learn ways to manage stress.    Limit alcohol to no more than 1 drink a day for women and 2 drinks a day for men.  Follow-up care  Make a " follow-up appointment as directed.  When to call your healthcare provider  Call your healthcare provider right away if you have any of the following:    Chest pain or shortness of breath (call 911)    Moderate to severe headache    Weakness in the muscles of your face, arms, or legs    Trouble speaking    Extreme drowsiness    Confusion    Fainting or dizziness    Pulsating or rushing sound in your ears    Unexplained nosebleed    Weakness, tingling, or numbness of your face, arms, or legs    Change in vision    Blood pressure measured at home that is greater than 180/110   Date Last Reviewed: 4/27/2016 2000-2017 [employer]. [address] [place], [place] [id number]. All rights reserved. This information is not intended as a substitute for professional medical care. Always follow your healthcare professional's instructions.                Follow-ups after your visit        Your next 10 appointments already scheduled     Nov 13, 2018 11:45 AM CST   Anticoagulation Visit with BE ANTI COAG   [facility] [place] ([facility])    [id number] [place] [id number] [phone]              Who to contact     If you have questions or need follow up information about today's clinic visit or your schedule please contact [facility] directly at [phone].  Normal or non-critical lab and imaging results will be communicated to you by MyChart, letter or phone within 4 business days after the clinic has received the results. If you do not hear from us within 7 days, please contact the clinic through MyChart or phone. If you have a critical or abnormal lab result, we will notify you by phone as soon as possible.  Submit refill requests through [employer] or call your pharmacy and they will forward the refill request to us. Please allow 3 business days for your refill to be completed.          Additional Information About Your Visit        Care EveryWhere ID     This  "is your Care EveryWhere ID. This could be used by other organizations to access your [facility] medical records  [id number]        Your Vitals Were     Pulse Temperature Height Pulse Oximetry BMI (Body Mass Index)       67 97.4  F (36.3  C) (Oral) 6' 3\" (1.905 m) 96% 33.5 kg/m2        Blood Pressure from Last 3 Encounters:   10/22/18 130/80   08/08/18 137/83   05/01/18 140/86    Weight from Last 3 Encounters:   10/22/18 268 lb (121.6 kg)   08/08/18 273 lb (123.8 kg)   05/01/18 281 lb (127.5 kg)              Today, you had the following     No orders found for display         Today's Medication Changes          These changes are accurate as of 10/22/18  1:16 PM.  If you have any questions, ask your nurse or doctor.               Start taking these medicines.        Dose/Directions    amLODIPine 5 MG tablet   Commonly known as:  NORVASC   Used for:  HTN, goal below 140/90   Started by:  Rickey Mercado PA-C        Dose:  5 mg   Take 1 tablet (5 mg) by mouth daily   Quantity:  30 tablet   Refills:  1       losartan-hydrochlorothiazide 50-12.5 MG per tablet   Commonly known as:  HYZAAR   Used for:  HTN, goal below 140/90   Started by:  Rickey Mercado PA-C        Dose:  1 tablet   Take 1 tablet by mouth daily   Quantity:  30 tablet   Refills:  1       multivitamin, therapeutic with minerals Tabs tablet   Used for:  HTN, goal below 140/90   Started by:  Rickey Mercado PA-C        Dose:  1 tablet   Take 1 tablet by mouth daily   Quantity:  100 tablet   Refills:  3         Stop taking these medicines if you haven't already. Please contact your care team if you have questions.     FUROSEMIDE PO   Stopped by:  Rickey Mercado PA-C           potassium chloride 20 MEQ Packet   Commonly known as:  KLOR-CON   Stopped by:  Rickey Mercado PA-C                Where to get your medicines      These medications were sent to [id number] - [place], [place] - [address]  [address] " [facility] [place] [address]     Phone:  [phone]     amLODIPine 5 MG tablet    losartan-hydrochlorothiazide 50-12.5 MG per tablet    multivitamin, therapeutic with minerals Tabs tablet                Primary Care Provider Office Phone # Fax #    Phu Gonzalez [phone]        [facility] [place] [address]  [place] [address]        Goals        Diet    Reduce portion size     Related Problems    Hyperlipidemia LDL goal <70    Notes - Note created  5/9/2014  3:10 PM by Oliver Veliz MD    Everything in moderation.         General    Medication 1 (pt-stated)     Related Problems    Urolithiasis    Notes - Note created  5/9/2014  3:11 PM by Oliver Veliz MD    Stop the flomax and pay attention to your urine flow.  If not worse, ok to stay off of it. If worse, restart.       Taper off medication (pt-stated)     Related Problems    Fatigue    Notes - Note created  5/9/2014  3:08 PM by Oliver Veliz MD    Tapering Schedule for citalopram;    Week 1:  1/2 alternating with 1 every other day  Week 2:  1/2 daily  Week 3:  1/2 every other day  Week 4:  Stop    If at any point you experience significant side effects from tapering, we may need to slow the taper a bit.    Once you are off of this, pay attention to the mood and if you are not feeling better, we can try using bupropion which is a medication that is less likely to cause fatigue and weight gain.         Equal Access to Services     [facility] : Melly Herrera, marifer anguiano, dalia isaacaldarin jones . So [facility] [phone].    ATENCIÓN: Si habla español, tiene a brown disposición servicios gratuitos de asistencia lingüística. Llame al [phone].    We comply with applicable federal civil rights laws and [place] laws. We do not discriminate on the basis of race, color, national origin, age, disability, sex, sexual orientation, or gender identity.             Thank you!     Thank you for choosing [facility]  for your care. Our goal is always to provide you with excellent care. Hearing back from our patients is one way we can continue to improve our services. Please take a few minutes to complete the written survey that you may receive in the mail after your visit with us. Thank you!             Your Updated Medication List - Protect others around you: Learn how to safely use, store and throw away your medicines at www.disposemymeds.org.          This list is accurate as of 10/22/18  1:16 PM.  Always use your most recent med list.                   Brand Name Dispense Instructions for use Diagnosis    amLODIPine 5 MG tablet    NORVASC    30 tablet    Take 1 tablet (5 mg) by mouth daily    HTN, goal below 140/90       aspirin 81 MG tablet     30 tablet    Take 1 tablet (81 mg) by mouth daily    CAD (coronary artery disease)       atorvastatin 80 MG tablet    LIPITOR    90 tablet    Take 0.5 tablets (40 mg) by mouth daily    Hyperlipidemia LDL goal <70       losartan-hydrochlorothiazide 50-12.5 MG per tablet    HYZAAR    30 tablet    Take 1 tablet by mouth daily    HTN, goal below 140/90       multivitamin, therapeutic with minerals Tabs tablet     100 tablet    Take 1 tablet by mouth daily    HTN, goal below 140/90       nitroGLYcerin 0.4 MG sublingual tablet    NITROSTAT    25 tablet    Place 1 tablet (0.4 mg) under the tongue every 5 minutes as needed for chest pain    Hyperlipidemia LDL goal <70       * warfarin 5 MG tablet    COUMADIN    45 tablet    TAKE BY MOUTH ONCE DAILY OR AS DIRECTED BY PROTIME CLINIC. CURRENT DOSE IS 2.5 MG DAILY    New onset atrial fibrillation (H)       * warfarin 2.5 MG tablet    COUMADIN    80 tablet    Take 1 tablet (2.5 mg) by mouth daily Or as directed by INR. Current dose is 1.25 mg Mondays and Thursdays and 2.5 mg daily rest of week    Chronic atrial fibrillation (H)       * Notice:  This list has 2 medication(s) that  are the same as other medications prescribed for you. Read the directions carefully, and ask your doctor or other care provider to review them with you.

## 2018-10-22 NOTE — PROGRESS NOTES
"  SUBJECTIVE:   Chidi Dubon is a 78 year old male who presents to clinic today for the following health issues:    Hypertension Follow-up      Outpatient blood pressures are being checked at home.  Results are 170/85 all over .    Low Salt Diet: no added salt      Amount of exercise or physical activity: None    Problems taking medications regularly: No    Medication side effects: makes him itch     Diet: regular (no restrictions)          Problem list and histories reviewed & adjusted, as indicated.  Additional history: as documented      Reviewed and updated as needed this visit by clinical staff  Tobacco  Allergies  Meds  Med Hx  Surg Hx  Fam Hx  Soc Hx      ROS:  Constitutional, HEENT, cardiovascular, pulmonary, gi and gu systems are negative, except as otherwise noted.    OBJECTIVE:     /80  Pulse 67  Temp 97.4  F (36.3  C) (Oral)  Ht 6' 3\" (1.905 m)  Wt 268 lb (121.6 kg)  SpO2 96%  BMI 33.5 kg/m2  Body mass index is 33.5 kg/(m^2).  GENERAL: healthy, alert and no distress  NECK: no adenopathy, no asymmetry, masses, or scars and thyroid normal to palpation  RESP: lungs clear to auscultation - no rales, rhonchi or wheezes  CV: regular and rhythm, normal S1 S2, no S3 or S4, no murmur, click or rub, no peripheral edema and peripheral pulses strong  MS: no gross musculoskeletal defects noted, no edema  SKIN: no suspicious lesions or rashes    Diagnostic Test Results:  none     ASSESSMENT/PLAN:   1. HTN, goal below 140/90  -discontinue Losartan, Potassium and Lasix.  - amLODIPine (NORVASC) 5 MG tablet; Take 1 tablet (5 mg) by mouth daily  Dispense: 30 tablet; Refill: 1  - multivitamin, therapeutic with minerals (MULTI-VITAMIN) TABS tablet; Take 1 tablet by mouth daily  Dispense: 100 tablet; Refill: 3  - losartan-hydrochlorothiazide (HYZAAR) 50-12.5 MG per tablet; Take 1 tablet by mouth daily  Dispense: 30 tablet; Refill: 1      Use medication as directed.  BP checks daily in the PM.  Follow up " in 2 weeks.    Rickey Mercado PA-C  [facility]

## 2018-10-22 NOTE — TELEPHONE ENCOUNTER
Chidi Dubon is a 78 year old male who calls with reports of elevated blood pressure. Patient states since about October 1st he has noticed his systolic BP readings are at times running between 180-170 and diastolic between 100-90. Patient states he taking medications as directed. No new supplements or activity. BP at time of call 159/95, 15 minutes prior to call 183/106, 1 hour prior to call 174/99    NURSING ASSESSMENT:  Description: See above  Onset/duration:  Since about October 1st patient has noticed increased BP readings   Precip. factors: History of CAD, stent placement, pacemaker  Associated symptoms: Patient denies associated symptoms. No chest pain, lightheadedness, weakness, new onset SOB, numbness and tingling, blurred vision, headache.   Improves/worsens symptoms:  none  Pain scale (0-10)   0/10  LMP/preg/breast feeding:  NA  Last exam/Treatment: 8/8/2018 with Dr. Gonzalez  Allergies:   Allergies   Allergen Reactions     No Known Allergies        MEDICATIONS:   Taking medication(s) as prescribed? Yes  Taking over the counter medication(s?) No  Any medication side effects? No significant side effects    Any barriers to taking medication(s) as prescribed?  No  Medication(s) improving/managing symptoms?  No  Medication reconciliation completed: No      NURSING PLAN: Patient seeing Rickey Mercado at 1 pm. Chart routed to provider for review prior to visit.     RECOMMENDED DISPOSITION:  Scheduled patient for 1 pm with Rickey Mercado.   Will comply with recommendation: Yes  If further questions/concerns or if symptoms do not improve, worsen or new symptoms develop, call your PCP or [facility] as soon as possible.      Guideline used:  Telephone Triage Protocols for Nurses, Fifth Edition, Angela Locke RN

## 2018-10-22 NOTE — PATIENT INSTRUCTIONS
Discharge Instructions for High Blood Pressure (Hypertension)  You have been diagnosed with high blood pressure (also called hypertension). This means the force of blood against your artery walls is too strong. It also means your heart is working hard to move blood. High blood pressure usually has no symptoms, but over time, it can cause serious health problems. High blood pressure raises your risk for heart attack, stroke, heart failure, kidney disease, and blindness. With help from your doctor, you can manage your blood pressure and protect your health.  Blood pressure measurements are given as 2 numbers. Systolic blood pressure is the upper number. This is the pressure when the heart contracts. Diastolic blood pressure is the lower number. This is the pressure when the heart relaxes between beats.  Blood pressure is categorized as normal, elevated, or stage 1 or stage 2 high blood pressure:    Normal blood pressure is systolic of less than 120 and diastolic of less than 80 (120/80)    Elevated blood pressure is systolic of 120 to 129 and diastolic less than 80    Stage 1 high blood pressure is systolic is 130 to 139 or diastolic between 80 to 89    Stage 2 high blood pressure is when systolic is 140 or higher or the diastolic is 90 or higher  Taking medicine    Learn to take your own blood pressure. Keep a record of your results. Ask your doctor which readings mean that you need medical attention.    Take your blood pressure medicine exactly as directed. Don t skip doses. Missing doses can cause your blood pressure to get out of control.    If you do miss a dose (or doses) check with your healthcare provider about what to do.    Don't take medicines that contain heart stimulants, including over-the-counter medicines. Check for warnings about high blood pressure on the label. Ask the pharmacist before purchasing something you haven't used before    Check with your doctor or pharmacist before taking a  "decongestant. Some decongestants can worsen high blood pressure.  Lifestyle changes    Maintain a healthy weight. Get help to lose any extra pounds.    Cut back on salt.  ? Limit canned, dried, packaged, and fast foods.  ? Don t add salt to your food at the table.  ? Season foods with herbs instead of salt when you cook.  ? Request no added salt when you go to a restaurant.  ? The American Heart Association (AHA) says the \"ideal\" amount of sodium is no more than 1,500 mg a day.  But because Americans eat so much salt, you can make a positive change by cutting back to even 2,400 mg of sodium a day.     Follow the DASH (Dietary Approaches to Stop Hypertension) eating plan. This plan recommends vegetables, fruits, whole gains, and other heart healthy foods.    Begin an exercise program. Ask your healthcare provider how to get started. The AHA recommends aerobic exercise 3 to 4 times a week for an average of 40 minutes at a time, with your provider's approval. Simple activities like walking or gardening can help.    Break the smoking habit. Enroll in a stop-smoking program to improve your chances of success. Ask your healthcare provider about programs and medicines to help you stop smoking.    Limit drinks that contain caffeine such as coffee, black or green tea, and cola to 2 per day.    Never take stimulants such as amphetamines or cocaine. These drugs can be deadly for someone with high blood pressure.    Control your stress. Learn ways to manage stress.    Limit alcohol to no more than 1 drink a day for women and 2 drinks a day for men.  Follow-up care  Make a follow-up appointment as directed.  When to call your healthcare provider  Call your healthcare provider right away if you have any of the following:    Chest pain or shortness of breath (call 911)    Moderate to severe headache    Weakness in the muscles of your face, arms, or legs    Trouble speaking    Extreme drowsiness    Confusion    Fainting or " dizziness    Pulsating or rushing sound in your ears    Unexplained nosebleed    Weakness, tingling, or numbness of your face, arms, or legs    Change in vision    Blood pressure measured at home that is greater than 180/110   Date Last Reviewed: 4/27/2016 2000-2017 [employer]. [address] [place] [address]. All rights reserved. This information is not intended as a substitute for professional medical care. Always follow your healthcare professional's instructions.

## 2018-11-13 ENCOUNTER — TELEPHONE (OUTPATIENT)
Dept: INTERNAL MEDICINE | Facility: CLINIC | Age: 78
End: 2018-11-13

## 2018-11-13 NOTE — TELEPHONE ENCOUNTER
"FYI ONLY  Spoke with wife and patient, he is currently at [facility] ER.  He reports he was having some rectal bleeding, (he did not give details) and today he was very dizzy so went to ER.  He and wife report he will be getting 2 \"bags of blood\", he thought his hemoglobin was around \"5\" something?  INR=3.1 in ER,  he reports.  He will follow up in clinic once released for ER/hospital.  Hyacinth Montano RN    "

## 2018-11-20 ENCOUNTER — OFFICE VISIT (OUTPATIENT)
Dept: FAMILY MEDICINE | Facility: CLINIC | Age: 78
End: 2018-11-20
Payer: COMMERCIAL

## 2018-11-20 VITALS
WEIGHT: 262 LBS | RESPIRATION RATE: 16 BRPM | DIASTOLIC BLOOD PRESSURE: 63 MMHG | OXYGEN SATURATION: 99 % | TEMPERATURE: 97.4 F | HEART RATE: 83 BPM | BODY MASS INDEX: 32.58 KG/M2 | HEIGHT: 75 IN | SYSTOLIC BLOOD PRESSURE: 123 MMHG

## 2018-11-20 DIAGNOSIS — I10 BENIGN ESSENTIAL HYPERTENSION: ICD-10-CM

## 2018-11-20 DIAGNOSIS — D50.0 IRON DEFICIENCY ANEMIA DUE TO CHRONIC BLOOD LOSS: ICD-10-CM

## 2018-11-20 DIAGNOSIS — E78.5 HYPERLIPIDEMIA LDL GOAL <70: ICD-10-CM

## 2018-11-20 DIAGNOSIS — K92.2 GASTROINTESTINAL HEMORRHAGE, UNSPECIFIED GASTROINTESTINAL HEMORRHAGE TYPE: ICD-10-CM

## 2018-11-20 DIAGNOSIS — Z79.01 LONG TERM CURRENT USE OF ANTICOAGULANT THERAPY: Primary | ICD-10-CM

## 2018-11-20 DIAGNOSIS — I48.20 CHRONIC ATRIAL FIBRILLATION (H): ICD-10-CM

## 2018-11-20 LAB
ANION GAP SERPL CALCULATED.3IONS-SCNC: 8 MMOL/L (ref 3–14)
BASOPHILS # BLD AUTO: 0.1 10E9/L (ref 0–0.2)
BASOPHILS NFR BLD AUTO: 0.7 %
BUN SERPL-MCNC: 11 MG/DL (ref 7–30)
CALCIUM SERPL-MCNC: 8.4 MG/DL (ref 8.5–10.1)
CHLORIDE SERPL-SCNC: 109 MMOL/L (ref 94–109)
CHOLEST SERPL-MCNC: 96 MG/DL
CO2 SERPL-SCNC: 24 MMOL/L (ref 20–32)
CREAT SERPL-MCNC: 1.19 MG/DL (ref 0.66–1.25)
DIFFERENTIAL METHOD BLD: ABNORMAL
EOSINOPHIL # BLD AUTO: 0.2 10E9/L (ref 0–0.7)
EOSINOPHIL NFR BLD AUTO: 2.3 %
ERYTHROCYTE [DISTWIDTH] IN BLOOD BY AUTOMATED COUNT: 17.2 % (ref 10–15)
GFR SERPL CREATININE-BSD FRML MDRD: 59 ML/MIN/1.7M2
GLUCOSE SERPL-MCNC: 104 MG/DL (ref 70–99)
HCT VFR BLD AUTO: 23.5 % (ref 40–53)
HDLC SERPL-MCNC: 33 MG/DL
HGB BLD-MCNC: 7 G/DL (ref 13.3–17.7)
INR PPP: 1.1 (ref 0.86–1.14)
LDLC SERPL CALC-MCNC: 51 MG/DL
LYMPHOCYTES # BLD AUTO: 1.5 10E9/L (ref 0.8–5.3)
LYMPHOCYTES NFR BLD AUTO: 21.7 %
MCH RBC QN AUTO: 22.9 PG (ref 26.5–33)
MCHC RBC AUTO-ENTMCNC: 29.8 G/DL (ref 31.5–36.5)
MCV RBC AUTO: 77 FL (ref 78–100)
MONOCYTES # BLD AUTO: 0.5 10E9/L (ref 0–1.3)
MONOCYTES NFR BLD AUTO: 7.1 %
NEUTROPHILS # BLD AUTO: 4.8 10E9/L (ref 1.6–8.3)
NEUTROPHILS NFR BLD AUTO: 68.2 %
NONHDLC SERPL-MCNC: 63 MG/DL
PLATELET # BLD AUTO: 280 10E9/L (ref 150–450)
POTASSIUM SERPL-SCNC: 3.6 MMOL/L (ref 3.4–5.3)
RBC # BLD AUTO: 3.06 10E12/L (ref 4.4–5.9)
SODIUM SERPL-SCNC: 141 MMOL/L (ref 133–144)
TRIGL SERPL-MCNC: 58 MG/DL
WBC # BLD AUTO: 7.1 10E9/L (ref 4–11)

## 2018-11-20 PROCEDURE — 99214 OFFICE O/P EST MOD 30 MIN: CPT | Performed by: PHYSICIAN ASSISTANT

## 2018-11-20 PROCEDURE — 80048 BASIC METABOLIC PNL TOTAL CA: CPT | Performed by: PHYSICIAN ASSISTANT

## 2018-11-20 PROCEDURE — 36415 COLL VENOUS BLD VENIPUNCTURE: CPT | Performed by: PHYSICIAN ASSISTANT

## 2018-11-20 PROCEDURE — 80061 LIPID PANEL: CPT | Performed by: PHYSICIAN ASSISTANT

## 2018-11-20 PROCEDURE — 85025 COMPLETE CBC W/AUTO DIFF WBC: CPT | Performed by: PHYSICIAN ASSISTANT

## 2018-11-20 PROCEDURE — 85610 PROTHROMBIN TIME: CPT | Performed by: PHYSICIAN ASSISTANT

## 2018-11-20 RX ORDER — POTASSIUM CHLORIDE 1.5 G/1.58G
20 POWDER, FOR SOLUTION ORAL DAILY
COMMUNITY
End: 2019-04-26

## 2018-11-20 RX ORDER — FERROUS SULFATE 325(65) MG
325 TABLET ORAL
Qty: 90 TABLET | Refills: 2 | Status: SHIPPED | OUTPATIENT
Start: 2018-11-20 | End: 2019-08-18

## 2018-11-20 NOTE — MR AVS SNAPSHOT
"              After Visit Summary   11/20/2018    Chidi Dubon    MRN: [id number]           Patient Information     Date Of Birth          1940        Visit Information        Provider Department      11/20/2018 7:20 AM Martin Schultz PA-C [facility]        Today's Diagnoses     Long term current use of anticoagulant therapy    -  1    Atrial Fibrillation        Gastrointestinal hemorrhage, unspecified gastrointestinal hemorrhage type        Iron deficiency anemia due to chronic blood loss        Benign essential hypertension        Hyperlipidemia LDL goal <70           Follow-ups after your visit        Who to contact     Normal or non-critical lab and imaging results will be communicated to you by uTesthart, letter or phone within 4 business days after the clinic has received the results. If you do not hear from us within 7 days, please contact the clinic through Adsamet or phone. If you have a critical or abnormal lab result, we will notify you by phone as soon as possible.  Submit refill requests through Zentila or call your pharmacy and they will forward the refill request to us. Please allow 3 business days for your refill to be completed.          If you need to speak with a  for additional information , please call: [phone]             Additional Information About Your Visit        [employer] Information     Zentila lets you send messages to your doctor, view your test results, renew your prescriptions, schedule appointments and more. To sign up, go to [email or web address] . Click on \"Log in\" on the left side of the screen, which will take you to the Welcome page. Then click on \"Sign up Now\" on the right side of the page.     You will be asked to enter the access code listed below, as well as some personal information. Please follow the directions to create your username and password.     Your access code is: [email or web address]  Expires: 2/17/2019 10:37 AM     Your " "access code will  in 90 days. If you need help or a new code, please call your [facility] or [phone].        Care EveryWhere ID     This is your Care EveryWhere ID. This could be used by other organizations to access your [facility] medical records  [id number]        Your Vitals Were     Pulse Temperature Respirations Height Pulse Oximetry BMI (Body Mass Index)    83 97.4  F (36.3  C) (Tympanic) 16 6' 3\" (1.905 m) 99% 32.75 kg/m2       Blood Pressure from Last 3 Encounters:   18 123/63   10/22/18 130/80   18 137/83    Weight from Last 3 Encounters:   18 262 lb (118.8 kg)   10/22/18 268 lb (121.6 kg)   18 273 lb (123.8 kg)              We Performed the Following     Basic metabolic panel  (Ca, Cl, CO2, Creat, Gluc, K, Na, BUN)     CBC with platelets and differential     INR     Lipid panel reflex to direct LDL Fasting          Today's Medication Changes          These changes are accurate as of 18  8:33 AM.  If you have any questions, ask your nurse or doctor.               Start taking these medicines.        Dose/Directions    ferrous sulfate 325 (65 Fe) MG tablet   Commonly known as:  IRON   Used for:  Iron deficiency anemia due to chronic blood loss   Started by:  Martin Schultz PA-C        Dose:  325 mg   Take 1 tablet (325 mg) by mouth 3 times daily (with meals)   Quantity:  90 tablet   Refills:  2         Stop taking these medicines if you haven't already. Please contact your care team if you have questions.     aspirin 81 MG tablet   Stopped by:  Martin Schultz PA-C           warfarin 2.5 MG tablet   Commonly known as:  COUMADIN   Stopped by:  Martin Schultz PA-C           warfarin 5 MG tablet   Commonly known as:  COUMADIN   Stopped by:  Martin Schultz PA-C                Where to get your medicines      These medications were sent to [id number] - [place], [place] - [address] [address]  [address] [address] [address]     Phone: "  [phone]     ferrous sulfate 325 (65 Fe) MG tablet                Primary Care Provider Office Phone # Fax #    Phu Gonzalez [phone]       [facility] [address]  [place] [address]        Goals        Diet    Reduce portion size     Related Problems    Hyperlipidemia LDL goal <70    Notes - Note created  5/9/2014  3:10 PM by Oliver Veliz MD    Everything in moderation.         General    Medication 1 (pt-stated)     Related Problems    Urolithiasis    Notes - Note created  5/9/2014  3:11 PM by Oliver Veliz MD    Stop the flomax and pay attention to your urine flow.  If not worse, ok to stay off of it. If worse, restart.       Taper off medication (pt-stated)     Related Problems    Fatigue    Notes - Note created  5/9/2014  3:08 PM by Oliver Veliz MD    Tapering Schedule for citalopram;    Week 1:  1/2 alternating with 1 every other day  Week 2:  1/2 daily  Week 3:  1/2 every other day  Week 4:  Stop    If at any point you experience significant side effects from tapering, we may need to slow the taper a bit.    Once you are off of this, pay attention to the mood and if you are not feeling better, we can try using bupropion which is a medication that is less likely to cause fatigue and weight gain.         Equal Access to Services     NORMAN MILNER : Melly Herrera, waaxda luqadaha, qaybta kaalfrancois blankenship, darin jordan . So [facility] [phone].    ATENCIÓN: Si habla español, tiene a brown disposición servicios gratuitos de asistencia lingüística. Jacky al [phone].    We comply with applicable federal civil rights laws and [place] laws. We do not discriminate on the basis of race, color, national origin, age, disability, sex, sexual orientation, or gender identity.            Thank you!     Thank you for choosing [facility]  for your care. Our goal is always to provide you with excellent care. Hearing  back from our patients is one way we can continue to improve our services. Please take a few minutes to complete the written survey that you may receive in the mail after your visit with us. Thank you!             Your Updated Medication List - Protect others around you: Learn how to safely use, store and throw away your medicines at www.disposemymeds.org.          This list is accurate as of 11/20/18  8:33 AM.  Always use your most recent med list.                   Brand Name Dispense Instructions for use Diagnosis    amLODIPine 5 MG tablet    NORVASC    30 tablet    Take 1 tablet (5 mg) by mouth daily    HTN, goal below 140/90       atorvastatin 80 MG tablet    LIPITOR    90 tablet    Take 0.5 tablets (40 mg) by mouth daily    Hyperlipidemia LDL goal <70       ferrous sulfate 325 (65 Fe) MG tablet    IRON    90 tablet    Take 1 tablet (325 mg) by mouth 3 times daily (with meals)    Iron deficiency anemia due to chronic blood loss       losartan-hydrochlorothiazide 50-12.5 MG per tablet    HYZAAR    30 tablet    Take 1 tablet by mouth daily    HTN, goal below 140/90       multivitamin, therapeutic with minerals Tabs tablet     100 tablet    Take 1 tablet by mouth daily    HTN, goal below 140/90       nitroGLYcerin 0.4 MG sublingual tablet    NITROSTAT    25 tablet    Place 1 tablet (0.4 mg) under the tongue every 5 minutes as needed for chest pain    Hyperlipidemia LDL goal <70       potassium chloride 20 MEQ Packet    KLOR-CON     Take 20 mEq by mouth daily

## 2018-11-20 NOTE — LETTER
November 23, 2018      Chidi Dubon  [address]  [place] [address]        Chidi,     Your cholesterol numbers looked great. Also, your kidney function remains stable.    Resulted Orders   CBC with platelets and differential   Result Value Ref Range    WBC 7.1 4.0 - 11.0 10e9/L      Comment:      Critical Value called to and read back by   ALTON SANON @[id number] BY [id number]      RBC Count 3.06 (L) 4.4 - 5.9 10e12/L    Hemoglobin 7.0 (LL) 13.3 - 17.7 g/dL    Hematocrit 23.5 (L) 40.0 - 53.0 %    MCV 77 (L) 78 - 100 fl    MCH 22.9 (L) 26.5 - 33.0 pg    MCHC 29.8 (L) 31.5 - 36.5 g/dL    RDW 17.2 (H) 10.0 - 15.0 %    Platelet Count 280 150 - 450 10e9/L    % Neutrophils 68.2 %    % Lymphocytes 21.7 %    % Monocytes 7.1 %    % Eosinophils 2.3 %    % Basophils 0.7 %    Absolute Neutrophil 4.8 1.6 - 8.3 10e9/L    Absolute Lymphocytes 1.5 0.8 - 5.3 10e9/L    Absolute Monocytes 0.5 0.0 - 1.3 10e9/L    Absolute Eosinophils 0.2 0.0 - 0.7 10e9/L    Absolute Basophils 0.1 0.0 - 0.2 10e9/L    Diff Method Automated Method    Basic metabolic panel  (Ca, Cl, CO2, Creat, Gluc, K, Na, BUN)   Result Value Ref Range    Sodium 141 133 - 144 mmol/L    Potassium 3.6 3.4 - 5.3 mmol/L    Chloride 109 94 - 109 mmol/L    Carbon Dioxide 24 20 - 32 mmol/L    Anion Gap 8 3 - 14 mmol/L    Glucose 104 (H) 70 - 99 mg/dL      Comment:      Fasting specimen    Urea Nitrogen 11 7 - 30 mg/dL    Creatinine 1.19 0.66 - 1.25 mg/dL    GFR Estimate 59 (L) >60 mL/min/1.7m2      Comment:      Non  GFR Calc    GFR Estimate If Black 72 >60 mL/min/1.7m2      Comment:       GFR Calc    Calcium 8.4 (L) 8.5 - 10.1 mg/dL   Lipid panel reflex to direct LDL Fasting   Result Value Ref Range    Cholesterol 96 <200 mg/dL    Triglycerides 58 <150 mg/dL      Comment:      Fasting specimen    HDL Cholesterol 33 (L) >39 mg/dL    LDL Cholesterol Calculated 51 <100 mg/dL      Comment:      Desirable:       <100 mg/dl    Non HDL  Cholesterol 63 <130 mg/dL       If you have any questions or concerns, please call the clinic at the number listed above.       Sincerely,    Martin Schultz PA-C/jocelyn

## 2018-11-20 NOTE — PROGRESS NOTES
SUBJECTIVE:   Chidi Dubon is a 78 year old male who presents to clinic today for the following health issues:          Hospital Follow-up Visit:    Hospital/Nursing Home/IP Rehab Facility:   Date of Admission: 11/13/18  Date of Discharge: 11/15/18  Reason(s) for Admission: Gastrointestinal hemorrhage,      at that time had had some black stools at times also some red blood in stools. With in the week prior to going to ED had become very fatigued and sob. No concerning abnormalities/active bleeding noted on EGD and colonoscopy. Vitals stable.  Denies any current active bleeding. His warfarin and aspirin has been stopped. Denies nsaids . No profound alcohol use.        Problems taking medications regularly:  None       Medication changes since discharge: stopped coumadin       Problems adhering to non-medication therapy:  None    Summary of hospitalization:  CareEverywhere information obtained and reviewed  Diagnostic Tests/Treatments reviewed.  Follow up needed: pcp  Other Healthcare Providers Involved in Patient s Care:         None  Update since discharge: shortness of breath and fatigue increased     Post Discharge Medication Reconciliation: discharge medications reconciled and changed, per note/orders (see AVS).  Plan of care communicated with patient and family     Coding guidelines for this visit:  Type of Medical   Decision Making Face-to-Face Visit       within 7 Days of discharge Face-to-Face Visit        within 14 days of discharge   Moderate Complexity [id number]   High Complexity [id number]                  Problem list and histories reviewed & adjusted, as indicated.  Additional history: as documented    BP Readings from Last 3 Encounters:   11/20/18 123/63   10/22/18 130/80   08/08/18 137/83    Wt Readings from Last 3 Encounters:   11/20/18 262 lb (118.8 kg)   10/22/18 268 lb (121.6 kg)   08/08/18 273 lb (123.8 kg)                    Reviewed and updated as needed this visit by clinical  staff  Tobacco  Allergies  Meds       Reviewed and updated as needed this visit by Provider         All other systems negative except as outline above  OBJECTIVE:  Eye exam - right eye normal lid, conjunctiva, cornea, pupil and fundus, left eye normal lid, conjunctiva, cornea, pupil and fundus.  Thyroid not palpable, not enlarged, no nodules detected.  CHEST:chest clear to IPPA, no tachypnea, retractions or cyanosis and S1, S2 normal, no murmur, no gallop, rate regular.  The abdomen is soft without tenderness, guarding, mass, rebound or organomegaly. Bowel sounds are normal. No CVA tenderness or inguinal adenopathy noted.    Chidi was seen today for hospital f/u.    Diagnoses and all orders for this visit:    Long term current use of anticoagulant therapy  -     INR    Atrial Fibrillation    Gastrointestinal hemorrhage, unspecified gastrointestinal hemorrhage type  -     CBC with platelets and differential    Iron deficiency anemia due to chronic blood loss  -     CBC with platelets and differential  -     ferrous sulfate (IRON) 325 (65 Fe) MG tablet; Take 1 tablet (325 mg) by mouth 3 times daily (with meals)    Benign essential hypertension  -     Basic metabolic panel  (Ca, Cl, CO2, Creat, Gluc, K, Na, BUN)    Hyperlipidemia LDL goal <70  -     Lipid panel reflex to direct LDL Fasting    iron dropped to 7. Patient currently not symptomatic and is hemodynamically stable.  I will Start iron supplementation today. F/u with Dr Gonzalez on Friday to recheck hgb. Should he become symptoms (sob, dizziness) recommend ED for another blood transfusion.   No nsaids, etoh. Continue to hold aspirin and warfarin.

## 2018-11-28 ENCOUNTER — OFFICE VISIT (OUTPATIENT)
Dept: FAMILY MEDICINE | Facility: CLINIC | Age: 78
End: 2018-11-28
Payer: COMMERCIAL

## 2018-11-28 VITALS
DIASTOLIC BLOOD PRESSURE: 64 MMHG | HEIGHT: 75 IN | BODY MASS INDEX: 32.2 KG/M2 | RESPIRATION RATE: 16 BRPM | WEIGHT: 259 LBS | SYSTOLIC BLOOD PRESSURE: 112 MMHG | OXYGEN SATURATION: 100 % | HEART RATE: 83 BPM | TEMPERATURE: 98.1 F

## 2018-11-28 DIAGNOSIS — D50.0 IRON DEFICIENCY ANEMIA DUE TO CHRONIC BLOOD LOSS: Primary | ICD-10-CM

## 2018-11-28 LAB
BASOPHILS # BLD AUTO: 0.1 10E9/L (ref 0–0.2)
BASOPHILS NFR BLD AUTO: 0.8 %
DIFFERENTIAL METHOD BLD: ABNORMAL
EOSINOPHIL # BLD AUTO: 0.2 10E9/L (ref 0–0.7)
EOSINOPHIL NFR BLD AUTO: 2.5 %
ERYTHROCYTE [DISTWIDTH] IN BLOOD BY AUTOMATED COUNT: 18.2 % (ref 10–15)
HCT VFR BLD AUTO: 27.4 % (ref 40–53)
HGB BLD-MCNC: 8 G/DL (ref 13.3–17.7)
LYMPHOCYTES # BLD AUTO: 1.6 10E9/L (ref 0.8–5.3)
LYMPHOCYTES NFR BLD AUTO: 24.5 %
MCH RBC QN AUTO: 22.3 PG (ref 26.5–33)
MCHC RBC AUTO-ENTMCNC: 29.2 G/DL (ref 31.5–36.5)
MCV RBC AUTO: 76 FL (ref 78–100)
MONOCYTES # BLD AUTO: 0.4 10E9/L (ref 0–1.3)
MONOCYTES NFR BLD AUTO: 7 %
NEUTROPHILS # BLD AUTO: 4.1 10E9/L (ref 1.6–8.3)
NEUTROPHILS NFR BLD AUTO: 65.2 %
PLATELET # BLD AUTO: 307 10E9/L (ref 150–450)
RBC # BLD AUTO: 3.59 10E12/L (ref 4.4–5.9)
WBC # BLD AUTO: 6.3 10E9/L (ref 4–11)

## 2018-11-28 PROCEDURE — 99214 OFFICE O/P EST MOD 30 MIN: CPT | Performed by: PHYSICIAN ASSISTANT

## 2018-11-28 PROCEDURE — 83540 ASSAY OF IRON: CPT | Performed by: PHYSICIAN ASSISTANT

## 2018-11-28 PROCEDURE — 36415 COLL VENOUS BLD VENIPUNCTURE: CPT | Performed by: PHYSICIAN ASSISTANT

## 2018-11-28 PROCEDURE — 82728 ASSAY OF FERRITIN: CPT | Performed by: PHYSICIAN ASSISTANT

## 2018-11-28 PROCEDURE — 85025 COMPLETE CBC W/AUTO DIFF WBC: CPT | Performed by: PHYSICIAN ASSISTANT

## 2018-11-28 PROCEDURE — 83550 IRON BINDING TEST: CPT | Performed by: PHYSICIAN ASSISTANT

## 2018-11-28 NOTE — MR AVS SNAPSHOT
"              After Visit Summary   11/28/2018    Chidi Dubon    MRN: [id number]           Patient Information     Date Of Birth          1940        Visit Information        Provider Department      11/28/2018 11:00 AM Martin Schultz PA-C [facility] [place]        Today's Diagnoses     Iron deficiency anemia due to chronic blood loss    -  1       Follow-ups after your visit        Your next 10 appointments already scheduled     Dec 05, 2018 11:20 AM CST   Office Visit with Martin Schultz PA-C   [facility] [place] ([facility])    [address]  [place] [address] [phone]           Bring a current list of meds and any records pertaining to this visit. For Physicals, please bring immunization records and any forms needing to be filled out. Please arrive 10 minutes early to complete paperwork.              Who to contact     Normal or non-critical lab and imaging results will be communicated to you by SpoonRockethart, letter or phone within 4 business days after the clinic has received the results. If you do not hear from us within 7 days, please contact the clinic through SpoonRockethart or phone. If you have a critical or abnormal lab result, we will notify you by phone as soon as possible.  Submit refill requests through KnexxLocal or call your pharmacy and they will forward the refill request to us. Please allow 3 business days for your refill to be completed.          If you need to speak with a  for additional information , please call: [phone]             Additional Information About Your Visit        [employer]nexxLocal Information     KnexxLocal lets you send messages to your doctor, view your test results, renew your prescriptions, schedule appointments and more. To sign up, go to [email or web address] . Click on \"Log in\" on the left side of the screen, which will take you to the Welcome page. Then click on \"Sign up Now\" on the right side of the page. " "    You will be asked to enter the access code listed below, as well as some personal information. Please follow the directions to create your username and password.     Your access code is: [email or web address]  Expires: 2019 10:37 AM     Your access code will  in 90 days. If you need help or a new code, please call your [facility] or [phone].        Care EveryWhere ID     This is your Care EveryWhere ID. This could be used by other organizations to access your [facility] medical records  [id number]        Your Vitals Were     Pulse Temperature Respirations Height Pulse Oximetry BMI (Body Mass Index)    83 98.1  F (36.7  C) (Tympanic) 16 6' 3\" (1.905 m) 100% 32.37 kg/m2       Blood Pressure from Last 3 Encounters:   18 112/64   18 123/63   10/22/18 130/80    Weight from Last 3 Encounters:   18 259 lb (117.5 kg)   18 262 lb (118.8 kg)   10/22/18 268 lb (121.6 kg)              We Performed the Following     CBC with platelets and differential     Ferritin     Iron and iron binding capacity        Primary Care Provider Office Phone # Fax #    Phu Gonzalez [phone]       [facility] [address] [address] [address]        Goals        Diet    Reduce portion size     Related Problems    Hyperlipidemia LDL goal <70    Notes - Note created  2014  3:10 PM by Oliver Veliz MD    Everything in moderation.         General    Medication 1 (pt-stated)     Related Problems    Urolithiasis    Notes - Note created  2014  3:11 PM by Oliver Veliz MD    Stop the flomax and pay attention to your urine flow.  If not worse, ok to stay off of it. If worse, restart.       Taper off medication (pt-stated)     Related Problems    Fatigue    Notes - Note created  2014  3:08 PM by Oliver Veliz MD    Tapering Schedule for citalopram;    Week 1:  1/2 alternating with 1 every other day  Week 2:  1/2 daily  Week 3:  1/2 every other day  Week 4:  " Stop    If at any point you experience significant side effects from tapering, we may need to slow the taper a bit.    Once you are off of this, pay attention to the mood and if you are not feeling better, we can try using bupropion which is a medication that is less likely to cause fatigue and weight gain.         Equal Access to Services     NORMAN MILNER : Melly Herrera, marifer anguiano, dalia isaacaldrain jones. So [facility] [phone].    ATENCIÓN: Si habla español, tiene a brown disposición servicios gratuitos de asistencia lingüística. Jacky al [phone].    We comply with applicable federal civil rights laws and [place] laws. We do not discriminate on the basis of race, color, national origin, age, disability, sex, sexual orientation, or gender identity.            Thank you!     Thank you for choosing [facility]  for your care. Our goal is always to provide you with excellent care. Hearing back from our patients is one way we can continue to improve our services. Please take a few minutes to complete the written survey that you may receive in the mail after your visit with us. Thank you!             Your Updated Medication List - Protect others around you: Learn how to safely use, store and throw away your medicines at www.disposemymeds.org.          This list is accurate as of 11/28/18 12:39 PM.  Always use your most recent med list.                   Brand Name Dispense Instructions for use Diagnosis    amLODIPine 5 MG tablet    NORVASC    30 tablet    Take 1 tablet (5 mg) by mouth daily    HTN, goal below 140/90       atorvastatin 80 MG tablet    LIPITOR    90 tablet    Take 0.5 tablets (40 mg) by mouth daily    Hyperlipidemia LDL goal <70       ferrous sulfate 325 (65 Fe) MG tablet    FEROSUL    90 tablet    Take 1 tablet (325 mg) by mouth 3 times daily (with meals)    Iron deficiency anemia due to chronic blood loss        losartan-hydrochlorothiazide 50-12.5 MG per tablet    HYZAAR    30 tablet    Take 1 tablet by mouth daily    HTN, goal below 140/90       multivitamin w/minerals tablet     100 tablet    Take 1 tablet by mouth daily    HTN, goal below 140/90       nitroGLYcerin 0.4 MG sublingual tablet    NITROSTAT    25 tablet    Place 1 tablet (0.4 mg) under the tongue every 5 minutes as needed for chest pain    Hyperlipidemia LDL goal <70       potassium chloride 20 MEQ packet    KLOR-CON     Take 20 mEq by mouth daily

## 2018-11-28 NOTE — PROGRESS NOTES
SUBJECTIVE:   Chidi Dubon is a 78 year old male who presents to clinic today for the following health issues:      Anemia follow up-recheck hemogloin today. Has been taking iron supplement for the past week has helped with fatigue and SOB    No blood in stools. No abd pain.   No chest pain/palps. Sob has improved some . Still occasional dizziness and fatigue. Feeling much better as compared to 8 days ago.     Problem list and histories reviewed & adjusted, as indicated.  Additional history: as documented    BP Readings from Last 3 Encounters:   11/28/18 112/64   11/20/18 123/63   10/22/18 130/80    Wt Readings from Last 3 Encounters:   11/28/18 259 lb (117.5 kg)   11/20/18 262 lb (118.8 kg)   10/22/18 268 lb (121.6 kg)                    Reviewed and updated as needed this visit by clinical staff  Tobacco  Allergies  Meds  Med Hx  Surg Hx  Fam Hx  Soc Hx      Reviewed and updated as needed this visit by Provider         All other systems negative except as outline above  OBJECTIVE:  Eye exam - right eye normal lid, conjunctiva, cornea, pupil and fundus, left eye normal lid, conjunctiva, cornea, pupil and fundus.  Thyroid not palpable, not enlarged, no nodules detected.  CHEST:chest clear to IPPA, no tachypnea, retractions or cyanosis and Heart exam detail:chest is clear . Paced rhythm. without rales or wheezing, no pedal edema, no JVD, no hepatosplenomegaly.  Trace edema.    Chidi was seen today for anemia.    Diagnoses and all orders for this visit:    Iron deficiency anemia due to chronic blood loss  -     CBC with platelets and differential  -     Ferritin  -     Iron and iron binding capacity      hgb improving to 8.0. Reviewed gi findings: mild gastropathy and duodenitis and ileitis. Continue iron tid. Hold warfarin for 1 more week. Recheck of another hgb in 1 week.

## 2018-11-29 LAB
FERRITIN SERPL-MCNC: 7 NG/ML (ref 26–388)
IRON SATN MFR SERPL: 3 % (ref 15–46)
IRON SERPL-MCNC: 17 UG/DL (ref 35–180)
TIBC SERPL-MCNC: 510 UG/DL (ref 240–430)

## 2018-12-05 ENCOUNTER — OFFICE VISIT (OUTPATIENT)
Dept: FAMILY MEDICINE | Facility: CLINIC | Age: 78
End: 2018-12-05
Payer: COMMERCIAL

## 2018-12-05 VITALS
HEIGHT: 75 IN | RESPIRATION RATE: 16 BRPM | OXYGEN SATURATION: 97 % | BODY MASS INDEX: 31.85 KG/M2 | DIASTOLIC BLOOD PRESSURE: 65 MMHG | SYSTOLIC BLOOD PRESSURE: 131 MMHG | HEART RATE: 72 BPM | TEMPERATURE: 97 F | WEIGHT: 256.2 LBS

## 2018-12-05 DIAGNOSIS — D50.0 IRON DEFICIENCY ANEMIA DUE TO CHRONIC BLOOD LOSS: Primary | ICD-10-CM

## 2018-12-05 DIAGNOSIS — I48.20 CHRONIC ATRIAL FIBRILLATION (H): ICD-10-CM

## 2018-12-05 DIAGNOSIS — I10 HTN, GOAL BELOW 140/90: ICD-10-CM

## 2018-12-05 DIAGNOSIS — E78.5 HYPERLIPIDEMIA LDL GOAL <70: ICD-10-CM

## 2018-12-05 LAB
BASOPHILS # BLD AUTO: 0 10E9/L (ref 0–0.2)
BASOPHILS NFR BLD AUTO: 0.5 %
DIFFERENTIAL METHOD BLD: ABNORMAL
EOSINOPHIL # BLD AUTO: 0.2 10E9/L (ref 0–0.7)
EOSINOPHIL NFR BLD AUTO: 2.6 %
ERYTHROCYTE [DISTWIDTH] IN BLOOD BY AUTOMATED COUNT: 19 % (ref 10–15)
HCT VFR BLD AUTO: 31 % (ref 40–53)
HGB BLD-MCNC: 9 G/DL (ref 13.3–17.7)
LYMPHOCYTES # BLD AUTO: 1.7 10E9/L (ref 0.8–5.3)
LYMPHOCYTES NFR BLD AUTO: 23.4 %
MCH RBC QN AUTO: 22.2 PG (ref 26.5–33)
MCHC RBC AUTO-ENTMCNC: 29 G/DL (ref 31.5–36.5)
MCV RBC AUTO: 77 FL (ref 78–100)
MONOCYTES # BLD AUTO: 0.6 10E9/L (ref 0–1.3)
MONOCYTES NFR BLD AUTO: 7.6 %
NEUTROPHILS # BLD AUTO: 4.8 10E9/L (ref 1.6–8.3)
NEUTROPHILS NFR BLD AUTO: 65.9 %
PLATELET # BLD AUTO: 356 10E9/L (ref 150–450)
RBC # BLD AUTO: 4.05 10E12/L (ref 4.4–5.9)
WBC # BLD AUTO: 7.3 10E9/L (ref 4–11)

## 2018-12-05 PROCEDURE — 36415 COLL VENOUS BLD VENIPUNCTURE: CPT | Performed by: PHYSICIAN ASSISTANT

## 2018-12-05 PROCEDURE — 99214 OFFICE O/P EST MOD 30 MIN: CPT | Performed by: PHYSICIAN ASSISTANT

## 2018-12-05 PROCEDURE — 85025 COMPLETE CBC W/AUTO DIFF WBC: CPT | Performed by: PHYSICIAN ASSISTANT

## 2018-12-05 RX ORDER — WARFARIN SODIUM 2.5 MG/1
2.5 TABLET ORAL DAILY
Qty: 80 TABLET | Refills: 0 | Status: SHIPPED | OUTPATIENT
Start: 2018-12-05 | End: 2018-12-17

## 2018-12-05 RX ORDER — AMLODIPINE BESYLATE 5 MG/1
5 TABLET ORAL DAILY
Qty: 90 TABLET | Refills: 1 | Status: SHIPPED | OUTPATIENT
Start: 2018-12-05 | End: 2019-06-19

## 2018-12-05 RX ORDER — ATORVASTATIN CALCIUM 80 MG/1
TABLET, FILM COATED ORAL
Qty: 90 TABLET | Refills: 3 | Status: SHIPPED | OUTPATIENT
Start: 2018-12-05 | End: 2019-06-19

## 2018-12-05 RX ORDER — LOSARTAN POTASSIUM AND HYDROCHLOROTHIAZIDE 12.5; 5 MG/1; MG/1
1 TABLET ORAL DAILY
Qty: 90 TABLET | Refills: 1 | Status: SHIPPED | OUTPATIENT
Start: 2018-12-05 | End: 2019-04-26

## 2018-12-05 ASSESSMENT — PATIENT HEALTH QUESTIONNAIRE - PHQ9
SUM OF ALL RESPONSES TO PHQ QUESTIONS 1-9: 4
5. POOR APPETITE OR OVEREATING: NOT AT ALL

## 2018-12-05 ASSESSMENT — ANXIETY QUESTIONNAIRES
GAD7 TOTAL SCORE: 2
3. WORRYING TOO MUCH ABOUT DIFFERENT THINGS: SEVERAL DAYS
5. BEING SO RESTLESS THAT IT IS HARD TO SIT STILL: NOT AT ALL
2. NOT BEING ABLE TO STOP OR CONTROL WORRYING: NOT AT ALL
1. FEELING NERVOUS, ANXIOUS, OR ON EDGE: NOT AT ALL
IF YOU CHECKED OFF ANY PROBLEMS ON THIS QUESTIONNAIRE, HOW DIFFICULT HAVE THESE PROBLEMS MADE IT FOR YOU TO DO YOUR WORK, TAKE CARE OF THINGS AT HOME, OR GET ALONG WITH OTHER PEOPLE: SOMEWHAT DIFFICULT
7. FEELING AFRAID AS IF SOMETHING AWFUL MIGHT HAPPEN: NOT AT ALL
6. BECOMING EASILY ANNOYED OR IRRITABLE: SEVERAL DAYS

## 2018-12-05 ASSESSMENT — PAIN SCALES - GENERAL: PAINLEVEL: NO PAIN (0)

## 2018-12-05 NOTE — PROGRESS NOTES
SUBJECTIVE:   Chidi Dubon is a 78 year old male who presents to clinic today for the following health issues:      Chief Complaint   Patient presents with     Anemia     Patient is wondering about blood thinners, should he be taking it.        Problem list and histories reviewed & adjusted, as indicated.  Additional history: as documented    Patient Active Problem List   Diagnosis     Hyperlipidemia LDL goal <70     Obesity     Mild major depression (H)     Osteoarthritis, knee     Diverticulosis     Urolithiasis     CAD S/P percutaneous coronary angioplasty     Atrial Fibrillation     Fatigue     Restless leg syndrome     Long-term (current) use of anticoagulants [Z79.01]     Benign essential hypertension     Major depressive disorder, recurrent episode, mild (H)     Aftercare following right hip joint replacement surgery     Hip pain, right     Hip joint replacement status     Posterior capsular opacification of both eyes, obscuring vision     Coronary artery disease involving native heart with angina pectoris, unspecified vessel or lesion type (H)     S/P placement of cardiac pacemaker     Past Surgical History:   Procedure Laterality Date     ANGIOGRAM  age 61     3 cornary artery stents, [facility]     ANGIOGRAM  age 66    1 coronary artery stent, [facility]     ANGIOGRAM  11/2013    1 coronary artery stent, [facility]      ARTHROSCOPY KNEE RT/LT         Social History   Substance Use Topics     Smoking status: Never Smoker     Smokeless tobacco: Never Used      Comment: never smoker; non-smoking household     Alcohol use No      Comment: none     Family History   Problem Relation Age of Onset     Heart Disease Paternal Grandfather          Current Outpatient Prescriptions   Medication Sig Dispense Refill     amLODIPine (NORVASC) 5 MG tablet Take 1 tablet (5 mg) by mouth daily 90 tablet 1     atorvastatin (LIPITOR) 80 MG tablet Take 0.5 tablets (40 mg) by mouth daily 90 tablet 3     ferrous sulfate  (IRON) 325 (65 Fe) MG tablet Take 1 tablet (325 mg) by mouth 3 times daily (with meals) 90 tablet 2     losartan-hydrochlorothiazide (HYZAAR) 50-12.5 MG tablet Take 1 tablet by mouth daily 90 tablet 1     multivitamin, therapeutic with minerals (MULTI-VITAMIN) TABS tablet Take 1 tablet by mouth daily 100 tablet 3     potassium chloride (KLOR-CON) 20 MEQ Packet Take 20 mEq by mouth daily       warfarin (COUMADIN) 2.5 MG tablet Take 1 tablet (2.5 mg) by mouth daily Or as directed by INR. Current dose is 1.25 mg Mondays and Thursdays and 2.5 mg daily rest of week 80 tablet 0     nitroglycerin (NITROSTAT) 0.4 MG SL tablet Place 1 tablet (0.4 mg) under the tongue every 5 minutes as needed for chest pain (Patient not taking: Reported on 12/5/2018) 25 tablet 0     [DISCONTINUED] amLODIPine (NORVASC) 5 MG tablet Take 1 tablet (5 mg) by mouth daily 30 tablet 1     [DISCONTINUED] atorvastatin (LIPITOR) 80 MG tablet Take 0.5 tablets (40 mg) by mouth daily 90 tablet 3     [DISCONTINUED] losartan-hydrochlorothiazide (HYZAAR) 50-12.5 MG per tablet Take 1 tablet by mouth daily 30 tablet 1     [DISCONTINUED] simvastatin (ZOCOR) 80 MG tablet Take 0.5 tablets (40 mg) by mouth At Bedtime 45 tablet 3     Allergies   Allergen Reactions     No Known Allergies      Recent Labs   Lab Test  11/20/18   0806  01/23/18   1126   10/04/16   1144   12/31/14   1010  05/09/14   1524   08/05/13   1427  03/03/13 03/01/13 02/28/13   LDL  51   --    --   90   --   87  100   < >   --    < >   --   87   --    HDL  33*   --    --   41   --   41  36*   < >   --    < >   --   30   --    TRIG  58   --    --   130   --   142  162*   < >   --    < >   --   123   --    ALT   --    --    --    --    --    --    --    --    --    --   13  17  18   CR  1.19  1.05   < >  0.96   < >   --   0.99   < >   --    --   1.03  0.99  0.9   GFRESTIMATED  59*  69   < >  77   < >   --   74   < >   --    --    --    --    --    GFRESTBLACK  72  83   < >  >90    GFR Calc     < >   --   90   < >   --    --    --    --    --    POTASSIUM  3.6  3.0*   < >  3.2*   < >   --   3.6   < >   --    --   3.3  3.3  3.7   TSH   --    --    --    --    --    --   1.57   --   1.73   --    --   1.8   --     < > = values in this interval not displayed.      BP Readings from Last 3 Encounters:   12/05/18 131/65   11/28/18 112/64   11/20/18 123/63    Wt Readings from Last 3 Encounters:   12/05/18 256 lb 3.2 oz (116.2 kg)   11/28/18 259 lb (117.5 kg)   11/20/18 262 lb (118.8 kg)                  Labs reviewed in EPIC    Reviewed and updated as needed this visit by clinical staff  Tobacco  Allergies  Meds  Problems  Med Hx  Surg Hx  Fam Hx  Soc Hx        Reviewed and updated as needed this visit by Provider  Tobacco  Allergies  Meds  Problems  Med Hx  Surg Hx  Fam Hx  Soc Hx          All other systems negative except as outline above  OBJECTIVE:  Eye exam - right eye normal lid, conjunctiva, cornea, pupil and fundus, left eye normal lid, conjunctiva, cornea, pupil and fundus.  CHEST:chest clear to IPPA, no tachypnea, retractions or cyanosis and Heart exam detail:irregularly irregular rhythm, chest is clear without rales or wheezing, no pedal edema, no JVD, no hepatosplenomegaly.    Chidi was seen today for anemia.    Diagnoses and all orders for this visit:    Iron deficiency anemia due to chronic blood loss  -     CBC with platelets differential    HTN, goal below 140/90  -     amLODIPine (NORVASC) 5 MG tablet; Take 1 tablet (5 mg) by mouth daily  -     losartan-hydrochlorothiazide (HYZAAR) 50-12.5 MG tablet; Take 1 tablet by mouth daily    Hyperlipidemia LDL goal <70  -     atorvastatin (LIPITOR) 80 MG tablet; Take 0.5 tablets (40 mg) by mouth daily    Chronic atrial fibrillation (H)  -     warfarin (COUMADIN) 2.5 MG tablet; Take 1 tablet (2.5 mg) by mouth daily Or as directed by INR. Current dose is 1.25 mg Mondays and Thursdays and 2.5 mg daily rest of week    restart  warfarin since no active bleeding or findings on work up to suggest sites of bleeding.  Recheck another cbc and inr next week.   work on lifestyle modification

## 2018-12-05 NOTE — MR AVS SNAPSHOT
"              After Visit Summary   12/5/2018    Chidi Dubon    MRN: [id number]           Patient Information     Date Of Birth          1940        Visit Information        Provider Department      12/5/2018 11:20 AM Martin Schultz PA-C [facility] [place]        Today's Diagnoses     Iron deficiency anemia due to chronic blood loss    -  1    HTN, goal below 140/90        Hyperlipidemia LDL goal <70        Chronic atrial fibrillation (H)          Care Instructions    Restart warfarin.  See me next week for a recheck of an inr and blood count.             Follow-ups after your visit        Follow-up notes from your care team     Return in about 1 week (around 12/12/2018).      Who to contact     Normal or non-critical lab and imaging results will be communicated to you by MobStachart, letter or phone within 4 business days after the clinic has received the results. If you do not hear from us within 7 days, please contact the clinic through MobStachart or phone. If you have a critical or abnormal lab result, we will notify you by phone as soon as possible.  Submit refill requests through Autoquake or call your pharmacy and they will forward the refill request to us. Please allow 3 business days for your refill to be completed.          If you need to speak with a  for additional information , please call: [phone]             Additional Information About Your Visit        Autoquake Information     Autoquake lets you send messages to your doctor, view your test results, renew your prescriptions, schedule appointments and more. To sign up, go to [email or web address] . Click on \"Log in\" on the left side of the screen, which will take you to the Welcome page. Then click on \"Sign up Now\" on the right side of the page.     You will be asked to enter the access code listed below, as well as some personal information. Please follow the directions to create your username and password.     Your " "access code is: [email or web address]  Expires: 2019 10:37 AM     Your access code will  in 90 days. If you need help or a new code, please call your [facility] or [phone].        Care EveryWhere ID     This is your Care EveryWhere ID. This could be used by other organizations to access your [facility] medical records  [id number]        Your Vitals Were     Pulse Temperature Respirations Height Pulse Oximetry BMI (Body Mass Index)    72 97  F (36.1  C) (Tympanic) 16 6' 3\" (1.905 m) 97% 32.02 kg/m2       Blood Pressure from Last 3 Encounters:   18 131/65   18 112/64   18 123/63    Weight from Last 3 Encounters:   18 256 lb 3.2 oz (116.2 kg)   18 259 lb (117.5 kg)   18 262 lb (118.8 kg)              We Performed the Following     CBC with platelets differential          Today's Medication Changes          These changes are accurate as of 18 12:37 PM.  If you have any questions, ask your nurse or doctor.               Start taking these medicines.        Dose/Directions    warfarin 2.5 MG tablet   Commonly known as:  COUMADIN   Used for:  Chronic atrial fibrillation (H)   Started by:  Martin Schultz PA-C        Dose:  2.5 mg   Take 1 tablet (2.5 mg) by mouth daily Or as directed by INR. Current dose is 1.25 mg  and  and 2.5 mg daily rest of week   Quantity:  80 tablet   Refills:  0            Where to get your medicines      These medications were sent to [id number] - [place] - [address]  [address] [place] [address]     Phone:  [phone]     amLODIPine 5 MG tablet    atorvastatin 80 MG tablet    losartan-hydrochlorothiazide 50-12.5 MG tablet    warfarin 2.5 MG tablet                Primary Care Provider Office Phone # Fax #    Phu Gonzalez [phone]       [facility] [place] [address]  [place] [address]        Goals        Diet    Reduce portion size     Related Problems    " Hyperlipidemia LDL goal <70    Notes - Note created  5/9/2014  3:10 PM by Oliver Veliz MD    Everything in moderation.         General    Medication 1 (pt-stated)     Related Problems    Urolithiasis    Notes - Note created  5/9/2014  3:11 PM by Oliver Veliz MD    Stop the flomax and pay attention to your urine flow.  If not worse, ok to stay off of it. If worse, restart.       Taper off medication (pt-stated)     Related Problems    Fatigue    Notes - Note created  5/9/2014  3:08 PM by Oliver Veliz MD    Tapering Schedule for citalopram;    Week 1:  1/2 alternating with 1 every other day  Week 2:  1/2 daily  Week 3:  1/2 every other day  Week 4:  Stop    If at any point you experience significant side effects from tapering, we may need to slow the taper a bit.    Once you are off of this, pay attention to the mood and if you are not feeling better, we can try using bupropion which is a medication that is less likely to cause fatigue and weight gain.         Equal Access to Services     NORMAN MILNER : Hadjessie Herrera, waanette anguiano, qaybta kaalfrancois blankenship, darin jordan . So [facility] [phone].    ATENCIÓN: Si habla español, tiene a brown disposición servicios gratuitos de asistencia lingüística. Llame al [phone].    We comply with applicable federal civil rights laws and [place] laws. We do not discriminate on the basis of race, color, national origin, age, disability, sex, sexual orientation, or gender identity.            Thank you!     Thank you for choosing [facility]  for your care. Our goal is always to provide you with excellent care. Hearing back from our patients is one way we can continue to improve our services. Please take a few minutes to complete the written survey that you may receive in the mail after your visit with us. Thank you!             Your Updated Medication List - Protect others around you: Learn how to safely  use, store and throw away your medicines at www.disposemymeds.org.          This list is accurate as of 12/5/18 12:37 PM.  Always use your most recent med list.                   Brand Name Dispense Instructions for use Diagnosis    amLODIPine 5 MG tablet    NORVASC    90 tablet    Take 1 tablet (5 mg) by mouth daily    HTN, goal below 140/90       atorvastatin 80 MG tablet    LIPITOR    90 tablet    Take 0.5 tablets (40 mg) by mouth daily    Hyperlipidemia LDL goal <70       ferrous sulfate 325 (65 Fe) MG tablet    FEROSUL    90 tablet    Take 1 tablet (325 mg) by mouth 3 times daily (with meals)    Iron deficiency anemia due to chronic blood loss       losartan-hydrochlorothiazide 50-12.5 MG tablet    HYZAAR    90 tablet    Take 1 tablet by mouth daily    HTN, goal below 140/90       multivitamin w/minerals tablet     100 tablet    Take 1 tablet by mouth daily    HTN, goal below 140/90       nitroGLYcerin 0.4 MG sublingual tablet    NITROSTAT    25 tablet    Place 1 tablet (0.4 mg) under the tongue every 5 minutes as needed for chest pain    Hyperlipidemia LDL goal <70       potassium chloride 20 MEQ packet    KLOR-CON     Take 20 mEq by mouth daily        warfarin 2.5 MG tablet    COUMADIN    80 tablet    Take 1 tablet (2.5 mg) by mouth daily Or as directed by INR. Current dose is 1.25 mg Mondays and Thursdays and 2.5 mg daily rest of week    Chronic atrial fibrillation (H)

## 2018-12-07 ASSESSMENT — ANXIETY QUESTIONNAIRES: GAD7 TOTAL SCORE: 2

## 2018-12-17 ENCOUNTER — OFFICE VISIT (OUTPATIENT)
Dept: FAMILY MEDICINE | Facility: CLINIC | Age: 78
End: 2018-12-17
Payer: COMMERCIAL

## 2018-12-17 VITALS
SYSTOLIC BLOOD PRESSURE: 135 MMHG | OXYGEN SATURATION: 98 % | WEIGHT: 255.2 LBS | TEMPERATURE: 97.3 F | RESPIRATION RATE: 16 BRPM | BODY MASS INDEX: 31.73 KG/M2 | HEART RATE: 85 BPM | HEIGHT: 75 IN | DIASTOLIC BLOOD PRESSURE: 74 MMHG

## 2018-12-17 DIAGNOSIS — D50.0 IRON DEFICIENCY ANEMIA DUE TO CHRONIC BLOOD LOSS: Primary | ICD-10-CM

## 2018-12-17 DIAGNOSIS — I48.20 CHRONIC ATRIAL FIBRILLATION (H): ICD-10-CM

## 2018-12-17 DIAGNOSIS — Z79.01 LONG TERM CURRENT USE OF ANTICOAGULANT THERAPY: ICD-10-CM

## 2018-12-17 LAB
BASOPHILS # BLD AUTO: 0 10E9/L (ref 0–0.2)
BASOPHILS NFR BLD AUTO: 0.6 %
DIFFERENTIAL METHOD BLD: ABNORMAL
EOSINOPHIL # BLD AUTO: 0.1 10E9/L (ref 0–0.7)
EOSINOPHIL NFR BLD AUTO: 1.2 %
ERYTHROCYTE [DISTWIDTH] IN BLOOD BY AUTOMATED COUNT: 20.3 % (ref 10–15)
HCT VFR BLD AUTO: 35.1 % (ref 40–53)
HGB BLD-MCNC: 10.5 G/DL (ref 13.3–17.7)
INR PPP: 1.4 (ref 0.86–1.14)
LYMPHOCYTES # BLD AUTO: 1.6 10E9/L (ref 0.8–5.3)
LYMPHOCYTES NFR BLD AUTO: 22.7 %
MCH RBC QN AUTO: 23 PG (ref 26.5–33)
MCHC RBC AUTO-ENTMCNC: 29.9 G/DL (ref 31.5–36.5)
MCV RBC AUTO: 77 FL (ref 78–100)
MONOCYTES # BLD AUTO: 0.5 10E9/L (ref 0–1.3)
MONOCYTES NFR BLD AUTO: 7.3 %
NEUTROPHILS # BLD AUTO: 4.7 10E9/L (ref 1.6–8.3)
NEUTROPHILS NFR BLD AUTO: 68.2 %
PLATELET # BLD AUTO: 328 10E9/L (ref 150–450)
RBC # BLD AUTO: 4.57 10E12/L (ref 4.4–5.9)
WBC # BLD AUTO: 6.9 10E9/L (ref 4–11)

## 2018-12-17 PROCEDURE — 85025 COMPLETE CBC W/AUTO DIFF WBC: CPT | Performed by: PHYSICIAN ASSISTANT

## 2018-12-17 PROCEDURE — 36415 COLL VENOUS BLD VENIPUNCTURE: CPT | Performed by: PHYSICIAN ASSISTANT

## 2018-12-17 PROCEDURE — 85610 PROTHROMBIN TIME: CPT | Performed by: PHYSICIAN ASSISTANT

## 2018-12-17 PROCEDURE — 99213 OFFICE O/P EST LOW 20 MIN: CPT | Performed by: PHYSICIAN ASSISTANT

## 2018-12-17 RX ORDER — WARFARIN SODIUM 2.5 MG/1
2.5 TABLET ORAL DAILY
Qty: 90 TABLET | Refills: 0 | Status: SHIPPED | OUTPATIENT
Start: 2018-12-17 | End: 2019-06-19

## 2018-12-17 ASSESSMENT — PAIN SCALES - GENERAL: PAINLEVEL: NO PAIN (0)

## 2018-12-17 ASSESSMENT — MIFFLIN-ST. JEOR: SCORE: 1968.21

## 2018-12-17 NOTE — PATIENT INSTRUCTIONS
Continue iron supplementation.  Increase warfarin to 1 tab (2.5mg) daily   Recheck inr in 1 week.

## 2018-12-17 NOTE — PROGRESS NOTES
SUBJECTIVE:   Chidi Dubon is a 78 year old male who presents to clinic today for the following health issues:      Chief Complaint   Patient presents with     Anemia     Rechecking CBC and INR    Overall feeling well/better.     Problem list and histories reviewed & adjusted, as indicated.  Additional history: as documented    Patient Active Problem List   Diagnosis     Hyperlipidemia LDL goal <70     Obesity     Mild major depression (H)     Osteoarthritis, knee     Diverticulosis     Urolithiasis     CAD S/P percutaneous coronary angioplasty     Atrial Fibrillation     Fatigue     Restless leg syndrome     Long-term (current) use of anticoagulants [Z79.01]     Benign essential hypertension     Major depressive disorder, recurrent episode, mild (H)     Aftercare following right hip joint replacement surgery     Hip pain, right     Hip joint replacement status     Posterior capsular opacification of both eyes, obscuring vision     Coronary artery disease involving native heart with angina pectoris, unspecified vessel or lesion type (H)     S/P placement of cardiac pacemaker     Past Surgical History:   Procedure Laterality Date     ANGIOGRAM  age 61     3 cornary artery stents, [facility]     ANGIOGRAM  age 66    1 coronary artery stent, [facility]     ANGIOGRAM  11/2013    1 coronary artery stent, [facility]      ARTHROSCOPY KNEE RT/LT         Social History     Tobacco Use     Smoking status: Never Smoker     Smokeless tobacco: Never Used     Tobacco comment: never smoker; non-smoking household   Substance Use Topics     Alcohol use: No     Comment: none     Family History   Problem Relation Age of Onset     Heart Disease Paternal Grandfather          Current Outpatient Medications   Medication Sig Dispense Refill     amLODIPine (NORVASC) 5 MG tablet Take 1 tablet (5 mg) by mouth daily 90 tablet 1     atorvastatin (LIPITOR) 80 MG tablet Take 0.5 tablets (40 mg) by mouth daily 90 tablet 3     ferrous  sulfate (IRON) 325 (65 Fe) MG tablet Take 1 tablet (325 mg) by mouth 3 times daily (with meals) 90 tablet 2     losartan-hydrochlorothiazide (HYZAAR) 50-12.5 MG tablet Take 1 tablet by mouth daily 90 tablet 1     multivitamin, therapeutic with minerals (MULTI-VITAMIN) TABS tablet Take 1 tablet by mouth daily 100 tablet 3     potassium chloride (KLOR-CON) 20 MEQ Packet Take 20 mEq by mouth daily       warfarin (COUMADIN) 2.5 MG tablet Take 1 tablet (2.5 mg) by mouth daily Or as directed by INR. Current dose is 1.25 mg Mondays and Thursdays and 2.5 mg daily rest of week 80 tablet 0     nitroglycerin (NITROSTAT) 0.4 MG SL tablet Place 1 tablet (0.4 mg) under the tongue every 5 minutes as needed for chest pain (Patient not taking: Reported on 12/5/2018) 25 tablet 0     Allergies   Allergen Reactions     No Known Allergies      Recent Labs   Lab Test 11/20/18  0806 01/23/18  1126  10/04/16  1144  12/31/14  1010 05/09/14  1524  08/05/13  1427  03/03/13 03/01/13 02/28/13   LDL 51  --   --  90  --  87 100   < >  --    < >  --  87  --    HDL 33*  --   --  41  --  41 36*   < >  --    < >  --  30  --    TRIG 58  --   --  130  --  142 162*   < >  --    < >  --  123  --    ALT  --   --   --   --   --   --   --   --   --   --  13 17 18   CR 1.19 1.05   < > 0.96   < >  --  0.99   < >  --   --  1.03 0.99 0.9   GFRESTIMATED 59* 69   < > 77   < >  --  74   < >  --   --   --   --   --    GFRESTBLACK 72 83   < > >90   GFR Calc     < >  --  90   < >  --   --   --   --   --    POTASSIUM 3.6 3.0*   < > 3.2*   < >  --  3.6   < >  --   --  3.3 3.3 3.7   TSH  --   --   --   --   --   --  1.57  --  1.73  --   --  1.8  --     < > = values in this interval not displayed.      BP Readings from Last 3 Encounters:   12/17/18 135/74   12/05/18 131/65   11/28/18 112/64    Wt Readings from Last 3 Encounters:   12/17/18 115.8 kg (255 lb 3.2 oz)   12/05/18 116.2 kg (256 lb 3.2 oz)   11/28/18 117.5 kg (259 lb)                  Labs  reviewed in EPIC    Reviewed and updated as needed this visit by clinical staff  Allergies  Meds  Problems  Med Hx  Surg Hx  Fam Hx       Reviewed and updated as needed this visit by Provider  Allergies  Meds  Problems  Med Hx  Surg Hx  Fam Hx         All other systems negative except as outline above  OBJECTIVE:  Eye exam - right eye normal lid, conjunctiva, cornea, pupil and fundus, left eye normal lid, conjunctiva, cornea, pupil and fundus.  Thyroid not palpable, not enlarged, no nodules detected.  CHEST:chest clear to IPPA, no tachypnea, retractions or cyanosis and S1, S2 normal (paced rhythm), no murmur, no gallop, rate regular.    Chidi was seen today for anemia.    Diagnoses and all orders for this visit:    Iron deficiency anemia due to chronic blood loss  -     CBC with platelets differential    Chronic atrial fibrillation (H)  -     INR  -     warfarin (COUMADIN) 2.5 MG tablet; Take 1 tablet (2.5 mg) by mouth daily    Long term current use of anticoagulant therapy  -     INR  -     warfarin (COUMADIN) 2.5 MG tablet; Take 1 tablet (2.5 mg) by mouth daily      Continue iron supplementation.  Increase warfarin to 1 tab (2.5mg) daily   Recheck inr in 1 week.

## 2018-12-21 NOTE — PROGRESS NOTES
SUBJECTIVE:   Chidi Dubon is a 78 year old male who presents to clinic today for the following health issues:      INR recheck-  1.7 . Will have him continue on 2.5 mg of warfarin daily . He we see coumadin clinic in 1 week.   Patient states that he has been feeling better.     hgb continues to improve as does his energy level.   Denies profound fatigue and dizziness.          Problem list and histories reviewed & adjusted, as indicated.  Additional history: as documented    BP Readings from Last 3 Encounters:   12/17/18 135/74   12/05/18 131/65   11/28/18 112/64    Wt Readings from Last 3 Encounters:   12/17/18 115.8 kg (255 lb 3.2 oz)   12/05/18 116.2 kg (256 lb 3.2 oz)   11/28/18 117.5 kg (259 lb)                    Reviewed and updated as needed this visit by clinical staff       Reviewed and updated as needed this visit by Provider         All other systems negative except as outline above  OBJECTIVE:  Eye exam - right eye normal lid, conjunctiva, cornea, pupil and fundus, left eye normal lid, conjunctiva, cornea, pupil and fundus.  Thyroid not palpable, not enlarged, no nodules detected.  CHEST:chest clear to IPPA, no tachypnea, retractions or cyanosis and paced rhythm .  No bruising.    Chidi was seen today for inr followup.    Diagnoses and all orders for this visit:    Long term (current) use of anticoagulants  -     INR    Anemia, iron deficiency  -     CBC with platelets and differential      Recheck inr in 1 week with coumadin clinic.   For now continue 2.5 mg tab daily of warfarin

## 2018-12-24 ENCOUNTER — OFFICE VISIT (OUTPATIENT)
Dept: FAMILY MEDICINE | Facility: CLINIC | Age: 78
End: 2018-12-24
Payer: COMMERCIAL

## 2018-12-24 VITALS
TEMPERATURE: 96.6 F | RESPIRATION RATE: 18 BRPM | HEART RATE: 76 BPM | OXYGEN SATURATION: 98 % | HEIGHT: 75 IN | WEIGHT: 259.6 LBS | SYSTOLIC BLOOD PRESSURE: 127 MMHG | BODY MASS INDEX: 32.28 KG/M2 | DIASTOLIC BLOOD PRESSURE: 71 MMHG

## 2018-12-24 DIAGNOSIS — D50.0 IRON DEFICIENCY ANEMIA DUE TO CHRONIC BLOOD LOSS: ICD-10-CM

## 2018-12-24 DIAGNOSIS — Z79.01 LONG TERM (CURRENT) USE OF ANTICOAGULANTS: Primary | ICD-10-CM

## 2018-12-24 LAB
BASOPHILS # BLD AUTO: 0 10E9/L (ref 0–0.2)
BASOPHILS NFR BLD AUTO: 0.4 %
DIFFERENTIAL METHOD BLD: ABNORMAL
EOSINOPHIL # BLD AUTO: 0.2 10E9/L (ref 0–0.7)
EOSINOPHIL NFR BLD AUTO: 2 %
ERYTHROCYTE [DISTWIDTH] IN BLOOD BY AUTOMATED COUNT: 20.9 % (ref 10–15)
HCT VFR BLD AUTO: 36.8 % (ref 40–53)
HGB BLD-MCNC: 11 G/DL (ref 13.3–17.7)
INR PPP: 1.7 (ref 0.86–1.14)
LYMPHOCYTES # BLD AUTO: 2.1 10E9/L (ref 0.8–5.3)
LYMPHOCYTES NFR BLD AUTO: 27 %
MCH RBC QN AUTO: 23.2 PG (ref 26.5–33)
MCHC RBC AUTO-ENTMCNC: 29.9 G/DL (ref 31.5–36.5)
MCV RBC AUTO: 78 FL (ref 78–100)
MONOCYTES # BLD AUTO: 0.5 10E9/L (ref 0–1.3)
MONOCYTES NFR BLD AUTO: 7 %
NEUTROPHILS # BLD AUTO: 4.9 10E9/L (ref 1.6–8.3)
NEUTROPHILS NFR BLD AUTO: 63.6 %
PLATELET # BLD AUTO: 269 10E9/L (ref 150–450)
RBC # BLD AUTO: 4.74 10E12/L (ref 4.4–5.9)
WBC # BLD AUTO: 7.7 10E9/L (ref 4–11)

## 2018-12-24 PROCEDURE — 36415 COLL VENOUS BLD VENIPUNCTURE: CPT | Performed by: PHYSICIAN ASSISTANT

## 2018-12-24 PROCEDURE — 85610 PROTHROMBIN TIME: CPT | Performed by: PHYSICIAN ASSISTANT

## 2018-12-24 PROCEDURE — 85025 COMPLETE CBC W/AUTO DIFF WBC: CPT | Performed by: PHYSICIAN ASSISTANT

## 2018-12-24 PROCEDURE — 99213 OFFICE O/P EST LOW 20 MIN: CPT | Performed by: PHYSICIAN ASSISTANT

## 2018-12-24 ASSESSMENT — PAIN SCALES - GENERAL: PAINLEVEL: NO PAIN (0)

## 2018-12-24 ASSESSMENT — MIFFLIN-ST. JEOR: SCORE: 1988.17

## 2019-01-09 ENCOUNTER — ANTICOAGULATION THERAPY VISIT (OUTPATIENT)
Dept: NURSING | Facility: CLINIC | Age: 79
End: 2019-01-09
Payer: COMMERCIAL

## 2019-01-09 DIAGNOSIS — I48.20 CHRONIC ATRIAL FIBRILLATION (H): ICD-10-CM

## 2019-01-09 DIAGNOSIS — D50.0 IRON DEFICIENCY ANEMIA DUE TO CHRONIC BLOOD LOSS: ICD-10-CM

## 2019-01-09 LAB
ERYTHROCYTE [DISTWIDTH] IN BLOOD BY AUTOMATED COUNT: 21.7 % (ref 10–15)
HCT VFR BLD AUTO: 40.7 % (ref 40–53)
HGB BLD-MCNC: 12.6 G/DL (ref 13.3–17.7)
INR POINT OF CARE: 2.2 (ref 0.86–1.14)
MCH RBC QN AUTO: 24.3 PG (ref 26.5–33)
MCHC RBC AUTO-ENTMCNC: 31 G/DL (ref 31.5–36.5)
MCV RBC AUTO: 78 FL (ref 78–100)
PLATELET # BLD AUTO: 257 10E9/L (ref 150–450)
RBC # BLD AUTO: 5.19 10E12/L (ref 4.4–5.9)
WBC # BLD AUTO: 7.3 10E9/L (ref 4–11)

## 2019-01-09 PROCEDURE — 85610 PROTHROMBIN TIME: CPT | Mod: QW

## 2019-01-09 PROCEDURE — 36416 COLLJ CAPILLARY BLOOD SPEC: CPT

## 2019-01-09 PROCEDURE — 99207 ZZC NO CHARGE NURSE ONLY: CPT

## 2019-01-09 PROCEDURE — 85027 COMPLETE CBC AUTOMATED: CPT | Performed by: PHYSICIAN ASSISTANT

## 2019-01-09 NOTE — PROGRESS NOTES
ANTICOAGULATION FOLLOW-UP CLINIC VISIT    Patient Name:  Chidi Dubon  Date:  2019  Contact Type:  Face to Face    SUBJECTIVE:     Patient Findings     Positives:   No Problem Findings    Comments:   INR 2.2    Patient denies any signs or symptoms of bleeding or clotting. Patient denies any diet changes, medication changes or changes to health. No change made to Coumadin medication regimen and follow up due in 2 weeks.             OBJECTIVE    INR Protime   Date Value Ref Range Status   2019 2.2 (A) 0.86 - 1.14 Final       ASSESSMENT / PLAN  INR assessment THER    Recheck INR In: 2 WEEKS    INR Location Clinic      Anticoagulation Summary  As of 2019    INR goal:   2.0-3.0   TTR:   76.5 % (2.9 y)   INR used for dosin.2 (2019)   Warfarin maintenance plan:   2.5 mg (2.5 mg x 1) every day   Full warfarin instructions:   2.5 mg every day   Weekly warfarin total:   17.5 mg   Plan last modified:   Kari Montilla RN (2019)   Next INR check:   2019   Target end date:   Indefinite    Indications    Long-term (current) use of anticoagulants [Z79.01] [Z79.01]  Atrial Fibrillation [I48.2]             Anticoagulation Episode Summary     INR check location:       Preferred lab:       Send INR reminders to:   [facility]    Comments:         Anticoagulation Care Providers     Provider Role Specialty Phone number    Martin Schultz PA-C Responsible Physician Assistant [phone]            See the Encounter Report to view Anticoagulation Flowsheet and Dosing Calendar (Go to Encounters tab in chart review, and find the Anticoagulation Therapy Visit)        Kari Montilla RN

## 2019-01-24 ENCOUNTER — ANTICOAGULATION THERAPY VISIT (OUTPATIENT)
Dept: NURSING | Facility: CLINIC | Age: 79
End: 2019-01-24
Payer: COMMERCIAL

## 2019-01-24 DIAGNOSIS — D64.9 LOW HEMOGLOBIN: Primary | ICD-10-CM

## 2019-01-24 DIAGNOSIS — I48.20 CHRONIC ATRIAL FIBRILLATION (H): ICD-10-CM

## 2019-01-24 LAB
BASOPHILS # BLD AUTO: 0 10E9/L (ref 0–0.2)
BASOPHILS NFR BLD AUTO: 0.3 %
DIFFERENTIAL METHOD BLD: ABNORMAL
EOSINOPHIL # BLD AUTO: 0.1 10E9/L (ref 0–0.7)
EOSINOPHIL NFR BLD AUTO: 2 %
ERYTHROCYTE [DISTWIDTH] IN BLOOD BY AUTOMATED COUNT: 21.5 % (ref 10–15)
HCT VFR BLD AUTO: 41.9 % (ref 40–53)
HGB BLD-MCNC: 13.1 G/DL (ref 13.3–17.7)
INR POINT OF CARE: 2.1 (ref 0.86–1.14)
LYMPHOCYTES # BLD AUTO: 1.8 10E9/L (ref 0.8–5.3)
LYMPHOCYTES NFR BLD AUTO: 26.6 %
MCH RBC QN AUTO: 24.6 PG (ref 26.5–33)
MCHC RBC AUTO-ENTMCNC: 31.3 G/DL (ref 31.5–36.5)
MCV RBC AUTO: 79 FL (ref 78–100)
MONOCYTES # BLD AUTO: 0.7 10E9/L (ref 0–1.3)
MONOCYTES NFR BLD AUTO: 9.5 %
NEUTROPHILS # BLD AUTO: 4.2 10E9/L (ref 1.6–8.3)
NEUTROPHILS NFR BLD AUTO: 61.6 %
PLATELET # BLD AUTO: 242 10E9/L (ref 150–450)
RBC # BLD AUTO: 5.33 10E12/L (ref 4.4–5.9)
WBC # BLD AUTO: 6.8 10E9/L (ref 4–11)

## 2019-01-24 PROCEDURE — 85025 COMPLETE CBC W/AUTO DIFF WBC: CPT | Performed by: PHYSICIAN ASSISTANT

## 2019-01-24 PROCEDURE — 99207 ZZC NO CHARGE NURSE ONLY: CPT

## 2019-01-24 PROCEDURE — 85610 PROTHROMBIN TIME: CPT | Mod: QW

## 2019-01-24 PROCEDURE — 36416 COLLJ CAPILLARY BLOOD SPEC: CPT

## 2019-01-24 NOTE — PROGRESS NOTES
ANTICOAGULATION FOLLOW-UP CLINIC VISIT    Patient Name:  Chidi Dubon  Date:  2019  Contact Type:  Face to Face    SUBJECTIVE:     Patient Findings     Positives:   No Problem Findings           OBJECTIVE    INR Protime   Date Value Ref Range Status   2019 2.1 (A) 0.86 - 1.14 Final       ASSESSMENT / PLAN  INR assessment THER    Recheck INR In: 4 WEEKS    INR Location Clinic      Anticoagulation Summary  As of 2019    INR goal:   2.0-3.0   TTR:   76.8 % (2.9 y)   INR used for dosin.1 (2019)   Warfarin maintenance plan:   2.5 mg (2.5 mg x 1) every day   Full warfarin instructions:   2.5 mg every day   Weekly warfarin total:   17.5 mg   No change documented:   Hyacinth Montano   Plan last modified:   Kari Montilla RN (2019)   Next INR check:   2019   Target end date:   Indefinite    Indications    Long-term (current) use of anticoagulants [Z79.01] [Z79.01]  Atrial Fibrillation [I48.2]             Anticoagulation Episode Summary     INR check location:       Preferred lab:       Send INR reminders to:   [facility]    Comments:         Anticoagulation Care Providers     Provider Role Specialty Phone number    Martin Schultz PA-C Responsible Physician Assistant [phone]            See the Encounter Report to view Anticoagulation Flowsheet and Dosing Calendar (Go to Encounters tab in chart review, and find the Anticoagulation Therapy Visit)        HYACINTH MONTANO

## 2019-02-08 ENCOUNTER — TELEPHONE (OUTPATIENT)
Dept: INTERNAL MEDICINE | Facility: CLINIC | Age: 79
End: 2019-02-08

## 2019-02-08 NOTE — TELEPHONE ENCOUNTER
Patient is calling stating leg and feet is swollen up, would like to know if OK to go back on water pills RX: Furosemide 20mg, patient currently has some at home ready to take if OK. Please call to advise. Thank you.

## 2019-02-08 NOTE — TELEPHONE ENCOUNTER
Martin has seen the patient most recently, however I see PCP is Carlos and he has seen patient.    Martin already gone for the weekend, will route to Dr. Gonzalez.    Kari Montilla, RN, BSN

## 2019-02-11 NOTE — TELEPHONE ENCOUNTER
RN called patient and VM left on patients phone advising to call back clinic.   [phone] or [phone]    Catalina Khan RN, BSN, PHN

## 2019-02-11 NOTE — TELEPHONE ENCOUNTER
Routing to PCP and Martin as FYI    Spoke with patient, he is noting foot, ankle and lower leg edema, weight gain of 4-5 pounds over the past 6 weeks which he believes is water weight.     Drinking plenty of fluids, urinating every 3-4 hours.    Given patient information as per below from Dr. Gonzalez. Patient verbalized understanding and agrees with plan.     Scheduled patient to have the F/U lab work completed and then scheduled to F/U with Martin Schultz as Dr. Gonzalez does not have openings that work for patient's schedule.    Kari Montilla, RN, BSN

## 2019-02-11 NOTE — TELEPHONE ENCOUNTER
Yes, ok to resume furosemide 20 mg once daily and follow-up with BMP in 1 week.  Call in daily weights after a week or sooner if weight up 3 lbs.  Follow-up in 1-2 weeks.

## 2019-02-18 ENCOUNTER — DOCUMENTATION ONLY (OUTPATIENT)
Dept: LAB | Facility: CLINIC | Age: 79
End: 2019-02-18

## 2019-02-18 DIAGNOSIS — I25.119 CORONARY ARTERY DISEASE INVOLVING NATIVE HEART WITH ANGINA PECTORIS, UNSPECIFIED VESSEL OR LESION TYPE (H): ICD-10-CM

## 2019-02-18 DIAGNOSIS — Z86.2 HISTORY OF ANEMIA: ICD-10-CM

## 2019-02-18 DIAGNOSIS — I48.20 CHRONIC ATRIAL FIBRILLATION (H): ICD-10-CM

## 2019-02-18 DIAGNOSIS — Z86.2 HISTORY OF ANEMIA: Primary | ICD-10-CM

## 2019-02-18 DIAGNOSIS — E78.5 HYPERLIPIDEMIA LDL GOAL <70: ICD-10-CM

## 2019-02-18 LAB
ALBUMIN SERPL-MCNC: 3.7 G/DL (ref 3.4–5)
ALP SERPL-CCNC: 102 U/L (ref 40–150)
ALT SERPL W P-5'-P-CCNC: 19 U/L (ref 0–70)
ANION GAP SERPL CALCULATED.3IONS-SCNC: 8 MMOL/L (ref 3–14)
AST SERPL W P-5'-P-CCNC: 24 U/L (ref 0–45)
BASOPHILS # BLD AUTO: 0 10E9/L (ref 0–0.2)
BASOPHILS NFR BLD AUTO: 0.4 %
BILIRUB SERPL-MCNC: 2 MG/DL (ref 0.2–1.3)
BUN SERPL-MCNC: 13 MG/DL (ref 7–30)
CALCIUM SERPL-MCNC: 8.8 MG/DL (ref 8.5–10.1)
CHLORIDE SERPL-SCNC: 106 MMOL/L (ref 94–109)
CHOLEST SERPL-MCNC: 143 MG/DL
CO2 SERPL-SCNC: 27 MMOL/L (ref 20–32)
CREAT SERPL-MCNC: 1.21 MG/DL (ref 0.66–1.25)
DIFFERENTIAL METHOD BLD: ABNORMAL
EOSINOPHIL # BLD AUTO: 0.1 10E9/L (ref 0–0.7)
EOSINOPHIL NFR BLD AUTO: 1.7 %
ERYTHROCYTE [DISTWIDTH] IN BLOOD BY AUTOMATED COUNT: 20.1 % (ref 10–15)
GFR SERPL CREATININE-BSD FRML MDRD: 57 ML/MIN/{1.73_M2}
GLUCOSE SERPL-MCNC: 114 MG/DL (ref 70–99)
HCT VFR BLD AUTO: 45.1 % (ref 40–53)
HDLC SERPL-MCNC: 41 MG/DL
HGB BLD-MCNC: 14.7 G/DL (ref 13.3–17.7)
LDLC SERPL CALC-MCNC: 81 MG/DL
LYMPHOCYTES # BLD AUTO: 2 10E9/L (ref 0.8–5.3)
LYMPHOCYTES NFR BLD AUTO: 28.1 %
MCH RBC QN AUTO: 25.8 PG (ref 26.5–33)
MCHC RBC AUTO-ENTMCNC: 32.6 G/DL (ref 31.5–36.5)
MCV RBC AUTO: 79 FL (ref 78–100)
MONOCYTES # BLD AUTO: 0.6 10E9/L (ref 0–1.3)
MONOCYTES NFR BLD AUTO: 8.1 %
NEUTROPHILS # BLD AUTO: 4.3 10E9/L (ref 1.6–8.3)
NEUTROPHILS NFR BLD AUTO: 61.7 %
NONHDLC SERPL-MCNC: 102 MG/DL
PLATELET # BLD AUTO: 224 10E9/L (ref 150–450)
POTASSIUM SERPL-SCNC: 3 MMOL/L (ref 3.4–5.3)
PROT SERPL-MCNC: 7.4 G/DL (ref 6.8–8.8)
RBC # BLD AUTO: 5.7 10E12/L (ref 4.4–5.9)
SODIUM SERPL-SCNC: 141 MMOL/L (ref 133–144)
TRIGL SERPL-MCNC: 104 MG/DL
WBC # BLD AUTO: 7 10E9/L (ref 4–11)

## 2019-02-18 PROCEDURE — 36415 COLL VENOUS BLD VENIPUNCTURE: CPT | Performed by: INTERNAL MEDICINE

## 2019-02-18 PROCEDURE — 80061 LIPID PANEL: CPT | Performed by: INTERNAL MEDICINE

## 2019-02-18 PROCEDURE — 85025 COMPLETE CBC W/AUTO DIFF WBC: CPT | Performed by: PHYSICIAN ASSISTANT

## 2019-02-18 PROCEDURE — 80053 COMPREHEN METABOLIC PANEL: CPT | Performed by: INTERNAL MEDICINE

## 2019-02-18 NOTE — PROGRESS NOTES
Please review & confirm pended orders if needed, patient requested CBCD today. We have a JIC tube.    Thank you,  Aidan Kelly

## 2019-02-21 ENCOUNTER — ANTICOAGULATION THERAPY VISIT (OUTPATIENT)
Dept: NURSING | Facility: CLINIC | Age: 79
End: 2019-02-21
Payer: COMMERCIAL

## 2019-02-21 DIAGNOSIS — I48.20 CHRONIC ATRIAL FIBRILLATION (H): ICD-10-CM

## 2019-02-21 LAB — INR POINT OF CARE: 2.2 (ref 0.86–1.14)

## 2019-02-21 PROCEDURE — 85610 PROTHROMBIN TIME: CPT | Mod: QW

## 2019-02-21 PROCEDURE — 36416 COLLJ CAPILLARY BLOOD SPEC: CPT

## 2019-02-21 PROCEDURE — 99207 ZZC NO CHARGE NURSE ONLY: CPT

## 2019-02-21 NOTE — PROGRESS NOTES
ANTICOAGULATION FOLLOW-UP CLINIC VISIT    Patient Name:  Chidi Dubon  Date:  2019  Contact Type:  Face to Face    SUBJECTIVE:     Patient Findings     Positives:   No Problem Findings           OBJECTIVE    INR Protime   Date Value Ref Range Status   2019 2.2 (A) 0.86 - 1.14 Final       ASSESSMENT / PLAN  INR assessment THER    Recheck INR In: 4 WEEKS    INR Location Clinic      Anticoagulation Summary  As of 2019    INR goal:   2.0-3.0   TTR:   77.5 % (3 y)   INR used for dosin.2 (2019)   Warfarin maintenance plan:   2.5 mg (2.5 mg x 1) every day   Full warfarin instructions:   2.5 mg every day   Weekly warfarin total:   17.5 mg   No change documented:   Catalina Khan RN   Plan last modified:   Kari Montilla RN (2019)   Next INR check:   3/21/2019   Target end date:   Indefinite    Indications    Long-term (current) use of anticoagulants [Z79.01] [Z79.01]  Atrial Fibrillation [I48.2]             Anticoagulation Episode Summary     INR check location:       Preferred lab:       Send INR reminders to:   [facility]    Comments:         Anticoagulation Care Providers     Provider Role Specialty Phone number    Martin Schultz PA-C Responsible Physician Assistant [phone]            See the Encounter Report to view Anticoagulation Flowsheet and Dosing Calendar (Go to Encounters tab in chart review, and find the Anticoagulation Therapy Visit)    Dosage adjustment made based on physician directed care plan.    Catalina Khan RN

## 2019-02-25 ENCOUNTER — OFFICE VISIT (OUTPATIENT)
Dept: FAMILY MEDICINE | Facility: CLINIC | Age: 79
End: 2019-02-25
Payer: COMMERCIAL

## 2019-02-25 VITALS
SYSTOLIC BLOOD PRESSURE: 111 MMHG | OXYGEN SATURATION: 98 % | HEART RATE: 52 BPM | DIASTOLIC BLOOD PRESSURE: 72 MMHG | RESPIRATION RATE: 16 BRPM | WEIGHT: 267 LBS | TEMPERATURE: 97.8 F | HEIGHT: 75 IN | BODY MASS INDEX: 33.2 KG/M2

## 2019-02-25 DIAGNOSIS — I48.20 CHRONIC ATRIAL FIBRILLATION (H): Primary | ICD-10-CM

## 2019-02-25 DIAGNOSIS — I87.2 VENOUS (PERIPHERAL) INSUFFICIENCY: ICD-10-CM

## 2019-02-25 DIAGNOSIS — R60.0 BILATERAL LEG EDEMA: ICD-10-CM

## 2019-02-25 PROCEDURE — 99214 OFFICE O/P EST MOD 30 MIN: CPT | Performed by: PHYSICIAN ASSISTANT

## 2019-02-25 ASSESSMENT — MIFFLIN-ST. JEOR: SCORE: 2021.73

## 2019-02-25 NOTE — PROGRESS NOTES
SUBJECTIVE:   Chidi Dubon is a 78 year old male who presents to clinic today for the following health issues:      Edema follow up      Duration: 4 weeks    Description (location/character/radiation): bilateral lower leg edema    Intensity:  moderate    Accompanying signs and symptoms:     History (similar episodes/previous evaluation): follow up same issue    Precipitating or alleviating factors: None    Therapies tried and outcome: furosemide 20mg      Some bilateral leg edema x 5-6 wks. Some mild KRAUSE. No orthopnea or pnd. Appetite normal. Some weight gain.   Voiding fine.   No calf pain. inr therapeutic.   Echo in 2017: EF of 65%  Edema improves overnight.   Recent potassium was a little low. I will have him increase his dose from a 1/2 tab daily, to a full tab daily.   Problem list and histories reviewed & adjusted, as indicated.  Additional history: as documented    BP Readings from Last 3 Encounters:   02/25/19 111/72   12/24/18 127/71   12/17/18 135/74    Wt Readings from Last 3 Encounters:   02/25/19 121.1 kg (267 lb)   12/24/18 117.8 kg (259 lb 9.6 oz)   12/17/18 115.8 kg (255 lb 3.2 oz)                    Reviewed and updated as needed this visit by clinical staff  Tobacco  Allergies  Meds       Reviewed and updated as needed this visit by Provider         All other systems negative except as outline above  OBJECTIVE:    Eye exam - right eye normal lid, conjunctiva, cornea, pupil and fundus, left eye normal lid, conjunctiva, cornea, pupil and fundus.  CHEST:chest clear to IPPA, no tachypnea, retractions or cyanosis and S1, S2 normal, slight systolic murmur, no gallop, rate regular.  Mild leg edema. Pulses stable. No calf pain.   The abdomen is soft without tenderness, guarding, mass, rebound or organomegaly. Bowel sounds are normal. No CVA tenderness or inguinal adenopathy noted.    Chidi was seen today for edema.    Diagnoses and all orders for this visit:    Atrial Fibrillation  -     INR  CLINIC REFERRAL    Bilateral leg edema    Venous (peripheral) insufficiency    suspect amlodipine is influencing his edema as well.  work on lifestyle modification  He's not interested in wearing compression stockings.

## 2019-03-21 ENCOUNTER — ANTICOAGULATION THERAPY VISIT (OUTPATIENT)
Dept: NURSING | Facility: CLINIC | Age: 79
End: 2019-03-21
Payer: COMMERCIAL

## 2019-03-21 DIAGNOSIS — I48.20 CHRONIC ATRIAL FIBRILLATION (H): ICD-10-CM

## 2019-03-21 LAB — INR POINT OF CARE: 2.2 (ref 0.86–1.14)

## 2019-03-21 PROCEDURE — 36416 COLLJ CAPILLARY BLOOD SPEC: CPT

## 2019-03-21 PROCEDURE — 99207 ZZC NO CHARGE NURSE ONLY: CPT

## 2019-03-21 PROCEDURE — 85610 PROTHROMBIN TIME: CPT | Mod: QW

## 2019-03-21 NOTE — PROGRESS NOTES
ANTICOAGULATION FOLLOW-UP CLINIC VISIT    Patient Name:  Chidi Dubon  Date:  3/21/2019  Contact Type:  Face to Face    SUBJECTIVE:     Patient Findings     Comments:   No problems           OBJECTIVE    INR Protime   Date Value Ref Range Status   2019 2.2 (A) 0.86 - 1.14 Final       ASSESSMENT / PLAN  INR assessment THER    Recheck INR In: 4 WEEKS    INR Location Clinic      Anticoagulation Summary  As of 3/21/2019    INR goal:   2.0-3.0   TTR:   78.1 % (3.1 y)   INR used for dosin.2 (3/21/2019)   Warfarin maintenance plan:   2.5 mg (2.5 mg x 1) every day   Full warfarin instructions:   2.5 mg every day   Weekly warfarin total:   17.5 mg   No change documented:   Hyacinth Montano   Plan last modified:   Kari Montilla RN (2019)   Next INR check:   2019   Target end date:   Indefinite    Indications    Long-term (current) use of anticoagulants [Z79.01] [Z79.01]  Atrial Fibrillation [I48.2]             Anticoagulation Episode Summary     INR check location:       Preferred lab:       Send INR reminders to:   [facility]    Comments:         Anticoagulation Care Providers     Provider Role Specialty Phone number    Martin Schultz PA-C Responsible Physician Assistant [phone]            See the Encounter Report to view Anticoagulation Flowsheet and Dosing Calendar (Go to Encounters tab in chart review, and find the Anticoagulation Therapy Visit)        HYACINTH MONTANO

## 2019-04-18 ENCOUNTER — ANTICOAGULATION THERAPY VISIT (OUTPATIENT)
Dept: NURSING | Facility: CLINIC | Age: 79
End: 2019-04-18
Payer: COMMERCIAL

## 2019-04-18 DIAGNOSIS — I48.20 CHRONIC ATRIAL FIBRILLATION (H): ICD-10-CM

## 2019-04-18 LAB — INR POINT OF CARE: 2.6 (ref 0.86–1.14)

## 2019-04-18 PROCEDURE — 36416 COLLJ CAPILLARY BLOOD SPEC: CPT

## 2019-04-18 PROCEDURE — 85610 PROTHROMBIN TIME: CPT | Mod: QW

## 2019-04-18 PROCEDURE — 99207 ZZC NO CHARGE NURSE ONLY: CPT

## 2019-04-18 NOTE — PROGRESS NOTES
ANTICOAGULATION FOLLOW-UP CLINIC VISIT    Patient Name:  Chidi Dubon  Date:  2019  Contact Type:  Face to Face    SUBJECTIVE:     Patient Findings     Comments:   No problems           OBJECTIVE    INR Protime   Date Value Ref Range Status   2019 2.6 (A) 0.86 - 1.14 Final       ASSESSMENT / PLAN  INR assessment THER    Recheck INR In: 4 WEEKS    INR Location Clinic      Anticoagulation Summary  As of 2019    INR goal:   2.0-3.0   TTR:   78.6 % (3.1 y)   INR used for dosin.6 (2019)   Warfarin maintenance plan:   2.5 mg (2.5 mg x 1) every day   Full warfarin instructions:   2.5 mg every day   Weekly warfarin total:   17.5 mg   No change documented:   Hyacinth Montano   Plan last modified:   Kari Montilla RN (2019)   Next INR check:   2019   Target end date:   Indefinite    Indications    Long-term (current) use of anticoagulants [Z79.01] [Z79.01]  Atrial Fibrillation [I48.2]             Anticoagulation Episode Summary     INR check location:       Preferred lab:       Send INR reminders to:   [facility]    Comments:         Anticoagulation Care Providers     Provider Role Specialty Phone number    Martin Schultz PA-C Responsible Physician Assistant [phone]            See the Encounter Report to view Anticoagulation Flowsheet and Dosing Calendar (Go to Encounters tab in chart review, and find the Anticoagulation Therapy Visit)        HYACINTH MONTANO

## 2019-04-23 ENCOUNTER — TELEPHONE (OUTPATIENT)
Dept: INTERNAL MEDICINE | Facility: CLINIC | Age: 79
End: 2019-04-23

## 2019-04-23 NOTE — TELEPHONE ENCOUNTER
Patient is stating that he is retaining fluid and his diaretic is not working any longer.  He would like to get in the same time as his wife Aaliyah Dubon.  Please call to schedule.  Thank you

## 2019-04-26 ENCOUNTER — OFFICE VISIT (OUTPATIENT)
Dept: INTERNAL MEDICINE | Facility: CLINIC | Age: 79
End: 2019-04-26
Payer: COMMERCIAL

## 2019-04-26 VITALS
RESPIRATION RATE: 16 BRPM | TEMPERATURE: 97 F | WEIGHT: 269 LBS | DIASTOLIC BLOOD PRESSURE: 72 MMHG | BODY MASS INDEX: 33.62 KG/M2 | HEART RATE: 52 BPM | SYSTOLIC BLOOD PRESSURE: 109 MMHG

## 2019-04-26 DIAGNOSIS — R60.0 BILATERAL LEG EDEMA: Primary | ICD-10-CM

## 2019-04-26 DIAGNOSIS — I48.20 CHRONIC ATRIAL FIBRILLATION (H): ICD-10-CM

## 2019-04-26 DIAGNOSIS — I10 BENIGN ESSENTIAL HYPERTENSION: ICD-10-CM

## 2019-04-26 DIAGNOSIS — I25.119 CORONARY ARTERY DISEASE INVOLVING NATIVE HEART WITH ANGINA PECTORIS, UNSPECIFIED VESSEL OR LESION TYPE (H): ICD-10-CM

## 2019-04-26 DIAGNOSIS — F32.5 DEPRESSION, MAJOR, IN REMISSION (H): ICD-10-CM

## 2019-04-26 LAB
ANION GAP SERPL CALCULATED.3IONS-SCNC: 9 MMOL/L (ref 3–14)
BUN SERPL-MCNC: 14 MG/DL (ref 7–30)
CALCIUM SERPL-MCNC: 9.5 MG/DL (ref 8.5–10.1)
CHLORIDE SERPL-SCNC: 105 MMOL/L (ref 94–109)
CO2 SERPL-SCNC: 27 MMOL/L (ref 20–32)
CREAT SERPL-MCNC: 1.19 MG/DL (ref 0.66–1.25)
GFR SERPL CREATININE-BSD FRML MDRD: 58 ML/MIN/{1.73_M2}
GLUCOSE SERPL-MCNC: 96 MG/DL (ref 70–99)
POTASSIUM SERPL-SCNC: 3.7 MMOL/L (ref 3.4–5.3)
SODIUM SERPL-SCNC: 141 MMOL/L (ref 133–144)

## 2019-04-26 PROCEDURE — 80048 BASIC METABOLIC PNL TOTAL CA: CPT | Performed by: INTERNAL MEDICINE

## 2019-04-26 PROCEDURE — 36415 COLL VENOUS BLD VENIPUNCTURE: CPT | Performed by: INTERNAL MEDICINE

## 2019-04-26 PROCEDURE — 99214 OFFICE O/P EST MOD 30 MIN: CPT | Performed by: INTERNAL MEDICINE

## 2019-04-26 RX ORDER — POTASSIUM CHLORIDE 1.5 G/1.58G
20 POWDER, FOR SOLUTION ORAL 2 TIMES DAILY
Qty: 60 TABLET | Refills: 0 | Status: SHIPPED | OUTPATIENT
Start: 2019-04-26 | End: 2019-06-19

## 2019-04-26 RX ORDER — LOSARTAN POTASSIUM 50 MG/1
50 TABLET ORAL DAILY
Qty: 90 TABLET | Refills: 0 | Status: SHIPPED | OUTPATIENT
Start: 2019-04-26 | End: 2019-06-19

## 2019-04-26 RX ORDER — FUROSEMIDE 20 MG
20 TABLET ORAL DAILY
COMMUNITY
End: 2019-04-26

## 2019-04-26 RX ORDER — FUROSEMIDE 20 MG
40 TABLET ORAL DAILY
Qty: 60 TABLET | Refills: 0 | Status: SHIPPED | OUTPATIENT
Start: 2019-04-26 | End: 2019-06-19

## 2019-04-26 ASSESSMENT — ANXIETY QUESTIONNAIRES
1. FEELING NERVOUS, ANXIOUS, OR ON EDGE: SEVERAL DAYS
IF YOU CHECKED OFF ANY PROBLEMS ON THIS QUESTIONNAIRE, HOW DIFFICULT HAVE THESE PROBLEMS MADE IT FOR YOU TO DO YOUR WORK, TAKE CARE OF THINGS AT HOME, OR GET ALONG WITH OTHER PEOPLE: NOT DIFFICULT AT ALL
3. WORRYING TOO MUCH ABOUT DIFFERENT THINGS: NOT AT ALL
5. BEING SO RESTLESS THAT IT IS HARD TO SIT STILL: NOT AT ALL
2. NOT BEING ABLE TO STOP OR CONTROL WORRYING: NOT AT ALL
6. BECOMING EASILY ANNOYED OR IRRITABLE: SEVERAL DAYS
7. FEELING AFRAID AS IF SOMETHING AWFUL MIGHT HAPPEN: NOT AT ALL
GAD7 TOTAL SCORE: 2

## 2019-04-26 ASSESSMENT — PATIENT HEALTH QUESTIONNAIRE - PHQ9
SUM OF ALL RESPONSES TO PHQ QUESTIONS 1-9: 4
5. POOR APPETITE OR OVEREATING: NOT AT ALL

## 2019-04-26 NOTE — PROGRESS NOTES
SUBJECTIVE:   Chidi Dubon is a 78 year old male who presents to clinic today for the following   health issues:      Concern - swelling in legs  Onset: 4 weeks    Description:   Swelling in lower legs right leg worse.      Intensity: moderate    Progression of Symptoms:  worsening    Accompanying Signs & Symptoms:  Can effect his gait    Previous history of similar problem:   yes    Precipitating factors:   Worsened by: on his feet  A lot increase in swelling    Alleviating factors:  Improved by: None elicited    Therapies Tried and outcome: He has been taking the 12.5 mg of hydrochlorthiazide on his combination pill with losartan.  He is also been taking 20 mg of furosemide daily.    We discussed that he did not endorse any particular dietary sodium indiscretion over the recent passover/Easter holiday.    He otherwise reports feeling well.  He is concerned about his wife's health and memory, reporting she has left the stove on and has been more forgetful with short-term memory over the past few weeks.    1. Bilateral leg edema    2. Benign essential hypertension    3. Coronary artery disease involving native heart with angina pectoris, unspecified vessel or lesion type (H)    4. Major depressive disorder, recurrent episode, mild (H)    5. Atrial Fibrillation        PMH: Updated and/or reviewed in chart.    ROS:  Constitutional, cardiovascular, pulmonary, gi and gu systems are otherwise negative.    OBJECTIVE:                                                    /72   Pulse 52   Temp 97  F (36.1  C) (Tympanic)   Resp 16   Wt 122 kg (269 lb)   BMI 33.62 kg/m      GEN: No acute distress  EYES: No conjunctival injection or icterus, EOMI grossly intact  RESP: Unlabored, regular, clear to auscultation bilaterally  COR: regular rate, 2+ lower extremity edema bilaterally to mid-calf  ABDO: obese, soft, non-distended   NEURO: Normal gait, MAEx4, light touch sensation grossly intact  PSYCH: Normal mood and  affect      Results for orders placed or performed in visit on 04/26/19   Basic metabolic panel   Result Value Ref Range    Sodium 141 133 - 144 mmol/L    Potassium 3.7 3.4 - 5.3 mmol/L    Chloride 105 94 - 109 mmol/L    Carbon Dioxide 27 20 - 32 mmol/L    Anion Gap 9 3 - 14 mmol/L    Glucose 96 70 - 99 mg/dL    Urea Nitrogen 14 7 - 30 mg/dL    Creatinine 1.19 0.66 - 1.25 mg/dL    GFR Estimate 58 (L) >60 mL/min/[1.73_m2]    GFR Estimate If Black 68 >60 mL/min/[1.73_m2]    Calcium 9.5 8.5 - 10.1 mg/dL      ASSESSMENT/PLAN:                                                    1. Bilateral leg edema  2. Benign essential hypertension  No known history of heart failure despite ischemic heart disease history.  His last ejection fraction at time of Lexiscan in May 2017 was greater than 65%.  This is likely related to his sodium indiscretion and we discussed dietary changes, temporary increase in his furosemide to 40 mg daily for at least a week or 2 pending his response, and simplification of his diuresis by eliminating HCTZ from his losartan in favor of monotherapy with furosemide.  He may need to return to 20 mg or alternate 20 and 40 mg every other day to maintain optimal fluid status.  Could consider BNP, albumin/protein, and other work-up as well as compression treatment if not improving promptly.  He has a history of hypokalemia which is currently well supplemented.  - Echocardiogram Complete; Future  - furosemide (LASIX) 20 MG tablet; Take 2 tablets (40 mg) by mouth daily  Dispense: 60 tablet; Refill: 0  - losartan (COZAAR) 50 MG tablet; Take 1 tablet (50 mg) by mouth daily  Dispense: 90 tablet; Refill: 0  - potassium chloride (KLOR-CON) 20 MEQ packet; Take 20 mEq by mouth 2 times daily  Dispense: 60 tablet; Refill: 0  - Basic metabolic panel    3. Coronary artery disease involving native heart with angina pectoris, unspecified vessel or lesion type (H)  - ASPIRIN NOT PRESCRIBED (INTENTIONAL); Please choose reason not  prescribed, below - on warfarin anticoagulation     4. Major depressive disorder, recurrent episode, mild (H)  In remission    5. Atrial Fibrillation  On warfarin anticoagulation - continue        Orders Placed This Encounter     Basic metabolic panel     DISCONTD: furosemide (LASIX) 20 MG tablet     furosemide (LASIX) 20 MG tablet     losartan (COZAAR) 50 MG tablet     potassium chloride (KLOR-CON) 20 MEQ packet     ASPIRIN NOT PRESCRIBED (INTENTIONAL)              Phu Gonzalez MD

## 2019-04-26 NOTE — LETTER
April 29, 2019      Chidi Dubon  [address] [facility] [address]  [place] [address]        Dear ,    We are writing to inform you of your test results.    Your electrolytes and kidney function are within normal limits overall, suggesting to me that we do indeed need to take some fluid off. We'll recheck in follow-up in a few weeks. Potassium was normal this time.    Resulted Orders   Basic metabolic panel   Result Value Ref Range    Sodium 141 133 - 144 mmol/L    Potassium 3.7 3.4 - 5.3 mmol/L    Chloride 105 94 - 109 mmol/L    Carbon Dioxide 27 20 - 32 mmol/L    Anion Gap 9 3 - 14 mmol/L    Glucose 96 70 - 99 mg/dL    Urea Nitrogen 14 7 - 30 mg/dL    Creatinine 1.19 0.66 - 1.25 mg/dL    GFR Estimate 58 (L) >60 mL/min/[1.73_m2]      Comment:      Non  GFR Calc  Starting 12/18/2018, serum creatinine based estimated GFR (eGFR) will be   calculated using the Chronic Kidney Disease Epidemiology Collaboration   (CKD-EPI) equation.      GFR Estimate If Black 68 >60 mL/min/[1.73_m2]      Comment:       GFR Calc  Starting 12/18/2018, serum creatinine based estimated GFR (eGFR) will be   calculated using the Chronic Kidney Disease Epidemiology Collaboration   (CKD-EPI) equation.      Calcium 9.5 8.5 - 10.1 mg/dL       If you have any questions or concerns, please call the clinic at the number listed above.       Sincerely,        Phu Gonzalez MD/jorge

## 2019-04-27 PROBLEM — F32.5 DEPRESSION, MAJOR, IN REMISSION (H): Status: ACTIVE | Noted: 2017-04-20

## 2019-04-27 ASSESSMENT — ANXIETY QUESTIONNAIRES: GAD7 TOTAL SCORE: 2

## 2019-04-29 ENCOUNTER — TELEPHONE (OUTPATIENT)
Dept: INTERNAL MEDICINE | Facility: CLINIC | Age: 79
End: 2019-04-29

## 2019-04-29 NOTE — TELEPHONE ENCOUNTER
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  I asked patient to call the insurance number and get an idea of coverage but he declined test at this time

## 2019-05-17 ENCOUNTER — ANTICOAGULATION THERAPY VISIT (OUTPATIENT)
Dept: NURSING | Facility: CLINIC | Age: 79
End: 2019-05-17
Payer: COMMERCIAL

## 2019-05-17 ENCOUNTER — OFFICE VISIT (OUTPATIENT)
Dept: INTERNAL MEDICINE | Facility: CLINIC | Age: 79
End: 2019-05-17
Payer: COMMERCIAL

## 2019-05-17 VITALS
HEART RATE: 52 BPM | DIASTOLIC BLOOD PRESSURE: 69 MMHG | OXYGEN SATURATION: 97 % | WEIGHT: 269 LBS | BODY MASS INDEX: 33.45 KG/M2 | RESPIRATION RATE: 16 BRPM | SYSTOLIC BLOOD PRESSURE: 115 MMHG | HEIGHT: 75 IN

## 2019-05-17 DIAGNOSIS — E80.6 BILIRUBINEMIA: ICD-10-CM

## 2019-05-17 DIAGNOSIS — I71.40 ABDOMINAL AORTIC ANEURYSM WITHOUT RUPTURE (H): ICD-10-CM

## 2019-05-17 DIAGNOSIS — I50.30 (HFPEF) HEART FAILURE WITH PRESERVED EJECTION FRACTION (H): Primary | ICD-10-CM

## 2019-05-17 DIAGNOSIS — I10 BENIGN ESSENTIAL HYPERTENSION: ICD-10-CM

## 2019-05-17 DIAGNOSIS — I48.20 CHRONIC ATRIAL FIBRILLATION (H): ICD-10-CM

## 2019-05-17 LAB — INR POINT OF CARE: 2.6 (ref 0.86–1.14)

## 2019-05-17 PROCEDURE — 85610 PROTHROMBIN TIME: CPT | Mod: QW

## 2019-05-17 PROCEDURE — 99207 ZZC NO CHARGE NURSE ONLY: CPT

## 2019-05-17 PROCEDURE — 36416 COLLJ CAPILLARY BLOOD SPEC: CPT

## 2019-05-17 PROCEDURE — 99214 OFFICE O/P EST MOD 30 MIN: CPT | Performed by: INTERNAL MEDICINE

## 2019-05-17 ASSESSMENT — MIFFLIN-ST. JEOR: SCORE: 2030.81

## 2019-05-17 NOTE — PROGRESS NOTES
ANTICOAGULATION FOLLOW-UP CLINIC VISIT    Patient Name:  Chidi Dubon  Date:  2019  Contact Type:  Face to Face    SUBJECTIVE:  Patient Findings     Comments:   No problems        Clinical Outcomes     Comments:   No problems           OBJECTIVE    INR Protime   Date Value Ref Range Status   2019 2.6 (A) 0.86 - 1.14 Final       ASSESSMENT / PLAN  INR assessment THER    Recheck INR In: 4 WEEKS    INR Location Clinic      Anticoagulation Summary  As of 2019    INR goal:   2.0-3.0   TTR:   79.1 % (3.2 y)   INR used for dosin.6 (2019)   Warfarin maintenance plan:   2.5 mg (2.5 mg x 1) every day   Full warfarin instructions:   2.5 mg every day   Weekly warfarin total:   17.5 mg   No change documented:   Hyacinth Montano   Plan last modified:   Kari Montilla RN (2019)   Next INR check:   2019   Target end date:   Indefinite    Indications    Long-term (current) use of anticoagulants [Z79.01] [Z79.01]  Atrial Fibrillation [I48.2]             Anticoagulation Episode Summary     INR check location:       Preferred lab:       Send INR reminders to:   [facility]    Comments:         Anticoagulation Care Providers     Provider Role Specialty Phone number    Martin Schultz PA-C Responsible Physician Assistant [phone]            See the Encounter Report to view Anticoagulation Flowsheet and Dosing Calendar (Go to Encounters tab in chart review, and find the Anticoagulation Therapy Visit)        HYACINTH MONTANO

## 2019-05-17 NOTE — PROGRESS NOTES
"  SUBJECTIVE:   Chidi Dubon is a 78 year old male who presents to clinic today for the following   health issues:      Hypertension Follow-up      Outpatient blood pressures are not being checked.    Low Salt Diet: no added salt      Amount of exercise or physical activity: None    Problems taking medications regularly: No    Medication side effects: none    Diet: regular (no restrictions) and low salt    But she reports that he stopped his furosemide which had been 20 mg twice daily the day after climbing a ladder and painting after which he suffered from lower back pain and was concerned about his kidneys hurting.  He has noticed an increase in lower leg swelling up to the knee with particularly prominent dorsal foot swelling.  He denies shortness of breath, chest pain.    On review, his cardiology evaluations in the past have had like addition of heart failure with preserved ejection fraction.  He has not yet completed his TTE as was ordered last month.  He would like to follow-up with his cardiologist in the [facility] group at [facility].    1. (HFpEF) heart failure with preserved ejection fraction (H)    2. Benign essential hypertension    3. Abdominal aortic aneurysm without rupture (H)        PMH: Updated and/or reviewed in chart.    ROS:  Constitutional, cardiovascular, pulmonary, and gu systems are otherwise negative.    OBJECTIVE:                                                    /69   Pulse 52   Resp 16   Ht 1.905 m (6' 3\")   Wt 122 kg (269 lb)   SpO2 97%   BMI 33.62 kg/m      GEN: No acute distress  EYES: No conjunctival injection or icterus, EOMI grossly intact  RESP: Unlabored, regular  COR: 2+ bilateral lower extremity edema  PSYCH: Normal mood and affect    Results for orders placed or performed in visit on 05/17/19   INR point of care   Result Value Ref Range    INR Protime 2.6 (A) 0.86 - 1.14      ASSESSMENT/PLAN:                                                    1. (HFpEF) heart failure " with preserved ejection fraction (H)  Complete echo.  Resume furosemide 20 mg twice daily for a total daily dose 40 mg.  We discussed that we may need to temporarily increase this and expect to withdraw some furosemide once his relative hypervolemia has been addressed.  We discussed leg elevation and compression wraps.  His albumin and protein levels were normal in February.  He will have BMP drawn within 1 to 2 weeks and we will scale his diuresis as indicated.    2. Benign essential hypertension  Excellent control today-continue regimen and consider reduction of antihypertensives as his diuresis resumes.    3. Abdominal aortic aneurysm without rupture (H)  Possible may be able to schedule at the same time of his echocardiogram.    We did not discuss his previous bilirubin of 2.0.  We will do this and follow-up in about 1 month.    Phu Gonzalez MD

## 2019-06-13 ENCOUNTER — ANTICOAGULATION THERAPY VISIT (OUTPATIENT)
Dept: NURSING | Facility: CLINIC | Age: 79
End: 2019-06-13
Payer: COMMERCIAL

## 2019-06-13 DIAGNOSIS — I48.20 CHRONIC ATRIAL FIBRILLATION (H): ICD-10-CM

## 2019-06-13 LAB — INR POINT OF CARE: 2.5 (ref 0.86–1.14)

## 2019-06-13 PROCEDURE — 85610 PROTHROMBIN TIME: CPT | Mod: QW

## 2019-06-13 PROCEDURE — 99207 ZZC NO CHARGE NURSE ONLY: CPT

## 2019-06-13 PROCEDURE — 36416 COLLJ CAPILLARY BLOOD SPEC: CPT

## 2019-06-13 NOTE — PROGRESS NOTES
ANTICOAGULATION FOLLOW-UP CLINIC VISIT    Patient Name:  Chidi Dubon  Date:  2019  Contact Type:  Face to Face    SUBJECTIVE:  Patient Findings     Comments:   No problems        Clinical Outcomes     Comments:   No problems           OBJECTIVE    INR Protime   Date Value Ref Range Status   2019 2.5 (A) 0.86 - 1.14 Final       ASSESSMENT / PLAN  INR assessment THER    Recheck INR In: 4 WEEKS    INR Location Clinic      Anticoagulation Summary  As of 2019    INR goal:   2.0-3.0   TTR:   79.6 % (3.3 y)   INR used for dosin.5 (2019)   Warfarin maintenance plan:   2.5 mg (2.5 mg x 1) every day   Full warfarin instructions:   2.5 mg every day   Weekly warfarin total:   17.5 mg   No change documented:   Hyacinth Montano   Plan last modified:   Kari Montilla RN (2019)   Next INR check:   2019   Target end date:   Indefinite    Indications    Long-term (current) use of anticoagulants [Z79.01] [Z79.01]  Atrial Fibrillation [I48.2]             Anticoagulation Episode Summary     INR check location:       Preferred lab:       Send INR reminders to:   [facility]    Comments:         Anticoagulation Care Providers     Provider Role Specialty Phone number    Martin Schultz PA-C Responsible Physician Assistant [phone]            See the Encounter Report to view Anticoagulation Flowsheet and Dosing Calendar (Go to Encounters tab in chart review, and find the Anticoagulation Therapy Visit)        HYACINTH MONTANO

## 2019-06-19 ENCOUNTER — OFFICE VISIT (OUTPATIENT)
Dept: INTERNAL MEDICINE | Facility: CLINIC | Age: 79
End: 2019-06-19
Payer: COMMERCIAL

## 2019-06-19 VITALS
SYSTOLIC BLOOD PRESSURE: 116 MMHG | DIASTOLIC BLOOD PRESSURE: 67 MMHG | WEIGHT: 262 LBS | RESPIRATION RATE: 16 BRPM | TEMPERATURE: 97 F | HEART RATE: 64 BPM | BODY MASS INDEX: 32.75 KG/M2 | OXYGEN SATURATION: 97 %

## 2019-06-19 DIAGNOSIS — I10 HTN, GOAL BELOW 140/90: ICD-10-CM

## 2019-06-19 DIAGNOSIS — R79.9 ABNORMAL FINDING OF BLOOD CHEMISTRY: ICD-10-CM

## 2019-06-19 DIAGNOSIS — E80.6 BILIRUBINEMIA: ICD-10-CM

## 2019-06-19 DIAGNOSIS — I48.20 CHRONIC ATRIAL FIBRILLATION (H): ICD-10-CM

## 2019-06-19 DIAGNOSIS — E78.5 HYPERLIPIDEMIA LDL GOAL <70: ICD-10-CM

## 2019-06-19 DIAGNOSIS — F32.0 MILD MAJOR DEPRESSION (H): ICD-10-CM

## 2019-06-19 DIAGNOSIS — I50.32 CHRONIC HEART FAILURE WITH PRESERVED EJECTION FRACTION (H): Primary | ICD-10-CM

## 2019-06-19 DIAGNOSIS — Z79.01 LONG TERM CURRENT USE OF ANTICOAGULANT THERAPY: ICD-10-CM

## 2019-06-19 DIAGNOSIS — N18.30 CKD (CHRONIC KIDNEY DISEASE) STAGE 3, GFR 30-59 ML/MIN (H): ICD-10-CM

## 2019-06-19 DIAGNOSIS — I10 BENIGN ESSENTIAL HYPERTENSION: ICD-10-CM

## 2019-06-19 LAB
ALBUMIN SERPL-MCNC: 4 G/DL (ref 3.4–5)
ALBUMIN UR-MCNC: NEGATIVE MG/DL
ALP SERPL-CCNC: 98 U/L (ref 40–150)
ALT SERPL W P-5'-P-CCNC: 29 U/L (ref 0–70)
ANION GAP SERPL CALCULATED.3IONS-SCNC: 6 MMOL/L (ref 3–14)
APPEARANCE UR: CLEAR
AST SERPL W P-5'-P-CCNC: 26 U/L (ref 0–45)
BASOPHILS # BLD AUTO: 0 10E9/L (ref 0–0.2)
BASOPHILS NFR BLD AUTO: 0.6 %
BILIRUB SERPL-MCNC: 2.1 MG/DL (ref 0.2–1.3)
BILIRUB UR QL STRIP: NEGATIVE
BUN SERPL-MCNC: 17 MG/DL (ref 7–30)
CALCIUM SERPL-MCNC: 9.2 MG/DL (ref 8.5–10.1)
CHLORIDE SERPL-SCNC: 103 MMOL/L (ref 94–109)
CO2 SERPL-SCNC: 30 MMOL/L (ref 20–32)
COLOR UR AUTO: YELLOW
CREAT SERPL-MCNC: 1.13 MG/DL (ref 0.66–1.25)
DIFFERENTIAL METHOD BLD: NORMAL
EOSINOPHIL # BLD AUTO: 0.1 10E9/L (ref 0–0.7)
EOSINOPHIL NFR BLD AUTO: 1.8 %
ERYTHROCYTE [DISTWIDTH] IN BLOOD BY AUTOMATED COUNT: 14.9 % (ref 10–15)
FERRITIN SERPL-MCNC: 55 NG/ML (ref 26–388)
GFR SERPL CREATININE-BSD FRML MDRD: 62 ML/MIN/{1.73_M2}
GLUCOSE SERPL-MCNC: 106 MG/DL (ref 70–99)
GLUCOSE UR STRIP-MCNC: NEGATIVE MG/DL
HCT VFR BLD AUTO: 44 % (ref 40–53)
HGB BLD-MCNC: 15 G/DL (ref 13.3–17.7)
HGB UR QL STRIP: NEGATIVE
KETONES UR STRIP-MCNC: NEGATIVE MG/DL
LEUKOCYTE ESTERASE UR QL STRIP: NEGATIVE
LYMPHOCYTES # BLD AUTO: 1.8 10E9/L (ref 0.8–5.3)
LYMPHOCYTES NFR BLD AUTO: 27.2 %
MCH RBC QN AUTO: 29.1 PG (ref 26.5–33)
MCHC RBC AUTO-ENTMCNC: 34.1 G/DL (ref 31.5–36.5)
MCV RBC AUTO: 85 FL (ref 78–100)
MONOCYTES # BLD AUTO: 0.5 10E9/L (ref 0–1.3)
MONOCYTES NFR BLD AUTO: 7.1 %
NEUTROPHILS # BLD AUTO: 4.2 10E9/L (ref 1.6–8.3)
NEUTROPHILS NFR BLD AUTO: 63.3 %
NITRATE UR QL: NEGATIVE
PH UR STRIP: 5.5 PH (ref 5–7)
PLATELET # BLD AUTO: 222 10E9/L (ref 150–450)
POTASSIUM SERPL-SCNC: 2.9 MMOL/L (ref 3.4–5.3)
PROT SERPL-MCNC: 7.4 G/DL (ref 6.8–8.8)
RBC # BLD AUTO: 5.15 10E12/L (ref 4.4–5.9)
SODIUM SERPL-SCNC: 139 MMOL/L (ref 133–144)
SOURCE: NORMAL
SP GR UR STRIP: 1.01 (ref 1–1.03)
UROBILINOGEN UR STRIP-ACNC: 0.2 EU/DL (ref 0.2–1)
WBC # BLD AUTO: 6.6 10E9/L (ref 4–11)

## 2019-06-19 PROCEDURE — 82728 ASSAY OF FERRITIN: CPT | Performed by: INTERNAL MEDICINE

## 2019-06-19 PROCEDURE — 81003 URINALYSIS AUTO W/O SCOPE: CPT | Performed by: INTERNAL MEDICINE

## 2019-06-19 PROCEDURE — 80053 COMPREHEN METABOLIC PANEL: CPT | Performed by: INTERNAL MEDICINE

## 2019-06-19 PROCEDURE — 85025 COMPLETE CBC W/AUTO DIFF WBC: CPT | Performed by: INTERNAL MEDICINE

## 2019-06-19 PROCEDURE — 99214 OFFICE O/P EST MOD 30 MIN: CPT | Performed by: INTERNAL MEDICINE

## 2019-06-19 PROCEDURE — 36415 COLL VENOUS BLD VENIPUNCTURE: CPT | Performed by: INTERNAL MEDICINE

## 2019-06-19 RX ORDER — ATORVASTATIN CALCIUM 80 MG/1
TABLET, FILM COATED ORAL
Qty: 45 TABLET | Refills: 3 | Status: SHIPPED | OUTPATIENT
Start: 2019-06-19 | End: 2019-09-18

## 2019-06-19 RX ORDER — FUROSEMIDE 20 MG
TABLET ORAL
Qty: 90 TABLET | Refills: 3 | Status: SHIPPED | OUTPATIENT
Start: 2019-06-19 | End: 2019-07-23

## 2019-06-19 RX ORDER — AMLODIPINE BESYLATE 5 MG/1
5 TABLET ORAL DAILY
Qty: 90 TABLET | Refills: 3 | Status: SHIPPED | OUTPATIENT
Start: 2019-06-19 | End: 2019-07-23

## 2019-06-19 RX ORDER — LOSARTAN POTASSIUM 50 MG/1
50 TABLET ORAL DAILY
Qty: 90 TABLET | Refills: 3 | Status: SHIPPED | OUTPATIENT
Start: 2019-06-19 | End: 2019-08-14

## 2019-06-19 RX ORDER — POTASSIUM CHLORIDE 1.5 G/1.58G
20 POWDER, FOR SOLUTION ORAL 2 TIMES DAILY
Qty: 60 TABLET | Refills: 3 | Status: SHIPPED | OUTPATIENT
Start: 2019-06-19 | End: 2019-09-18

## 2019-06-19 RX ORDER — WARFARIN SODIUM 2.5 MG/1
2.5 TABLET ORAL DAILY
Qty: 90 TABLET | Refills: 3 | Status: SHIPPED | OUTPATIENT
Start: 2019-06-19 | End: 2020-07-09

## 2019-06-19 NOTE — LETTER
My Heart Failure Action Plan   Name: Chidi Dubno    YOB: 1940   Date: 6/19/2019    My doctor: Phu Gonzalez     [address]  [place] [id number] [phone]  My Diagnosis: Preserved (HFp)- EF: TBD%   My Exercise Goal: 30 minutes daily  .     My Weight Plan:   Wt Readings from Last 2 Encounters:   06/19/19 118.8 kg (262 lb)   05/17/19 122 kg (269 lb)     Weigh yourself daily using the same scale. If you gain more than 2 pounds in 24 hours or 5 pounds in a week call the clinic    My Diet Goal: No added salt    Emergency Room Visits:    Our goal is to improve your quality of life and help you avoid a visit to the emergency room or hospital.  If we work together, we can achieve this goal. But, if you feel you need to call 911 or go to the emergency room, please do so.  If you go to the emergency room, please bring your list of medicines and your daily weight chart with you.       GREEN ZONE     Doing well today    Weight gained is no more than 2 pounds a day or 5 pounds a week.    No swelling in feet, ankles, legs or stomach.    No more swelling than usual.    No more trouble breathing than usual.    No change in my sleep.    No other problems. Actions:    I am doing fine.  I will take my medicine, follow my diet, see my doctor, exercise, and watch for symptoms.           YELLOW ZONE         Having a bad day or flare up    Weight gain of more than 2 pounds in one day or 5 pounds in one week.    New swelling in ankle, leg, knee or thigh.    Bloating in belly, pants feel tighter.    Swelling in hands or face.    Coughing or trouble breathing while walking or talking.    Harder to breathe last night.    Have trouble sleeping, wake up short of breath.    Much more tired than usual.    Not eating.    Pain in my chest or bad leg cramps.    Feel weak or dizzy. Actions:    I need to take action and call my doctor or nurse today.                 RED ZONE          Need medical care now    Weight gain of 5 pounds overnight.    Chest pain or pressure that does not go away.    Feel less alert.    Wheezing or have trouble breathing when at rest.    Cannot sleep lying down.    Cannot take my water pill.    Pass out or faint. Actions:    I need to call my doctor or nurse now!    Call 911 if I have chest pain or cannot breathe.

## 2019-06-19 NOTE — LETTER
June 21, 2019      Chidi Dubon  [address] [facility]  [place] [address]        Dear ,    We are writing to inform you of your test results.    A couple of notes from your recent laboratory evaluation:        Your potassium is, in fact, too low now.  Take one potassium supplement for each tablet of furosemide you take until we can recheck.        Your bilirubin level is still mildly elevated.  When we meet next, we can discuss whether to look into this further.  You have had a lower baseline in the past.      Iron markers have recovered -- should be OK to discontinue the iron supplementation      Urinalysis was perfectly normal   Resulted Orders   Comprehensive metabolic panel   Result Value Ref Range    Sodium 139 133 - 144 mmol/L    Potassium 2.9 (L) 3.4 - 5.3 mmol/L    Chloride 103 94 - 109 mmol/L    Carbon Dioxide 30 20 - 32 mmol/L    Anion Gap 6 3 - 14 mmol/L    Glucose 106 (H) 70 - 99 mg/dL      Comment:      Non Fasting    Urea Nitrogen 17 7 - 30 mg/dL    Creatinine 1.13 0.66 - 1.25 mg/dL    GFR Estimate 62 >60 mL/min/[1.73_m2]      Comment:      Non  GFR Calc  Starting 12/18/2018, serum creatinine based estimated GFR (eGFR) will be   calculated using the Chronic Kidney Disease Epidemiology Collaboration   (CKD-EPI) equation.      GFR Estimate If Black 72 >60 mL/min/[1.73_m2]      Comment:       GFR Calc  Starting 12/18/2018, serum creatinine based estimated GFR (eGFR) will be   calculated using the Chronic Kidney Disease Epidemiology Collaboration   (CKD-EPI) equation.      Calcium 9.2 8.5 - 10.1 mg/dL    Bilirubin Total 2.1 (H) 0.2 - 1.3 mg/dL    Albumin 4.0 3.4 - 5.0 g/dL    Protein Total 7.4 6.8 - 8.8 g/dL    Alkaline Phosphatase 98 40 - 150 U/L    ALT 29 0 - 70 U/L    AST 26 0 - 45 U/L   UA reflex to Microscopic and Culture   Result Value Ref Range    Color Urine Yellow     Appearance Urine Clear     Glucose Urine Negative NEG^Negative mg/dL    Bilirubin  Urine Negative NEG^Negative    Ketones Urine Negative NEG^Negative mg/dL    Specific Gravity Urine 1.010 1.003 - 1.035    Blood Urine Negative NEG^Negative    pH Urine 5.5 5.0 - 7.0 pH    Protein Albumin Urine Negative NEG^Negative mg/dL    Urobilinogen Urine 0.2 0.2 - 1.0 EU/dL    Nitrite Urine Negative NEG^Negative    Leukocyte Esterase Urine Negative NEG^Negative    Source Midstream Urine    CBC with platelets differential   Result Value Ref Range    WBC 6.6 4.0 - 11.0 10e9/L    RBC Count 5.15 4.4 - 5.9 10e12/L    Hemoglobin 15.0 13.3 - 17.7 g/dL    Hematocrit 44.0 40.0 - 53.0 %    MCV 85 78 - 100 fl    MCH 29.1 26.5 - 33.0 pg    MCHC 34.1 31.5 - 36.5 g/dL    RDW 14.9 10.0 - 15.0 %    Platelet Count 222 150 - 450 10e9/L    % Neutrophils 63.3 %    % Lymphocytes 27.2 %    % Monocytes 7.1 %    % Eosinophils 1.8 %    % Basophils 0.6 %    Absolute Neutrophil 4.2 1.6 - 8.3 10e9/L    Absolute Lymphocytes 1.8 0.8 - 5.3 10e9/L    Absolute Monocytes 0.5 0.0 - 1.3 10e9/L    Absolute Eosinophils 0.1 0.0 - 0.7 10e9/L    Absolute Basophils 0.0 0.0 - 0.2 10e9/L    Diff Method Automated Method    Ferritin   Result Value Ref Range    Ferritin 55 26 - 388 ng/mL       If you have any questions or concerns, please call the clinic at the number listed above.       Sincerely,        Phu Gonzalez MD/hlo

## 2019-06-19 NOTE — PROGRESS NOTES
Subjective     Chidi Dubon is a 78 year old male who presents to clinic today for the following health issues:    HPI   Heart Failure Follow-up  Are you experiencing any shortness of breath? Yes, with activity only   How would you describe your shortness of breath?  Slightly worse        Are you experiencing any swelling in your legs or feet?  Better than usual    Are you using more pillows than usual? No    Do you cough at night?  No    Do you check your weight daily?  Yes    Have you had a weight change recently?  No    Are you having any of the following side effects from your medications? (Select all that apply)  Fatigue    Since your last visit, how many times have you gone to the cardiologist, urgent care, emergency room, or hospital because of your heart failure?   None      Amount of exercise or physical activity: no exercise program but is doing yard work, painting and landscaping etc    Problems taking medications regularly: No    Medication side effects: none    Diet: regular (no restrictions) and trying to eat in moderation and lose a little weight    Has been taking furosemide 40 mg qAM and 20 mg qPM except this past weekend when [place] cabin.  Had mild pedal edema prior on that regimen, now complains of bilateral lower extremity edema.  No chest pain, shortness of breath.  TTE next month.  Denies knowledge of history of elevated bilirubin levels, and 3/2013 levels have unclear context possibly related to anticoagulation / hemolysis?  Tolerating potassium supplementation.  He's happy with his blood pressure improvement.    Feels less depressed.  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      1. Chronic heart failure with preserved ejection fraction (H)    2. HTN, goal below 140/90    3. Hyperlipidemia LDL goal <70    4. Benign essential hypertension    5. Chronic atrial fibrillation (H)    6. Long term current use of anticoagulant therapy    7. CKD  (chronic kidney disease) stage 3, GFR 30-59 ml/min (H)    8. Bilirubinemia    9. Mild major depression (H)    10. Abnormal finding of blood chemistry         PMH: Updated and/or reviewed in chart.    ROS:  Constitutional, HEENT, cardiovascular, pulmonary, gi and gu systems are otherwise negative.    Objective   OBJECTIVE:                                                    /67   Pulse 64   Temp 97  F (36.1  C) (Tympanic)   Resp 16   Wt 118.8 kg (262 lb)   SpO2 97%   BMI 32.75 kg/m      GEN: No acute distress  EYES: No conjunctival injection or icterus, EOMI grossly intact  RESP: Unlabored, regular, clear to auscultation bilaterally  COR: regular rate, no murmurs, rubs, or gallops appreciated, 2+ right > left lower extremity edema to mid-calf  ABDO: overall soft, non-tender, non-distended, no hepatosplenomegaly or masses  BACK: no deformity or CVA tenderness  NEURO: Normal gait, MAEx4, light touch sensation grossly intact  PSYCH: Normal mood and affect      Results for orders placed or performed in visit on 06/19/19   UA reflex to Microscopic and Culture   Result Value Ref Range    Color Urine Yellow     Appearance Urine Clear     Glucose Urine Negative NEG^Negative mg/dL    Bilirubin Urine Negative NEG^Negative    Ketones Urine Negative NEG^Negative mg/dL    Specific Gravity Urine 1.010 1.003 - 1.035    Blood Urine Negative NEG^Negative    pH Urine 5.5 5.0 - 7.0 pH    Protein Albumin Urine Negative NEG^Negative mg/dL    Urobilinogen Urine 0.2 0.2 - 1.0 EU/dL    Nitrite Urine Negative NEG^Negative    Leukocyte Esterase Urine Negative NEG^Negative    Source Midstream Urine       Assessment & Plan   ASSESSMENT/PLAN:                                                    1. Chronic heart failure with preserved ejection fraction (H)  Discussed uncertainty of baseline diuretic need given weekend off from furosemide, but given his report he was probably euvolemic prior to this weekend.  I advised him to continue  40/20 dosing and follow-up in 3-4 weeks to gauge progress.  May be able to reduce to 20-40 qAM for maintenance.  Await TTE.  - HEART FAILURE ACTION PLAN  - Comprehensive metabolic panel  - Basic metabolic panel; Future  - furosemide (LASIX) 20 MG tablet; 40 mg in AM and 20 mg in early afternoon  Dispense: 90 tablet; Refill: 3  - potassium chloride (KLOR-CON) 20 MEQ packet; Take 20 mEq by mouth 2 times daily  Dispense: 60 tablet; Refill: 3    2. HTN, goal below 140/90  4. Benign essential hypertension  Excellent control - continue   - amLODIPine (NORVASC) 5 MG tablet; Take 1 tablet (5 mg) by mouth daily  Dispense: 90 tablet; Refill: 3  - losartan (COZAAR) 50 MG tablet; Take 1 tablet (50 mg) by mouth daily  Dispense: 90 tablet; Refill: 3    3. Hyperlipidemia LDL goal <70  Stable - continue   - atorvastatin (LIPITOR) 80 MG tablet; Take 0.5 tablets (40 mg) by mouth daily  Dispense: 45 tablet; Refill: 3    5. Chronic atrial fibrillation (H)  6. Long term current use of anticoagulant therapy  Doing well without rate control   - warfarin (COUMADIN) 2.5 MG tablet; Take 1 tablet (2.5 mg) by mouth daily  Dispense: 90 tablet; Refill: 3    7. CKD (chronic kidney disease) stage 3, GFR 30-59 ml/min (H)  10. Abnormal finding of blood chemistry   Normal hemoglobin, lower MCV; rechecking to see if he can cease iron supplementation.  - CBC with platelets differential  - Ferritin    8. Bilirubinemia  Recheck -- consider more complete work-up if remains elevated.  - Comprehensive metabolic panel  - UA reflex to Microscopic and Culture    9. Mild major depression (H)  Good control at this time        Orders Placed This Encounter     HEART FAILURE ACTION PLAN     Comprehensive metabolic panel     UA reflex to Microscopic and Culture     CBC with platelets differential     Ferritin     Basic metabolic panel     amLODIPine (NORVASC) 5 MG tablet     atorvastatin (LIPITOR) 80 MG tablet     furosemide (LASIX) 20 MG tablet     losartan  (COZAAR) 50 MG tablet     potassium chloride (KLOR-CON) 20 MEQ packet     warfarin (COUMADIN) 2.5 MG tablet        Return in about 4 weeks (around 7/17/2019) for CHF f/u.    Phu Gonzalez MD

## 2019-06-27 ENCOUNTER — TRANSFERRED RECORDS (OUTPATIENT)
Dept: HEALTH INFORMATION MANAGEMENT | Facility: CLINIC | Age: 79
End: 2019-06-27

## 2019-06-27 LAB — EJECTION FRACTION: 68

## 2019-07-11 ENCOUNTER — TELEPHONE (OUTPATIENT)
Dept: INTERNAL MEDICINE | Facility: CLINIC | Age: 79
End: 2019-07-11

## 2019-07-11 ENCOUNTER — ANTICOAGULATION THERAPY VISIT (OUTPATIENT)
Dept: NURSING | Facility: CLINIC | Age: 79
End: 2019-07-11
Payer: COMMERCIAL

## 2019-07-11 DIAGNOSIS — I48.20 CHRONIC ATRIAL FIBRILLATION (H): ICD-10-CM

## 2019-07-11 DIAGNOSIS — Z79.01 LONG TERM CURRENT USE OF ANTICOAGULANT THERAPY: ICD-10-CM

## 2019-07-11 LAB — INR POINT OF CARE: 2.2 (ref 0.86–1.14)

## 2019-07-11 PROCEDURE — 36416 COLLJ CAPILLARY BLOOD SPEC: CPT

## 2019-07-11 PROCEDURE — 85610 PROTHROMBIN TIME: CPT | Mod: QW

## 2019-07-11 PROCEDURE — 99207 ZZC NO CHARGE NURSE ONLY: CPT

## 2019-07-11 NOTE — PROGRESS NOTES
ANTICOAGULATION FOLLOW-UP CLINIC VISIT    Patient Name:  Chidi Dubon  Date:  2019  Contact Type:  Face to Face    SUBJECTIVE:  Patient Findings     Comments:   No bleeding difficulties        Clinical Outcomes     Comments:   No bleeding difficulties           OBJECTIVE    INR Protime   Date Value Ref Range Status   2019 2.2 (A) 0.86 - 1.14 Final       ASSESSMENT / PLAN  INR assessment THER    Recheck INR In: 4 WEEKS    INR Location Clinic      Anticoagulation Summary  As of 2019    INR goal:   2.0-3.0   TTR:   80.1 % (3.4 y)   INR used for dosin.2 (2019)   Warfarin maintenance plan:   2.5 mg (2.5 mg x 1) every day   Full warfarin instructions:   2.5 mg every day   Weekly warfarin total:   17.5 mg   No change documented:   Hyacinth Montano   Plan last modified:   Kari Montilla RN (2019)   Next INR check:   2019   Target end date:   Indefinite    Indications    Long-term (current) use of anticoagulants [Z79.01] [Z79.01]  Atrial Fibrillation [I48.2]             Anticoagulation Episode Summary     INR check location:       Preferred lab:       Send INR reminders to:   EMELIA SARGENT    Comments:         Anticoagulation Care Providers     Provider Role Specialty Phone number    Martin Schultz PA-C Responsible Physician Assistant [phone]            See the Encounter Report to view Anticoagulation Flowsheet and Dosing Calendar (Go to Encounters tab in chart review, and find the Anticoagulation Therapy Visit)    Dosage adjustment made based on physician directed care plan.    HYACINTH MONTANO

## 2019-07-11 NOTE — TELEPHONE ENCOUNTER
Patient informed okay to wait to address at follow-up per Dr Gonzalez.  Did advise if symptoms change or worsen to call back..  Patient verbalized understanding.

## 2019-07-11 NOTE — TELEPHONE ENCOUNTER
"Patient was in for INR appt today and mentioned after visit he has had some chest pain?    Pain is left sided chest wall pain, he reports it is dull and on the area that the \"Echo tech\" was pushing on when doing his echo?   ((mercy) after patient left unable to find that exam?)  The pain is dull, mild and only present if he lays on his left side or pushes  On the spot.   Patient refused to go to ER for evaluation.  Just wanted to check in with Dr. Gonzalez.    He has not tried any heat or cold or any pain medications due to \"pain is mild\".  He thought maybe pain is from the \"water pill\"  He did agree if pain changes, moves, worsens or new symptoms arise he will go to ER, otherwise will wait for recommendations from Dr. Gonzalez.   please advise    "

## 2019-07-23 ENCOUNTER — OFFICE VISIT (OUTPATIENT)
Dept: INTERNAL MEDICINE | Facility: CLINIC | Age: 79
End: 2019-07-23
Payer: COMMERCIAL

## 2019-07-23 VITALS
WEIGHT: 262 LBS | HEART RATE: 51 BPM | TEMPERATURE: 97 F | BODY MASS INDEX: 32.58 KG/M2 | RESPIRATION RATE: 16 BRPM | SYSTOLIC BLOOD PRESSURE: 117 MMHG | DIASTOLIC BLOOD PRESSURE: 73 MMHG | OXYGEN SATURATION: 91 % | HEIGHT: 75 IN

## 2019-07-23 DIAGNOSIS — I25.119 CORONARY ARTERY DISEASE INVOLVING NATIVE HEART WITH ANGINA PECTORIS, UNSPECIFIED VESSEL OR LESION TYPE (H): ICD-10-CM

## 2019-07-23 DIAGNOSIS — I48.20 CHRONIC ATRIAL FIBRILLATION (H): ICD-10-CM

## 2019-07-23 DIAGNOSIS — I50.32 CHRONIC HEART FAILURE WITH PRESERVED EJECTION FRACTION (H): ICD-10-CM

## 2019-07-23 DIAGNOSIS — I10 BENIGN ESSENTIAL HYPERTENSION: ICD-10-CM

## 2019-07-23 DIAGNOSIS — E78.5 HYPERLIPIDEMIA LDL GOAL <70: ICD-10-CM

## 2019-07-23 DIAGNOSIS — I34.0 NON-RHEUMATIC MITRAL REGURGITATION: Primary | ICD-10-CM

## 2019-07-23 LAB
ALBUMIN SERPL-MCNC: 3.9 G/DL (ref 3.4–5)
ALP SERPL-CCNC: 108 U/L (ref 40–150)
ALT SERPL W P-5'-P-CCNC: 26 U/L (ref 0–70)
ANION GAP SERPL CALCULATED.3IONS-SCNC: 9 MMOL/L (ref 3–14)
AST SERPL W P-5'-P-CCNC: 25 U/L (ref 0–45)
BILIRUB SERPL-MCNC: 3.4 MG/DL (ref 0.2–1.3)
BUN SERPL-MCNC: 10 MG/DL (ref 7–30)
CALCIUM SERPL-MCNC: 9.4 MG/DL (ref 8.5–10.1)
CHLORIDE SERPL-SCNC: 109 MMOL/L (ref 94–109)
CO2 SERPL-SCNC: 24 MMOL/L (ref 20–32)
CREAT SERPL-MCNC: 1.1 MG/DL (ref 0.66–1.25)
GFR SERPL CREATININE-BSD FRML MDRD: 64 ML/MIN/{1.73_M2}
GLUCOSE SERPL-MCNC: 91 MG/DL (ref 70–99)
POTASSIUM SERPL-SCNC: 3.6 MMOL/L (ref 3.4–5.3)
PROT SERPL-MCNC: 7.3 G/DL (ref 6.8–8.8)
SODIUM SERPL-SCNC: 142 MMOL/L (ref 133–144)

## 2019-07-23 PROCEDURE — 80053 COMPREHEN METABOLIC PANEL: CPT | Performed by: INTERNAL MEDICINE

## 2019-07-23 PROCEDURE — 99215 OFFICE O/P EST HI 40 MIN: CPT | Performed by: INTERNAL MEDICINE

## 2019-07-23 PROCEDURE — 36415 COLL VENOUS BLD VENIPUNCTURE: CPT | Performed by: INTERNAL MEDICINE

## 2019-07-23 RX ORDER — FUROSEMIDE 40 MG
40 TABLET ORAL 2 TIMES DAILY
Qty: 60 TABLET | Refills: 2 | Status: SHIPPED | OUTPATIENT
Start: 2019-07-23 | End: 2019-08-14

## 2019-07-23 ASSESSMENT — MIFFLIN-ST. JEOR: SCORE: 1999.05

## 2019-07-23 NOTE — PROGRESS NOTES
"Subjective     Chidi Dubon is a 78 year old male who presents to clinic today for the following health issues:      Chief Complaint   Patient presents with     Results     discuss ECHO     Chest Pain       HPI   CHEST PAIN     Onset: 7/11    Description:   Location:  left side  Character: achey  Radiation: none  Duration: intermittent     Intensity: mild    Progression of Symptoms:  improving and intermittent    Accompanying Signs & Symptoms:  Shortness of breath: YES  Sweating: no  Nausea/vomiting: no  Lightheadedness: no  Palpitations: no  Fever/Chills: no  Cough: no  Heartburn: no    History:   Family history of heart disease YES- grandfather, older sibling  Tobacco use: no    Precipitating factors:   Worse with exertion: no  Worse with deep breaths :  no  Related to food: no    Alleviating factors:  Nothing tried       Therapies Tried and outcome: none    1. Atrial Fibrillation    2. Non-rheumatic mitral regurgitation    3. Benign essential hypertension    4. Chronic heart failure with preserved ejection fraction (H)    5. Coronary artery disease involving native heart with angina pectoris, unspecified vessel or lesion type (H)    6. Hyperlipidemia LDL goal <70      Endorses significant dietary sodium indiscretion with hot dogs over the summer thus far.  Otherwise feeling well but anxious about TTE results.    PMH: Updated and/or reviewed in chart.    ROS:  Constitutional, HEENT, cardiovascular, pulmonary, gi and gu systems are otherwise negative.    Objective   OBJECTIVE:                                                    /73   Pulse 51   Temp 97  F (36.1  C) (Tympanic)   Resp 16   Ht 1.905 m (6' 3\")   Wt 118.8 kg (262 lb)   SpO2 91%   BMI 32.75 kg/m      GEN: No acute distress  EYES: No conjunctival injection or icterus, EOMI grossly intact  RESP: Unlabored, regular, clear to auscultation bilaterally  COR: regular rate and rhythm, 2+ edema to below knees bilaterally, pillowy edema in dorsal " feet  ABDO: obese, non-tender   BACK: no deformity or CVA tenderness  NEURO: Normal gait, MAEx4, light touch sensation grossly intact  PSYCH: Normal mood and affect      Results for orders placed or performed in visit on 07/23/19   Comprehensive metabolic panel   Result Value Ref Range    Sodium 142 133 - 144 mmol/L    Potassium 3.6 3.4 - 5.3 mmol/L    Chloride 109 94 - 109 mmol/L    Carbon Dioxide 24 20 - 32 mmol/L    Anion Gap 9 3 - 14 mmol/L    Glucose 91 70 - 99 mg/dL    Urea Nitrogen 10 7 - 30 mg/dL    Creatinine 1.10 0.66 - 1.25 mg/dL    GFR Estimate 64 >60 mL/min/[1.73_m2]    GFR Estimate If Black 74 >60 mL/min/[1.73_m2]    Calcium 9.4 8.5 - 10.1 mg/dL    Bilirubin Total 3.4 (H) 0.2 - 1.3 mg/dL    Albumin 3.9 3.4 - 5.0 g/dL    Protein Total 7.3 6.8 - 8.8 g/dL    Alkaline Phosphatase 108 40 - 150 U/L    ALT 26 0 - 70 U/L    AST 25 0 - 45 U/L      Assessment & Plan   ASSESSMENT/PLAN:                                                    2. Mitral regurgitation, unspecified type  We discussed risks and benefits of MAXIMO as next step in evaluation of significance which I promoted.  He elected to discuss further with cardiology for logistics, including timing (he has events he wants to attend this summer and does not have a reliable ride from his wife from a sedation event) and location.  No clear hemodynamic instability, and worsened lower extremity edema is explicable through dietary sodium indiscretion and amlodipine (see below).  - CARDIOLOGY EVAL ADULT REFERRAL    3. Benign essential hypertension  4. Chronic heart failure with preserved ejection fraction (H)  Optimal control but given lower extremity edema increase, stop amlodipine and increase furosemide to 40 mg twice daily.  Continue KCl.  Follow-up 2 weeks and reduce dietary sodium, wrap and continue to elevate legs.  May need furosemide increase vs torsemide.  - furosemide (LASIX) 40 MG tablet; Take 1 tablet (40 mg) by mouth 2 times daily  Dispense: 60  tablet; Refill: 2  - Comprehensive metabolic panel  - Basic metabolic panel; Future    1. Atrial Fibrillation  Rate well controlled without AVN blockade.  Anticoagulation with warfarin, tolerating well.    5. Coronary artery disease involving native heart with angina pectoris, unspecified vessel or lesion type (H)  6. Hyperlipidemia LDL goal <70  Quiescent - medically optimized other than beta-blocker but rate well controlled, as above and aspirin (on warfarin anticoagulation).    I spent a total of 40 minutes with the patient of which greater than 50% were devoted to counseling and coordination of care: MR, CHF.  Notably, his left lateral chest pain was consistent with chest wall pain from his substantiated report of costal manipulation from his technically difficult TTE.    Orders Placed This Encounter     Comprehensive metabolic panel     Basic metabolic panel     CARDIOLOGY EVAL ADULT REFERRAL     furosemide (LASIX) 40 MG tablet        Return in about 2 weeks (around 8/6/2019) for Hypertension follow-up.    Phu Gonzalez MD

## 2019-07-23 NOTE — LETTER
July 24, 2019      Chidi Duobn  [address] [facility]  [place] [address]        Dear ,    We are writing to inform you of your test results.    Your potassium normalized but your bilirubin level has increased somewhat over the last month. It could be related to a process also causing the leg swelling, so we will discuss in follow-up in about 2 weeks whether we should look separately at the liver or attribute it to a heart process and wait for the MAXIMO. Let me know if any difficulties scheduling with cardiology for further discussion in the weeks ahead.     Resulted Orders   Comprehensive metabolic panel   Result Value Ref Range    Sodium 142 133 - 144 mmol/L    Potassium 3.6 3.4 - 5.3 mmol/L    Chloride 109 94 - 109 mmol/L    Carbon Dioxide 24 20 - 32 mmol/L    Anion Gap 9 3 - 14 mmol/L    Glucose 91 70 - 99 mg/dL    Urea Nitrogen 10 7 - 30 mg/dL    Creatinine 1.10 0.66 - 1.25 mg/dL    GFR Estimate 64 >60 mL/min/[1.73_m2]      Comment:      Non  GFR Calc  Starting 12/18/2018, serum creatinine based estimated GFR (eGFR) will be   calculated using the Chronic Kidney Disease Epidemiology Collaboration   (CKD-EPI) equation.      GFR Estimate If Black 74 >60 mL/min/[1.73_m2]      Comment:       GFR Calc  Starting 12/18/2018, serum creatinine based estimated GFR (eGFR) will be   calculated using the Chronic Kidney Disease Epidemiology Collaboration   (CKD-EPI) equation.      Calcium 9.4 8.5 - 10.1 mg/dL    Bilirubin Total 3.4 (H) 0.2 - 1.3 mg/dL    Albumin 3.9 3.4 - 5.0 g/dL    Protein Total 7.3 6.8 - 8.8 g/dL    Alkaline Phosphatase 108 40 - 150 U/L    ALT 26 0 - 70 U/L    AST 25 0 - 45 U/L       If you have any questions or concerns, please call the clinic at the number listed above.       Sincerely,        Phu Gonzalez MD/jorge

## 2019-08-08 ENCOUNTER — ANTICOAGULATION THERAPY VISIT (OUTPATIENT)
Dept: NURSING | Facility: CLINIC | Age: 79
End: 2019-08-08
Payer: COMMERCIAL

## 2019-08-08 DIAGNOSIS — I48.20 CHRONIC ATRIAL FIBRILLATION (H): ICD-10-CM

## 2019-08-08 DIAGNOSIS — Z79.01 LONG TERM CURRENT USE OF ANTICOAGULANT THERAPY: ICD-10-CM

## 2019-08-08 LAB — INR POINT OF CARE: 2.6 (ref 0.86–1.14)

## 2019-08-08 PROCEDURE — 85610 PROTHROMBIN TIME: CPT | Mod: QW

## 2019-08-08 PROCEDURE — 99207 ZZC NO CHARGE NURSE ONLY: CPT

## 2019-08-08 PROCEDURE — 36416 COLLJ CAPILLARY BLOOD SPEC: CPT

## 2019-08-08 NOTE — PROGRESS NOTES
ANTICOAGULATION FOLLOW-UP CLINIC VISIT    Patient Name:  Chidi Dubon  Date:  2019  Contact Type:  Face to Face    SUBJECTIVE:  Patient Findings     Comments:   No problems        Clinical Outcomes     Comments:   No problems           OBJECTIVE    INR Protime   Date Value Ref Range Status   2019 2.6 (A) 0.86 - 1.14 Final       ASSESSMENT / PLAN  INR assessment THER    Recheck INR In: 4 WEEKS    INR Location Clinic      Anticoagulation Summary  As of 2019    INR goal:   2.0-3.0   TTR:   80.5 % (3.4 y)   INR used for dosin.6 (2019)   Warfarin maintenance plan:   2.5 mg (2.5 mg x 1) every day   Full warfarin instructions:   2.5 mg every day   Weekly warfarin total:   17.5 mg   No change documented:   Hyacinth Montano   Plan last modified:   Kari Montilla RN (2019)   Next INR check:   2019   Target end date:   Indefinite    Indications    Long-term (current) use of anticoagulants [Z79.01] [Z79.01]  Atrial Fibrillation [I48.2]             Anticoagulation Episode Summary     INR check location:       Preferred lab:       Send INR reminders to:   EMELIA SARGENT    Comments:         Anticoagulation Care Providers     Provider Role Specialty Phone number    Martin Schultz PA-C Responsible Physician Assistant [phone]            See the Encounter Report to view Anticoagulation Flowsheet and Dosing Calendar (Go to Encounters tab in chart review, and find the Anticoagulation Therapy Visit)    Dosage adjustment made based on physician directed care plan.    HYACINTH MONTANO

## 2019-08-14 ENCOUNTER — OFFICE VISIT (OUTPATIENT)
Dept: INTERNAL MEDICINE | Facility: CLINIC | Age: 79
End: 2019-08-14
Payer: COMMERCIAL

## 2019-08-14 VITALS
DIASTOLIC BLOOD PRESSURE: 79 MMHG | WEIGHT: 255 LBS | RESPIRATION RATE: 16 BRPM | HEIGHT: 75 IN | TEMPERATURE: 97.2 F | HEART RATE: 48 BPM | BODY MASS INDEX: 31.71 KG/M2 | OXYGEN SATURATION: 95 % | SYSTOLIC BLOOD PRESSURE: 146 MMHG

## 2019-08-14 DIAGNOSIS — I48.20 CHRONIC ATRIAL FIBRILLATION (H): ICD-10-CM

## 2019-08-14 DIAGNOSIS — I10 BENIGN ESSENTIAL HYPERTENSION: Primary | ICD-10-CM

## 2019-08-14 DIAGNOSIS — Z98.61 CAD S/P PERCUTANEOUS CORONARY ANGIOPLASTY: ICD-10-CM

## 2019-08-14 DIAGNOSIS — E80.6 BILIRUBINEMIA: ICD-10-CM

## 2019-08-14 DIAGNOSIS — I25.10 CAD S/P PERCUTANEOUS CORONARY ANGIOPLASTY: ICD-10-CM

## 2019-08-14 DIAGNOSIS — I50.32 CHRONIC HEART FAILURE WITH PRESERVED EJECTION FRACTION (H): ICD-10-CM

## 2019-08-14 DIAGNOSIS — I71.40 ABDOMINAL AORTIC ANEURYSM WITHOUT RUPTURE (H): ICD-10-CM

## 2019-08-14 DIAGNOSIS — I34.0 MITRAL VALVE INSUFFICIENCY, UNSPECIFIED ETIOLOGY: ICD-10-CM

## 2019-08-14 LAB
ALBUMIN SERPL-MCNC: 3.9 G/DL (ref 3.4–5)
ALBUMIN UR-MCNC: NEGATIVE MG/DL
ALP SERPL-CCNC: 113 U/L (ref 40–150)
ALT SERPL W P-5'-P-CCNC: 26 U/L (ref 0–70)
ANION GAP SERPL CALCULATED.3IONS-SCNC: 5 MMOL/L (ref 3–14)
APPEARANCE UR: CLEAR
AST SERPL W P-5'-P-CCNC: 20 U/L (ref 0–45)
BACTERIA #/AREA URNS HPF: ABNORMAL /HPF
BASOPHILS # BLD AUTO: 0 10E9/L (ref 0–0.2)
BASOPHILS NFR BLD AUTO: 0.3 %
BILIRUB DIRECT SERPL-MCNC: 0.5 MG/DL (ref 0–0.2)
BILIRUB SERPL-MCNC: 3.3 MG/DL (ref 0.2–1.3)
BILIRUB UR QL STRIP: NEGATIVE
BUN SERPL-MCNC: 14 MG/DL (ref 7–30)
CALCIUM SERPL-MCNC: 9.3 MG/DL (ref 8.5–10.1)
CHLORIDE SERPL-SCNC: 109 MMOL/L (ref 94–109)
CO2 SERPL-SCNC: 28 MMOL/L (ref 20–32)
COLOR UR AUTO: YELLOW
CREAT SERPL-MCNC: 1.09 MG/DL (ref 0.66–1.25)
DIFFERENTIAL METHOD BLD: NORMAL
EOSINOPHIL # BLD AUTO: 0.1 10E9/L (ref 0–0.7)
EOSINOPHIL NFR BLD AUTO: 1.4 %
ERYTHROCYTE [DISTWIDTH] IN BLOOD BY AUTOMATED COUNT: 14.5 % (ref 10–15)
GFR SERPL CREATININE-BSD FRML MDRD: 65 ML/MIN/{1.73_M2}
GLUCOSE SERPL-MCNC: 94 MG/DL (ref 70–99)
GLUCOSE UR STRIP-MCNC: NEGATIVE MG/DL
HCT VFR BLD AUTO: 46 % (ref 40–53)
HGB BLD-MCNC: 15.3 G/DL (ref 13.3–17.7)
HGB UR QL STRIP: ABNORMAL
KETONES UR STRIP-MCNC: NEGATIVE MG/DL
LEUKOCYTE ESTERASE UR QL STRIP: NEGATIVE
LYMPHOCYTES # BLD AUTO: 1.7 10E9/L (ref 0.8–5.3)
LYMPHOCYTES NFR BLD AUTO: 23.5 %
MCH RBC QN AUTO: 29.1 PG (ref 26.5–33)
MCHC RBC AUTO-ENTMCNC: 33.3 G/DL (ref 31.5–36.5)
MCV RBC AUTO: 88 FL (ref 78–100)
MONOCYTES # BLD AUTO: 0.6 10E9/L (ref 0–1.3)
MONOCYTES NFR BLD AUTO: 8.7 %
NEUTROPHILS # BLD AUTO: 4.9 10E9/L (ref 1.6–8.3)
NEUTROPHILS NFR BLD AUTO: 66.1 %
NITRATE UR QL: NEGATIVE
PH UR STRIP: 5.5 PH (ref 5–7)
PLATELET # BLD AUTO: 195 10E9/L (ref 150–450)
POTASSIUM SERPL-SCNC: 3.7 MMOL/L (ref 3.4–5.3)
PROT SERPL-MCNC: 7.5 G/DL (ref 6.8–8.8)
RBC # BLD AUTO: 5.25 10E12/L (ref 4.4–5.9)
RBC #/AREA URNS AUTO: ABNORMAL /HPF
SODIUM SERPL-SCNC: 142 MMOL/L (ref 133–144)
SOURCE: ABNORMAL
SP GR UR STRIP: 1.01 (ref 1–1.03)
TSH SERPL DL<=0.005 MIU/L-ACNC: 2 MU/L (ref 0.4–4)
UROBILINOGEN UR STRIP-ACNC: 0.2 EU/DL (ref 0.2–1)
WBC # BLD AUTO: 7.4 10E9/L (ref 4–11)
WBC #/AREA URNS AUTO: ABNORMAL /HPF

## 2019-08-14 PROCEDURE — 80076 HEPATIC FUNCTION PANEL: CPT | Performed by: INTERNAL MEDICINE

## 2019-08-14 PROCEDURE — 85025 COMPLETE CBC W/AUTO DIFF WBC: CPT | Performed by: INTERNAL MEDICINE

## 2019-08-14 PROCEDURE — 81001 URINALYSIS AUTO W/SCOPE: CPT | Performed by: INTERNAL MEDICINE

## 2019-08-14 PROCEDURE — 80048 BASIC METABOLIC PNL TOTAL CA: CPT | Performed by: INTERNAL MEDICINE

## 2019-08-14 PROCEDURE — 84443 ASSAY THYROID STIM HORMONE: CPT | Performed by: INTERNAL MEDICINE

## 2019-08-14 PROCEDURE — 36415 COLL VENOUS BLD VENIPUNCTURE: CPT | Performed by: INTERNAL MEDICINE

## 2019-08-14 PROCEDURE — 99214 OFFICE O/P EST MOD 30 MIN: CPT | Performed by: INTERNAL MEDICINE

## 2019-08-14 RX ORDER — LOSARTAN POTASSIUM 100 MG/1
100 TABLET ORAL DAILY
Qty: 90 TABLET | Refills: 3 | Status: SHIPPED | OUTPATIENT
Start: 2019-08-14 | End: 2020-07-29

## 2019-08-14 RX ORDER — FUROSEMIDE 20 MG
20 TABLET ORAL 2 TIMES DAILY
COMMUNITY
Start: 2019-08-14 | End: 2020-05-08

## 2019-08-14 ASSESSMENT — MIFFLIN-ST. JEOR: SCORE: 1967.3

## 2019-08-14 NOTE — PROGRESS NOTES
"Subjective     Chidi Dubon is a 78 year old male who presents to clinic today for the following health issues:    HPI   Hypertension Follow-up      Do you check your blood pressure regularly outside of the clinic? Yes     Are you following a low salt diet? Yes    Are your blood pressures ever more than 140 on the top number (systolic) OR more   than 90 on the bottom number (diastolic), for example 140/90? Yes      How many servings of fruits and vegetables do you eat daily?  0-1    On average, how many sweetened beverages do you drink each day (soda, juice, sweet tea, etc)?   2    How many days per week do you miss taking your medication? 0    Denies chest pain, shortness of breath, abdominal pain, nausea, vomiting, diarrhea, hematochezia, melena, acholic stool, jaundice, fatigue beyond baseline.    1. Benign essential hypertension    2. Mitral valve insufficiency, unspecified etiology    3. Bilirubinemia    4. Chronic heart failure with preserved ejection fraction (H)    5. Atrial Fibrillation    6. CAD S/P percutaneous coronary angioplasty        PMH: Updated and/or reviewed in chart.    ROS:  Constitutional, cardiovascular, pulmonary, gi and gu systems are otherwise negative.    Objective   OBJECTIVE:                                                    BP (!) 146/79   Pulse (!) 48   Temp 97.2  F (36.2  C) (Tympanic)   Resp 16   Ht 1.905 m (6' 3\")   Wt 115.7 kg (255 lb)   SpO2 95%   BMI 31.87 kg/m      GEN: No acute distress, obese gentleman  EYES: No conjunctival injection or icterus, EOMI grossly intact  RESP: Unlabored, regular  NEURO: Normal gait, MAEx4, light touch sensation grossly intact  PSYCH: Normal mood and affect      Results for orders placed or performed in visit on 08/14/19   Basic metabolic panel   Result Value Ref Range    Sodium 142 133 - 144 mmol/L    Potassium 3.7 3.4 - 5.3 mmol/L    Chloride 109 94 - 109 mmol/L    Carbon Dioxide 28 20 - 32 mmol/L    Anion Gap 5 3 - 14 mmol/L    " Glucose 94 70 - 99 mg/dL    Urea Nitrogen 14 7 - 30 mg/dL    Creatinine 1.09 0.66 - 1.25 mg/dL    GFR Estimate 65 >60 mL/min/[1.73_m2]    GFR Estimate If Black 75 >60 mL/min/[1.73_m2]    Calcium 9.3 8.5 - 10.1 mg/dL   Hepatic panel   Result Value Ref Range    Bilirubin Direct 0.5 (H) 0.0 - 0.2 mg/dL    Bilirubin Total 3.3 (H) 0.2 - 1.3 mg/dL    Albumin 3.9 3.4 - 5.0 g/dL    Protein Total 7.5 6.8 - 8.8 g/dL    Alkaline Phosphatase 113 40 - 150 U/L    ALT 26 0 - 70 U/L    AST 20 0 - 45 U/L   UA reflex to Microscopic and Culture   Result Value Ref Range    Color Urine Yellow     Appearance Urine Clear     Glucose Urine Negative NEG^Negative mg/dL    Bilirubin Urine Negative NEG^Negative    Ketones Urine Negative NEG^Negative mg/dL    Specific Gravity Urine 1.015 1.003 - 1.035    Blood Urine Trace (A) NEG^Negative    pH Urine 5.5 5.0 - 7.0 pH    Protein Albumin Urine Negative NEG^Negative mg/dL    Urobilinogen Urine 0.2 0.2 - 1.0 EU/dL    Nitrite Urine Negative NEG^Negative    Leukocyte Esterase Urine Negative NEG^Negative    Source Midstream Urine    TSH with free T4 reflex   Result Value Ref Range    TSH 2.00 0.40 - 4.00 mU/L   CBC with platelets differential   Result Value Ref Range    WBC 7.4 4.0 - 11.0 10e9/L    RBC Count 5.25 4.4 - 5.9 10e12/L    Hemoglobin 15.3 13.3 - 17.7 g/dL    Hematocrit 46.0 40.0 - 53.0 %    MCV 88 78 - 100 fl    MCH 29.1 26.5 - 33.0 pg    MCHC 33.3 31.5 - 36.5 g/dL    RDW 14.5 10.0 - 15.0 %    Platelet Count 195 150 - 450 10e9/L    % Neutrophils 66.1 %    % Lymphocytes 23.5 %    % Monocytes 8.7 %    % Eosinophils 1.4 %    % Basophils 0.3 %    Absolute Neutrophil 4.9 1.6 - 8.3 10e9/L    Absolute Lymphocytes 1.7 0.8 - 5.3 10e9/L    Absolute Monocytes 0.6 0.0 - 1.3 10e9/L    Absolute Eosinophils 0.1 0.0 - 0.7 10e9/L    Absolute Basophils 0.0 0.0 - 0.2 10e9/L    Diff Method Automated Method    Urine Microscopic   Result Value Ref Range    WBC Urine 0 - 5 OTO5^0 - 5 /HPF    RBC Urine O - 2  OTO2^O - 2 /HPF    Bacteria Urine Few (A) NEG^Negative /HPF      Assessment & Plan   ASSESSMENT/PLAN:                                                    1. Benign essential hypertension  Not at goal - unclear as has been doing well previously.  We agreed to follow-up and continue to monitor without medication changes at this time.  - furosemide (LASIX) 20 MG tablet; Take 1 tablet (20 mg) by mouth daily  - losartan (COZAAR) 100 MG tablet; Take 1 tablet (100 mg) by mouth daily  Dispense: 90 tablet; Refill: 3  - Basic metabolic panel  - Hepatic panel  - UA reflex to Microscopic and Culture  - TSH with free T4 reflex  - CBC with platelets differential  - Urine Microscopic    4. Chronic heart failure with preserved ejection fraction (H)  2. Mitral valve insufficiency, unspecified etiology  We discussed his TTE results again and the rationale behind MAXIMO for better assessment of mitral valve    3. Bilirubinemia  Indirect/unconjugated bili first identified 2013 around 2.0 at time of atrial fibrillation and warfarin anticoagulation initiation on chart review.  Now elevated around 3.5.  Possibly related to hemolysis but hemoglobin remains robust.  No evidence hyperthyroidism, hepatitis, cholestasis, cirrhosis by laboratory evaluation.  May be drug effect from loop diuretic.  Benign elevation not ruled out.    5. Atrial Fibrillation with slow ventricular response  6. CAD S/P percutaneous coronary angioplasty  Cameron ongoing without AVNB - warfarin anticoagulation OK     AAA -- follow-up ultrasound     Orders Placed This Encounter     Basic metabolic panel     Hepatic panel     UA reflex to Microscopic and Culture     TSH with free T4 reflex     CBC with platelets differential     Urine Microscopic     furosemide (LASIX) 20 MG tablet     losartan (COZAAR) 100 MG tablet        Return in about 4 weeks (around 9/11/2019) for Hypertension follow-up.    I spent a total of 42 minutes with the patient of which greater than 50% were  devoted to counseling and coordination of care: bilirubin elevation, mitral regurg, hypertension, sunny.      Phu Gonzaelz MD

## 2019-08-14 NOTE — LETTER
August 23, 2019      Chidi Dubon  [address] [place] [address]        [place],     Laboratory evaluation summary:      Urinalysis normal      Thyroid function within normal limits      Hemoglobin, white cells, and platelets within normal limits      Electrolytes, kidney, and liver function within normal limits except bilirubin level remains stable but elevated at about 3.5.  On further review, your baseline since starting on warfarin has been about 2.0, though your levels were normal (1.0 or so) years before that.  I don't see another obvious cause of this bilirubin elevation, so we'll continue to monitor it for now.      It looks like you're due for a fasting ultrasound to recheck the blood vessel in your abdomen we call AAA.     Resulted Orders   Basic metabolic panel   Result Value Ref Range    Sodium 142 133 - 144 mmol/L    Potassium 3.7 3.4 - 5.3 mmol/L    Chloride 109 94 - 109 mmol/L    Carbon Dioxide 28 20 - 32 mmol/L    Anion Gap 5 3 - 14 mmol/L    Glucose 94 70 - 99 mg/dL    Urea Nitrogen 14 7 - 30 mg/dL    Creatinine 1.09 0.66 - 1.25 mg/dL    GFR Estimate 65 >60 mL/min/[1.73_m2]      Comment:      Non  GFR Calc  Starting 12/18/2018, serum creatinine based estimated GFR (eGFR) will be   calculated using the Chronic Kidney Disease Epidemiology Collaboration   (CKD-EPI) equation.      GFR Estimate If Black 75 >60 mL/min/[1.73_m2]      Comment:       GFR Calc  Starting 12/18/2018, serum creatinine based estimated GFR (eGFR) will be   calculated using the Chronic Kidney Disease Epidemiology Collaboration   (CKD-EPI) equation.      Calcium 9.3 8.5 - 10.1 mg/dL   Hepatic panel   Result Value Ref Range    Bilirubin Direct 0.5 (H) 0.0 - 0.2 mg/dL    Bilirubin Total 3.3 (H) 0.2 - 1.3 mg/dL    Albumin 3.9 3.4 - 5.0 g/dL    Protein Total 7.5 6.8 - 8.8 g/dL    Alkaline Phosphatase 113 40 - 150 U/L    ALT 26 0 - 70 U/L    AST 20 0 - 45 U/L   UA reflex to Microscopic and  Culture   Result Value Ref Range    Color Urine Yellow     Appearance Urine Clear     Glucose Urine Negative NEG^Negative mg/dL    Bilirubin Urine Negative NEG^Negative    Ketones Urine Negative NEG^Negative mg/dL    Specific Gravity Urine 1.015 1.003 - 1.035    Blood Urine Trace (A) NEG^Negative    pH Urine 5.5 5.0 - 7.0 pH    Protein Albumin Urine Negative NEG^Negative mg/dL    Urobilinogen Urine 0.2 0.2 - 1.0 EU/dL    Nitrite Urine Negative NEG^Negative    Leukocyte Esterase Urine Negative NEG^Negative    Source Midstream Urine    TSH with free T4 reflex   Result Value Ref Range    TSH 2.00 0.40 - 4.00 mU/L   CBC with platelets differential   Result Value Ref Range    WBC 7.4 4.0 - 11.0 10e9/L    RBC Count 5.25 4.4 - 5.9 10e12/L    Hemoglobin 15.3 13.3 - 17.7 g/dL    Hematocrit 46.0 40.0 - 53.0 %    MCV 88 78 - 100 fl    MCH 29.1 26.5 - 33.0 pg    MCHC 33.3 31.5 - 36.5 g/dL    RDW 14.5 10.0 - 15.0 %    Platelet Count 195 150 - 450 10e9/L    % Neutrophils 66.1 %    % Lymphocytes 23.5 %    % Monocytes 8.7 %    % Eosinophils 1.4 %    % Basophils 0.3 %    Absolute Neutrophil 4.9 1.6 - 8.3 10e9/L    Absolute Lymphocytes 1.7 0.8 - 5.3 10e9/L    Absolute Monocytes 0.6 0.0 - 1.3 10e9/L    Absolute Eosinophils 0.1 0.0 - 0.7 10e9/L    Absolute Basophils 0.0 0.0 - 0.2 10e9/L    Diff Method Automated Method    Urine Microscopic   Result Value Ref Range    WBC Urine 0 - 5 OTO5^0 - 5 /HPF    RBC Urine O - 2 OTO2^O - 2 /HPF    Bacteria Urine Few (A) NEG^Negative /HPF       If you have any questions or concerns, please call the clinic at the number listed above.       Sincerely,    Phu Gonzalez MD/jocelyn

## 2019-08-18 RX ORDER — AMLODIPINE BESYLATE 5 MG/1
10 TABLET ORAL DAILY
Refills: 3 | COMMUNITY
Start: 2019-06-20 | End: 2021-03-03

## 2019-08-22 ENCOUNTER — TELEPHONE (OUTPATIENT)
Dept: INTERNAL MEDICINE | Facility: CLINIC | Age: 79
End: 2019-08-22
Payer: COMMERCIAL

## 2019-08-22 DIAGNOSIS — Z13.6 ENCOUNTER FOR ABDOMINAL AORTIC ANEURYSM (AAA) SCREENING: Primary | ICD-10-CM

## 2019-08-22 NOTE — TELEPHONE ENCOUNTER
Spoke with patient in regards to scheduling his AAA ultrasound. He states he was just seen at [facility] on 08/21/19 for a device check and following up with his cardiologist on 09/26/19. Patient plans on discussing the US at his appointment on 09/18/19 w/,declines scheduling the AAA for now.

## 2019-08-22 NOTE — TELEPHONE ENCOUNTER
Jessie-clarification needed on the message regarding AAA US:    Phu Gonzalez MD  P Be  Pool             Due for AAA ultrasound follow-up (ordered after his visit and not directly discussed)      Does patient need a follow up AAA, or clinic appt? He is scheduled for a wellness visit w/ on 09/18/19. AAA order pended.

## 2019-09-05 ENCOUNTER — ANTICOAGULATION THERAPY VISIT (OUTPATIENT)
Dept: NURSING | Facility: CLINIC | Age: 79
End: 2019-09-05
Payer: COMMERCIAL

## 2019-09-05 DIAGNOSIS — I48.20 CHRONIC ATRIAL FIBRILLATION (H): ICD-10-CM

## 2019-09-05 DIAGNOSIS — Z79.01 LONG TERM CURRENT USE OF ANTICOAGULANT THERAPY: ICD-10-CM

## 2019-09-05 LAB — INR POINT OF CARE: 3.1 (ref 0.86–1.14)

## 2019-09-05 PROCEDURE — 85610 PROTHROMBIN TIME: CPT | Mod: QW

## 2019-09-05 PROCEDURE — 99207 ZZC NO CHARGE NURSE ONLY: CPT

## 2019-09-05 PROCEDURE — 36416 COLLJ CAPILLARY BLOOD SPEC: CPT

## 2019-09-05 NOTE — PROGRESS NOTES
ANTICOAGULATION FOLLOW-UP CLINIC VISIT    Patient Name:  Chidi Dubon  Date:  9/5/2019  Contact Type:  Face to Face    SUBJECTIVE:  Patient Findings     Comments:   No bleeding or clotting issues. He has some muscle stiffness and soreness due to extra yard work.  He wants to return in 4 weeks not 2 weeks. Denies injury        Clinical Outcomes     Comments:   No bleeding or clotting issues. He has some muscle stiffness and soreness due to extra yard work.  He wants to return in 4 weeks not 2 weeks. Denies injury           OBJECTIVE    INR Protime   Date Value Ref Range Status   09/05/2019 3.1 (A) 0.86 - 1.14 Final       ASSESSMENT / PLAN  INR assessment SUPRA    Recheck INR In: 4 WEEKS per patient request   INR Location Clinic      Anticoagulation Summary  As of 9/5/2019    INR goal:   2.0-3.0   TTR:   80.5 % (3.5 y)   INR used for dosing:   3.1! (9/5/2019)   Warfarin maintenance plan:   2.5 mg (2.5 mg x 1) every day   Full warfarin instructions:   2.5 mg every day   Weekly warfarin total:   17.5 mg   No change documented:   Hyacinth Montano   Plan last modified:   Kari Montilla RN (1/9/2019)   Next INR check:   10/3/2019   Target end date:   Indefinite    Indications    Long-term (current) use of anticoagulants [Z79.01] [Z79.01]  Atrial Fibrillation [I48.2]             Anticoagulation Episode Summary     INR check location:       Preferred lab:       Send INR reminders to:   EMELIA SARGENT    Comments:         Anticoagulation Care Providers     Provider Role Specialty Phone number    Martin Schultz PA-C Responsible Physician Assistant [phone]            See the Encounter Report to view Anticoagulation Flowsheet and Dosing Calendar (Go to Encounters tab in chart review, and find the Anticoagulation Therapy Visit)    Dosage adjustment made based on physician directed care plan.    HYACINTH MONTANO

## 2019-09-18 ENCOUNTER — ANCILLARY PROCEDURE (OUTPATIENT)
Dept: ULTRASOUND IMAGING | Facility: CLINIC | Age: 79
End: 2019-09-18
Attending: PHYSICIAN ASSISTANT
Payer: COMMERCIAL

## 2019-09-18 ENCOUNTER — OFFICE VISIT (OUTPATIENT)
Dept: FAMILY MEDICINE | Facility: CLINIC | Age: 79
End: 2019-09-18
Payer: COMMERCIAL

## 2019-09-18 VITALS
BODY MASS INDEX: 31.45 KG/M2 | SYSTOLIC BLOOD PRESSURE: 138 MMHG | RESPIRATION RATE: 20 BRPM | OXYGEN SATURATION: 95 % | HEART RATE: 65 BPM | WEIGHT: 251.6 LBS | DIASTOLIC BLOOD PRESSURE: 70 MMHG

## 2019-09-18 DIAGNOSIS — E87.6 HYPOKALEMIA: ICD-10-CM

## 2019-09-18 DIAGNOSIS — R17 ELEVATED BILIRUBIN: Primary | ICD-10-CM

## 2019-09-18 DIAGNOSIS — R17 ELEVATED BILIRUBIN: ICD-10-CM

## 2019-09-18 DIAGNOSIS — E78.5 HYPERLIPIDEMIA LDL GOAL <70: ICD-10-CM

## 2019-09-18 DIAGNOSIS — I10 BENIGN ESSENTIAL HYPERTENSION: ICD-10-CM

## 2019-09-18 LAB
ALBUMIN SERPL-MCNC: 3.9 G/DL (ref 3.4–5)
ALP SERPL-CCNC: 122 U/L (ref 40–150)
ALT SERPL W P-5'-P-CCNC: 22 U/L (ref 0–70)
ANION GAP SERPL CALCULATED.3IONS-SCNC: 8 MMOL/L (ref 3–14)
AST SERPL W P-5'-P-CCNC: 24 U/L (ref 0–45)
BILIRUB SERPL-MCNC: 3.8 MG/DL (ref 0.2–1.3)
BUN SERPL-MCNC: 9 MG/DL (ref 7–30)
CALCIUM SERPL-MCNC: 9.2 MG/DL (ref 8.5–10.1)
CHLORIDE SERPL-SCNC: 108 MMOL/L (ref 94–109)
CO2 SERPL-SCNC: 27 MMOL/L (ref 20–32)
CREAT SERPL-MCNC: 1.07 MG/DL (ref 0.66–1.25)
GFR SERPL CREATININE-BSD FRML MDRD: 66 ML/MIN/{1.73_M2}
GLUCOSE SERPL-MCNC: 96 MG/DL (ref 70–99)
POTASSIUM SERPL-SCNC: 3.2 MMOL/L (ref 3.4–5.3)
PROT SERPL-MCNC: 7.6 G/DL (ref 6.8–8.8)
SODIUM SERPL-SCNC: 143 MMOL/L (ref 133–144)

## 2019-09-18 PROCEDURE — 36415 COLL VENOUS BLD VENIPUNCTURE: CPT | Performed by: PHYSICIAN ASSISTANT

## 2019-09-18 PROCEDURE — 99214 OFFICE O/P EST MOD 30 MIN: CPT | Performed by: PHYSICIAN ASSISTANT

## 2019-09-18 PROCEDURE — 76705 ECHO EXAM OF ABDOMEN: CPT

## 2019-09-18 PROCEDURE — 80053 COMPREHEN METABOLIC PANEL: CPT | Performed by: PHYSICIAN ASSISTANT

## 2019-09-18 RX ORDER — POTASSIUM CHLORIDE 1.5 G/1.58G
20 POWDER, FOR SOLUTION ORAL DAILY
Qty: 90 TABLET | Refills: 3 | Status: SHIPPED | OUTPATIENT
Start: 2019-09-18 | End: 2020-08-17

## 2019-09-18 RX ORDER — ATORVASTATIN CALCIUM 80 MG/1
TABLET, FILM COATED ORAL
Qty: 45 TABLET | Refills: 3 | Status: SHIPPED | OUTPATIENT
Start: 2019-09-18 | End: 2020-08-17

## 2019-09-18 NOTE — LETTER
September 24, 2019      Chidi Dubon  [address]  [place] [address]        Dear Chidi,    We are writing to inform you of your test results and have tried to reach you by phone:    Your potassium level came back a little low. Increase your potassium intake by bumping your supplementation to two packets daily. Your bilirubin level cinthia slightly to 3.8.     I'd recommend a seeing GI to discuss further evaluation.      Resulted Orders   Comprehensive metabolic panel   Result Value Ref Range    Sodium 143 133 - 144 mmol/L    Potassium 3.2 (L) 3.4 - 5.3 mmol/L    Chloride 108 94 - 109 mmol/L    Carbon Dioxide 27 20 - 32 mmol/L    Anion Gap 8 3 - 14 mmol/L    Glucose 96 70 - 99 mg/dL    Urea Nitrogen 9 7 - 30 mg/dL    Creatinine 1.07 0.66 - 1.25 mg/dL    GFR Estimate 66 >60 mL/min/[1.73_m2]      Comment:      Non  GFR Calc  Starting 12/18/2018, serum creatinine based estimated GFR (eGFR) will be   calculated using the Chronic Kidney Disease Epidemiology Collaboration   (CKD-EPI) equation.      GFR Estimate If Black 77 >60 mL/min/[1.73_m2]      Comment:       GFR Calc  Starting 12/18/2018, serum creatinine based estimated GFR (eGFR) will be   calculated using the Chronic Kidney Disease Epidemiology Collaboration   (CKD-EPI) equation.      Calcium 9.2 8.5 - 10.1 mg/dL    Bilirubin Total 3.8 (H) 0.2 - 1.3 mg/dL    Albumin 3.9 3.4 - 5.0 g/dL    Protein Total 7.6 6.8 - 8.8 g/dL    Alkaline Phosphatase 122 40 - 150 U/L    ALT 22 0 - 70 U/L    AST 24 0 - 45 U/L       If you have any questions or concerns, please call the clinic at the number listed above.       Sincerely,        Martin Schultz PA-C

## 2019-09-24 NOTE — TELEPHONE ENCOUNTER
amand  RECORDS RECEIVED FROM: Internal - Elevated bilirubin, per pt, referral and records in epic   DATE RECEIVED: 10.07.2019   NOTES STATUS DETAILS   OFFICE NOTE from referring provider Internal 09.19.2019    OFFICE NOTES from other specialists N/A    DISCHARGE SUMMARY from hospital N/A    MEDICATION LIST Internal  Care Everywhere    LIVER BIOSPY (IF APPLICABLE)      PATHOLOGY REPORTS  N/A    IMAGING     ENDOSCOPY (IF AVAILABLE) Care Everywhere 11/15/2018 [facility]   COLONOSCOPY (IF AVAILABLE) Care Everywhere 11/15/2018 [facility]    ULTRASOUND LIVER Internal 09.18.2019   CT OF ABDOMEN N/A    MRI OF LIVER N/A    FIBROSCAN, US ELASTOGRAPHY, FIBROSIS SCAN, MR ELASTOGRAPHY N/A    LABS     HEPATIC PANEL (LIVER PANEL) Internal 08/14/2019   BASIC METABOLIC PANEL Internal 08/14/2019   COMPLETE METABOLIC PANEL N/A    COMPLETE BLOOD COUNT (CBC) Internal 08/14/2019   INTERNATIONAL NORMALIZED RATIO (INR) Internal 12/24/2018   HEPATITIS C ANTIBODY N/A    HEPATITIS C VIRAL LOAD/PCR N/A    HEPATITIS C GENOTYPE N/A    HEPATITIS B SURFACE ANTIGEN N/A    HEPATITIS B SURFACE ANTIBODY N/A    HEPATITIS B DNA QUANT LEVEL N/A    HEPATITIS B CORE ANTIBODY N/A

## 2019-10-03 ENCOUNTER — ANTICOAGULATION THERAPY VISIT (OUTPATIENT)
Dept: NURSING | Facility: CLINIC | Age: 79
End: 2019-10-03
Payer: COMMERCIAL

## 2019-10-03 DIAGNOSIS — I48.20 CHRONIC ATRIAL FIBRILLATION (H): ICD-10-CM

## 2019-10-03 DIAGNOSIS — Z79.01 LONG TERM CURRENT USE OF ANTICOAGULANT THERAPY: ICD-10-CM

## 2019-10-03 LAB — INR POINT OF CARE: 2.5 (ref 0.86–1.14)

## 2019-10-03 PROCEDURE — 99207 ZZC NO CHARGE NURSE ONLY: CPT

## 2019-10-03 PROCEDURE — 36416 COLLJ CAPILLARY BLOOD SPEC: CPT

## 2019-10-03 PROCEDURE — 85610 PROTHROMBIN TIME: CPT | Mod: QW

## 2019-10-03 NOTE — PROGRESS NOTES
ANTICOAGULATION FOLLOW-UP CLINIC VISIT    Patient Name:  Chidi Dubon  Date:  10/3/2019  Contact Type:  Face to Face    SUBJECTIVE:  Patient Findings     Comments:   Denies any problems        Clinical Outcomes     Comments:   Denies any problems           OBJECTIVE    INR Protime   Date Value Ref Range Status   10/03/2019 2.5 (A) 0.86 - 1.14 Final       ASSESSMENT / PLAN  INR assessment THER    Recheck INR In: 4 WEEKS    INR Location Clinic      Anticoagulation Summary  As of 10/3/2019    INR goal:   2.0-3.0   TTR:   80.6 % (3.6 y)   INR used for dosin.5 (10/3/2019)   Warfarin maintenance plan:   2.5 mg (2.5 mg x 1) every day   Full warfarin instructions:   2.5 mg every day   Weekly warfarin total:   17.5 mg   No change documented:   Hyacinth Montano   Plan last modified:   Kari Montilla RN (2019)   Next INR check:   10/31/2019   Target end date:   Indefinite    Indications    Long-term (current) use of anticoagulants [Z79.01] [Z79.01]  Atrial Fibrillation [I48.20]             Anticoagulation Episode Summary     INR check location:       Preferred lab:       Send INR reminders to:   EMELIA SARGENT    Comments:         Anticoagulation Care Providers     Provider Role Specialty Phone number    Martin Schultz PA-C Responsible Physician Assistant [phone]            See the Encounter Report to view Anticoagulation Flowsheet and Dosing Calendar (Go to Encounters tab in chart review, and find the Anticoagulation Therapy Visit)        HYACINTH MONTANO

## 2019-10-07 ENCOUNTER — PRE VISIT (OUTPATIENT)
Dept: GASTROENTEROLOGY | Facility: CLINIC | Age: 79
End: 2019-10-07

## 2019-10-16 ENCOUNTER — DOCUMENTATION ONLY (OUTPATIENT)
Dept: INTERNAL MEDICINE | Facility: CLINIC | Age: 79
End: 2019-10-16

## 2019-10-16 NOTE — PROGRESS NOTES
Patient was in clinic with wife asked to get his blood pressure check      B/P 150/85      Pulse 55

## 2019-10-31 ENCOUNTER — ANTICOAGULATION THERAPY VISIT (OUTPATIENT)
Dept: NURSING | Facility: CLINIC | Age: 79
End: 2019-10-31
Payer: COMMERCIAL

## 2019-10-31 ENCOUNTER — IMMUNIZATION (OUTPATIENT)
Dept: NURSING | Facility: CLINIC | Age: 79
End: 2019-10-31
Payer: COMMERCIAL

## 2019-10-31 DIAGNOSIS — Z79.01 LONG TERM CURRENT USE OF ANTICOAGULANT THERAPY: ICD-10-CM

## 2019-10-31 DIAGNOSIS — I48.20 CHRONIC ATRIAL FIBRILLATION (H): ICD-10-CM

## 2019-10-31 LAB — INR POINT OF CARE: 2.3 (ref 0.86–1.14)

## 2019-10-31 PROCEDURE — 90662 IIV NO PRSV INCREASED AG IM: CPT

## 2019-10-31 PROCEDURE — G0008 ADMIN INFLUENZA VIRUS VAC: HCPCS

## 2019-10-31 PROCEDURE — 99207 ZZC NO CHARGE NURSE ONLY: CPT

## 2019-10-31 PROCEDURE — 85610 PROTHROMBIN TIME: CPT | Mod: QW

## 2019-10-31 PROCEDURE — 36416 COLLJ CAPILLARY BLOOD SPEC: CPT

## 2019-10-31 NOTE — PROGRESS NOTES
ANTICOAGULATION FOLLOW-UP CLINIC VISIT    Patient Name:  Chidi Dubon  Date:  10/31/2019  Contact Type:  Face to Face    SUBJECTIVE:  Patient Findings     Comments:   Denies problems        Clinical Outcomes     Comments:   Denies problems           OBJECTIVE    INR Protime   Date Value Ref Range Status   10/31/2019 2.3 (A) 0.86 - 1.14 Final       ASSESSMENT / PLAN  INR assessment THER    Recheck INR In: 4 WEEKS    INR Location Clinic      Anticoagulation Summary  As of 10/31/2019    INR goal:   2.0-3.0   TTR:   81.0 % (3.7 y)   INR used for dosin.3 (10/31/2019)   Warfarin maintenance plan:   2.5 mg (2.5 mg x 1) every day   Full warfarin instructions:   2.5 mg every day   Weekly warfarin total:   17.5 mg   No change documented:   Hyacinth Montano   Plan last modified:   Kari Montilla RN (2019)   Next INR check:   2019   Target end date:   Indefinite    Indications    Long-term (current) use of anticoagulants [Z79.01] [Z79.01]  Atrial Fibrillation [I48.20]             Anticoagulation Episode Summary     INR check location:       Preferred lab:       Send INR reminders to:   EMELIA SARGENT    Comments:         Anticoagulation Care Providers     Provider Role Specialty Phone number    Martin Schultz PA-C Responsible Physician Assistant [phone]            See the Encounter Report to view Anticoagulation Flowsheet and Dosing Calendar (Go to Encounters tab in chart review, and find the Anticoagulation Therapy Visit)        HYACINTH MONTANO

## 2019-11-25 NOTE — PROGRESS NOTES
"SUBJECTIVE:   Chidi Dubon is a 79 year old male who presents for Preventive Visit.      Are you in the first 12 months of your Medicare Part B coverage?  No    Physical Health:    In general, how would you rate your overall physical health? good    Outside of work, how many days during the week do you exercise? 6-7 days/week    Outside of work, approximately how many minutes a day do you exercise?30-45 minutes    If you drink alcohol do you typically have >3 drinks per day or >7 drinks per week? No    Do you usually eat at least 4 servings of fruit and vegetables a day, include whole grains & fiber and avoid regularly eating high fat or \"junk\" foods? NO    Do you have any problems taking medications regularly?  No    Do you have any side effects from medications? not applicable    Needs assistance for the following daily activities: no assistance needed    Which of the following safety concerns are present in your home?  none identified     Hearing impairment: YES turning up the TV    In the past 6 months, have you been bothered by leaking of urine? no    Mental Health:    In general, how would you rate your overall mental or emotional health? good  PHQ-2 Score:      Do you feel safe in your environment? Yes    Have you ever done Advance Care Planning? (For example, a Health Directive, POLST, or a discussion with a medical provider or your loved ones about your wishes): Yes, patient states has an Advance Care Planning document and will bring a copy to the clinic.    Additional concerns to address?  No  Wife says he is looking yellow  Taking lasix causes his right kidney to hurt    Fall risk:  Fallen 2 or more times in the past year?: No  Any fall with injury in the past year?: No    Cognitive Screenin) Repeat 3 items (Leader, Season, Table)    2) Clock draw: NORMAL  3) 3 item recall: Recalls 2 objects   Results: NORMAL clock, 1-2 items recalled: COGNITIVE IMPAIRMENT LESS LIKELY    Mini-CogTM Copyright S " "Jordan. Licensed by the author for use in [facility]; reprinted with permission (jose@[facility]). All rights reserved.      Do you have sleep apnea, excessive snoring or daytime drowsiness?: no      Reviewed and updated as needed this visit by clinical staff  Tobacco  Allergies  Meds         Reviewed and updated as needed this visit by Provider        Social History     Tobacco Use     Smoking status: Never Smoker     Smokeless tobacco: Never Used     Tobacco comment: never smoker; non-smoking household   Substance Use Topics     Alcohol use: No     Comment: none                           Current providers sharing in care for this patient include:   Patient Care Team:  Phu Gonzalez MD as PCP - General (Internal Medicine)  Martin Schultz PA-C as Assigned PCP    The following health maintenance items are reviewed in Epic and correct as of today:  Health Maintenance   Topic Date Due     ZOSTER IMMUNIZATION (1 of 2) 10/13/1991     MEDICARE ANNUAL WELLNESS VISIT  10/13/2006     OP ANNUAL INR REFERRAL  08/23/2014     PHQ-9  10/26/2019     LIPID  02/18/2020     FALL RISK ASSESSMENT  02/25/2020     BMP  03/18/2020     CBC  08/14/2020     ALT  09/18/2020     CMP  09/18/2020     DTAP/TDAP/TD IMMUNIZATION (2 - Td) 01/04/2021     HF ACTION PLAN  06/19/2022     ADVANCE CARE PLANNING  05/01/2023     DEPRESSION ACTION PLAN  Completed     INFLUENZA VACCINE  Completed     PNEUMOCOCCAL IMMUNIZATION 65+ LOW/MEDIUM RISK  Completed     IPV IMMUNIZATION  Aged Out     MENINGITIS IMMUNIZATION  Aged Out     ROS:  Constitutional, HEENT, cardiovascular, pulmonary, GI, , musculoskeletal, neuro, skin, endocrine and psych systems are negative, except as otherwise noted.    OBJECTIVE:   BP (!) 162/78   Pulse 64   Temp 98.1  F (36.7  C) (Tympanic)   Resp 16   Ht 1.905 m (6' 3\")   Wt 115.7 kg (255 lb)   SpO2 95%   BMI 31.87 kg/m   Estimated body mass index is 31.87 kg/m  as calculated from the following:    Height as of " "this encounter: 1.905 m (6' 3\").    Weight as of this encounter: 115.7 kg (255 lb).  EXAM:   GENERAL: healthy, alert and no distress  EYES: Eyes grossly normal to inspection, PERRL and conjunctivae and sclerae normal  HENT: ear canals and TM's normal, nose and mouth without ulcers or lesions  NECK: no adenopathy, no asymmetry, masses, or scars and thyroid normal to palpation  RESP: lungs clear to auscultation - no rales, rhonchi or wheezes  CV: regular rate and rhythm, normal S1 S2, no S3 or S4, no murmur, click or rub, no peripheral edema and peripheral pulses strong  ABDOMEN: soft, nontender, no hepatosplenomegaly, no masses and bowel sounds normal  MS: no gross musculoskeletal defects noted, no edema  SKIN: no suspicious lesions or rashes on skin exposed for exam   NEURO: Normal strength and tone, mentation intact and speech normal  PSYCH: mentation appears normal, affect normal/bright    Diagnostic Test Results:  Labs reviewed in Epic    ASSESSMENT / PLAN:   1. Encounter for Medicare annual wellness exam    Adequate control of mood despite challenges with relationship with wife and her mood and memory issues.  He enjoys staying active.  Plans to move back to [place] in the nearer future.    Hypertension - need to recheck but isolated systolic hypertension of elderly <150 mg would be acceptable for age and co-morbidities.  Got lower extremity edema on amlodipine 5 -- we discussed trying 10 mg cautiously as he's had headache as well; we'd stop and trial carvedilol if symptoms worsening.  S/p PPM for sunny.    Hasn't been able to have MAXIMO yet nor GI follow-up for hyperbilirubinemia.    Recheck today as well as hypokalemia.  Has stones/sludge seen in  9/2019 ultrasound.    I spent a total of 25 minutes with the patient in addition to preventive services of which greater than 50% were devoted to counseling and coordination of care: hypertension.      COUNSELING:  Reviewed preventive health counseling, as reflected " "in patient instructions    Estimated body mass index is 31.87 kg/m  as calculated from the following:    Height as of this encounter: 1.905 m (6' 3\").    Weight as of this encounter: 115.7 kg (255 lb).    Weight management plan: Discussed healthy diet and exercise guidelines     reports that he has never smoked. He has never used smokeless tobacco.      Appropriate preventive services were discussed with this patient, including applicable screening as appropriate for cardiovascular disease, diabetes, osteopenia/osteoporosis, and glaucoma.  As appropriate for age/gender, discussed screening for colorectal cancer, prostate cancer, breast cancer, and cervical cancer. Checklist reviewing preventive services available has been given to the patient.    Reviewed patients plan of care and provided an AVS. The Basic Care Plan (routine screening as documented in Health Maintenance) for Chidi meets the Care Plan requirement. This Care Plan has been established and reviewed with the Patient.    Counseling Resources:  ATP IV Guidelines  Pooled Cohorts Equation Calculator  Breast Cancer Risk Calculator  FRAX Risk Assessment  ICSI Preventive Guidelines  Dietary Guidelines for Americans, 2010  USDA's MyPlate  ASA Prophylaxis  Lung CA Screening    Phu Gonzalez MD  [facility]  "

## 2019-11-25 NOTE — PATIENT INSTRUCTIONS
Patient Education   Personalized Prevention Plan  You are due for the preventive services outlined below.  Your care team is available to assist you in scheduling these services.  If you have already completed any of these items, please share that information with your care team to update in your medical record.  Health Maintenance Due   Topic Date Due     Zoster (Shingles) Vaccine (1 of 2) 10/13/1991     Annual Wellness Visit  10/13/2006     INR CLINIC REFERRAL - yearly  08/23/2014     Depression Assessment  10/26/2019

## 2019-11-26 ENCOUNTER — ANTICOAGULATION THERAPY VISIT (OUTPATIENT)
Dept: NURSING | Facility: CLINIC | Age: 79
End: 2019-11-26
Payer: COMMERCIAL

## 2019-11-26 ENCOUNTER — OFFICE VISIT (OUTPATIENT)
Dept: INTERNAL MEDICINE | Facility: CLINIC | Age: 79
End: 2019-11-26
Payer: COMMERCIAL

## 2019-11-26 VITALS
OXYGEN SATURATION: 95 % | WEIGHT: 255 LBS | HEIGHT: 75 IN | RESPIRATION RATE: 16 BRPM | TEMPERATURE: 98.1 F | DIASTOLIC BLOOD PRESSURE: 85 MMHG | BODY MASS INDEX: 31.71 KG/M2 | HEART RATE: 55 BPM | SYSTOLIC BLOOD PRESSURE: 156 MMHG

## 2019-11-26 DIAGNOSIS — Z00.00 ENCOUNTER FOR MEDICARE ANNUAL WELLNESS EXAM: Primary | ICD-10-CM

## 2019-11-26 DIAGNOSIS — Z79.01 LONG TERM CURRENT USE OF ANTICOAGULANT THERAPY: ICD-10-CM

## 2019-11-26 DIAGNOSIS — I48.20 CHRONIC ATRIAL FIBRILLATION (H): ICD-10-CM

## 2019-11-26 DIAGNOSIS — F32.0 MILD MAJOR DEPRESSION (H): ICD-10-CM

## 2019-11-26 DIAGNOSIS — E80.6 HYPERBILIRUBINEMIA: ICD-10-CM

## 2019-11-26 DIAGNOSIS — E87.6 HYPOKALEMIA: ICD-10-CM

## 2019-11-26 DIAGNOSIS — I10 BENIGN ESSENTIAL HYPERTENSION: ICD-10-CM

## 2019-11-26 LAB
ALBUMIN SERPL-MCNC: 4.1 G/DL (ref 3.4–5)
ALP SERPL-CCNC: 108 U/L (ref 40–150)
ALT SERPL W P-5'-P-CCNC: 20 U/L (ref 0–70)
ANION GAP SERPL CALCULATED.3IONS-SCNC: 5 MMOL/L (ref 3–14)
AST SERPL W P-5'-P-CCNC: 20 U/L (ref 0–45)
BILIRUB DIRECT SERPL-MCNC: 0.7 MG/DL (ref 0–0.2)
BILIRUB SERPL-MCNC: 3.5 MG/DL (ref 0.2–1.3)
BUN SERPL-MCNC: 14 MG/DL (ref 7–30)
CALCIUM SERPL-MCNC: 9.3 MG/DL (ref 8.5–10.1)
CHLORIDE SERPL-SCNC: 109 MMOL/L (ref 94–109)
CO2 SERPL-SCNC: 27 MMOL/L (ref 20–32)
CREAT SERPL-MCNC: 1.07 MG/DL (ref 0.66–1.25)
GFR SERPL CREATININE-BSD FRML MDRD: 66 ML/MIN/{1.73_M2}
GLUCOSE SERPL-MCNC: 96 MG/DL (ref 70–99)
INR POINT OF CARE: 2.3 (ref 0.86–1.14)
POTASSIUM SERPL-SCNC: 3.2 MMOL/L (ref 3.4–5.3)
PROT SERPL-MCNC: 7.8 G/DL (ref 6.8–8.8)
SODIUM SERPL-SCNC: 141 MMOL/L (ref 133–144)

## 2019-11-26 PROCEDURE — 99397 PER PM REEVAL EST PAT 65+ YR: CPT | Performed by: INTERNAL MEDICINE

## 2019-11-26 PROCEDURE — 80048 BASIC METABOLIC PNL TOTAL CA: CPT | Performed by: INTERNAL MEDICINE

## 2019-11-26 PROCEDURE — 36416 COLLJ CAPILLARY BLOOD SPEC: CPT

## 2019-11-26 PROCEDURE — 85610 PROTHROMBIN TIME: CPT | Mod: QW

## 2019-11-26 PROCEDURE — 99214 OFFICE O/P EST MOD 30 MIN: CPT | Mod: 25 | Performed by: INTERNAL MEDICINE

## 2019-11-26 PROCEDURE — 99207 ZZC NO CHARGE NURSE ONLY: CPT

## 2019-11-26 PROCEDURE — 80076 HEPATIC FUNCTION PANEL: CPT | Performed by: INTERNAL MEDICINE

## 2019-11-26 ASSESSMENT — ANXIETY QUESTIONNAIRES
GAD7 TOTAL SCORE: 6
6. BECOMING EASILY ANNOYED OR IRRITABLE: SEVERAL DAYS
5. BEING SO RESTLESS THAT IT IS HARD TO SIT STILL: NOT AT ALL
2. NOT BEING ABLE TO STOP OR CONTROL WORRYING: SEVERAL DAYS
3. WORRYING TOO MUCH ABOUT DIFFERENT THINGS: MORE THAN HALF THE DAYS
IF YOU CHECKED OFF ANY PROBLEMS ON THIS QUESTIONNAIRE, HOW DIFFICULT HAVE THESE PROBLEMS MADE IT FOR YOU TO DO YOUR WORK, TAKE CARE OF THINGS AT HOME, OR GET ALONG WITH OTHER PEOPLE: NOT DIFFICULT AT ALL
7. FEELING AFRAID AS IF SOMETHING AWFUL MIGHT HAPPEN: NOT AT ALL
1. FEELING NERVOUS, ANXIOUS, OR ON EDGE: SEVERAL DAYS

## 2019-11-26 ASSESSMENT — PATIENT HEALTH QUESTIONNAIRE - PHQ9
SUM OF ALL RESPONSES TO PHQ QUESTIONS 1-9: 3
5. POOR APPETITE OR OVEREATING: SEVERAL DAYS

## 2019-11-26 ASSESSMENT — MIFFLIN-ST. JEOR: SCORE: 1962.3

## 2019-11-26 NOTE — LETTER
December 2, 2019      Chidi Dubon  [address]  [place] [address]        Dear ,    We are writing to inform you of your test results.    Potassium remains too low. Let me know if we need to change the potassium supplementation method to a tablet so you can take it regularly.     Electrolytes and kidney function otherwise within normal limits.     The bilirubin seems to have stabilized. It remains elevated but didn't get higher. The fraction is mostly indirect or unconjugated bilirubin, and there is more testing on this we should do in our next follow-up, including a repeat hemoglobin level, LDH, and haptoglobin (ways to look for if your red blood cells are being broken up and leaking the bilirubin). There may also be additional elevation of direct or conjugated bilirubin from the sludge in your gallbladder.     No other abnormalities. Call or write sooner if questions or concerns.    Resulted Orders   Basic metabolic panel   Result Value Ref Range    Sodium 141 133 - 144 mmol/L    Potassium 3.2 (L) 3.4 - 5.3 mmol/L    Chloride 109 94 - 109 mmol/L    Carbon Dioxide 27 20 - 32 mmol/L    Anion Gap 5 3 - 14 mmol/L    Glucose 96 70 - 99 mg/dL      Comment:      Non Fasting    Urea Nitrogen 14 7 - 30 mg/dL    Creatinine 1.07 0.66 - 1.25 mg/dL    GFR Estimate 66 >60 mL/min/[1.73_m2]      Comment:      Non  GFR Calc  Starting 12/18/2018, serum creatinine based estimated GFR (eGFR) will be   calculated using the Chronic Kidney Disease Epidemiology Collaboration   (CKD-EPI) equation.      GFR Estimate If Black 77 >60 mL/min/[1.73_m2]      Comment:       GFR Calc  Starting 12/18/2018, serum creatinine based estimated GFR (eGFR) will be   calculated using the Chronic Kidney Disease Epidemiology Collaboration   (CKD-EPI) equation.      Calcium 9.3 8.5 - 10.1 mg/dL   Hepatic panel   Result Value Ref Range    Bilirubin Direct 0.7 (H) 0.0 - 0.2 mg/dL    Bilirubin Total 3.5  (H) 0.2 - 1.3 mg/dL    Albumin 4.1 3.4 - 5.0 g/dL    Protein Total 7.8 6.8 - 8.8 g/dL    Alkaline Phosphatase 108 40 - 150 U/L    ALT 20 0 - 70 U/L    AST 20 0 - 45 U/L         Sincerely,        Phu Gonzalez MD/[place]

## 2019-11-26 NOTE — PROGRESS NOTES
ANTICOAGULATION FOLLOW-UP CLINIC VISIT    Patient Name:  Chidi Dubon  Date:  2019  Contact Type:  Face to Face    SUBJECTIVE:  Patient Findings     Comments:   Denies any problems        Clinical Outcomes     Comments:   Denies any problems           OBJECTIVE    INR Protime   Date Value Ref Range Status   2019 2.3 (A) 0.86 - 1.14 Final       ASSESSMENT / PLAN  INR assessment THER    Recheck INR In: 6 WEEKS    INR Location Clinic      Anticoagulation Summary  As of 2019    INR goal:   2.0-3.0   TTR:   86.8 % (1 y)   INR used for dosin.3 (2019)   Warfarin maintenance plan:   2.5 mg (2.5 mg x 1) every day   Full warfarin instructions:   2.5 mg every day   Weekly warfarin total:   17.5 mg   No change documented:   Hyacinth Montano   Plan last modified:   Kari Montilla RN (2019)   Next INR check:   2020   Target end date:   Indefinite    Indications    Long-term (current) use of anticoagulants [Z79.01] [Z79.01]  Atrial Fibrillation [I48.20]             Anticoagulation Episode Summary     INR check location:       Preferred lab:       Send INR reminders to:   EMELIA SARGENT    Comments:         Anticoagulation Care Providers     Provider Role Specialty Phone number    Martin Schultz PA-C Responsible Physician Assistant [phone]            See the Encounter Report to view Anticoagulation Flowsheet and Dosing Calendar (Go to Encounters tab in chart review, and find the Anticoagulation Therapy Visit)        HYACINTH MONTANO

## 2019-11-27 ASSESSMENT — ANXIETY QUESTIONNAIRES: GAD7 TOTAL SCORE: 6

## 2019-12-11 ENCOUNTER — ALLIED HEALTH/NURSE VISIT (OUTPATIENT)
Dept: NURSING | Facility: CLINIC | Age: 79
End: 2019-12-11
Payer: COMMERCIAL

## 2019-12-11 VITALS — SYSTOLIC BLOOD PRESSURE: 132 MMHG | DIASTOLIC BLOOD PRESSURE: 64 MMHG | HEART RATE: 60 BPM

## 2019-12-11 DIAGNOSIS — I50.32 CHRONIC HEART FAILURE WITH PRESERVED EJECTION FRACTION (H): ICD-10-CM

## 2019-12-11 DIAGNOSIS — I10 BENIGN ESSENTIAL HYPERTENSION: Primary | ICD-10-CM

## 2019-12-11 DIAGNOSIS — E80.6 HYPERBILIRUBINEMIA: ICD-10-CM

## 2019-12-11 LAB
ANION GAP SERPL CALCULATED.3IONS-SCNC: 6 MMOL/L (ref 3–14)
BASOPHILS # BLD AUTO: 0 10E9/L (ref 0–0.2)
BASOPHILS NFR BLD AUTO: 0.5 %
BUN SERPL-MCNC: 17 MG/DL (ref 7–30)
CALCIUM SERPL-MCNC: 9.2 MG/DL (ref 8.5–10.1)
CHLORIDE SERPL-SCNC: 109 MMOL/L (ref 94–109)
CO2 SERPL-SCNC: 27 MMOL/L (ref 20–32)
CREAT SERPL-MCNC: 1.18 MG/DL (ref 0.66–1.25)
CRP SERPL-MCNC: <2.9 MG/L (ref 0–8)
DAT POLY-SP REAG RBC QL: NORMAL
DIFFERENTIAL METHOD BLD: ABNORMAL
EOSINOPHIL # BLD AUTO: 0.1 10E9/L (ref 0–0.7)
EOSINOPHIL NFR BLD AUTO: 2.1 %
ERYTHROCYTE [DISTWIDTH] IN BLOOD BY AUTOMATED COUNT: 15.1 % (ref 10–15)
ERYTHROCYTE [SEDIMENTATION RATE] IN BLOOD BY WESTERGREN METHOD: 12 MM/H (ref 0–20)
GFR SERPL CREATININE-BSD FRML MDRD: 59 ML/MIN/{1.73_M2}
GLUCOSE SERPL-MCNC: 114 MG/DL (ref 70–99)
HCT VFR BLD AUTO: 48.8 % (ref 40–53)
HGB BLD-MCNC: 16.3 G/DL (ref 13.3–17.7)
LDH SERPL L TO P-CCNC: 189 U/L (ref 85–227)
LYMPHOCYTES # BLD AUTO: 1.7 10E9/L (ref 0.8–5.3)
LYMPHOCYTES NFR BLD AUTO: 28.6 %
MCH RBC QN AUTO: 28.3 PG (ref 26.5–33)
MCHC RBC AUTO-ENTMCNC: 33.4 G/DL (ref 31.5–36.5)
MCV RBC AUTO: 85 FL (ref 78–100)
MONOCYTES # BLD AUTO: 0.4 10E9/L (ref 0–1.3)
MONOCYTES NFR BLD AUTO: 7.4 %
NEUTROPHILS # BLD AUTO: 3.6 10E9/L (ref 1.6–8.3)
NEUTROPHILS NFR BLD AUTO: 61.4 %
PLATELET # BLD AUTO: 215 10E9/L (ref 150–450)
POTASSIUM SERPL-SCNC: 3.3 MMOL/L (ref 3.4–5.3)
RBC # BLD AUTO: 5.75 10E12/L (ref 4.4–5.9)
RETICS # AUTO: 48.5 10E9/L (ref 25–95)
RETICS/RBC NFR AUTO: 0.9 % (ref 0.5–2)
SODIUM SERPL-SCNC: 142 MMOL/L (ref 133–144)
WBC # BLD AUTO: 5.8 10E9/L (ref 4–11)

## 2019-12-11 PROCEDURE — 83010 ASSAY OF HAPTOGLOBIN QUANT: CPT | Performed by: INTERNAL MEDICINE

## 2019-12-11 PROCEDURE — 86880 COOMBS TEST DIRECT: CPT | Performed by: INTERNAL MEDICINE

## 2019-12-11 PROCEDURE — 80048 BASIC METABOLIC PNL TOTAL CA: CPT | Performed by: INTERNAL MEDICINE

## 2019-12-11 PROCEDURE — 85025 COMPLETE CBC W/AUTO DIFF WBC: CPT | Performed by: INTERNAL MEDICINE

## 2019-12-11 PROCEDURE — 83615 LACTATE (LD) (LDH) ENZYME: CPT | Performed by: INTERNAL MEDICINE

## 2019-12-11 PROCEDURE — 85652 RBC SED RATE AUTOMATED: CPT | Performed by: INTERNAL MEDICINE

## 2019-12-11 PROCEDURE — 36415 COLL VENOUS BLD VENIPUNCTURE: CPT | Performed by: INTERNAL MEDICINE

## 2019-12-11 PROCEDURE — 85045 AUTOMATED RETICULOCYTE COUNT: CPT | Performed by: INTERNAL MEDICINE

## 2019-12-11 PROCEDURE — 86140 C-REACTIVE PROTEIN: CPT | Performed by: INTERNAL MEDICINE

## 2019-12-11 PROCEDURE — 99207 ZZC NO CHARGE NURSE ONLY: CPT

## 2019-12-11 NOTE — PROGRESS NOTES
Chidi Dubon is a 79 year old patient who comes in today for a Blood Pressure check.  Initial BP (!) 151/87   Pulse 58      2nd /64 Pulse 60  Disposition: results routed to provider

## 2019-12-11 NOTE — LETTER
December 17, 2019      Chidi Dubon  [address]  [place] [address]        Dear ,    We are writing to inform you of your test results.    Your laboratory evaluation summary:       No signs of hemolysis (breaking up of red blood cells), so we've ruled out that process contributing to your elevated bilirubin levels. They are now most likely coming from the sludge in your gallbladder.       Your potassium remains on the lower end, stubbornly, so continue to push potassium supplementation as able       Otherwise stable electrolytes and kidney function overall       No markers of inflammation or anemia     Resulted Orders   Basic metabolic panel   Result Value Ref Range    Sodium 142 133 - 144 mmol/L    Potassium 3.3 (L) 3.4 - 5.3 mmol/L    Chloride 109 94 - 109 mmol/L    Carbon Dioxide 27 20 - 32 mmol/L    Anion Gap 6 3 - 14 mmol/L    Glucose 114 (H) 70 - 99 mg/dL    Urea Nitrogen 17 7 - 30 mg/dL    Creatinine 1.18 0.66 - 1.25 mg/dL    GFR Estimate 59 (L) >60 mL/min/[1.73_m2]      Comment:      Non  GFR Calc  Starting 12/18/2018, serum creatinine based estimated GFR (eGFR) will be   calculated using the Chronic Kidney Disease Epidemiology Collaboration   (CKD-EPI) equation.      GFR Estimate If Black 68 >60 mL/min/[1.73_m2]      Comment:       GFR Calc  Starting 12/18/2018, serum creatinine based estimated GFR (eGFR) will be   calculated using the Chronic Kidney Disease Epidemiology Collaboration   (CKD-EPI) equation.      Calcium 9.2 8.5 - 10.1 mg/dL   CRP inflammation   Result Value Ref Range    CRP Inflammation <2.9 0.0 - 8.0 mg/L   Erythrocyte sedimentation rate auto   Result Value Ref Range    Sed Rate 12 0 - 20 mm/h   Haptoglobin   Result Value Ref Range    Haptoglobin 131 35 - 175 mg/dL   Direct antiglobulin test   Result Value Ref Range    DEEPAK  Broad Spectrum Neg    Reticulocyte count   Result Value Ref Range    % Retic 0.9 0.5 - 2.0 %    Absolute Retic  48.5 25 - 95 10e9/L   Lactate Dehydrogenase   Result Value Ref Range    Lactate Dehydrogenase 189 85 - 227 U/L   CBC with platelets differential   Result Value Ref Range    WBC 5.8 4.0 - 11.0 10e9/L    RBC Count 5.75 4.4 - 5.9 10e12/L    Hemoglobin 16.3 13.3 - 17.7 g/dL    Hematocrit 48.8 40.0 - 53.0 %    MCV 85 78 - 100 fl    MCH 28.3 26.5 - 33.0 pg    MCHC 33.4 31.5 - 36.5 g/dL    RDW 15.1 (H) 10.0 - 15.0 %    Platelet Count 215 150 - 450 10e9/L    % Neutrophils 61.4 %    % Lymphocytes 28.6 %    % Monocytes 7.4 %    % Eosinophils 2.1 %    % Basophils 0.5 %    Absolute Neutrophil 3.6 1.6 - 8.3 10e9/L    Absolute Lymphocytes 1.7 0.8 - 5.3 10e9/L    Absolute Monocytes 0.4 0.0 - 1.3 10e9/L    Absolute Eosinophils 0.1 0.0 - 0.7 10e9/L    Absolute Basophils 0.0 0.0 - 0.2 10e9/L    Diff Method Automated Method      If you have any questions or concerns, please call the clinic at the number listed above.       Sincerely,    Phu Gonzalez MD/jorge

## 2019-12-11 NOTE — LETTER
Chidi,    Thanks for returning for the blood pressure recheck.  Your second measurement was acceptable.  No need for medication changes at this time, but please continue to focus on healthy lifestyle and habits.  Call or write if questions or concerns.     Phu Gonzalez MD

## 2019-12-12 LAB — HAPTOGLOB SERPL-MCNC: 131 MG/DL (ref 35–175)

## 2020-01-07 ENCOUNTER — ANTICOAGULATION THERAPY VISIT (OUTPATIENT)
Dept: NURSING | Facility: CLINIC | Age: 80
End: 2020-01-07
Payer: COMMERCIAL

## 2020-01-07 DIAGNOSIS — Z79.01 LONG TERM CURRENT USE OF ANTICOAGULANT THERAPY: ICD-10-CM

## 2020-01-07 DIAGNOSIS — I48.20 CHRONIC ATRIAL FIBRILLATION (H): ICD-10-CM

## 2020-01-07 LAB — INR POINT OF CARE: 2.5 (ref 0.86–1.14)

## 2020-01-07 PROCEDURE — 36416 COLLJ CAPILLARY BLOOD SPEC: CPT

## 2020-01-07 PROCEDURE — 99207 ZZC NO CHARGE NURSE ONLY: CPT

## 2020-01-07 PROCEDURE — 85610 PROTHROMBIN TIME: CPT | Mod: QW

## 2020-01-24 NOTE — TELEPHONE ENCOUNTER
Patient is calling back. Patient is unable to get voice mail.  Please call to advise  Thank you    intact

## 2020-02-18 ENCOUNTER — ANTICOAGULATION THERAPY VISIT (OUTPATIENT)
Dept: NURSING | Facility: CLINIC | Age: 80
End: 2020-02-18
Payer: COMMERCIAL

## 2020-02-18 DIAGNOSIS — I48.20 CHRONIC ATRIAL FIBRILLATION (H): ICD-10-CM

## 2020-02-18 DIAGNOSIS — Z79.01 LONG TERM CURRENT USE OF ANTICOAGULANT THERAPY: ICD-10-CM

## 2020-02-18 LAB — INR POINT OF CARE: 2.7 (ref 0.86–1.14)

## 2020-02-18 PROCEDURE — 85610 PROTHROMBIN TIME: CPT | Mod: QW

## 2020-02-18 PROCEDURE — 36416 COLLJ CAPILLARY BLOOD SPEC: CPT

## 2020-02-18 PROCEDURE — 99207 ZZC NO CHARGE NURSE ONLY: CPT

## 2020-02-18 NOTE — PROGRESS NOTES
ANTICOAGULATION FOLLOW-UP CLINIC VISIT    Patient Name:  Chidi Dubon  Date:  2020  Contact Type:  Face to Face    SUBJECTIVE:  Patient Findings     Comments:   Denies any problems or changes        Clinical Outcomes     Comments:   Denies any problems or changes           OBJECTIVE    INR Protime   Date Value Ref Range Status   2020 2.7 (A) 0.86 - 1.14 Final       ASSESSMENT / PLAN  INR assessment THER    Recheck INR In: 6 WEEKS    INR Location Clinic      Anticoagulation Summary  As of 2020    INR goal:   2.0-3.0   TTR:   97.2 % (1 y)   INR used for dosin.7 (2020)   Warfarin maintenance plan:   2.5 mg (2.5 mg x 1) every day   Full warfarin instructions:   2.5 mg every day   Weekly warfarin total:   17.5 mg   No change documented:   Hyacinth Montano   Plan last modified:   Kari Montilla RN (2019)   Next INR check:   3/31/2020   Priority:   Maintenance   Target end date:   Indefinite    Indications    Long-term (current) use of anticoagulants [Z79.01] [Z79.01]  Atrial Fibrillation [I48.20]             Anticoagulation Episode Summary     INR check location:       Preferred lab:       Send INR reminders to:   EMELAI SARGENT    Comments:         Anticoagulation Care Providers     Provider Role Specialty Phone number    Martin Schultz PA-C Responsible Physician Assistant [phone]            See the Encounter Report to view Anticoagulation Flowsheet and Dosing Calendar (Go to Encounters tab in chart review, and find the Anticoagulation Therapy Visit)    Dosage adjustment made based on physician directed care plan.    HYACINTH MONTANO

## 2020-03-12 ENCOUNTER — OFFICE VISIT (OUTPATIENT)
Dept: FAMILY MEDICINE | Facility: CLINIC | Age: 80
End: 2020-03-12
Payer: COMMERCIAL

## 2020-03-12 VITALS
WEIGHT: 257.2 LBS | OXYGEN SATURATION: 96 % | RESPIRATION RATE: 20 BRPM | HEART RATE: 58 BPM | SYSTOLIC BLOOD PRESSURE: 154 MMHG | TEMPERATURE: 97.5 F | DIASTOLIC BLOOD PRESSURE: 77 MMHG | BODY MASS INDEX: 32.15 KG/M2

## 2020-03-12 DIAGNOSIS — M79.675 PAIN OF TOE OF LEFT FOOT: Primary | ICD-10-CM

## 2020-03-12 DIAGNOSIS — M19.90 ARTHRITIS: ICD-10-CM

## 2020-03-12 LAB
ERYTHROCYTE [DISTWIDTH] IN BLOOD BY AUTOMATED COUNT: 14.9 % (ref 10–15)
HCT VFR BLD AUTO: 45.6 % (ref 40–53)
HGB BLD-MCNC: 15.6 G/DL (ref 13.3–17.7)
INR PPP: 2.5 (ref 0.86–1.14)
MCH RBC QN AUTO: 29.3 PG (ref 26.5–33)
MCHC RBC AUTO-ENTMCNC: 34.2 G/DL (ref 31.5–36.5)
MCV RBC AUTO: 86 FL (ref 78–100)
PLATELET # BLD AUTO: 195 10E9/L (ref 150–450)
RBC # BLD AUTO: 5.33 10E12/L (ref 4.4–5.9)
URATE SERPL-MCNC: 6.1 MG/DL (ref 3.5–7.2)
WBC # BLD AUTO: 6.5 10E9/L (ref 4–11)

## 2020-03-12 PROCEDURE — 85027 COMPLETE CBC AUTOMATED: CPT | Performed by: PHYSICIAN ASSISTANT

## 2020-03-12 PROCEDURE — 36415 COLL VENOUS BLD VENIPUNCTURE: CPT | Performed by: PHYSICIAN ASSISTANT

## 2020-03-12 PROCEDURE — 84550 ASSAY OF BLOOD/URIC ACID: CPT | Performed by: PHYSICIAN ASSISTANT

## 2020-03-12 PROCEDURE — 99214 OFFICE O/P EST MOD 30 MIN: CPT | Performed by: PHYSICIAN ASSISTANT

## 2020-03-12 PROCEDURE — 85610 PROTHROMBIN TIME: CPT | Performed by: PHYSICIAN ASSISTANT

## 2020-03-12 RX ORDER — COLCHICINE 0.6 MG/1
0.6 TABLET ORAL DAILY
Status: CANCELLED | OUTPATIENT
Start: 2020-03-12

## 2020-03-12 RX ORDER — OXYCODONE AND ACETAMINOPHEN 5; 325 MG/1; MG/1
.5-1 TABLET ORAL EVERY 6 HOURS PRN
Qty: 8 TABLET | Refills: 0 | Status: SHIPPED | OUTPATIENT
Start: 2020-03-12 | End: 2020-08-17

## 2020-03-12 RX ORDER — METHYLPREDNISOLONE 4 MG
TABLET, DOSE PACK ORAL
Qty: 21 TABLET | Refills: 0 | Status: SHIPPED | OUTPATIENT
Start: 2020-03-12 | End: 2020-08-17

## 2020-03-12 RX ORDER — COLCHICINE 0.6 MG/1
TABLET ORAL
Qty: 3 TABLET | Refills: 0 | Status: SHIPPED | OUTPATIENT
Start: 2020-03-12 | End: 2021-03-03

## 2020-03-12 NOTE — PROGRESS NOTES
Subjective     Chidi Dubon is a 79 year old male who presents to clinic today for the following health issues:    HPI   No history of gout, but had uric acid stones in kidneys years ago per report. No injuries. No open sores. No fevers or chills. Never had infected joints before.    Joint Pain    Onset: over a week ago    Description:   Location: right foot  Character: Sharp    Intensity: moderate, up to /10    Progression of Symptoms: worse    Accompanying Signs & Symptoms:  Other symptoms: numbness, tingling, warmth, swelling and redness    History:   Previous similar pain: no       Precipitating factors:   Trauma or overuse: no     Alleviating factors:  Improved by: rest/inactivity and massage    Therapies Tried and outcome: nothing    Patient Active Problem List   Diagnosis     Hyperlipidemia LDL goal <70     Obesity     Mild major depression (H)     Osteoarthritis, knee     Diverticulosis     Urolithiasis     CAD S/P percutaneous coronary angioplasty     Atrial Fibrillation     Fatigue     Restless leg syndrome     Long-term (current) use of anticoagulants [Z79.01]     Benign essential hypertension     Depression, major, in remission (H)     Aftercare following right hip joint replacement surgery     Hip pain, right     Hip joint replacement status     Posterior capsular opacification of both eyes, obscuring vision     Coronary artery disease involving native heart with angina pectoris, unspecified vessel or lesion type (H)     S/P placement of cardiac pacemaker     Abdominal aortic aneurysm without rupture (H)     (HFpEF) heart failure with preserved ejection fraction (H)     Bilirubinemia     CKD (chronic kidney disease) stage 3, GFR 30-59 ml/min (H)     Non-rheumatic mitral regurgitation     Past Surgical History:   Procedure Laterality Date     ANGIOGRAM  age 61     3 cornary artery stents, [facility]     ANGIOGRAM  age 66    1 coronary artery stent, [facility]     ANGIOGRAM  11/2013    1  coronary artery stent, [facility]      ARTHROSCOPY KNEE RT/LT         Social History     Tobacco Use     Smoking status: Never Smoker     Smokeless tobacco: Never Used     Tobacco comment: never smoker; non-smoking household   Substance Use Topics     Alcohol use: No     Comment: none     Family History   Problem Relation Age of Onset     Heart Disease Paternal Grandfather          Current Outpatient Medications   Medication Sig Dispense Refill     ASPIRIN NOT PRESCRIBED (INTENTIONAL) Please choose reason not prescribed, below       atorvastatin (LIPITOR) 80 MG tablet Take 0.5 tablets (40 mg) by mouth daily 45 tablet 3     colchicine (COLCYRS) 0.6 MG tablet Take 2 tablets by mouth and wait 1 hour.  Then take the final tablet by mouth.  For gout. 3 tablet 0     furosemide (LASIX) 20 MG tablet Take 20 mg by mouth 2 times daily        losartan (COZAAR) 100 MG tablet Take 1 tablet (100 mg) by mouth daily 90 tablet 3     methylPREDNISolone (MEDROL DOSEPAK) 4 MG tablet therapy pack Follow Package Directions 21 tablet 0     oxyCODONE-acetaminophen (PERCOCET) 5-325 MG tablet Take 0.5-1 tablets by mouth every 6 hours as needed for breakthrough pain 8 tablet 0     potassium chloride (KLOR-CON) 20 MEQ packet Take 20 mEq by mouth daily 90 tablet 3     warfarin (COUMADIN) 2.5 MG tablet Take 1 tablet (2.5 mg) by mouth daily 90 tablet 3     amLODIPine (NORVASC) 5 MG tablet Take 10 mg by mouth daily  3     nitroglycerin (NITROSTAT) 0.4 MG SL tablet Place 1 tablet (0.4 mg) under the tongue every 5 minutes as needed for chest pain (Patient not taking: Reported on 4/26/2019) 25 tablet 0     Allergies   Allergen Reactions     No Known Allergies      Review of Systems   ROS COMP: Constitutional, HEENT, cardiovascular, pulmonary, gi and gu systems are negative, except as otherwise noted.      Objective    There were no vitals taken for this visit.  There is no height or weight on file to calculate BMI.  Physical Exam  Constitutional:        General: He is not in acute distress.     Appearance: Normal appearance. He is not diaphoretic.   HENT:      Head: Normocephalic and atraumatic.      Nose: Nose normal.   Eyes:      Conjunctiva/sclera: Conjunctivae normal.   Cardiovascular:      Comments: 1+ symmetric DP pulses.  Less than 2 seconds of capillary refill in bilateral toes.  Pulmonary:      Effort: Pulmonary effort is normal. No respiratory distress.   Musculoskeletal:      Comments: Tenderness, erythema and warmth to the left 1st MTP joint.  Distal CMS intact.   Skin:     General: Skin is warm and dry.      Coloration: Skin is not jaundiced or pale.      Comments: No open or healing or healing sores to the left foot.   Neurological:      General: No focal deficit present.      Mental Status: He is alert. Mental status is at baseline.   Psychiatric:         Mood and Affect: Mood normal.         Behavior: Behavior normal.          Diagnostic Test Results:  Labs reviewed in Epic  Results for orders placed or performed in visit on 03/12/20 (from the past 24 hour(s))   CBC with platelets   Result Value Ref Range    WBC 6.5 4.0 - 11.0 10e9/L    RBC Count 5.33 4.4 - 5.9 10e12/L    Hemoglobin 15.6 13.3 - 17.7 g/dL    Hematocrit 45.6 40.0 - 53.0 %    MCV 86 78 - 100 fl    MCH 29.3 26.5 - 33.0 pg    MCHC 34.2 31.5 - 36.5 g/dL    RDW 14.9 10.0 - 15.0 %    Platelet Count 195 150 - 450 10e9/L   INR   Result Value Ref Range    INR 2.50 (H) 0.86 - 1.14           Assessment & Plan    Chidi Dubon is a 79 year old male with a PMH of atrial fibrillation on warfarin, AAA and hypertension who now presents to ER c/o atraumatic left foot.  No history of gout, but has had elevated uric acid levels per his report and had nephrolithiasis in the past. Low suspicion for sprain/strain/fracture/contusion/dislocation given no trauma. 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 Favor gout given stability over the last week, though I cannot rule out septic arthritis or cellulitis.   He has no fevers or chills and he does not state that the redness has spread.  We will treat with 3 tablets of colchicine, Medrol Dosepak and a limited supply of oxycodone for breakthrough pain.  If he is not improving in 2 days, he is to go to the [facility] [place] [facility] [place] or to the ER at anytime with worsening/changing symptoms at any time.  CBC shows no leukocytosis and his INR is 2.5 today.  Uric acid level pending.    Complete history and physical exam as above. AF with normal VS except for elevated blood pressure and he would like to address this with Dr. Gonzalez in upcoming appointment.    DDx and Dx discussed with and explained to the pt to their satisfaction.  All questions were answered at this time. Pt expressed understanding of and agreement with this dx, tx, and plan. No further workup warranted and standard medication warnings given. I have given the patient a list of pertinent indications for re-evaluation. Will go to the Emergency Department if symptoms worsen or new concerning symptoms arise. Patient left in no apparent distress.       ICD-10-CM    1. Pain of toe of left foot  M79.675 Uric acid     CBC with platelets     colchicine (COLCYRS) 0.6 MG tablet     methylPREDNISolone (MEDROL DOSEPAK) 4 MG tablet therapy pack     oxyCODONE-acetaminophen (PERCOCET) 5-325 MG tablet     INR     CANCELED: INR point of care   2. Arthritis  M19.90           Patient Instructions     Tonie Murillo,    Thank you for allowing [facility] to manage your care.    I am unsure the cause her symptoms, however this is most consistent with gout.  We will treat it as such.  If you are not improving in 2 days, please go to the acute injury orthopedic clinic located here at our [facility] upstairs.  If you have worsening/changing symptoms at any time, such as fever/chills, worsening pain, spreading rash, bloody/black stools, etc. please go to the ER  for evaluation at any time.    If you are not improving, but not getting worse, please see me in 1 week.    Methylprednisolone Discharge Instructions:    Please take the steroid, methylprednisolone, for the full course as prescribed.  Take methylprednisolone with food or milk to minimize stomach upset.      Side effects of methylprednisolone include difficulty sleeping, increased appetite, weight gain, and changes in mood.  If you are diabetic, please monitor your blood sugar regularly while taking this medicine as methylprednisolone can cause high blood sugar.  Treating Gout Attacks    Return in about 2 days (around 3/14/2020) for a recheck of your symptoms if not improving at the Orthopedic Walk in Clinic upstairs..    GAY Carolina  [facility]

## 2020-03-12 NOTE — PATIENT INSTRUCTIONS
Tonie Murillo,    Thank you for allowing [facility] to manage your care.    I am unsure the cause her symptoms, however this is most consistent with gout.  We will treat it as such.  If you are not improving in 2 days, please go to the acute injury orthopedic clinic located here at our [facility] upstairs.  If you have worsening/changing symptoms at any time, such as fever/chills, worsening pain, spreading rash, bloody/black stools, etc. please go to the ER for evaluation at any time.    If you are not improving, but not getting worse, please see me in 1 week.    I ordered some blood work, please go to the laboratory to get your laboratory studies.    I sent your prescriptions to your pharmacy.    Methylprednisolone Discharge Instructions:    Please take the steroid, methylprednisolone, for the full course as prescribed.  Take methylprednisolone with food or milk to minimize stomach upset.      Side effects of methylprednisolone include difficulty sleeping, increased appetite, weight gain, and changes in mood.  If you are diabetic, please monitor your blood sugar regularly while taking this medicine as methylprednisolone can cause high blood sugar.    Please allow 1-2 business days for our office to call you in regards to your laboratory/radiological studies.  If not done so, I encourage you to login into [employer] [email or web address] to review your results as well.     If you have any questions or concerns, please feel free to call us at [phone]    Sincerely,    Cm Marti PA-C    Did you know?  You can schedule an e-Visit for certain simple non-emergent issue for your convenience.  To learn more about or start an eVisit, simply login to [employer], click  Visits  on top banner, click  Start a Virtual Visit  drop down, and click  Symptom-Specific E-Visit     Patient Education     Treating Gout Attacks     Raising the joint above the level of your heart can help reduce gout  symptoms.     Gout is a disease that affects the joints. It is caused by excess uric acid in your blood that may lead to crystals forming in your joints. Left untreated, it can lead to painful foot and joint deformities and even kidney problems. But, by treating gout early, you can relieve pain and help prevent future problems. Gout can usually be treated with medicine and proper diet. In severe cases, surgery may be needed.  Gout attacks are painful and often happen more than once. Taking medicines may reduce pain and prevent attacks in the future. There are also some things you can do at home to relieve symptoms.  Medicines for gout  Your healthcare provider may prescribe a daily medicine to reduce levels of uric acid. Reducing your uric acid levels may help prevent gout attacks. Allopurinol is one commonly used medicine taken daily to reduce uric acid levels. Other daily medicines used to reduce uric acid levels include febuxostat, lesinurad, and probencid. Other medicines can help relieve pain and swelling during an acute attack. Medicines such as NSAIDs (nonsteroidal anti-inflammatory medicines), steroids, and colchicine may be prescribed for intermittent use to relieve an acute gout attack. Be sure to take your medicine as directed.  What you can do  Below are some things you can do at home to relieve gout symptoms. Your healthcare provider may have other tips.    Rest the painful joint as much as you can.    Raise the painful joint so it is at a level higher than your heart.    Use ice for 10 minutes every 1 to 2 hours as possible.  How can I prevent gout?  With a little effort, you may be able to prevent gout attacks in the future. Here are some things you can do:    Don't eat foods high in purines  ? Certain meats (red meat, processed meat, turkey)  ? Organ meats (kidney, liver, sweetbread)  ? Shellfish (lobster, crab, shrimp, scallop, mussel)  ? Certain fish (anchovy, sardine, herring, mackerel)    Take  any medicines prescribed by your healthcare provider.    Lose weight if you need to.    Reduce high fructose corn syrup in meals and drinks.    Reduce or cut out alcohol, particularly beer, but also red wine and spirits.    Control blood pressure, diabetes, and cholesterol.    Drink plenty of water to help flush uric acid from your body.  Date Last Reviewed: 4/1/2018 2000-2019 [employer]. [address] [place] [address], [place] [address]. All rights reserved. This information is not intended as a substitute for professional medical care. Always follow your healthcare professional's instructions.

## 2020-03-24 DIAGNOSIS — I48.20 CHRONIC ATRIAL FIBRILLATION (H): Primary | ICD-10-CM

## 2020-04-27 DIAGNOSIS — I10 BENIGN ESSENTIAL HYPERTENSION: ICD-10-CM

## 2020-04-27 RX ORDER — FUROSEMIDE 20 MG
20 TABLET ORAL 2 TIMES DAILY
Status: CANCELLED | OUTPATIENT
Start: 2020-04-27

## 2020-04-27 NOTE — TELEPHONE ENCOUNTER
"Requested Prescriptions   Pending Prescriptions Disp Refills     furosemide (LASIX) 20 MG tablet       Sig: Take 1 tablet (20 mg) by mouth 2 times daily   Last Written Prescription Date:  03/03/20  Last Fill Quantity: 60,  # refills: 0   Last office visit: 03/12/20 with prescribing provider:  KAMLESH Marti   Future Office Visit:        Diuretics (Including Combos) Protocol Failed - 4/27/2020 11:39 AM        Failed - Blood pressure under 140/90 in past 12 months     BP Readings from Last 3 Encounters:   03/12/20 (!) 154/77   12/11/19 132/64   11/26/19 (!) 156/85                 Failed - Normal serum potassium on file in past 12 months     Recent Labs   Lab Test 12/11/19  1142   POTASSIUM 3.3*                    Passed - Recent (12 mo) or future (30 days) visit within the authorizing provider's specialty     Patient has had an office visit with the authorizing provider or a provider within the authorizing providers department within the previous 12 mos or has a future within next 30 days. See \"Patient Info\" tab in inbasket, or \"Choose Columns\" in Meds & Orders section of the refill encounter.              Passed - Medication is active on med list        Passed - Patient is age 18 or older        Passed - Normal serum creatinine on file in past 12 months     Recent Labs   Lab Test 12/11/19  1142   CR 1.18              Passed - Normal serum sodium on file in past 12 months     Recent Labs   Lab Test 12/11/19  1142                      "

## 2020-05-01 ENCOUNTER — TELEPHONE (OUTPATIENT)
Dept: INTERNAL MEDICINE | Facility: CLINIC | Age: 80
End: 2020-05-01

## 2020-05-01 NOTE — TELEPHONE ENCOUNTER
Left message on voice mail for patient to call clinic to schedule his INR appt with lab. [phone].  Hyacinth Montano RN

## 2020-05-04 NOTE — TELEPHONE ENCOUNTER
Left message on voice mail for patient to call clinic. [phone]  Need to schedule lab appointment to have INR checked

## 2020-05-05 ENCOUNTER — ANTICOAGULATION THERAPY VISIT (OUTPATIENT)
Dept: INTERNAL MEDICINE | Facility: CLINIC | Age: 80
End: 2020-05-05

## 2020-05-05 ENCOUNTER — TELEPHONE (OUTPATIENT)
Dept: INTERNAL MEDICINE | Facility: CLINIC | Age: 80
End: 2020-05-05

## 2020-05-05 DIAGNOSIS — I48.20 CHRONIC ATRIAL FIBRILLATION (H): ICD-10-CM

## 2020-05-05 DIAGNOSIS — Z79.01 LONG TERM CURRENT USE OF ANTICOAGULANT THERAPY: Primary | ICD-10-CM

## 2020-05-05 DIAGNOSIS — Z79.01 LONG TERM CURRENT USE OF ANTICOAGULANT THERAPY: ICD-10-CM

## 2020-05-05 LAB
CAPILLARY BLOOD COLLECTION: NORMAL
INR PPP: 2.6 (ref 0.86–1.14)

## 2020-05-05 PROCEDURE — 36416 COLLJ CAPILLARY BLOOD SPEC: CPT | Performed by: INTERNAL MEDICINE

## 2020-05-05 PROCEDURE — 85610 PROTHROMBIN TIME: CPT | Performed by: INTERNAL MEDICINE

## 2020-05-05 PROCEDURE — 99207 ZZC NO CHARGE NURSE ONLY: CPT | Performed by: INTERNAL MEDICINE

## 2020-05-05 NOTE — TELEPHONE ENCOUNTER
Left message on voice mail for patient to call clinic, please schedule INR via lab.   [phone]  Hyacinth Montano RN

## 2020-05-05 NOTE — PROGRESS NOTES
ANTICOAGULATION FOLLOW-UP CLINIC VISIT    Patient Name:  Chidi Dubon  Date:  2020  Contact Type:  Telephone    SUBJECTIVE:  Patient Findings     Comments:   Spoke with patient, he denies any problems or concerns        Clinical Outcomes     Comments:   Spoke with patient, he denies any problems or concerns           OBJECTIVE    INR   Date Value Ref Range Status   2020 2.60 (H) 0.86 - 1.14 Final     Comment:     This test is intended for monitoring Coumadin therapy.  Results are not   accurate in patients with prolonged INR due to factor deficiency.         ASSESSMENT / PLAN  INR assessment THER    Recheck INR In: 6 WEEKS    INR Location Clinic      Anticoagulation Summary  As of 2020    INR goal:   2.0-3.0   TTR:   97.2 % (1 y)   INR used for dosin.60 (2020)   Warfarin maintenance plan:   2.5 mg (2.5 mg x 1) every day   Full warfarin instructions:   2.5 mg every day   Weekly warfarin total:   17.5 mg   No change documented:   Hyacinth Montano   Plan last modified:   Kari Montilla RN (2019)   Next INR check:   2020   Priority:   Maintenance   Target end date:   Indefinite    Indications    Long-term (current) use of anticoagulants [Z79.01] [Z79.01]  Atrial Fibrillation [I48.20]             Anticoagulation Episode Summary     INR check location:       Preferred lab:       Send INR reminders to:   EMELIA SARGENT    Comments:         Anticoagulation Care Providers     Provider Role Specialty Phone number    Martin Schultz PA-C Responsible Physician Assistant [phone]            See the Encounter Report to view Anticoagulation Flowsheet and Dosing Calendar (Go to Encounters tab in chart review, and find the Anticoagulation Therapy Visit)    Dosage adjustment made based on physician directed care plan.    HYACINTH MONTANO

## 2020-05-06 DIAGNOSIS — I10 BENIGN ESSENTIAL HYPERTENSION: ICD-10-CM

## 2020-05-06 NOTE — TELEPHONE ENCOUNTER
Requested Prescriptions   Pending Prescriptions Disp Refills     furosemide (LASIX) 20 MG tablet        Sig: Take 1 tablet (20 mg) by mouth 2 times daily  Last Written Prescription Date:  3/3/20  Last Fill Quantity: 60,  # refills: 0   Last office visit: 11/26/2019 with prescribing provider:  3/12/20 Figueroa   Future Office Visit:               There is no refill protocol information for this order

## 2020-05-07 NOTE — TELEPHONE ENCOUNTER
Routing refill request to provider for review/approval because:  Labs not current:  Potassium  BP Readings from Last 3 Encounters:   03/12/20 (!) 154/77   12/11/19 132/64   11/26/19 (!) 156/85   BP not at goal

## 2020-05-08 RX ORDER — FUROSEMIDE 20 MG
20 TABLET ORAL 2 TIMES DAILY
Qty: 60 TABLET | Refills: 0 | Status: SHIPPED | OUTPATIENT
Start: 2020-05-08 | End: 2020-07-14

## 2020-06-16 ENCOUNTER — ANTICOAGULATION THERAPY VISIT (OUTPATIENT)
Dept: INTERNAL MEDICINE | Facility: CLINIC | Age: 80
End: 2020-06-16

## 2020-06-16 DIAGNOSIS — I48.20 CHRONIC ATRIAL FIBRILLATION (H): ICD-10-CM

## 2020-06-16 DIAGNOSIS — Z79.01 LONG TERM CURRENT USE OF ANTICOAGULANT THERAPY: ICD-10-CM

## 2020-06-16 LAB
CAPILLARY BLOOD COLLECTION: NORMAL
INR PPP: 2.2 (ref 0.86–1.14)

## 2020-06-16 PROCEDURE — 99207 ZZC NO CHARGE NURSE ONLY: CPT | Performed by: INTERNAL MEDICINE

## 2020-06-16 PROCEDURE — 85610 PROTHROMBIN TIME: CPT | Performed by: INTERNAL MEDICINE

## 2020-06-16 PROCEDURE — 36416 COLLJ CAPILLARY BLOOD SPEC: CPT | Performed by: INTERNAL MEDICINE

## 2020-06-16 NOTE — PROGRESS NOTES
ANTICOAGULATION FOLLOW-UP CLINIC VISIT    Patient Name:  Chidi Dubon  Date:  2020  Contact Type:  Telephone    SUBJECTIVE:  Patient Findings     Comments:   Spoke with patient, denies any problems or concerns        Clinical Outcomes     Comments:   Spoke with patient, denies any problems or concerns           OBJECTIVE    Recent labs: (last 7 days)     20  1059   INR 2.20*       ASSESSMENT / PLAN  INR assessment THER    Recheck INR In: 6 WEEKS    INR Location Clinic      Anticoagulation Summary  As of 2020    INR goal:   2.0-3.0   TTR:   97.2 % (1 y)   INR used for dosin.20 (2020)   Warfarin maintenance plan:   2.5 mg (2.5 mg x 1) every day   Full warfarin instructions:   2.5 mg every day   Weekly warfarin total:   17.5 mg   No change documented:   Hyacinth Montano   Plan last modified:   Kari Montilla RN (2019)   Next INR check:   2020   Priority:   Maintenance   Target end date:   Indefinite    Indications    Long-term (current) use of anticoagulants [Z79.01] [Z79.01]  Atrial Fibrillation [I48.20]             Anticoagulation Episode Summary     INR check location:       Preferred lab:       Send INR reminders to:   EMELIA SARGENT    Comments:         Anticoagulation Care Providers     Provider Role Specialty Phone number    Gen Marti PA Referring  [phone]    Martin Schultz PA-C Responsible Physician Assistant [phone]            See the Encounter Report to view Anticoagulation Flowsheet and Dosing Calendar (Go to Encounters tab in chart review, and find the Anticoagulation Therapy Visit)    Dosage adjustment made based on physician directed care plan.    HYACINTH MONTANO

## 2020-07-13 DIAGNOSIS — I10 BENIGN ESSENTIAL HYPERTENSION: ICD-10-CM

## 2020-07-13 NOTE — TELEPHONE ENCOUNTER
Requested Prescriptions   Pending Prescriptions Disp Refills     furosemide (LASIX) 20 MG tablet 60 tablet 0     Sig: Take 1 tablet (20 mg) by mouth 2 times daily  Last Written Prescription Date:  5/8/20  Last Fill Quantity: 60,  # refills: 0   Last office visit: 3/12/2020 with prescribing provider:  Figueroa   Future Office Visit:               There is no refill protocol information for this order

## 2020-07-14 RX ORDER — FUROSEMIDE 20 MG
20 TABLET ORAL 2 TIMES DAILY
Qty: 60 TABLET | Refills: 0 | Status: SHIPPED | OUTPATIENT
Start: 2020-07-14 | End: 2020-08-17

## 2020-07-14 NOTE — TELEPHONE ENCOUNTER
Routing refill request to provider for review/approval because:  Labs not current:  Potassium and BP not at goal    BP Readings from Last 3 Encounters:   03/12/20 (!) 154/77   12/11/19 132/64   11/26/19 (!) 156/85     Last Comprehensive Metabolic Panel:  Sodium   Date Value Ref Range Status   12/11/2019 142 133 - 144 mmol/L Final     Potassium   Date Value Ref Range Status   12/11/2019 3.3 (L) 3.4 - 5.3 mmol/L Final     Chloride   Date Value Ref Range Status   12/11/2019 109 94 - 109 mmol/L Final     Carbon Dioxide   Date Value Ref Range Status   12/11/2019 27 20 - 32 mmol/L Final     Anion Gap   Date Value Ref Range Status   12/11/2019 6 3 - 14 mmol/L Final     Glucose   Date Value Ref Range Status   12/11/2019 114 (H) 70 - 99 mg/dL Final     Urea Nitrogen   Date Value Ref Range Status   12/11/2019 17 7 - 30 mg/dL Final     Creatinine   Date Value Ref Range Status   12/11/2019 1.18 0.66 - 1.25 mg/dL Final     GFR Estimate   Date Value Ref Range Status   12/11/2019 59 (L) >60 mL/min/[1.73_m2] Final     Comment:     Non  GFR Calc  Starting 12/18/2018, serum creatinine based estimated GFR (eGFR) will be   calculated using the Chronic Kidney Disease Epidemiology Collaboration   (CKD-EPI) equation.       Calcium   Date Value Ref Range Status   12/11/2019 9.2 8.5 - 10.1 mg/dL Final     Bilirubin Total   Date Value Ref Range Status   11/26/2019 3.5 (H) 0.2 - 1.3 mg/dL Final     Alkaline Phosphatase   Date Value Ref Range Status   11/26/2019 108 40 - 150 U/L Final     ALT   Date Value Ref Range Status   11/26/2019 20 0 - 70 U/L Final     AST   Date Value Ref Range Status   11/26/2019 20 0 - 45 U/L Final

## 2020-08-10 DIAGNOSIS — I48.20 CHRONIC ATRIAL FIBRILLATION (H): ICD-10-CM

## 2020-08-10 LAB
CAPILLARY BLOOD COLLECTION: NORMAL
INR PPP: 3.3 (ref 0.86–1.14)

## 2020-08-10 PROCEDURE — 85610 PROTHROMBIN TIME: CPT | Performed by: INTERNAL MEDICINE

## 2020-08-10 PROCEDURE — 36416 COLLJ CAPILLARY BLOOD SPEC: CPT | Performed by: INTERNAL MEDICINE

## 2020-08-11 ENCOUNTER — ANTICOAGULATION THERAPY VISIT (OUTPATIENT)
Dept: INTERNAL MEDICINE | Facility: CLINIC | Age: 80
End: 2020-08-11
Payer: COMMERCIAL

## 2020-08-11 DIAGNOSIS — I48.20 CHRONIC ATRIAL FIBRILLATION (H): ICD-10-CM

## 2020-08-11 DIAGNOSIS — Z79.01 LONG TERM CURRENT USE OF ANTICOAGULANT THERAPY: ICD-10-CM

## 2020-08-11 PROCEDURE — 99207 ZZC NO CHARGE NURSE ONLY: CPT | Performed by: INTERNAL MEDICINE

## 2020-08-13 NOTE — PROGRESS NOTES
ANTICOAGULATION FOLLOW-UP CLINIC VISIT    Patient Name:  Chidi Dubon  Date:  8/13/2020  Contact Type:  Telephone    SUBJECTIVE:  Patient Findings     Comments:   Spoke with patient, he denies any problems, no changes. Will continue same dose and recheck in 2 weeks        Clinical Outcomes     Comments:   Spoke with patient, he denies any problems, no changes. Will continue same dose and recheck in 2 weeks           OBJECTIVE    Recent labs: (last 7 days)     08/10/20  1115   INR 3.30*       ASSESSMENT / PLAN  INR assessment SUPRA    Recheck INR In: 2 WEEKS    INR Location Clinic      Anticoagulation Summary  As of 8/11/2020    INR goal:   2.0-3.0   TTR:   93.1 % (1 y)   INR used for dosing:   3.30! (8/10/2020)   Warfarin maintenance plan:   2.5 mg (2.5 mg x 1) every day   Full warfarin instructions:   2.5 mg every day   Weekly warfarin total:   17.5 mg   No change documented:   Hyacinth Montano   Plan last modified:   Kari Montilla RN (1/9/2019)   Next INR check:   8/25/2020   Priority:   Maintenance   Target end date:   Indefinite    Indications    Long-term (current) use of anticoagulants [Z79.01] [Z79.01]  Atrial Fibrillation [I48.20]             Anticoagulation Episode Summary     INR check location:       Preferred lab:       Send INR reminders to:   EMELIA SARGENT    Comments:         Anticoagulation Care Providers     Provider Role Specialty Phone number    Gen Marti PA Referring  [phone]    Martin Schultz PA-C Responsible Physician Assistant [phone]            See the Encounter Report to view Anticoagulation Flowsheet and Dosing Calendar (Go to Encounters tab in chart review, and find the Anticoagulation Therapy Visit)    Dosage adjustment made based on physician directed care plan.    HYACINTH MONTANO

## 2020-08-17 ENCOUNTER — OFFICE VISIT (OUTPATIENT)
Dept: FAMILY MEDICINE | Facility: CLINIC | Age: 80
End: 2020-08-17
Payer: COMMERCIAL

## 2020-08-17 VITALS
RESPIRATION RATE: 20 BRPM | BODY MASS INDEX: 33.19 KG/M2 | SYSTOLIC BLOOD PRESSURE: 124 MMHG | TEMPERATURE: 97.9 F | HEART RATE: 66 BPM | OXYGEN SATURATION: 92 % | HEIGHT: 74 IN | DIASTOLIC BLOOD PRESSURE: 74 MMHG | WEIGHT: 258.6 LBS

## 2020-08-17 DIAGNOSIS — E78.5 HYPERLIPIDEMIA LDL GOAL <70: ICD-10-CM

## 2020-08-17 DIAGNOSIS — E87.6 HYPOKALEMIA: ICD-10-CM

## 2020-08-17 DIAGNOSIS — I10 BENIGN ESSENTIAL HYPERTENSION: Primary | ICD-10-CM

## 2020-08-17 DIAGNOSIS — R53.83 FATIGUE, UNSPECIFIED TYPE: ICD-10-CM

## 2020-08-17 DIAGNOSIS — I48.20 CHRONIC ATRIAL FIBRILLATION (H): ICD-10-CM

## 2020-08-17 DIAGNOSIS — R06.09 DOE (DYSPNEA ON EXERTION): ICD-10-CM

## 2020-08-17 DIAGNOSIS — D50.0 IRON DEFICIENCY ANEMIA DUE TO CHRONIC BLOOD LOSS: ICD-10-CM

## 2020-08-17 LAB
ANION GAP SERPL CALCULATED.3IONS-SCNC: 4 MMOL/L (ref 3–14)
BASOPHILS # BLD AUTO: 0 10E9/L (ref 0–0.2)
BASOPHILS NFR BLD AUTO: 0.2 %
BUN SERPL-MCNC: 14 MG/DL (ref 7–30)
CALCIUM SERPL-MCNC: 9.6 MG/DL (ref 8.5–10.1)
CHLORIDE SERPL-SCNC: 109 MMOL/L (ref 94–109)
CO2 SERPL-SCNC: 27 MMOL/L (ref 20–32)
CREAT SERPL-MCNC: 1.23 MG/DL (ref 0.66–1.25)
DIFFERENTIAL METHOD BLD: NORMAL
EOSINOPHIL # BLD AUTO: 0.2 10E9/L (ref 0–0.7)
EOSINOPHIL NFR BLD AUTO: 2.3 %
ERYTHROCYTE [DISTWIDTH] IN BLOOD BY AUTOMATED COUNT: 14.1 % (ref 10–15)
GFR SERPL CREATININE-BSD FRML MDRD: 56 ML/MIN/{1.73_M2}
GLUCOSE SERPL-MCNC: 99 MG/DL (ref 70–99)
HCT VFR BLD AUTO: 46.8 % (ref 40–53)
HGB BLD-MCNC: 15.5 G/DL (ref 13.3–17.7)
LYMPHOCYTES # BLD AUTO: 1.7 10E9/L (ref 0.8–5.3)
LYMPHOCYTES NFR BLD AUTO: 25.3 %
MCH RBC QN AUTO: 28.8 PG (ref 26.5–33)
MCHC RBC AUTO-ENTMCNC: 33.1 G/DL (ref 31.5–36.5)
MCV RBC AUTO: 87 FL (ref 78–100)
MONOCYTES # BLD AUTO: 0.6 10E9/L (ref 0–1.3)
MONOCYTES NFR BLD AUTO: 8.9 %
NEUTROPHILS # BLD AUTO: 4.1 10E9/L (ref 1.6–8.3)
NEUTROPHILS NFR BLD AUTO: 63.3 %
NT-PROBNP SERPL-MCNC: 1599 PG/ML (ref 0–450)
PLATELET # BLD AUTO: 207 10E9/L (ref 150–450)
POTASSIUM SERPL-SCNC: 3.6 MMOL/L (ref 3.4–5.3)
RBC # BLD AUTO: 5.38 10E12/L (ref 4.4–5.9)
SODIUM SERPL-SCNC: 140 MMOL/L (ref 133–144)
TSH SERPL DL<=0.005 MIU/L-ACNC: 2.07 MU/L (ref 0.4–4)
WBC # BLD AUTO: 6.5 10E9/L (ref 4–11)

## 2020-08-17 PROCEDURE — 85025 COMPLETE CBC W/AUTO DIFF WBC: CPT | Performed by: PHYSICIAN ASSISTANT

## 2020-08-17 PROCEDURE — 84443 ASSAY THYROID STIM HORMONE: CPT | Performed by: PHYSICIAN ASSISTANT

## 2020-08-17 PROCEDURE — 99214 OFFICE O/P EST MOD 30 MIN: CPT | Performed by: PHYSICIAN ASSISTANT

## 2020-08-17 PROCEDURE — 80048 BASIC METABOLIC PNL TOTAL CA: CPT | Performed by: PHYSICIAN ASSISTANT

## 2020-08-17 PROCEDURE — 83880 ASSAY OF NATRIURETIC PEPTIDE: CPT | Performed by: PHYSICIAN ASSISTANT

## 2020-08-17 PROCEDURE — 36415 COLL VENOUS BLD VENIPUNCTURE: CPT | Performed by: PHYSICIAN ASSISTANT

## 2020-08-17 RX ORDER — FUROSEMIDE 20 MG
20 TABLET ORAL DAILY
Qty: 30 TABLET | Refills: 3 | Status: SHIPPED | OUTPATIENT
Start: 2020-08-17 | End: 2021-02-02

## 2020-08-17 RX ORDER — LOSARTAN POTASSIUM 100 MG/1
100 TABLET ORAL DAILY
Qty: 90 TABLET | Refills: 1 | Status: SHIPPED | OUTPATIENT
Start: 2020-08-17 | End: 2021-02-26

## 2020-08-17 RX ORDER — POTASSIUM CHLORIDE 1.5 G/1.58G
20 POWDER, FOR SOLUTION ORAL DAILY
Qty: 90 TABLET | Refills: 3 | Status: SHIPPED | OUTPATIENT
Start: 2020-08-17 | End: 2022-03-09

## 2020-08-17 RX ORDER — ATORVASTATIN CALCIUM 80 MG/1
TABLET, FILM COATED ORAL
Qty: 45 TABLET | Refills: 3 | Status: SHIPPED | OUTPATIENT
Start: 2020-08-17 | End: 2021-09-03

## 2020-08-17 ASSESSMENT — MIFFLIN-ST. JEOR: SCORE: 1956.75

## 2020-08-17 NOTE — PROGRESS NOTES
Subjective     Chidi Dubon is a 79 year old male who presents to clinic today for the following health issues:    HPI       Hyperlipidemia Follow-Up      Are you regularly taking any medication or supplement to lower your cholesterol?   Yes- atorvastatin    Are you having muscle aches or other side effects that you think could be caused by your cholesterol lowering medication?  No    Hypertension Follow-up      Do you check your blood pressure regularly outside of the clinic? Yes     Are you following a low salt diet? Yes    Are your blood pressures ever more than 140 on the top number (systolic) OR more   than 90 on the bottom number (diastolic), for example 140/90? Yes    Atrial Fibrilation      How many servings of fruits and vegetables do you eat daily?  0-1    On average, how many sweetened beverages do you drink each day (Examples: soda, juice, sweet tea, etc.  Do NOT count diet or artificially sweetened beverages)?   0-2    How many days per week do you exercise enough to make your heart beat faster? 7    How many minutes a day do you exercise enough to make your heart beat faster? 30 - 60    How many days per week do you miss taking your medication? 0    Some fatigue as of late . Occasional sob. No profound patten as of late [place] . No profound edema.   No dizziness or syncope.     Patient Active Problem List   Diagnosis     Hyperlipidemia LDL goal <70     Obesity     Mild major depression (H)     Osteoarthritis, knee     Diverticulosis     Urolithiasis     CAD S/P percutaneous coronary angioplasty     Atrial Fibrillation     Fatigue     Restless leg syndrome     Long-term (current) use of anticoagulants [Z79.01]     Benign essential hypertension     Depression, major, in remission (H)     Aftercare following right hip joint replacement surgery     Hip pain, right     Hip joint replacement status     Posterior capsular opacification of both eyes, obscuring vision     Coronary  artery disease involving native heart with angina pectoris, unspecified vessel or lesion type (H)     S/P placement of cardiac pacemaker     Abdominal aortic aneurysm without rupture (H)     (HFpEF) heart failure with preserved ejection fraction (H)     Bilirubinemia     CKD (chronic kidney disease) stage 3, GFR 30-59 ml/min (H)     Non-rheumatic mitral regurgitation     Past Surgical History:   Procedure Laterality Date     ANGIOGRAM  age 61     3 cornary artery stents, [facility]     ANGIOGRAM  age 66    1 coronary artery stent, [facility]     ANGIOGRAM  11/2013    1 coronary artery stent, [facility]      ARTHROSCOPY KNEE RT/LT         Social History     Tobacco Use     Smoking status: Never Smoker     Smokeless tobacco: Never Used     Tobacco comment: never smoker; non-smoking household   Substance Use Topics     Alcohol use: No     Comment: none     Family History   Problem Relation Age of Onset     Heart Disease Paternal Grandfather          Current Outpatient Medications   Medication Sig Dispense Refill     atorvastatin (LIPITOR) 80 MG tablet Take 0.5 tablets (40 mg) by mouth daily 45 tablet 3     furosemide (LASIX) 20 MG tablet Take 1 tablet (20 mg) by mouth daily 30 tablet 3     losartan (COZAAR) 100 MG tablet Take 1 tablet (100 mg) by mouth daily 90 tablet 1     nitroglycerin (NITROSTAT) 0.4 MG SL tablet Place 1 tablet (0.4 mg) under the tongue every 5 minutes as needed for chest pain 25 tablet 0     potassium chloride (KLOR-CON) 20 MEQ packet Take 20 mEq by mouth daily 90 tablet 3     warfarin ANTICOAGULANT (COUMADIN) 2.5 MG tablet Take 1 tablet (2.5 mg) by mouth daily 90 tablet 3     amLODIPine (NORVASC) 5 MG tablet Take 10 mg by mouth daily  3     ASPIRIN NOT PRESCRIBED (INTENTIONAL) Please choose reason not prescribed, below       colchicine (COLCYRS) 0.6 MG tablet Take 2 tablets by mouth and wait 1 hour.  Then take the final tablet by mouth.  For gout. (Patient not taking: Reported on 8/17/2020) 3  "tablet 0     Allergies   Allergen Reactions     No Known Allergies      Recent Labs   Lab Test 12/11/19  1142 11/26/19  1438 09/18/19  1326 08/14/19  1254  02/18/19  1142 11/20/18  0806  10/04/16  1144  05/09/14  1524   LDL  --   --   --   --   --  81 51  --  90   < > 100   HDL  --   --   --   --   --  41 33*  --  41   < > 36*   TRIG  --   --   --   --   --  104 58  --  130   < > 162*   ALT  --  20 22 26   < > 19  --   --   --   --   --    CR 1.18 1.07 1.07 1.09   < > 1.21 1.19   < > 0.96   < > 0.99   GFRESTIMATED 59* 66 66 65   < > 57* 59*   < > 77   < > 74   GFRESTBLACK 68 77 77 75   < > 66 72   < > >90   GFR Calc     < > 90   POTASSIUM 3.3* 3.2* 3.2* 3.7   < > 3.0* 3.6   < > 3.2*   < > 3.6   TSH  --   --   --  2.00  --   --   --   --   --   --  1.57    < > = values in this interval not displayed.      BP Readings from Last 3 Encounters:   08/17/20 124/74   03/12/20 (!) 154/77   12/11/19 132/64    Wt Readings from Last 3 Encounters:   08/17/20 117.3 kg (258 lb 9.6 oz)   03/12/20 116.7 kg (257 lb 3.2 oz)   11/26/19 115.7 kg (255 lb)            Missed bottom rung of ladder and fell. Denies pain.         Reviewed and updated as needed this visit by Provider         Review of Systems    Constitutional, HEENT, cardiovascular, pulmonary, GI, , musculoskeletal, neuro, skin, endocrine and psych systems are negative, except as otherwise noted.      Objective    /74   Pulse 66   Temp 97.9  F (36.6  C) (Tympanic)   Resp 20   Ht 1.87 m (6' 1.62\")   Wt 117.3 kg (258 lb 9.6 oz)   SpO2 92%   BMI 33.54 kg/m    Body mass index is 33.54 kg/m .  Physical Exam   Eye exam - right eye normal lid, conjunctiva, cornea, pupil and fundus, left eye normal lid, conjunctiva, cornea, pupil and fundus.  Thyroid not palpable, not enlarged, no nodules detected.  CHEST:chest clear to IPPA, no tachypnea, retractions or cyanosis and Heart exam detail:irregularly irregular rhythm, chest is clear without rales or " wheezing, no pedal edema, no JVD, no hepatosplenomegaly.  Mild leg edema. Dec pulses.  Chidi was seen today for hypertension, atrial fib and hyperlipidemia.    Diagnoses and all orders for this visit:    Benign essential hypertension  -     Basic metabolic panel  (Ca, Cl, CO2, Creat, Gluc, K, Na, BUN)  -     losartan (COZAAR) 100 MG tablet; Take 1 tablet (100 mg) by mouth daily  -     furosemide (LASIX) 20 MG tablet; Take 1 tablet (20 mg) by mouth daily    Atrial Fibrillation    Iron deficiency anemia due to chronic blood loss  -     CBC with platelets and differential    KRAUSE (dyspnea on exertion)  -     BNP-N terminal pro    Fatigue, unspecified type  -     CBC with platelets and differential  -     TSH with free T4 reflex    Hypokalemia  -     potassium chloride (KLOR-CON) 20 MEQ packet; Take 20 mEq by mouth daily    Hyperlipidemia LDL goal <70  -     atorvastatin (LIPITOR) 80 MG tablet; Take 0.5 tablets (40 mg) by mouth daily      work on lifestyle modification

## 2020-08-25 ENCOUNTER — ANTICOAGULATION THERAPY VISIT (OUTPATIENT)
Dept: INTERNAL MEDICINE | Facility: CLINIC | Age: 80
End: 2020-08-25

## 2020-08-25 DIAGNOSIS — I48.20 CHRONIC ATRIAL FIBRILLATION (H): ICD-10-CM

## 2020-08-25 DIAGNOSIS — Z79.01 LONG TERM CURRENT USE OF ANTICOAGULANT THERAPY: ICD-10-CM

## 2020-08-25 LAB
CAPILLARY BLOOD COLLECTION: NORMAL
INR PPP: 3.9 (ref 0.86–1.14)

## 2020-08-25 PROCEDURE — 99207 ZZC NO CHARGE NURSE ONLY: CPT | Performed by: INTERNAL MEDICINE

## 2020-08-25 PROCEDURE — 36416 COLLJ CAPILLARY BLOOD SPEC: CPT | Performed by: INTERNAL MEDICINE

## 2020-08-25 PROCEDURE — 85610 PROTHROMBIN TIME: CPT | Performed by: INTERNAL MEDICINE

## 2020-08-25 NOTE — PROGRESS NOTES
"ANTICOAGULATION FOLLOW-UP CLINIC VISIT    Patient Name:  Chidi Dubon  Date:  8/25/2020  Contact Type:  Telephone    SUBJECTIVE:  Patient Findings     Comments:   Spoke with patient, he denies any problems or concerns. Unknown second high INR, will hold today and decrease maintenance. He voiced understanding. He then reported a couple of weeks ago he fell off a ladder onto his picnic table, (a short fall), denies any injury \"only to his pride\". He did not seek medical attention. And reports he is fine.        Clinical Outcomes     Comments:   Spoke with patient, he denies any problems or concerns. Unknown second high INR, will hold today and decrease maintenance. He voiced understanding. He then reported a couple of weeks ago he fell off a ladder onto his picnic table, (a short fall), denies any injury \"only to his pride\". He did not seek medical attention. And reports he is fine.           OBJECTIVE    Recent labs: (last 7 days)     08/25/20  1122   INR 3.90*       ASSESSMENT / PLAN  INR assessment SUPRA    Recheck INR In: 2 WEEKS    INR Location Clinic      Anticoagulation Summary  As of 8/25/2020    INR goal:   2.0-3.0   TTR:   89.0 % (1 y)   INR used for dosing:   3.90! (8/25/2020)   Warfarin maintenance plan:   1.25 mg (2.5 mg x 0.5) every Wed; 2.5 mg (2.5 mg x 1) all other days   Full warfarin instructions:   8/25: Hold; Otherwise 1.25 mg every Wed; 2.5 mg all other days   Weekly warfarin total:   16.25 mg   Plan last modified:   Hyacinth Montano (8/25/2020)   Next INR check:   9/8/2020   Priority:   Maintenance   Target end date:   Indefinite    Indications    Long-term (current) use of anticoagulants [Z79.01] [Z79.01]  Atrial Fibrillation [I48.20]             Anticoagulation Episode Summary     INR check location:       Preferred lab:       Send INR reminders to:   EMELIA SARGENT    Comments:         Anticoagulation Care Providers     Provider Role Specialty Phone number    Gen Marti PA " Referring  [phone]    Martin Schultz PA-C Responsible Physician Assistant [phone]            See the Encounter Report to view Anticoagulation Flowsheet and Dosing Calendar (Go to Encounters tab in chart review, and find the Anticoagulation Therapy Visit)    Dosage adjustment made based on physician directed care plan.    DAVID WILSON

## 2020-09-10 ENCOUNTER — ANTICOAGULATION THERAPY VISIT (OUTPATIENT)
Dept: INTERNAL MEDICINE | Facility: CLINIC | Age: 80
End: 2020-09-10

## 2020-09-10 DIAGNOSIS — Z79.01 LONG TERM CURRENT USE OF ANTICOAGULANT THERAPY: ICD-10-CM

## 2020-09-10 DIAGNOSIS — I48.20 CHRONIC ATRIAL FIBRILLATION (H): ICD-10-CM

## 2020-09-10 LAB
CAPILLARY BLOOD COLLECTION: NORMAL
INR PPP: 3.1 (ref 0.86–1.14)

## 2020-09-10 PROCEDURE — 99207 ZZC NO CHARGE NURSE ONLY: CPT | Performed by: INTERNAL MEDICINE

## 2020-09-10 PROCEDURE — 85610 PROTHROMBIN TIME: CPT | Performed by: INTERNAL MEDICINE

## 2020-09-10 PROCEDURE — 36416 COLLJ CAPILLARY BLOOD SPEC: CPT | Performed by: INTERNAL MEDICINE

## 2020-09-10 NOTE — PROGRESS NOTES
ANTICOAGULATION FOLLOW-UP CLINIC VISIT    Patient Name:  Chidi Dubon  Date:  9/10/2020  Contact Type:  Face to Face    SUBJECTIVE:  Patient Findings     Comments:   Spoke with patient, he denies any problems or concerns. Remains high will decrease maintenance and recheck in 2 weeks.        Clinical Outcomes     Comments:   Spoke with patient, he denies any problems or concerns. Remains high will decrease maintenance and recheck in 2 weeks.           OBJECTIVE    Recent labs: (last 7 days)     09/10/20  1130   INR 3.10*       ASSESSMENT / PLAN  INR assessment SUPRA    Recheck INR In: 2 WEEKS    INR Location Clinic      Anticoagulation Summary  As of 9/10/2020    INR goal:   2.0-3.0   TTR:   87.4 % (1 y)   INR used for dosing:   3.10! (9/10/2020)   Warfarin maintenance plan:   1.25 mg (2.5 mg x 0.5) every Sun, Wed; 2.5 mg (2.5 mg x 1) all other days   Full warfarin instructions:   1.25 mg every Sun, Wed; 2.5 mg all other days   Weekly warfarin total:   15 mg   Plan last modified:   Hyacinth Montano (9/10/2020)   Next INR check:   9/24/2020   Priority:   Maintenance   Target end date:   Indefinite    Indications    Long-term (current) use of anticoagulants [Z79.01] [Z79.01]  Atrial Fibrillation [I48.20]             Anticoagulation Episode Summary     INR check location:       Preferred lab:       Send INR reminders to:   EMELIA SARGENT    Comments:         Anticoagulation Care Providers     Provider Role Specialty Phone number    Gen Marti PA Referring  [phone]    Martin Schultz PA-C Responsible Physician Assistant [phone]            See the Encounter Report to view Anticoagulation Flowsheet and Dosing Calendar (Go to Encounters tab in chart review, and find the Anticoagulation Therapy Visit)    Dosage adjustment made based on physician directed care plan.    HYACINTH MONTANO

## 2020-09-24 ENCOUNTER — ANTICOAGULATION THERAPY VISIT (OUTPATIENT)
Dept: NURSING | Facility: CLINIC | Age: 80
End: 2020-09-24

## 2020-09-24 DIAGNOSIS — I48.20 CHRONIC ATRIAL FIBRILLATION (H): ICD-10-CM

## 2020-09-24 DIAGNOSIS — Z79.01 LONG TERM CURRENT USE OF ANTICOAGULANT THERAPY: ICD-10-CM

## 2020-09-24 LAB
CAPILLARY BLOOD COLLECTION: NORMAL
INR PPP: 2.6 (ref 0.86–1.14)

## 2020-09-24 PROCEDURE — 85610 PROTHROMBIN TIME: CPT | Performed by: INTERNAL MEDICINE

## 2020-09-24 PROCEDURE — 99207 ZZC NO CHARGE NURSE ONLY: CPT | Performed by: INTERNAL MEDICINE

## 2020-09-24 PROCEDURE — 36416 COLLJ CAPILLARY BLOOD SPEC: CPT | Performed by: INTERNAL MEDICINE

## 2020-09-24 NOTE — PROGRESS NOTES
ANTICOAGULATION FOLLOW-UP CLINIC VISIT    Patient Name:  Chidi Dubon  Date:  2020  Contact Type:  Telephone/ Chidi Dubon    SUBJECTIVE:  Patient Findings     Comments:   INR 2.6.  Will continue with current maintenance and recheck INR in 4 weeks.  Patient read back instructions correctly        Clinical Outcomes     Comments:   INR 2.6.  Will continue with current maintenance and recheck INR in 4 weeks.  Patient read back instructions correctly           OBJECTIVE    Recent labs: (last 7 days)     20  1103   INR 2.60*       ASSESSMENT / PLAN  INR assessment THER    Recheck INR In: 4 WEEKS    INR Location Clinic      Anticoagulation Summary  As of 2020    INR goal:   2.0-3.0   TTR:   86.6 % (1 y)   INR used for dosin.60 (2020)   Warfarin maintenance plan:   1.25 mg (2.5 mg x 0.5) every Sun, Wed; 2.5 mg (2.5 mg x 1) all other days   Full warfarin instructions:   1.25 mg every Sun, Wed; 2.5 mg all other days   Weekly warfarin total:   15 mg   No change documented:   Lissy Nichols RN   Plan last modified:   Hyacinth Montano (9/10/2020)   Next INR check:   10/22/2020   Priority:   Maintenance   Target end date:   Indefinite    Indications    Long-term (current) use of anticoagulants [Z79.01] [Z79.01]  Atrial Fibrillation [I48.20]             Anticoagulation Episode Summary     INR check location:       Preferred lab:       Send INR reminders to:   EMELIA SARGENT    Comments:         Anticoagulation Care Providers     Provider Role Specialty Phone number    Gen Marti PA Referring  [phone]    Martin Schultz PA-C Responsible Physician Assistant [phone]            See the Encounter Report to view Anticoagulation Flowsheet and Dosing Calendar (Go to Encounters tab in chart review, and find the Anticoagulation Therapy Visit)    Dosage adjustment made based on physician directed care plan.    Lissy Nichols RN

## 2020-10-22 ENCOUNTER — ANTICOAGULATION THERAPY VISIT (OUTPATIENT)
Dept: INTERNAL MEDICINE | Facility: CLINIC | Age: 80
End: 2020-10-22

## 2020-10-22 DIAGNOSIS — I48.20 CHRONIC ATRIAL FIBRILLATION (H): ICD-10-CM

## 2020-10-22 DIAGNOSIS — Z79.01 LONG TERM CURRENT USE OF ANTICOAGULANT THERAPY: ICD-10-CM

## 2020-10-22 LAB
CAPILLARY BLOOD COLLECTION: NORMAL
INR PPP: 2.6 (ref 0.86–1.14)

## 2020-10-22 PROCEDURE — 85610 PROTHROMBIN TIME: CPT | Performed by: INTERNAL MEDICINE

## 2020-10-22 PROCEDURE — 36416 COLLJ CAPILLARY BLOOD SPEC: CPT | Performed by: INTERNAL MEDICINE

## 2020-10-22 PROCEDURE — 99207 PR NO CHARGE NURSE ONLY: CPT | Performed by: INTERNAL MEDICINE

## 2020-10-22 NOTE — PROGRESS NOTES
Anticoagulation Management    Unable to reach Chidi today.    Today's INR result of 2.6 is therapeutic (goal INR of 2.0-3.0).  Result received from: Clinic Lab    Follow up required to confirm warfarin dose taken and assess for changes    Left message to continue current dose of warfarin 2.5 mg tonight and to contact [facility] at [phone] for further dosing instructions.        Anticoagulation clinic to follow up    Fabio Branham RN

## 2020-10-23 RX ORDER — WARFARIN SODIUM 2.5 MG/1
TABLET ORAL
Qty: 90 TABLET | Refills: 3
Start: 2020-10-23 | End: 2021-08-31

## 2020-10-23 NOTE — PROGRESS NOTES
Left message on voicemail for Chidi to call back and speak with [facility] nurse at [phone]. Carolin Gongora RN

## 2020-10-23 NOTE — PROGRESS NOTES
ANTICOAGULATION MANAGEMENT     Patient Name:  Chidi Dubon  Date:  10/23/2020    ASSESSMENT /SUBJECTIVE:    Today's INR result of 2.6 is therapeutic. Goal INR of 2.0-3.0      Warfarin dose taken: Warfarin taken as instructed    Diet: No new diet changes affecting INR    Medication changes/ interactions: No new medications/supplements affecting INR    Previous INR: Therapeutic     S/S of bleeding or thromboembolism: No    New injury or illness: No    Upcoming surgery, procedure or cardioversion: No    Additional findings: None      PLAN:    Telephone call with Chidi regarding INR result and instructed:     Warfarin Dosing Instructions: Continue your current warfarin dose 1.25 mg sun,wed and 2.5 mg rest of days    Instructed patient to follow up no later than: 4 weeks  Lab visit scheduled    Education provided: Contact the anticoagulation clinic with any changes, questions or concerns at #[phone]       Chidi verbalizes understanding and agrees to warfarin dosing plan.    Instructed to call the Anticoagulation Clinic for any changes, questions or concerns. (#[phone])        Carolin Gongora RN      OBJECTIVE:  Recent labs: (last 7 days)     10/22/20  1103   INR 2.60*         INR assessment THER    Recheck INR In: 4 WEEKS    INR Location Clinic      Anticoagulation Summary  As of 10/22/2020    INR goal:  2.0-3.0   TTR:  86.6 % (1 y)   INR used for dosin.60 (10/22/2020)   Warfarin maintenance plan:  1.25 mg (2.5 mg x 0.5) every Sun, Wed; 2.5 mg (2.5 mg x 1) all other days   Full warfarin instructions:  1.25 mg every Sun, Wed; 2.5 mg all other days   Weekly warfarin total:  15 mg   Plan last modified:  Hyacinth Montano (9/10/2020)   Next INR check:     Priority:  Maintenance   Target end date:  Indefinite    Indications    Long-term (current) use of anticoagulants [Z79.01] [Z79.01]  Atrial Fibrillation [I48.20]             Anticoagulation Episode Summary     INR check location:      Preferred lab:       Send INR reminders to:  EMELIA SARGENT    Comments:        Anticoagulation Care Providers     Provider Role Specialty Phone number    Gen Marti PA Referring  [phone]    Martin Schultz PA-C Responsible Physician Assistant [phone]       Left message for Chidi to call back and speak with INR nurse.  Carolin Gongora RN  ANTICOAGULATION FOLLOW-UP CLINIC VISIT    Patient Name:  Chidi Dubon  Date:  10/23/2020  Contact Type:  Telephone    SUBJECTIVE:         OBJECTIVE    Recent labs: (last 7 days)     10/22/20  1103   INR 2.60*       ASSESSMENT / PLAN  INR assessment THER    Recheck INR In: 4 WEEKS    INR Location Clinic      Anticoagulation Summary  As of 10/22/2020    INR goal:  2.0-3.0   TTR:  86.6 % (1 y)   INR used for dosin.60 (10/22/2020)   Warfarin maintenance plan:  1.25 mg (2.5 mg x 0.5) every Sun, Wed; 2.5 mg (2.5 mg x 1) all other days   Full warfarin instructions:  1.25 mg every Sun, Wed; 2.5 mg all other days   Weekly warfarin total:  15 mg   Plan last modified:  Hyacinth Montano (9/10/2020)   Next INR check:     Priority:  Maintenance   Target end date:  Indefinite    Indications    Long-term (current) use of anticoagulants [Z79.01] [Z79.01]  Atrial Fibrillation [I48.20]             Anticoagulation Episode Summary     INR check location:      Preferred lab:      Send INR reminders to:  EMELIA SARGENT    Comments:        Anticoagulation Care Providers     Provider Role Specialty Phone number    Gen Marti PA Referring  [phone]    Martin Schultz PA-C Responsible Physician Assistant [phone]            See the Encounter Report to view Anticoagulation Flowsheet and Dosing Calendar (Go to Encounters tab in chart review, and find the Anticoagulation Therapy Visit)    Dosage adjustment made based on physician directed care plan.    Carolin Gongora RN

## 2020-12-16 ENCOUNTER — ANTICOAGULATION THERAPY VISIT (OUTPATIENT)
Dept: NURSING | Facility: CLINIC | Age: 80
End: 2020-12-16

## 2020-12-16 DIAGNOSIS — Z79.01 LONG TERM CURRENT USE OF ANTICOAGULANT THERAPY: ICD-10-CM

## 2020-12-16 DIAGNOSIS — I48.20 CHRONIC ATRIAL FIBRILLATION (H): ICD-10-CM

## 2020-12-16 LAB — INR PPP: 2.5 (ref 0.86–1.14)

## 2020-12-16 PROCEDURE — 85610 PROTHROMBIN TIME: CPT | Performed by: INTERNAL MEDICINE

## 2020-12-16 PROCEDURE — 36416 COLLJ CAPILLARY BLOOD SPEC: CPT | Performed by: INTERNAL MEDICINE

## 2020-12-16 NOTE — PROGRESS NOTES
Anticoagulation Management    Unable to reach Chidi today.    Today's INR result of 2.5 is therapeutic (goal INR of 2.0-3.0).  Result received from: Clinic Lab    Follow up required to confirm warfarin dose taken and assess for changes    left message to call back and speak with nurse at [phone]      Anticoagulation clinic to follow up    Carolin Gongora RN  Left message to call me back. [phone]. Carolin Gongora RN

## 2020-12-17 NOTE — PROGRESS NOTES
ANTICOAGULATION MANAGEMENT     Patient Name:  Chidi Dubon  Date:  2020    ASSESSMENT /SUBJECTIVE:    Today's INR result of 2.5 is therapeutic. Goal INR of 2.0-3.0      Warfarin dose taken: Warfarin taken as instructed    Diet: No new diet changes affecting INR    Medication changes/ interactions: No new medications/supplements affecting INR    Previous INR: Therapeutic     S/S of bleeding or thromboembolism: No    New injury or illness: No    Upcoming surgery, procedure or cardioversion: No    Additional findings: None      PLAN:    Telephone call with Chidi regarding INR result and instructed:     Warfarin Dosing Instructions: Continue your current warfarin dose 1.25mg every Lorenzo, Wed; 2.5 mg all other days    Instructed patient to follow up no later than: 6 weeks. Pt refused because he doesn't want to come in so frequently due to COVID. Agreed to 8 weeks.  Lab visit scheduled    Education provided: Please call back if any changes to your diet, medications or how you've been taking warfarin      Chidi verbalizes understanding and agrees to warfarin dosing plan.    Instructed to call the Anticoagulation Clinic for any changes, questions or concerns. (#[phone])        Paul Caldera RN      OBJECTIVE:  Recent labs: (last 7 days)     20  1148   INR 2.50*         No question data found.  Anticoagulation Summary  As of 2020    INR goal:  2.0-3.0   TTR:  86.6 % (1 y)   INR used for dosin.50 (2020)   Warfarin maintenance plan:  1.25 mg (2.5 mg x 0.5) every Sun, Wed; 2.5 mg (2.5 mg x 1) all other days   Full warfarin instructions:  1.25 mg every Sun, Wed; 2.5 mg all other days   Weekly warfarin total:  15 mg   Plan last modified:  Hyacinth Montano (9/10/2020)   Next INR check:  2/10/2021   Priority:  Maintenance   Target end date:  Indefinite    Indications    Long-term (current) use of anticoagulants [Z79.01] [Z79.01]  Atrial Fibrillation [I48.20]             Anticoagulation  Episode Summary     INR check location:      Preferred lab:      Send INR reminders to:  EMELIA SARGENT    Comments:        Anticoagulation Care Providers     Provider Role Specialty Phone number    Gen Marti PA Referring  [phone]    Martin Schultz PA-C [facility] [phone]

## 2021-01-31 DIAGNOSIS — I10 BENIGN ESSENTIAL HYPERTENSION: ICD-10-CM

## 2021-02-02 RX ORDER — FUROSEMIDE 20 MG
20 TABLET ORAL DAILY
Qty: 30 TABLET | Refills: 0 | Status: SHIPPED | OUTPATIENT
Start: 2021-02-02 | End: 2021-02-26

## 2021-02-03 DIAGNOSIS — I48.20 CHRONIC ATRIAL FIBRILLATION (H): ICD-10-CM

## 2021-02-03 LAB
CAPILLARY BLOOD COLLECTION: NORMAL
INR PPP: 3.1 (ref 0.86–1.14)

## 2021-02-03 PROCEDURE — 85610 PROTHROMBIN TIME: CPT | Performed by: INTERNAL MEDICINE

## 2021-02-03 PROCEDURE — 36416 COLLJ CAPILLARY BLOOD SPEC: CPT | Performed by: INTERNAL MEDICINE

## 2021-02-11 ENCOUNTER — IMMUNIZATION (OUTPATIENT)
Dept: PEDIATRICS | Facility: CLINIC | Age: 81
End: 2021-02-11
Payer: COMMERCIAL

## 2021-02-11 PROCEDURE — 91300 PR COVID VAC PFIZER DIL RECON 30 MCG/0.3 ML IM: CPT

## 2021-02-11 PROCEDURE — 0001A PR COVID VAC PFIZER DIL RECON 30 MCG/0.3 ML IM: CPT

## 2021-02-24 ENCOUNTER — TELEPHONE (OUTPATIENT)
Dept: INTERNAL MEDICINE | Facility: CLINIC | Age: 81
End: 2021-02-24

## 2021-02-24 NOTE — TELEPHONE ENCOUNTER
ANTICOAGULATION     Cihdi Dubon is overdue for INR check.      Left message for patient to call and schedule lab appointment as soon as possible. If returning call, please schedule.      INR due 2/17/21    Carolin Gongora RN

## 2021-02-25 DIAGNOSIS — I10 BENIGN ESSENTIAL HYPERTENSION: ICD-10-CM

## 2021-02-26 RX ORDER — LOSARTAN POTASSIUM 100 MG/1
TABLET ORAL
Qty: 30 TABLET | Refills: 0 | Status: SHIPPED | OUTPATIENT
Start: 2021-02-26 | End: 2021-03-26

## 2021-02-26 RX ORDER — FUROSEMIDE 20 MG
20 TABLET ORAL DAILY
Qty: 30 TABLET | Refills: 0 | Status: SHIPPED | OUTPATIENT
Start: 2021-02-26 | End: 2021-04-20

## 2021-02-26 NOTE — TELEPHONE ENCOUNTER
"Prescription approved per [facility] Refill Protocol.  Requested Prescriptions   Pending Prescriptions Disp Refills     furosemide (LASIX) 20 MG tablet [Pharmacy Med Name: Furosemide Oral Tablet 20 MG] 30 tablet 0     Sig: Take 1 tablet (20 mg) by mouth daily       Diuretics (Including Combos) Protocol Passed - 2/25/2021  1:19 AM        Passed - Blood pressure under 140/90 in past 12 months     BP Readings from Last 3 Encounters:   08/17/20 124/74   03/12/20 (!) 154/77   12/11/19 132/64                 Passed - Recent (12 mo) or future (30 days) visit within the authorizing provider's specialty     Patient has had an office visit with the authorizing provider or a provider within the authorizing providers department within the previous 12 mos or has a future within next 30 days. See \"Patient Info\" tab in inbasket, or \"Choose Columns\" in Meds & Orders section of the refill encounter.              Passed - Medication is active on med list        Passed - Patient is age 18 or older        Passed - Normal serum creatinine on file in past 12 months     Recent Labs   Lab Test 08/17/20  1425   CR 1.23              Passed - Normal serum potassium on file in past 12 months     Recent Labs   Lab Test 08/17/20  1425   POTASSIUM 3.6                    Passed - Normal serum sodium on file in past 12 months     Recent Labs   Lab Test 08/17/20  1425                    losartan (COZAAR) 100 MG tablet [Pharmacy Med Name: Losartan Potassium Oral Tablet 100 MG] 30 tablet 0     Sig: TAKE ONE TABLET BY MOUTH ONCE DAILY.       Angiotensin-II Receptors Passed - 2/25/2021  1:19 AM        Passed - Last blood pressure under 140/90 in past 12 months     BP Readings from Last 3 Encounters:   08/17/20 124/74   03/12/20 (!) 154/77   12/11/19 132/64                 Passed - Recent (12 mo) or future (30 days) visit within the authorizing provider's specialty     Patient has had an office visit with the authorizing provider or a provider within " "the authorizing providers department within the previous 12 mos or has a future within next 30 days. See \"Patient Info\" tab in inbasket, or \"Choose Columns\" in Meds & Orders section of the refill encounter.              Passed - Medication is active on med list        Passed - Patient is age 18 or older        Passed - Normal serum creatinine on file in past 12 months     Recent Labs   Lab Test 08/17/20  1425   CR 1.23       Ok to refill medication if creatinine is low          Passed - Normal serum potassium on file in past 12 months     Recent Labs   Lab Test 08/17/20  1425   POTASSIUM 3.6                         "

## 2021-03-03 ENCOUNTER — OFFICE VISIT (OUTPATIENT)
Dept: FAMILY MEDICINE | Facility: CLINIC | Age: 81
End: 2021-03-03
Payer: COMMERCIAL

## 2021-03-03 VITALS
WEIGHT: 259.8 LBS | BODY MASS INDEX: 33.34 KG/M2 | HEIGHT: 74 IN | SYSTOLIC BLOOD PRESSURE: 144 MMHG | RESPIRATION RATE: 24 BRPM | OXYGEN SATURATION: 95 % | DIASTOLIC BLOOD PRESSURE: 78 MMHG | TEMPERATURE: 97.8 F | HEART RATE: 59 BPM

## 2021-03-03 DIAGNOSIS — I48.20 CHRONIC ATRIAL FIBRILLATION (H): ICD-10-CM

## 2021-03-03 DIAGNOSIS — Z95.0 S/P PLACEMENT OF CARDIAC PACEMAKER: ICD-10-CM

## 2021-03-03 DIAGNOSIS — I25.119 CORONARY ARTERY DISEASE INVOLVING NATIVE HEART WITH ANGINA PECTORIS, UNSPECIFIED VESSEL OR LESION TYPE (H): ICD-10-CM

## 2021-03-03 DIAGNOSIS — N18.31 STAGE 3A CHRONIC KIDNEY DISEASE (H): ICD-10-CM

## 2021-03-03 DIAGNOSIS — E78.5 HYPERLIPIDEMIA LDL GOAL <70: ICD-10-CM

## 2021-03-03 DIAGNOSIS — I10 BENIGN ESSENTIAL HYPERTENSION: ICD-10-CM

## 2021-03-03 DIAGNOSIS — I34.0 NON-RHEUMATIC MITRAL REGURGITATION: ICD-10-CM

## 2021-03-03 DIAGNOSIS — Z00.00 ENCOUNTER FOR MEDICARE ANNUAL WELLNESS EXAM: Primary | ICD-10-CM

## 2021-03-03 DIAGNOSIS — Z98.61 CAD S/P PERCUTANEOUS CORONARY ANGIOPLASTY: ICD-10-CM

## 2021-03-03 DIAGNOSIS — I25.10 CAD S/P PERCUTANEOUS CORONARY ANGIOPLASTY: ICD-10-CM

## 2021-03-03 DIAGNOSIS — I50.32 CHRONIC HEART FAILURE WITH PRESERVED EJECTION FRACTION (H): ICD-10-CM

## 2021-03-03 DIAGNOSIS — Z13.220 SCREENING FOR HYPERLIPIDEMIA: ICD-10-CM

## 2021-03-03 LAB
ALBUMIN SERPL-MCNC: 3.7 G/DL (ref 3.4–5)
ALP SERPL-CCNC: 98 U/L (ref 40–150)
ALT SERPL W P-5'-P-CCNC: 17 U/L (ref 0–70)
ANION GAP SERPL CALCULATED.3IONS-SCNC: 7 MMOL/L (ref 3–14)
AST SERPL W P-5'-P-CCNC: 16 U/L (ref 0–45)
BILIRUB SERPL-MCNC: 3.7 MG/DL (ref 0.2–1.3)
BUN SERPL-MCNC: 12 MG/DL (ref 7–30)
CALCIUM SERPL-MCNC: 9.2 MG/DL (ref 8.5–10.1)
CHLORIDE SERPL-SCNC: 111 MMOL/L (ref 94–109)
CHOLEST SERPL-MCNC: 141 MG/DL
CO2 SERPL-SCNC: 26 MMOL/L (ref 20–32)
CREAT SERPL-MCNC: 1.33 MG/DL (ref 0.66–1.25)
GFR SERPL CREATININE-BSD FRML MDRD: 50 ML/MIN/{1.73_M2}
GLUCOSE SERPL-MCNC: 99 MG/DL (ref 70–99)
HDLC SERPL-MCNC: 41 MG/DL
LDLC SERPL CALC-MCNC: 82 MG/DL
NONHDLC SERPL-MCNC: 100 MG/DL
POTASSIUM SERPL-SCNC: 3.2 MMOL/L (ref 3.4–5.3)
PROT SERPL-MCNC: 7.2 G/DL (ref 6.8–8.8)
SODIUM SERPL-SCNC: 144 MMOL/L (ref 133–144)
TRIGL SERPL-MCNC: 88 MG/DL

## 2021-03-03 PROCEDURE — 99213 OFFICE O/P EST LOW 20 MIN: CPT | Mod: 25 | Performed by: PHYSICIAN ASSISTANT

## 2021-03-03 PROCEDURE — 36415 COLL VENOUS BLD VENIPUNCTURE: CPT | Performed by: PHYSICIAN ASSISTANT

## 2021-03-03 PROCEDURE — 80053 COMPREHEN METABOLIC PANEL: CPT | Performed by: PHYSICIAN ASSISTANT

## 2021-03-03 PROCEDURE — G0438 PPPS, INITIAL VISIT: HCPCS | Performed by: PHYSICIAN ASSISTANT

## 2021-03-03 PROCEDURE — 80061 LIPID PANEL: CPT | Performed by: PHYSICIAN ASSISTANT

## 2021-03-03 RX ORDER — AMLODIPINE BESYLATE 2.5 MG/1
2.5 TABLET ORAL DAILY
Qty: 30 TABLET | Refills: 1 | Status: SHIPPED | OUTPATIENT
Start: 2021-03-03 | End: 2021-04-27

## 2021-03-03 ASSESSMENT — ANXIETY QUESTIONNAIRES
IF YOU CHECKED OFF ANY PROBLEMS ON THIS QUESTIONNAIRE, HOW DIFFICULT HAVE THESE PROBLEMS MADE IT FOR YOU TO DO YOUR WORK, TAKE CARE OF THINGS AT HOME, OR GET ALONG WITH OTHER PEOPLE: NOT DIFFICULT AT ALL
7. FEELING AFRAID AS IF SOMETHING AWFUL MIGHT HAPPEN: SEVERAL DAYS
3. WORRYING TOO MUCH ABOUT DIFFERENT THINGS: SEVERAL DAYS
1. FEELING NERVOUS, ANXIOUS, OR ON EDGE: SEVERAL DAYS
GAD7 TOTAL SCORE: 5
2. NOT BEING ABLE TO STOP OR CONTROL WORRYING: SEVERAL DAYS
6. BECOMING EASILY ANNOYED OR IRRITABLE: SEVERAL DAYS
5. BEING SO RESTLESS THAT IT IS HARD TO SIT STILL: NOT AT ALL

## 2021-03-03 ASSESSMENT — PATIENT HEALTH QUESTIONNAIRE - PHQ9
SUM OF ALL RESPONSES TO PHQ QUESTIONS 1-9: 7
5. POOR APPETITE OR OVEREATING: NOT AT ALL

## 2021-03-03 ASSESSMENT — MIFFLIN-ST. JEOR: SCORE: 1967.17

## 2021-03-03 NOTE — PATIENT INSTRUCTIONS
Patient Education   Personalized Prevention Plan  You are due for the preventive services outlined below.  Your care team is available to assist you in scheduling these services.  If you have already completed any of these items, please share that information with your care team to update in your medical record.  Health Maintenance Due   Topic Date Due     Hepatitis C Screening  10/13/1958     Zoster (Shingles) Vaccine (1 of 2) 10/13/1991     Cholesterol Lab  02/18/2020     Depression Assessment  05/26/2020     Comprehensive Metabolic Panel  09/18/2020     Annual Wellness Visit  11/26/2020     FALL RISK ASSESSMENT  11/26/2020     Diptheria Tetanus Pertussis (DTAP/TDAP/TD) Vaccine (2 - Td) 01/04/2021     COVID-19 Vaccine (2 of 2 - Pfizer series) 03/04/2021

## 2021-03-03 NOTE — PROGRESS NOTES
"  SUBJECTIVE:   Chidi Dubon is a 80 year old male who presents for Preventive Visit.      Patient has been advised of split billing requirements and indicates understanding: Yes  Are you in the first 12 months of your Medicare Part B coverage?  No    Physical Health:    In general, how would you rate your overall physical health? poor    Outside of work, how many days during the week do you exercise? 6-7 days/week    Outside of work, approximately how many minutes a day do you exercise?less than 15 minutes    If you drink alcohol do you typically have >3 drinks per day or >7 drinks per week? Not Applicable    Do you usually eat at least 4 servings of fruit and vegetables a day, include whole grains & fiber and avoid regularly eating high fat or \"junk\" foods? NO    Do you have any problems taking medications regularly?  No    Do you have any side effects from medications? none    Needs assistance for the following daily activities: no assistance needed    Which of the following safety concerns are present in your home?  none identified     Hearing impairment: Yes, he knows it is getting a little worse    In the past 6 months, have you been bothered by leaking of urine? no    Recheck of his blood pressure and chf.  Still some KRAUSE. No edema. No chest pain/palps.   Mental Health:    In general, how would you rate your overall mental or emotional health? poor  PHQ-2 Score:  2    Do you feel safe in your environment? Yes    Have you ever done Advance Care Planning? (For example, a Health Directive, POLST, or a discussion with a medical provider or your loved ones about your wishes): No, advance care planning information given to patient to review.  Patient plans to discuss their wishes with loved ones or provider.      Additional concerns to address?  YES  Shortness of Breath, Loss of strength in legs    Fall risk:  Fallen 2 or more times in the past year?: No  Any fall with injury in the past year?: No   Cognitive " Screenin) Repeat 3 items (Leader, Season, Table)    2) Clock draw: NORMAL  3) 3 item recall: Recalls 1 object   Results: NORMAL clock, 1-2 items recalled: COGNITIVE IMPAIRMENT LESS LIKELY    Mini-CogTM Copyright S Jordan. Licensed by the author for use in [facility]; reprinted with permission (jose@[facility]). All rights reserved.      Do you have sleep apnea, excessive snoring or daytime drowsiness?: no        Reviewed and updated as needed this visit by clinical staff  Tobacco  Allergies  Meds   Med Hx  Surg Hx  Fam Hx  Soc Hx        Reviewed and updated as needed this visit by Provider                Social History     Tobacco Use     Smoking status: Never Smoker     Smokeless tobacco: Never Used     Tobacco comment: never smoker; non-smoking household   Substance Use Topics     Alcohol use: No     Comment: none                           Current providers sharing in care for this patient include:   Patient Care Team:  Phu Gonzalez MD as PCP - General (Internal Medicine)  Martin Schultz PA-C as Assigned PCP    The following health maintenance items are reviewed in Epic and correct as of today:  Health Maintenance   Topic Date Due     HEPATITIS C SCREENING  10/13/1958     ZOSTER IMMUNIZATION (1 of 2) 10/13/1991     FALL RISK ASSESSMENT  2020     DTAP/TDAP/TD IMMUNIZATION (2 - Td) 2021     COVID-19 Vaccine (2 of 2 - Pfizer series) 2021     PHQ-9  2021     MEDICARE ANNUAL WELLNESS VISIT  2022     CMP  2022     LIPID  2022     ADVANCE CARE PLANNING  2026     DEPRESSION ACTION PLAN  Completed     INFLUENZA VACCINE  Completed     Pneumococcal Vaccine: Pediatrics (0 to 5 Years) and At-Risk Patients (6 to 64 Years)  Completed     Pneumococcal Vaccine: 65+ Years  Completed     IPV IMMUNIZATION  Aged Out     MENINGITIS IMMUNIZATION  Aged Out     HEPATITIS B IMMUNIZATION  Aged Out     Lab work is in process  Labs reviewed in EPIC  BP Readings from  Last 3 Encounters:   03/03/21 (!) 144/78   08/17/20 124/74   03/12/20 (!) 154/77    Wt Readings from Last 3 Encounters:   03/03/21 117.8 kg (259 lb 12.8 oz)   08/17/20 117.3 kg (258 lb 9.6 oz)   03/12/20 116.7 kg (257 lb 3.2 oz)                  Patient Active Problem List   Diagnosis     Hyperlipidemia LDL goal <70     Obesity     Mild major depression (H)     Osteoarthritis, knee     Diverticulosis     Urolithiasis     CAD S/P percutaneous coronary angioplasty     Atrial Fibrillation     Fatigue     Restless leg syndrome     Long-term (current) use of anticoagulants [Z79.01]     Benign essential hypertension     Depression, major, in remission (H)     Aftercare following right hip joint replacement surgery     Hip pain, right     Hip joint replacement status     Posterior capsular opacification of both eyes, obscuring vision     Coronary artery disease involving native heart with angina pectoris, unspecified vessel or lesion type (H)     S/P placement of cardiac pacemaker     Abdominal aortic aneurysm without rupture (H)     (HFpEF) heart failure with preserved ejection fraction (H)     Bilirubinemia     CKD (chronic kidney disease) stage 3, GFR 30-59 ml/min     Non-rheumatic mitral regurgitation     Past Surgical History:   Procedure Laterality Date     ANGIOGRAM  age 61     3 cornary artery stents, [facility]     ANGIOGRAM  age 66    1 coronary artery stent, [facility]     ANGIOGRAM  11/2013    1 coronary artery stent, [facility]      ARTHROSCOPY KNEE RT/LT         Social History     Tobacco Use     Smoking status: Never Smoker     Smokeless tobacco: Never Used     Tobacco comment: never smoker; non-smoking household   Substance Use Topics     Alcohol use: No     Comment: none     Family History   Problem Relation Age of Onset     Heart Disease Paternal Grandfather      Heart Disease Brother          Current Outpatient Medications   Medication Sig Dispense Refill     amLODIPine (NORVASC) 2.5 MG tablet Take 1  "tablet (2.5 mg) by mouth daily 30 tablet 1     atorvastatin (LIPITOR) 80 MG tablet Take 0.5 tablets (40 mg) by mouth daily 45 tablet 3     furosemide (LASIX) 20 MG tablet Take 1 tablet (20 mg) by mouth daily  30 tablet 0     losartan (COZAAR) 100 MG tablet TAKE ONE TABLET BY MOUTH ONCE DAILY. 30 tablet 0     nitroglycerin (NITROSTAT) 0.4 MG SL tablet Place 1 tablet (0.4 mg) under the tongue every 5 minutes as needed for chest pain 25 tablet 0     potassium chloride (KLOR-CON) 20 MEQ packet Take 20 mEq by mouth daily 90 tablet 3     warfarin ANTICOAGULANT (COUMADIN) 2.5 MG tablet Take daily as directed. Current dose 1.25 mg Lorenzo, Wed and 2.5 mg rest of days 90 tablet 3     Allergies   Allergen Reactions     No Known Allergies      Recent Labs   Lab Test 08/17/20  1425 12/11/19  1142 11/26/19  1438 09/18/19  1326 08/14/19  1254 02/18/19  1142 02/18/19  1142 11/20/18  0806 10/04/16  1144 10/04/16  1144   LDL  --   --   --   --   --   --  81 51  --  90   HDL  --   --   --   --   --   --  41 33*  --  41   TRIG  --   --   --   --   --   --  104 58  --  130   ALT  --   --  20 22 26   < > 19  --   --   --    CR 1.23 1.18 1.07 1.07 1.09   < > 1.21 1.19   < > 0.96   GFRESTIMATED 56* 59* 66 66 65   < > 57* 59*   < > 77   GFRESTBLACK 64 68 77 77 75   < > 66 72   < > >90   GFR Calc     POTASSIUM 3.6 3.3* 3.2* 3.2* 3.7   < > 3.0* 3.6   < > 3.2*   TSH 2.07  --   --   --  2.00  --   --   --   --   --     < > = values in this interval not displayed.          ROS:  Constitutional, HEENT, cardiovascular, pulmonary, GI, , musculoskeletal, neuro, skin, endocrine and psych systems are negative, except as otherwise noted.    OBJECTIVE:   BP (!) 144/78   Pulse 59   Temp 97.8  F (36.6  C) (Tympanic)   Resp 24   Ht 1.886 m (6' 2.25\")   Wt 117.8 kg (259 lb 12.8 oz)   SpO2 95%   BMI 33.13 kg/m   Estimated body mass index is 33.13 kg/m  as calculated from the following:    Height as of this encounter: 1.886 m (6' " "2.25\").    Weight as of this encounter: 117.8 kg (259 lb 12.8 oz).  EXAM:   GENERAL: healthy, alert and no distress  EYES: Eyes grossly normal to inspection, PERRL and conjunctivae and sclerae normal  HENT: ear canals and TM's normal, nose and mouth without ulcers or lesions  NECK: no adenopathy, no asymmetry, masses, or scars and thyroid normal to palpation  RESP: lungs clear to auscultation - no rales, rhonchi or wheezes  CV: regular rate and rhythm, paced rhythm. 1/6 holosystolic murmur, click or rub, no peripheral edema and peripheral pulses strong  ABDOMEN: soft, nontender, no hepatosplenomegaly, no masses and bowel sounds normal  MS: no gross musculoskeletal defects noted, no edema  SKIN: no suspicious lesions or rashes  NEURO: Normal strength and tone, mentation intact and speech normal  PSYCH: mentation appears normal, affect normal/bright    Diagnostic Test Results:  Labs reviewed in Epic    ASSESSMENT / PLAN:       ICD-10-CM    1. Encounter for Medicare annual wellness exam  Z00.00    2. Screening for hyperlipidemia  Z13.220    3. Hyperlipidemia LDL goal <70  E78.5 Lipid panel reflex to direct LDL Fasting   4. Stage 3a chronic kidney disease  N18.31    5. Coronary artery disease involving native heart with angina pectoris, unspecified vessel or lesion type (H)  I25.119    6. CAD S/P percutaneous coronary angioplasty  I25.10 CARDIOLOGY EVAL ADULT REFERRAL    Z98.61    7. S/P placement of cardiac pacemaker  Z95.0 CARDIOLOGY EVAL ADULT REFERRAL   8. Non-rheumatic mitral regurgitation  I34.0 CARDIOLOGY EVAL ADULT REFERRAL   9. Atrial Fibrillation  I48.20 CARDIOLOGY EVAL ADULT REFERRAL   10. Benign essential hypertension  I10 COMPREHENSIVE METABOLIC PANEL     amLODIPine (NORVASC) 2.5 MG tablet   11. Chronic heart failure with preserved ejection fraction (H)  I50.32      Start amlodipine.  work on lifestyle modification  Recheck blood pressure in 1 mos.      Patient has been advised of split billing requirements " "and indicates understanding: Yes    COUNSELING:  Reviewed preventive health counseling, as reflected in patient instructions       Regular exercise       Healthy diet/nutrition    Estimated body mass index is 33.13 kg/m  as calculated from the following:    Height as of this encounter: 1.886 m (6' 2.25\").    Weight as of this encounter: 117.8 kg (259 lb 12.8 oz).    Weight management plan: Discussed healthy diet and exercise guidelines    He reports that he has never smoked. He has never used smokeless tobacco.    Appropriate preventive services were discussed with this patient, including applicable screening as appropriate for cardiovascular disease, diabetes, osteopenia/osteoporosis, and glaucoma.  As appropriate for age/gender, discussed screening for colorectal cancer, prostate cancer, breast cancer, and cervical cancer. Checklist reviewing preventive services available has been given to the patient.    Reviewed patients plan of care and provided an AVS. The Basic Care Plan (routine screening as documented in Health Maintenance) for Chidi meets the Care Plan requirement. This Care Plan has been established and reviewed with the Patient.    Counseling Resources:  ATP IV Guidelines  Pooled Cohorts Equation Calculator  Breast Cancer Risk Calculator  BRCA-Related Cancer Risk Assessment: FHS-7 Tool  FRAX Risk Assessment  ICSI Preventive Guidelines  Dietary Guidelines for Americans, 2010  USDA's MyPlate  ASA Prophylaxis  Lung CA Screening    M[facility]Y [facility]  "

## 2021-03-04 ENCOUNTER — IMMUNIZATION (OUTPATIENT)
Dept: PEDIATRICS | Facility: CLINIC | Age: 81
End: 2021-03-04
Attending: INTERNAL MEDICINE
Payer: COMMERCIAL

## 2021-03-04 PROCEDURE — 91300 PR COVID VAC PFIZER DIL RECON 30 MCG/0.3 ML IM: CPT

## 2021-03-04 PROCEDURE — 0002A PR COVID VAC PFIZER DIL RECON 30 MCG/0.3 ML IM: CPT

## 2021-03-04 ASSESSMENT — ANXIETY QUESTIONNAIRES: GAD7 TOTAL SCORE: 5

## 2021-03-15 ENCOUNTER — ANTICOAGULATION THERAPY VISIT (OUTPATIENT)
Dept: NURSING | Facility: CLINIC | Age: 81
End: 2021-03-15

## 2021-03-15 DIAGNOSIS — I48.20 CHRONIC ATRIAL FIBRILLATION (H): ICD-10-CM

## 2021-03-15 DIAGNOSIS — Z79.01 LONG TERM CURRENT USE OF ANTICOAGULANT THERAPY: ICD-10-CM

## 2021-03-15 LAB
CAPILLARY BLOOD COLLECTION: NORMAL
INR PPP: 1.9 (ref 0.86–1.14)

## 2021-03-15 PROCEDURE — 85610 PROTHROMBIN TIME: CPT | Performed by: INTERNAL MEDICINE

## 2021-03-15 PROCEDURE — 36416 COLLJ CAPILLARY BLOOD SPEC: CPT | Performed by: INTERNAL MEDICINE

## 2021-03-15 NOTE — PROGRESS NOTES
Patient attempted to call [facility] back, writer was unable to reach [facility] RN. Please call patient back at [phone] -- home number for wife.     Celestino PERALTA RN, [facility]

## 2021-03-15 NOTE — PROGRESS NOTES
ANTICOAGULATION MANAGEMENT     Patient Name:  Chidi Dubon  Date:  3/15/2021    ASSESSMENT /SUBJECTIVE:    Today's INR result of 1.9 is subtherapeutic. Goal INR of 2.0-3.0      Warfarin dose taken: Warfarin taken as instructed    Diet: No new diet changes affecting INR    Medication changes/ interactions: No new medications/supplements affecting INR    Previous INR: Supratherapeutic 3.1    S/S of bleeding or thromboembolism: No    New injury or illness: No    Upcoming surgery, procedure or cardioversion: No    Additional findings: denies any changes or cause for sub results      PLAN:    Telephone call with Chidi regarding INR result and instructed:     Warfarin Dosing Instructions: Continue your current warfarin dose 1.25 mg Sun,Wed and 2.5 mg all other days    Instructed patient to follow up no later than: 2 weeks  Lab visit scheduled    Education provided: Please call back if any changes to your diet, medications or how you've been taking warfarin      Chidi verbalizes understanding and agrees to warfarin dosing plan.    Instructed to call the Anticoagulation Clinic for any changes, questions or concerns. (#[phone])        Carolin Gongora RN      OBJECTIVE:  Recent labs: (last 7 days)     03/15/21  1143   INR 1.90*         No question data found.  Anticoagulation Summary  As of 3/15/2021    INR goal:  2.0-3.0   TTR:  82.6 % (1 y)   INR used for dosin.90 (3/15/2021)   Warfarin maintenance plan:  1.25 mg (2.5 mg x 0.5) every Sun, Wed; 2.5 mg (2.5 mg x 1) all other days   Full warfarin instructions:  1.25 mg every Sun, Wed; 2.5 mg all other days   Weekly warfarin total:  15 mg   Plan last modified:  Hyacinth Montano (9/10/2020)   Next INR check:     Priority:  Maintenance   Target end date:  Indefinite    Indications    Long-term (current) use of anticoagulants [Z79.01] [Z79.01]  Atrial Fibrillation [I48.20]             Anticoagulation Episode Summary     INR check location:      Preferred lab:       Send INR reminders to:  EMELIA SARGENT    Comments:        Anticoagulation Care Providers     Provider Role Specialty Phone number    Gen Marti PA Referring  [phone]    Martin Schultz PA-C [facility] [phone]

## 2021-03-15 NOTE — PROGRESS NOTES
Anticoagulation Management    Unable to reach Chidi today.    Today's INR result of 1.90 is subtherapeutic (goal INR of 2.0-3.0).  Result received from: Clinic Lab    Follow up required to confirm warfarin dose taken and assess for changes    call back and speak with nurse at [phone]      Anticoagulation clinic to follow up    Carolin Gongora -[phone]

## 2021-03-24 DIAGNOSIS — I48.20 CHRONIC ATRIAL FIBRILLATION (H): ICD-10-CM

## 2021-03-24 DIAGNOSIS — Z79.01 LONG TERM CURRENT USE OF ANTICOAGULANT THERAPY: Primary | ICD-10-CM

## 2021-04-01 ENCOUNTER — TELEPHONE (OUTPATIENT)
Dept: INTERNAL MEDICINE | Facility: CLINIC | Age: 81
End: 2021-04-01

## 2021-04-01 NOTE — TELEPHONE ENCOUNTER
ANTICOAGULATION     Chidi Dubon is overdue for INR check.      Left message for patient to call and schedule lab appointment as soon as possible. If returning call, please schedule.   INR due 3/29/21  Carolin Gongora RN

## 2021-04-05 ENCOUNTER — OFFICE VISIT (OUTPATIENT)
Dept: FAMILY MEDICINE | Facility: CLINIC | Age: 81
End: 2021-04-05
Payer: COMMERCIAL

## 2021-04-05 VITALS
WEIGHT: 259 LBS | BODY MASS INDEX: 33.24 KG/M2 | DIASTOLIC BLOOD PRESSURE: 62 MMHG | SYSTOLIC BLOOD PRESSURE: 134 MMHG | HEART RATE: 72 BPM | OXYGEN SATURATION: 96 % | HEIGHT: 74 IN | TEMPERATURE: 97.7 F | RESPIRATION RATE: 18 BRPM

## 2021-04-05 DIAGNOSIS — J01.90 ACUTE NON-RECURRENT SINUSITIS, UNSPECIFIED LOCATION: Primary | ICD-10-CM

## 2021-04-05 DIAGNOSIS — M79.672 LEFT FOOT PAIN: ICD-10-CM

## 2021-04-05 PROCEDURE — 99214 OFFICE O/P EST MOD 30 MIN: CPT | Performed by: PHYSICIAN ASSISTANT

## 2021-04-05 RX ORDER — FLUTICASONE PROPIONATE 50 MCG
1 SPRAY, SUSPENSION (ML) NASAL DAILY
Qty: 18.2 ML | Refills: 0 | Status: SHIPPED | OUTPATIENT
Start: 2021-04-05

## 2021-04-05 ASSESSMENT — MIFFLIN-ST. JEOR: SCORE: 1963.54

## 2021-04-05 NOTE — PROGRESS NOTES
Assessment & Plan     Acute non-recurrent sinusitis, unspecified location  Patient is a 80-year-old male who presents to clinic due to sinus congestion, low-grade fever, and facial pain after raking leaves and ongoing for 6 days.  Patient notes the symptoms occur yearly.  Vital signs normal.  Physical exam significant for nasal congestion.  Patient symptoms are consistent with bacterial sinusitis.  Patient prescribed Augmentin.  Flonase prescribed for seasonal allergies.  Discussed return precautions.  - amoxicillin-clavulanate (AUGMENTIN) 875-125 MG tablet; Take 1 tablet by mouth 2 times daily for 7 days  - fluticasone (FLONASE) 50 MCG/ACT nasal spray; Spray 1 spray into both nostrils daily    Left foot pain  Discussed that left foot pain is unlikely gout given physical exam findings and pinpoint discomfort.  Discussed imaging.  Patient declines x-rays at this time.  Symptoms likely due to contusion.  Recommend treatment with Tylenol and ice.       See Patient Instructions    Return in about 2 weeks (around 4/19/2021), or if symptoms worsen or fail to improve.    Carla Lemos PA-C  [facility] ARIE Murillo is a 80 year old who presents for the following health issues     HPI     Acute Illness  Acute illness concerns: Sinus pain after raking leaves. Patient notes this happens every Easter after raking leaves.   Onset/Duration: 9 days  Symptoms:  Fever: YES: low grade  Chills/Sweats: no  Headache (location?): no  Sinus Pressure: YES- Pressure  Conjunctivitis:  YES- drainage  Ear Pain: YES- feels like hes under water  Rhinorrhea: YES  Congestion: YES  Sore Throat: YES  Cough: YES-productive of yellow sputum  Wheeze: no  Decreased Appetite: no  Nausea: no  Vomiting: no  Diarrhea: no  Dysuria/Freq.: no  Dysuria or Hematuria: no  Fatigue/Achiness: YES  Sick/Strep Exposure: no  Therapies tried and outcome: Cough drops    Patient asking for colcrys-patient notes he is considering taking  "wife's RX for joint pain. He notes pain of the left 1st MTP after dropping something on the foot. Patient is unsure of how long ago this happened. He notes discoloration to the toe nail and pressure on the foot with the shoe on. Patient notes he can ambulate without pain.           Objective    /62   Pulse 72   Temp 97.7  F (36.5  C) (Tympanic)   Resp 18   Ht 1.886 m (6' 2.25\")   Wt 117.5 kg (259 lb)   SpO2 96%   BMI 33.03 kg/m    Body mass index is 33.03 kg/m .  Physical Exam  Vitals signs and nursing note reviewed.   Constitutional:       General: He is not in acute distress.     Appearance: Normal appearance.   HENT:      Head: Normocephalic and atraumatic.      Nose: Congestion present.      Mouth/Throat:      Mouth: Mucous membranes are moist.      Pharynx: Oropharynx is clear.   Eyes:      Extraocular Movements: Extraocular movements intact.      Pupils: Pupils are equal, round, and reactive to light.   Neck:      Musculoskeletal: Normal range of motion.   Cardiovascular:      Rate and Rhythm: Normal rate and regular rhythm.      Heart sounds: Normal heart sounds. No murmur.   Pulmonary:      Effort: Pulmonary effort is normal.      Breath sounds: Normal breath sounds. No wheezing, rhonchi or rales.   Abdominal:      Palpations: Abdomen is soft.   Musculoskeletal: Normal range of motion.      Comments: Point tenderness palpation of left first MTP without swelling, erythema, deformity, ecchymosis, wound   Skin:     General: Skin is warm and dry.   Neurological:      General: No focal deficit present.      Mental Status: He is alert.   Psychiatric:         Mood and Affect: Mood normal.         Behavior: Behavior normal.                      "

## 2021-04-05 NOTE — PATIENT INSTRUCTIONS
Your sinus congestion is likely due to a sinus infection caused by allergies after raking leaves.  For treatment you been prescribed antibiotic, Augmentin.  Please take as prescribed.  You have also been prescribed Flonase which is a nasal spray to treat allergies.  You can start this at the first snow melt each year and use it through your allergy season.    Your foot pain is probably not gout.  It was probably caused by dropping something on your foot.  For treatment please use ice and Tylenol.  Please follow-up in clinic for an x-ray if your symptoms are not improving over the next few weeks.    Please reach out with questions or concerns.      Patient Education     Acute Bacterial Rhinosinusitis (ABRS)    Acute bacterial rhinosinusitis (ABRS) is an infection of your nasal cavity and sinuses. It s caused by bacteria. Acute means that you ve had symptoms for less than 4 weeks, but possibly up to 12 weeks.  Understanding your sinuses  The nasal cavity is the large air-filled space behind your nose. The sinuses are a group of spaces formed by the bones of your face. They connect with your nasal cavity. ABRS causes the tissue lining these spaces to become inflamed. Mucus may not drain normally. This leads to facial pain and other symptoms.  What causes ABRS?  ABRS most often follows an upper respiratory infection caused by a virus. Bacteria then infect the lining of your nasal cavity and sinuses. But you can also get ABRS if you have:    Nasal allergies    Long-term nasal swelling and congestion not caused by allergies    Blockage in the nose  Symptoms of ABRS  The symptoms of ABRS may be different for each person and include:    Nasal congestion or blockage    Pain or pressure in the face    Thick, colored drainage from the nose  Other symptoms may include:    Runny nose    Fluid draining from the nose down the throat (postnasal drip)    Headache    Cough    Pain    Fever  Diagnosing ABRS  ABRS may be diagnosed if  you ve had an upper respiratory infection like a cold and cough for 10 or more days without improvement or with worsening symptoms. Your healthcare provider will ask about your symptoms and your medical history. The provider will check your vital signs, including your temperature. You ll have a physical exam. The healthcare provider will check your ears, nose, and throat. You likely won t need any tests. If ABRS comes back, you may have a culture or other tests.  Treatment for ABRS  Treatment may include:    Antibiotic medicine. This is for symptoms that last for at least 10 to 14 days.    Nasal corticosteroid medicine. Drops or spray used in the nose can lessen swelling and congestion.    Over-the-counter pain medicine. This is to lessen sinus pain and pressure.    Nasal decongestant medicine. Spray or drops may help to lessen congestion. Do not use them for more than a few days.    Salt wash (saline irrigation). This can help to loosen mucus.  Possible complications of ABRS  ABRS may come back or become long-term (chronic). In rare cases, ABRS may cause complications such as:     Inflamed tissue around the brain and spinal cord (meningitis)    Inflamed tissue around the eyes (orbital cellulitis)    Inflamed bones around the sinuses (osteitis)  These problems may need to be treated in a hospital with intravenous (IV) antibiotic medicine or surgery.  When to call the healthcare provider  Call your healthcare provider if you have any of the following:    Symptoms that don t get better, or get worse    Symptoms that don t get better after 3 to 5 days on antibiotics    Trouble seeing    Swelling around your eyes    Confusion or trouble staying awake   [employer] last reviewed this educational content on 5/1/2017 2000-2020 [employer]. All rights reserved. This information is not intended as a substitute for professional medical care. Always follow your healthcare professional's instructions.

## 2021-04-09 ENCOUNTER — TELEPHONE (OUTPATIENT)
Dept: INTERNAL MEDICINE | Facility: CLINIC | Age: 81
End: 2021-04-09

## 2021-04-09 NOTE — TELEPHONE ENCOUNTER
ANTICOAGULATION     Chidi Dubon is overdue for INR check.      Left message for patient to call and schedule lab appointment as soon as possible. If returning call, please schedule.   INR was due 3/29  Carolin Gongora RN

## 2021-04-15 DIAGNOSIS — I10 BENIGN ESSENTIAL HYPERTENSION: ICD-10-CM

## 2021-04-19 NOTE — TELEPHONE ENCOUNTER
Routing refill request to provider for review/approval because:  Labs out of range:  Creatinine and potassium       Reyna Zafar RN

## 2021-04-20 RX ORDER — FUROSEMIDE 20 MG
20 TABLET ORAL DAILY
Qty: 30 TABLET | Refills: 1 | Status: SHIPPED | OUTPATIENT
Start: 2021-04-20 | End: 2021-08-01

## 2021-04-20 NOTE — TELEPHONE ENCOUNTER
Patient is out of the medication and heading out of town. Please advise. Change to sixty day supply. Please advise

## 2021-04-21 ENCOUNTER — DOCUMENTATION ONLY (OUTPATIENT)
Dept: INTERNAL MEDICINE | Facility: CLINIC | Age: 81
End: 2021-04-21

## 2021-04-21 ENCOUNTER — ANTICOAGULATION THERAPY VISIT (OUTPATIENT)
Dept: NURSING | Facility: CLINIC | Age: 81
End: 2021-04-21
Payer: COMMERCIAL

## 2021-04-21 DIAGNOSIS — Z79.01 LONG TERM CURRENT USE OF ANTICOAGULANT THERAPY: ICD-10-CM

## 2021-04-21 DIAGNOSIS — I48.20 CHRONIC ATRIAL FIBRILLATION (H): ICD-10-CM

## 2021-04-21 LAB
CAPILLARY BLOOD COLLECTION: NORMAL
INR PPP: 2.2 (ref 0.86–1.14)

## 2021-04-21 PROCEDURE — 85610 PROTHROMBIN TIME: CPT | Performed by: INTERNAL MEDICINE

## 2021-04-21 PROCEDURE — 36416 COLLJ CAPILLARY BLOOD SPEC: CPT | Performed by: INTERNAL MEDICINE

## 2021-04-21 NOTE — PROGRESS NOTES
Anticoagulation Management    Unable to reach Chidi today.    Today's INR result of 2.20 is therapeutic (goal INR of 2.0-3.0).  Result received from: Clinic Lab    Follow up required to confirm warfarin dose taken and assess for changes    left message to ovidio nurse before 5 pm today      Anticoagulation clinic to follow up    Carolin Gongora -[phone]

## 2021-04-21 NOTE — PROGRESS NOTES
Call attempt again both patient and wife home #s . Left message for Chidi to call back and speak with INR nurse.. Carolin Gongora RN

## 2021-04-21 NOTE — PROGRESS NOTES
ANTICOAGULATION MANAGEMENT      Chidi Dubon due for annual renewal of referral to anticoagulation monitoring. Order pended for your review and signature.      ANTICOAGULATION SUMMARY      Warfarin indication(s)     Atrial fibrillation    Heart valve present?  NO       Current goal range   INR: 2.0-3.0     Goal appropriate for indication? Yes, INR 2-3 appropriate for hx of DVT, PE, hypercoagulable state, Afib, LVAD, or bileaflet AVR without risk factors     Current duration of therapy Indefinite/long term therapy   Time in Therapeutic Range (TTR)  (Goal > 60%) 82.6 %       Office visit with referring provider's group within last year yes on 3/3/21       Carolin Gongora RN

## 2021-04-22 NOTE — PROGRESS NOTES
Call attempt again and left message at both #'s for Chidi to call back and speak with a nurse. Carolin Gongora RN

## 2021-04-26 DIAGNOSIS — I10 BENIGN ESSENTIAL HYPERTENSION: ICD-10-CM

## 2021-04-26 NOTE — PROGRESS NOTES
No response from message left for patient. Will send dose calendar in mail and close encounter. Carolin Gongora RN

## 2021-04-26 NOTE — PATIENT INSTRUCTIONS
Chidi,   I was unsuccessful in reaching you to discuss your INR results and your warfarin dosing.   Please continue the same warfarin dose as on the calendar and call and speak with INR nurse at [phone] if you have any questions or concerns.   You are due to check your INR in the lab in 3-4 weeks and will need to call and schedule this apt.

## 2021-04-27 NOTE — TELEPHONE ENCOUNTER
Plan at 3/3/21 office visit:    Start amlodipine.  work on lifestyle modification  Recheck blood pressure in 1 mos.    BP Readings from Last 3 Encounters:   04/05/21 134/62   03/03/21 (!) 144/78   08/17/20 124/74     Creatinine   Date Value Ref Range Status   03/03/2021 1.33 (H) 0.66 - 1.25 mg/dL Final     BP was checked.      Routing refill request to provider for review/approval because:  Labs out of range:  Creatinine    Galina Waters RN  [facility]

## 2021-04-29 RX ORDER — AMLODIPINE BESYLATE 2.5 MG/1
2.5 TABLET ORAL DAILY
Qty: 90 TABLET | Refills: 0 | Status: SHIPPED | OUTPATIENT
Start: 2021-04-29 | End: 2021-07-29

## 2021-05-19 ENCOUNTER — DOCUMENTATION ONLY (OUTPATIENT)
Dept: INTERNAL MEDICINE | Facility: CLINIC | Age: 81
End: 2021-05-19

## 2021-05-19 ENCOUNTER — ANTICOAGULATION THERAPY VISIT (OUTPATIENT)
Dept: NURSING | Facility: CLINIC | Age: 81
End: 2021-05-19
Payer: COMMERCIAL

## 2021-05-19 DIAGNOSIS — Z79.01 LONG TERM CURRENT USE OF ANTICOAGULANT THERAPY: ICD-10-CM

## 2021-05-19 DIAGNOSIS — Z79.01 LONG TERM CURRENT USE OF ANTICOAGULANT THERAPY: Primary | ICD-10-CM

## 2021-05-19 DIAGNOSIS — I48.20 CHRONIC ATRIAL FIBRILLATION (H): ICD-10-CM

## 2021-05-19 LAB
CAPILLARY BLOOD COLLECTION: NORMAL
INR PPP: 1.9 (ref 0.86–1.14)

## 2021-05-19 PROCEDURE — 99207 PR NO CHARGE NURSE ONLY: CPT

## 2021-05-19 PROCEDURE — 36416 COLLJ CAPILLARY BLOOD SPEC: CPT | Performed by: INTERNAL MEDICINE

## 2021-05-19 PROCEDURE — 85610 PROTHROMBIN TIME: CPT | Performed by: INTERNAL MEDICINE

## 2021-05-19 NOTE — PROGRESS NOTES
Anticoagulation Management    Unable to reach Chidi today.X2    Today's INR result of 1.90 is subtherapeutic (goal INR of 2.0-3.0).  Result received from: Clinic Lab    Follow up required to discuss out of range INR     left message to call nurse back today  Left message to call back and speak with nurse today and if unable to reach nurse today to continue your same warfarin dose tonight of 1.25 mg and call as soon as able.    Anticoagulation clinic to follow up    Carolin Gongora -[phone]

## 2021-05-19 NOTE — PROGRESS NOTES
ANTICOAGULATION MANAGEMENT      Chidi Dubon due for annual renewal of referral to anticoagulation monitoring. Order pended for your review and signature.      ANTICOAGULATION SUMMARY      Warfarin indication(s)     Atrial fibrillation    Heart valve present?  NO       Current goal range   INR: 2.0-3.0     Goal appropriate for indication? Yes, INR 2-3 appropriate for hx of DVT, PE, hypercoagulable state, Afib, LVAD, or bileaflet AVR without risk factors     Current duration of therapy Indefinite/long term therapy   Time in Therapeutic Range (TTR)  (Goal > 60%) 82.6 %       Office visit with referring provider's group within last year yes on 3/3/21 with Martin Gongora RN

## 2021-05-20 ENCOUNTER — TELEPHONE (OUTPATIENT)
Dept: INTERNAL MEDICINE | Facility: CLINIC | Age: 81
End: 2021-05-20

## 2021-05-20 NOTE — PROGRESS NOTES
ANTICOAGULATION MANAGEMENT     Patient Name:  Chidi Dubon  Date:  2021    ASSESSMENT /SUBJECTIVE:    Today's INR result of 1.90 is subtherapeutic. Goal INR of 2.0-3.0      Warfarin dose taken: Warfarin taken as instructed    Diet: Increased greens/vitamin K in diet; plans to resume previous intake    Medication changes/ interactions: No new medications/supplements affecting INR    Previous INR: Therapeutic     S/S of bleeding or thromboembolism: No    New injury or illness: No    Upcoming surgery, procedure or cardioversion: No    Additional findings: some sinus issues that are improving. Had more salad this week. Would like dose calendar sent in mail.       PLAN:    Telephone call with Chidi regarding INR result and instructed:     Warfarin Dosing Instructions: Continue your current warfarin dose 1.25 mg Sun,Wed,and 2.5 mg all other days    Instructed patient to follow up no later than: 2 weeks  Lab visit scheduled    Education provided: Please call back if any changes to your diet, medications or how you've been taking warfarin      Chidi verbalizes understanding and agrees to warfarin dosing plan.    Instructed to call the Anticoagulation Clinic for any changes, questions or concerns. (#[phone])        Carolin Gongora RN      OBJECTIVE:  Recent labs: (last 7 days)     21  1144   INR 1.90*         No question data found.  Anticoagulation Summary  As of 2021    INR goal:  2.0-3.0   TTR:  76.6 % (1 y)   INR used for dosin.90 (2021)   Warfarin maintenance plan:  1.25 mg (2.5 mg x 0.5) every Sun, Wed; 2.5 mg (2.5 mg x 1) all other days   Full warfarin instructions:  1.25 mg every Sun, Wed; 2.5 mg all other days   Weekly warfarin total:  15 mg   Plan last modified:  Hyacinth Montano (9/10/2020)   Next INR check:     Priority:  High   Target end date:  Indefinite    Indications    Long-term (current) use of anticoagulants [Z79.01] [Z79.01]  Atrial Fibrillation [I48.20]              Anticoagulation Episode Summary     INR check location:      Preferred lab:      Send INR reminders to:  EMELIA SARGENT    Comments:        Anticoagulation Care Providers     Provider Role Specialty Phone number    Martin Schultz PA-C Referring Family Medicine [phone]    Gen Marti PA Referring  [phone]

## 2021-05-20 NOTE — TELEPHONE ENCOUNTER
Patient reports he never received most recent lab results and is wondering if his potassium is ok.   Would like results sent in mail please. Carolin Gongora RN

## 2021-05-20 NOTE — LETTER
May 27, 2021      Chidi Dubon  [address]  [place] [address]        Dear ,    We are writing to inform you of your test results. We have tried to reach you by telephone multiple times.    Your cholesterol numbers looked great. Your potassium was just a shade low. Please continue your potassium supplement. Your kidney function remains stable. Will recheck his potassium at your next visit.      If you have any questions or concerns, please call the clinic at the number listed above.       Sincerely,        Martin Schultz PA-C/jorge

## 2021-05-20 NOTE — TELEPHONE ENCOUNTER
Potassium   Date Value Ref Range Status   03/03/2021 3.2 (L) 3.4 - 5.3 mmol/L Final     CMP was ordered by Martin Schultz at routine office visit on 3/3/21.    Patient does not have mychart, I don't see a letter was sent.   No result note attached to labs so RN unable to release.    Routed to Martin Schultz to address results.    Galina Waters RN  [facility]

## 2021-05-24 NOTE — TELEPHONE ENCOUNTER
Let rosmery know his cholesterol numbers looked great. His potassium was just a shade low. Have him continue his potassium supplement. His kidney function remains stable. Will recheck his potassium at his next visit.

## 2021-05-24 NOTE — TELEPHONE ENCOUNTER
Called [phone] (home)       Did patient answer the phone: No, left a message on voicemail to return call to the [facility] at [phone].    LASHON AdameN,RN  [facility]

## 2021-05-25 NOTE — TELEPHONE ENCOUNTER
Left message on voice mail for patient to call clinic.   [phone]  Hyacinth Montano RN  [facility]

## 2021-05-27 NOTE — TELEPHONE ENCOUNTER
No return call after 3 attempts. Please send letter.  Hyacinth Montano RN  [facility]

## 2021-06-01 NOTE — PROGRESS NOTES
Kareen Murillo is a 80 year old who presents for the following health issues     HPI     Back Pain  Onset/Duration: 1.5wks  Description:   Location of pain: low back middle  Character of pain: stabbing during movement  Pain radiation: none  New numbness or weakness in legs, not attributed to pain: no   Intensity: Currently 0/10, severe 10/10  Progression of Symptoms: improving  History:   Specific cause: fall   Pain interferes with job: not applicable  History of back problems: a long time ago when he slipped on ice  Any previous MRI or X-rays: Yes--several years ago  Sees a specialist for back pain: No  Alleviating factors:   Improved by: pain pills - oxycontin    Precipitating factors:  Worsened by: movement  Therapies tried and outcome: had 2 pills left over from dental procedure, oxycontin - worked well    Accompanying Signs & Symptoms:  Risk of Fracture: None  Risk of Cauda Equina: None  Risk of Infection: None  Risk of Cancer: None  Risk of Ankylosing Spondylitis: Onset at age <35, male, AND morning back stiffness  no       Wooden chair disintegrated under him and he landed on his buttocks. Worse with prolonged positioning, bending, etc.  No irritative/obst voiding symptoms currently. No rash. No fever. No blood in urine.  Slowly feeling better.    Review of Systems   Constitutional, HEENT, cardiovascular, pulmonary, GI, , musculoskeletal, neuro, skin, endocrine and psych systems are negative, except as otherwise noted.      Objective    /74   Pulse 51   Temp 97.7  F (36.5  C) (Tympanic)   Resp 20   Wt 119.7 kg (263 lb 12.8 oz)   SpO2 95%   BMI 33.64 kg/m    Body mass index is 33.64 kg/m .  Physical Exam   BACK: Lumbosacral spine area reveals local tenderness.  Painful and reduced LS ROM noted. Straight leg raise is negative .  DTR's, motor strength and sensation normal, including heel and toe gait.  Perifpheral pulses are palpable.  Hipes and knees have full range of motion without  pain.  No abdominal tenderness, mass or organomegaly.        Chidi was seen today for back pain.    Diagnoses and all orders for this visit:    Acute midline low back pain without sciatica  -     XR Lumbar Spine 2/3 Views; Future  -     oxyCODONE (ROXICODONE) 5 MG tablet; Take 1 tablet (5 mg) by mouth every 6 hours as needed for pain  -     predniSONE (DELTASONE) 20 MG tablet; Take 1 tablet (20 mg) by mouth 2 times daily for 7 days    Other orders  -     REVIEW OF HEALTH MAINTENANCE PROTOCOL ORDERS      Advised supportive and symptomatic treatment.  Follow up with Provider - if condition persists or worsens.

## 2021-06-02 ENCOUNTER — OFFICE VISIT (OUTPATIENT)
Dept: FAMILY MEDICINE | Facility: CLINIC | Age: 81
End: 2021-06-02
Payer: COMMERCIAL

## 2021-06-02 ENCOUNTER — ANTICOAGULATION THERAPY VISIT (OUTPATIENT)
Dept: INTERNAL MEDICINE | Facility: CLINIC | Age: 81
End: 2021-06-02

## 2021-06-02 ENCOUNTER — ANCILLARY PROCEDURE (OUTPATIENT)
Dept: GENERAL RADIOLOGY | Facility: CLINIC | Age: 81
End: 2021-06-02
Attending: PHYSICIAN ASSISTANT
Payer: COMMERCIAL

## 2021-06-02 VITALS
HEART RATE: 51 BPM | TEMPERATURE: 97.7 F | BODY MASS INDEX: 33.64 KG/M2 | DIASTOLIC BLOOD PRESSURE: 74 MMHG | RESPIRATION RATE: 20 BRPM | OXYGEN SATURATION: 95 % | WEIGHT: 263.8 LBS | SYSTOLIC BLOOD PRESSURE: 125 MMHG

## 2021-06-02 DIAGNOSIS — I48.20 CHRONIC ATRIAL FIBRILLATION (H): ICD-10-CM

## 2021-06-02 DIAGNOSIS — M54.50 ACUTE MIDLINE LOW BACK PAIN WITHOUT SCIATICA: Primary | ICD-10-CM

## 2021-06-02 DIAGNOSIS — M54.50 ACUTE MIDLINE LOW BACK PAIN WITHOUT SCIATICA: ICD-10-CM

## 2021-06-02 DIAGNOSIS — Z79.01 LONG TERM CURRENT USE OF ANTICOAGULANT THERAPY: ICD-10-CM

## 2021-06-02 LAB
CAPILLARY BLOOD COLLECTION: NORMAL
INR PPP: 2.1 (ref 0.86–1.14)

## 2021-06-02 PROCEDURE — 99213 OFFICE O/P EST LOW 20 MIN: CPT | Performed by: PHYSICIAN ASSISTANT

## 2021-06-02 PROCEDURE — 36416 COLLJ CAPILLARY BLOOD SPEC: CPT | Performed by: INTERNAL MEDICINE

## 2021-06-02 PROCEDURE — 85610 PROTHROMBIN TIME: CPT | Performed by: INTERNAL MEDICINE

## 2021-06-02 PROCEDURE — 99207 PR NO CHARGE NURSE ONLY: CPT | Performed by: INTERNAL MEDICINE

## 2021-06-02 PROCEDURE — 72100 X-RAY EXAM L-S SPINE 2/3 VWS: CPT | Performed by: RADIOLOGY

## 2021-06-02 RX ORDER — PREDNISONE 20 MG/1
20 TABLET ORAL 2 TIMES DAILY
Qty: 14 TABLET | Refills: 0 | Status: SHIPPED | OUTPATIENT
Start: 2021-06-02 | End: 2021-06-09

## 2021-06-02 RX ORDER — OXYCODONE HYDROCHLORIDE 5 MG/1
5 TABLET ORAL EVERY 6 HOURS PRN
Qty: 12 TABLET | Refills: 0 | Status: SHIPPED | OUTPATIENT
Start: 2021-06-02 | End: 2021-06-05

## 2021-06-02 NOTE — PROGRESS NOTES
Anticoagulation Management    Unable to reach Chidi today.    Today's INR result of 2.1 is therapeutic (goal INR of 2.0-3.0).  Result received from: Clinic Lab    Follow up required to confirm warfarin dose taken and assess for changes    Left message to call [phone]     Plan: continue maintenance plan and recheck on Monday due to starting prednisone today.    Anticoagulation clinic to follow up    Lori Fuller RN

## 2021-06-02 NOTE — PROGRESS NOTES
Anticoagulation Management    Unable to reach Chidi (2nd attempt) today.    Left message to continue current dose of warfarin 1.25 mg tonight.      Anticoagulation clinic to follow up    Lori Fuller RN

## 2021-06-03 NOTE — PROGRESS NOTES
ANTICOAGULATION MANAGEMENT     Patient Name:  Chidi Dubon  Date:  6/3/2021    ASSESSMENT /SUBJECTIVE:    Today's INR result of 2.10 is therapeutic. Goal INR of 2.0-3.0      Warfarin dose taken: Warfarin taken as instructed    Diet: No new diet changes affecting INR    Medication changes/ interactions: Potential interaction between prednisone and warfarin which may affect subsequent INRs    Previous INR: Subtherapeutic     S/S of bleeding or thromboembolism: No    New injury or illness: No    Upcoming surgery, procedure or cardioversion: No    Additional findings: ongoing back pain, prednisone for 5 days for back pain.       PLAN:    Warfarin Dosing Instructions: Continue your current warfarin dose 1.25 mg Sun,Wed and 2.5 mg all other days    Instructed patient to follow up no later than: 1 week  Patient elected to schedule next visit in 2 weeks    Education provided: Please call back if any changes to your diet, medications or how you've been taking warfarin    Telephone call with Chidi whom verbalizes understanding and agrees to plan    Instructed to call the Anticoagulation Clinic for any changes, questions or concerns. (#[phone])        Carolin Gongora RN      OBJECTIVE:  Recent labs: (last 7 days)     21  1315   INR 2.10*         INR assessment THER    Recheck INR In: 5 DAYS    INR Location Clinic      Anticoagulation Summary  As of 2021    INR goal:  2.0-3.0   TTR:  74.7 % (1 y)   INR used for dosin.10 (2021)   Warfarin maintenance plan:  1.25 mg (2.5 mg x 0.5) every Sun, Wed; 2.5 mg (2.5 mg x 1) all other days   Full warfarin instructions:  1.25 mg every Sun, Wed; 2.5 mg all other days   Weekly warfarin total:  15 mg   No change documented:  Lori Fuller RN   Plan last modified:  Hyacinth Montano (9/10/2020)   Next INR check:  2021   Priority:  Maintenance   Target end date:  Indefinite    Indications    Long-term (current) use of anticoagulants [Z79.01]  [Z79.01]  Atrial Fibrillation [I48.20]             Anticoagulation Episode Summary     INR check location:      Preferred lab:      Send INR reminders to:  EMELIA SARGENT    Comments:        Anticoagulation Care Providers     Provider Role Specialty Phone number    Martin Schultz PA-C Referring Family Medicine [phone]    Gen Marti PA Referring  [phone]       Call attempt again and reached voicemail at both #s. Carolin Gongora RN

## 2021-06-03 NOTE — PROGRESS NOTES
Patient left another  returning call.    Camilla Gil RN  [facility] Anticoagulation [facility]

## 2021-06-03 NOTE — PROGRESS NOTES
Left message on home # and wife's cell # for  Chidi to call back and speak with nurse. Carolin Gongora RN

## 2021-06-03 NOTE — PROGRESS NOTES
Patient left a  returning call.    Camilla Gil RN  [facility] Anticoagulation [facility]

## 2021-06-17 ENCOUNTER — TELEPHONE (OUTPATIENT)
Dept: INTERNAL MEDICINE | Facility: CLINIC | Age: 81
End: 2021-06-17

## 2021-06-17 NOTE — TELEPHONE ENCOUNTER
ANTICOAGULATION     Chidi Dubon is overdue for INR check.      Left message for patient to call and schedule lab appointment as soon as possible. If returning call, please schedule.     Lori Fuller RN

## 2021-06-22 DIAGNOSIS — I10 BENIGN ESSENTIAL HYPERTENSION: ICD-10-CM

## 2021-06-24 ENCOUNTER — TELEPHONE (OUTPATIENT)
Dept: ANTICOAGULATION | Facility: CLINIC | Age: 81
End: 2021-06-24

## 2021-06-24 NOTE — TELEPHONE ENCOUNTER
ANTICOAGULATION     Chidi Dubon is overdue for INR check.      Left message for patient to call and schedule lab appointment as soon as possible. If returning call, please schedule.     Priyanka Desouza RN

## 2021-06-24 NOTE — TELEPHONE ENCOUNTER
Routing refill request to provider for review/approval because:      Angiotensin-II Receptors Pzvcml5306/22/2021 11:00 PM  x Normal serum creatinine on file in past 12 months Protocol Details   x Normal serum potassium on file in past 12 months      BP Readings from Last 3 Encounters:   06/02/21 125/74   04/05/21 134/62   03/03/21 (!) 144/78     Last Comprehensive Metabolic Panel:  Sodium   Date Value Ref Range Status   03/03/2021 144 133 - 144 mmol/L Final     Potassium   Date Value Ref Range Status   03/03/2021 3.2 (L) 3.4 - 5.3 mmol/L Final     Chloride   Date Value Ref Range Status   03/03/2021 111 (H) 94 - 109 mmol/L Final     Carbon Dioxide   Date Value Ref Range Status   03/03/2021 26 20 - 32 mmol/L Final     Anion Gap   Date Value Ref Range Status   03/03/2021 7 3 - 14 mmol/L Final     Glucose   Date Value Ref Range Status   03/03/2021 99 70 - 99 mg/dL Final     Comment:     Fasting specimen     Urea Nitrogen   Date Value Ref Range Status   03/03/2021 12 7 - 30 mg/dL Final     Creatinine   Date Value Ref Range Status   03/03/2021 1.33 (H) 0.66 - 1.25 mg/dL Final     GFR Estimate   Date Value Ref Range Status   03/03/2021 50 (L) >60 mL/min/[1.73_m2] Final     Comment:     Non  GFR Calc  Starting 12/18/2018, serum creatinine based estimated GFR (eGFR) will be   calculated using the Chronic Kidney Disease Epidemiology Collaboration   (CKD-EPI) equation.       Calcium   Date Value Ref Range Status   03/03/2021 9.2 8.5 - 10.1 mg/dL Final     Bilirubin Total   Date Value Ref Range Status   03/03/2021 3.7 (H) 0.2 - 1.3 mg/dL Final     Alkaline Phosphatase   Date Value Ref Range Status   03/03/2021 98 40 - 150 U/L Final     ALT   Date Value Ref Range Status   03/03/2021 17 0 - 70 U/L Final     AST   Date Value Ref Range Status   03/03/2021 16 0 - 45 U/L Final

## 2021-06-25 RX ORDER — LOSARTAN POTASSIUM 100 MG/1
TABLET ORAL
Qty: 90 TABLET | Refills: 0 | Status: SHIPPED | OUTPATIENT
Start: 2021-06-25 | End: 2021-09-23

## 2021-06-28 ENCOUNTER — TELEPHONE (OUTPATIENT)
Dept: INTERNAL MEDICINE | Facility: CLINIC | Age: 81
End: 2021-06-28

## 2021-06-28 ENCOUNTER — ANTICOAGULATION THERAPY VISIT (OUTPATIENT)
Dept: NURSING | Facility: CLINIC | Age: 81
End: 2021-06-28

## 2021-06-28 DIAGNOSIS — I48.20 CHRONIC ATRIAL FIBRILLATION (H): ICD-10-CM

## 2021-06-28 DIAGNOSIS — Z79.01 LONG TERM CURRENT USE OF ANTICOAGULANT THERAPY: ICD-10-CM

## 2021-06-28 LAB
CAPILLARY BLOOD COLLECTION: NORMAL
INR PPP: 2 (ref 0.86–1.14)

## 2021-06-28 PROCEDURE — 36416 COLLJ CAPILLARY BLOOD SPEC: CPT | Performed by: INTERNAL MEDICINE

## 2021-06-28 PROCEDURE — 85610 PROTHROMBIN TIME: CPT | Performed by: INTERNAL MEDICINE

## 2021-06-28 NOTE — TELEPHONE ENCOUNTER
That's up to his dentist. With out a recent inr they may be reluctant. Typically they'll extract teeth if a recent inr is less than 2.5. have him contact his dentist

## 2021-06-28 NOTE — TELEPHONE ENCOUNTER
INR   Date Value Ref Range Status   06/02/2021 2.10 (H) 0.86 - 1.14 Final     Comment:     This test is intended for monitoring Coumadin therapy.  Results are not   accurate in patients with prolonged INR due to factor deficiency.        I called and spoke to patient.   He has an appt for INR today and then will see dentist.  I advised he have lab give him his INR result so he can let the dentist know.    He does not know if they will take out teeth today, [place] is being seen for that.    Advised he call if we can be of further assistance.    Patient verbalized understanding of and agreement with plan.    Galina Waters RN  [facility]

## 2021-06-28 NOTE — TELEPHONE ENCOUNTER
"I see per [facility], patient overdue for INR.    What time is his visit and what teeth are being removed?    Assume he should have held coumadin already if having teeth removed today.    I see he was last seen 6/2/21 by Martin Schultz.    Attempted to call patient at home/mobile number, no answer, left message on voicemail identified as \"Aaliyah\", wife is Shiela; patient was instructed to return call to [facility] at [phone].    Will send to Martin Schultz and zach with him as well now.    Galina Waters RN  [facility]    "

## 2021-06-28 NOTE — PROGRESS NOTES
Anticoagulation Management    Unable to reach Chidi today.    Today's INR result of 2.0 is therapeutic (goal INR of 2.0-3.0).  Result received from: Clinic Lab    Follow up required to confirm warfarin dose taken and assess for changes    Left message to continue current dose of warfarin 2.5 mg tonight.  Left message to call back and speak with nurse today if able. If unable to reach nurse today to call as soon as able.  Note other TE from today states patient had dental apt today and was needing INR prior to this.     Anticoagulation clinic to follow up    Carolin Gongora RN

## 2021-06-29 NOTE — PROGRESS NOTES
Call attempt and left message on pt and wife cell # voicemail requesting call back to INR nurse. Carolin Gongora RN

## 2021-06-30 NOTE — PATIENT INSTRUCTIONS
Chidi, I have not been able to reach you by phone to discuss your INR results on Monday 6/28.  If there are any changes or updates please call and speak with INR nurse at [phone].  If no new concerns or changes please continue your same warfarin dose and schedule INR apt in the lab in 3-4 wks.  Thank you,  Carolin CURRAN  [facility]   [phone]

## 2021-06-30 NOTE — PROGRESS NOTES
Call attempt again and reached voicemail.   Will send dose calendar in the mail and close encounter today if no return call.. Carolin Gongora RN

## 2021-07-23 DIAGNOSIS — I48.20 CHRONIC ATRIAL FIBRILLATION (H): ICD-10-CM

## 2021-07-23 DIAGNOSIS — Z79.01 LONG TERM CURRENT USE OF ANTICOAGULANT THERAPY: Primary | ICD-10-CM

## 2021-07-27 DIAGNOSIS — I10 BENIGN ESSENTIAL HYPERTENSION: ICD-10-CM

## 2021-07-28 ENCOUNTER — ANTICOAGULATION THERAPY VISIT (OUTPATIENT)
Dept: ANTICOAGULATION | Facility: CLINIC | Age: 81
End: 2021-07-28

## 2021-07-28 ENCOUNTER — LAB (OUTPATIENT)
Dept: LAB | Facility: CLINIC | Age: 81
End: 2021-07-28
Payer: COMMERCIAL

## 2021-07-28 DIAGNOSIS — I48.20 CHRONIC ATRIAL FIBRILLATION (H): ICD-10-CM

## 2021-07-28 DIAGNOSIS — Z79.01 LONG TERM CURRENT USE OF ANTICOAGULANT THERAPY: ICD-10-CM

## 2021-07-28 DIAGNOSIS — Z79.01 LONG TERM CURRENT USE OF ANTICOAGULANT THERAPY: Primary | ICD-10-CM

## 2021-07-28 LAB — INR BLD: 2.1 (ref 0.9–1.1)

## 2021-07-28 PROCEDURE — 36416 COLLJ CAPILLARY BLOOD SPEC: CPT

## 2021-07-28 PROCEDURE — 85610 PROTHROMBIN TIME: CPT

## 2021-07-28 NOTE — TELEPHONE ENCOUNTER
Routing refill request to provider for review/approval because:  Labs out of range:    Creatinine   Date Value Ref Range Status   03/03/2021 1.33 (H) 0.66 - 1.25 mg/dL Final     Potassium   Date Value Ref Range Status   03/03/2021 3.2 (L) 3.4 - 5.3 mmol/L Final

## 2021-07-28 NOTE — PROGRESS NOTES
ANTICOAGULATION MANAGEMENT     Chidi Dubon 80 year old male is on warfarin with therapeutic INR result. (Goal INR 2.0-3.0)    Recent labs: (last 7 days)     07/28/21  1145   INR 2.1*       ASSESSMENT     Source(s): Chart Review and Patient/Caregiver Call       Warfarin doses taken: Warfarin taken as instructed    Diet: No new diet changes identified    New illness, injury, or hospitalization: No    Medication/supplement changes: None noted    Signs or symptoms of bleeding or clotting: No    Previous INR: Therapeutic last 2(+) visits    Additional findings: patient had a dental procedure at the end of June and held his warfarin for a couple days and then resumed his regular maintenace dose post-procedure.     PLAN     Recommended plan for no diet, medication or health factor changes affecting INR     Dosing Instructions: Continue your current warfarin dose with next INR in 4 weeks .  Patient asked to be scheduled for 8/31/21.  Summary  As of 7/28/2021    Full warfarin instructions:  1.25 mg every Sun, Wed; 2.5 mg all other days   Next INR check:  8/25/2021             Telephone call with Chidi who verbalizes understanding and agrees to plan    Lab visit scheduled    Education provided: Please call back if any changes to your diet, medications or how you've been taking warfarin    Plan made per [facility] anticoagulation protocol    Ngozi Valencia, RN  Anticoagulation Clinic  7/29/2021    _______________________________________________________________________     Anticoagulation Episode Summary     Current INR goal:  2.0-3.0   TTR:  75.4 % (1 y)   Target end date:  Indefinite   Send INR reminders to:  EMELIA SARGENT    Indications    Long-term (current) use of anticoagulants [Z79.01] [Z79.01]  Atrial Fibrillation [I48.20]           Comments:           Anticoagulation Care Providers     Provider Role Specialty Phone number    Martin Schultz PA-C Referring Family Medicine [phone]    Gen Marti PA  Referring  [phone]        Anticoagulation Management    Unable to reach Chidi or Flores today.    Today's INR result of 2.1 is therapeutic (goal INR of 2.0-3.0).  Result received from: Clinic Lab    Follow up required to confirm warfarin dose taken and assess for changes    Left message to continue current dose of warfarin 1.25 mg tonight.      Anticoagulation clinic to follow up    Ngozi Valencia RN

## 2021-07-29 DIAGNOSIS — I10 BENIGN ESSENTIAL HYPERTENSION: ICD-10-CM

## 2021-07-29 RX ORDER — AMLODIPINE BESYLATE 2.5 MG/1
2.5 TABLET ORAL DAILY
Qty: 90 TABLET | Refills: 0 | Status: SHIPPED | OUTPATIENT
Start: 2021-07-29 | End: 2021-10-27

## 2021-07-30 NOTE — TELEPHONE ENCOUNTER
Routing refill request to provider for review/approval because:  Diuretics (Including Combos) Protocol [id number]/29/2021 12:57 PM   Normal serum creatinine on file in past 12 months Protocol Details    Normal serum potassium on file in past 12 months      Last Comprehensive Metabolic Panel:  Sodium   Date Value Ref Range Status   03/03/2021 144 133 - 144 mmol/L Final     Potassium   Date Value Ref Range Status   03/03/2021 3.2 (L) 3.4 - 5.3 mmol/L Final     Chloride   Date Value Ref Range Status   03/03/2021 111 (H) 94 - 109 mmol/L Final     Carbon Dioxide   Date Value Ref Range Status   03/03/2021 26 20 - 32 mmol/L Final     Anion Gap   Date Value Ref Range Status   03/03/2021 7 3 - 14 mmol/L Final     Glucose   Date Value Ref Range Status   03/03/2021 99 70 - 99 mg/dL Final     Comment:     Fasting specimen     Urea Nitrogen   Date Value Ref Range Status   03/03/2021 12 7 - 30 mg/dL Final     Creatinine   Date Value Ref Range Status   03/03/2021 1.33 (H) 0.66 - 1.25 mg/dL Final     GFR Estimate   Date Value Ref Range Status   03/03/2021 50 (L) >60 mL/min/[1.73_m2] Final     Comment:     Non  GFR Calc  Starting 12/18/2018, serum creatinine based estimated GFR (eGFR) will be   calculated using the Chronic Kidney Disease Epidemiology Collaboration   (CKD-EPI) equation.       Calcium   Date Value Ref Range Status   03/03/2021 9.2 8.5 - 10.1 mg/dL Final     Bilirubin Total   Date Value Ref Range Status   03/03/2021 3.7 (H) 0.2 - 1.3 mg/dL Final     Alkaline Phosphatase   Date Value Ref Range Status   03/03/2021 98 40 - 150 U/L Final     ALT   Date Value Ref Range Status   03/03/2021 17 0 - 70 U/L Final     AST   Date Value Ref Range Status   03/03/2021 16 0 - 45 U/L Final

## 2021-08-01 RX ORDER — FUROSEMIDE 20 MG
TABLET ORAL
Qty: 30 TABLET | Refills: 0 | Status: SHIPPED | OUTPATIENT
Start: 2021-08-01 | End: 2021-09-02

## 2021-08-31 ENCOUNTER — ANTICOAGULATION THERAPY VISIT (OUTPATIENT)
Dept: ANTICOAGULATION | Facility: CLINIC | Age: 81
End: 2021-08-31

## 2021-08-31 ENCOUNTER — LAB (OUTPATIENT)
Dept: LAB | Facility: CLINIC | Age: 81
End: 2021-08-31
Payer: COMMERCIAL

## 2021-08-31 DIAGNOSIS — Z79.01 LONG TERM CURRENT USE OF ANTICOAGULANT THERAPY: ICD-10-CM

## 2021-08-31 DIAGNOSIS — I10 BENIGN ESSENTIAL HYPERTENSION: ICD-10-CM

## 2021-08-31 DIAGNOSIS — Z79.01 LONG TERM CURRENT USE OF ANTICOAGULANT THERAPY: Primary | ICD-10-CM

## 2021-08-31 DIAGNOSIS — I48.20 CHRONIC ATRIAL FIBRILLATION (H): ICD-10-CM

## 2021-08-31 LAB — INR BLD: 2.2 (ref 0.9–1.1)

## 2021-08-31 PROCEDURE — 85610 PROTHROMBIN TIME: CPT

## 2021-08-31 PROCEDURE — 36416 COLLJ CAPILLARY BLOOD SPEC: CPT

## 2021-08-31 NOTE — PROGRESS NOTES
Anticoagulation Management    Unable to reach Chidi today.    Today's INR result of 2.2 is therapeutic (goal INR of 2.0-3.0).  Result received from: Clinic Lab    Follow up required to confirm warfarin dose taken and assess for changes    Left message to continue current dose of warfarin 2.5 mg tonight.      Anticoagulation clinic to follow up    aFbio Mayorga RN

## 2021-09-01 NOTE — TELEPHONE ENCOUNTER
Routing refill request to provider for review/approval because:  Labs out of range:    Potassium   Date Value Ref Range Status   03/03/2021 3.2 (L) 3.4 - 5.3 mmol/L Final     Creatinine   Date Value Ref Range Status   03/03/2021 1.33 (H) 0.66 - 1.25 mg/dL Final

## 2021-09-02 RX ORDER — FUROSEMIDE 20 MG
TABLET ORAL
Qty: 30 TABLET | Refills: 0 | Status: SHIPPED | OUTPATIENT
Start: 2021-09-02 | End: 2021-10-05

## 2021-09-03 NOTE — PROGRESS NOTES
Call attempt and message left for patient to call and speak with INR nurse. Will print dose calendar and instructions and put in mail. Carolin Gongora RN

## 2021-09-03 NOTE — PATIENT INSTRUCTIONS
Chidi, we have been unable to reach you by phone to discuss your INR results. Please call and speak with INR nurse to discuss and update us on how you are doing.  Plan if no new concerns or changes to continue your same warfarin dose and schedule lab apt for recheck of INR in about 4 wks.   Thank you,  Carolin CURRAN  [facility]   [phone]

## 2021-09-20 DIAGNOSIS — I10 BENIGN ESSENTIAL HYPERTENSION: ICD-10-CM

## 2021-09-23 NOTE — TELEPHONE ENCOUNTER
Last Written Prescription Date:  6/25/21  Last Fill Quantity: 90,  # refills: 0   Last office visit: 6/2/2021 with prescribing provider:  Martin Schultz   Annual exam 3/3/21 with Martin Schultz   Future Office Visit: none    Routing refill request to provider for review/approval because:  Labs out of range:  Creatinine, Potassium    Creatinine   Date Value Ref Range Status   03/03/2021 1.33 (H) 0.66 - 1.25 mg/dL Final     Potassium   Date Value Ref Range Status   03/03/2021 3.2 (L) 3.4 - 5.3 mmol/L Final     Kari Montilla, RN, BSN  [facility]

## 2021-09-25 RX ORDER — LOSARTAN POTASSIUM 100 MG/1
TABLET ORAL
Qty: 90 TABLET | Refills: 2 | Status: SHIPPED | OUTPATIENT
Start: 2021-09-25 | End: 2022-03-09

## 2021-10-03 DIAGNOSIS — I48.20 CHRONIC ATRIAL FIBRILLATION (H): ICD-10-CM

## 2021-10-03 DIAGNOSIS — Z79.01 LONG TERM CURRENT USE OF ANTICOAGULANT THERAPY: ICD-10-CM

## 2021-10-03 DIAGNOSIS — I10 BENIGN ESSENTIAL HYPERTENSION: ICD-10-CM

## 2021-10-04 RX ORDER — WARFARIN SODIUM 2.5 MG/1
TABLET ORAL
Qty: 30 TABLET | Refills: 0 | Status: SHIPPED | OUTPATIENT
Start: 2021-10-04 | End: 2021-11-05

## 2021-10-04 NOTE — TELEPHONE ENCOUNTER
Rx approved per [facility] protocol.    Priyanka Hinds RN    [facility] Anticoagulation [facility]

## 2021-10-05 RX ORDER — FUROSEMIDE 20 MG
TABLET ORAL
Qty: 30 TABLET | Refills: 0 | Status: SHIPPED | OUTPATIENT
Start: 2021-10-05 | End: 2021-11-26

## 2021-10-05 NOTE — TELEPHONE ENCOUNTER
Routing refill request to provider for review/approval because:    Diuretics (Including Combos) Protocol [id number]/03/2021 11:19 AM   Normal serum creatinine on file in past 12 months Protocol Details    Normal serum potassium on file in past 12 months        Last Comprehensive Metabolic Panel:  Sodium   Date Value Ref Range Status   03/03/2021 144 133 - 144 mmol/L Final     Potassium   Date Value Ref Range Status   03/03/2021 3.2 (L) 3.4 - 5.3 mmol/L Final     Chloride   Date Value Ref Range Status   03/03/2021 111 (H) 94 - 109 mmol/L Final     Carbon Dioxide   Date Value Ref Range Status   03/03/2021 26 20 - 32 mmol/L Final     Anion Gap   Date Value Ref Range Status   03/03/2021 7 3 - 14 mmol/L Final     Glucose   Date Value Ref Range Status   03/03/2021 99 70 - 99 mg/dL Final     Comment:     Fasting specimen     Urea Nitrogen   Date Value Ref Range Status   03/03/2021 12 7 - 30 mg/dL Final     Creatinine   Date Value Ref Range Status   03/03/2021 1.33 (H) 0.66 - 1.25 mg/dL Final     GFR Estimate   Date Value Ref Range Status   03/03/2021 50 (L) >60 mL/min/[1.73_m2] Final     Comment:     Non  GFR Calc  Starting 12/18/2018, serum creatinine based estimated GFR (eGFR) will be   calculated using the Chronic Kidney Disease Epidemiology Collaboration   (CKD-EPI) equation.       Calcium   Date Value Ref Range Status   03/03/2021 9.2 8.5 - 10.1 mg/dL Final     Bilirubin Total   Date Value Ref Range Status   03/03/2021 3.7 (H) 0.2 - 1.3 mg/dL Final     Alkaline Phosphatase   Date Value Ref Range Status   03/03/2021 98 40 - 150 U/L Final     ALT   Date Value Ref Range Status   03/03/2021 17 0 - 70 U/L Final     AST   Date Value Ref Range Status   03/03/2021 16 0 - 45 U/L Final

## 2021-10-06 ENCOUNTER — ANTICOAGULATION THERAPY VISIT (OUTPATIENT)
Dept: ANTICOAGULATION | Facility: CLINIC | Age: 81
End: 2021-10-06

## 2021-10-06 ENCOUNTER — LAB (OUTPATIENT)
Dept: LAB | Facility: CLINIC | Age: 81
End: 2021-10-06
Payer: COMMERCIAL

## 2021-10-06 DIAGNOSIS — Z79.01 LONG TERM CURRENT USE OF ANTICOAGULANT THERAPY: ICD-10-CM

## 2021-10-06 DIAGNOSIS — I48.20 CHRONIC ATRIAL FIBRILLATION (H): ICD-10-CM

## 2021-10-06 DIAGNOSIS — Z79.01 LONG TERM CURRENT USE OF ANTICOAGULANT THERAPY: Primary | ICD-10-CM

## 2021-10-06 LAB — INR BLD: 2 (ref 0.9–1.1)

## 2021-10-06 PROCEDURE — 85610 PROTHROMBIN TIME: CPT

## 2021-10-06 PROCEDURE — 36415 COLL VENOUS BLD VENIPUNCTURE: CPT

## 2021-10-06 NOTE — PROGRESS NOTES
Anticoagulation Management    Unable to reach Chidi today.    Today's INR result of 2.0 is therapeutic (goal INR of 2.0-3.0).  Result received from: Clinic Lab    Follow up required to confirm warfarin dose taken and assess for changes    Left message to continue current dose of warfarin 1.25 mg tonight.     Plan: continue maintenance dose and recheck INR in 4 weeks    Anticoagulation clinic to follow up    Lori Fuller RN

## 2021-10-08 NOTE — PROGRESS NOTES
Call goes directly to voicemail. Left message to call back and speak with INR nurse. Carolin Gongora RN  Will also print dose instructions and send in mail.

## 2021-10-08 NOTE — PATIENT INSTRUCTIONS
Chidi,  We have not been able to reach you by phone to discuss your INR results from 10/6.  It is important for us to speak with you to verify you are taking your warfarin and see if there are any updates or changes to your health and medications  Please call and speak with a INR nurse when you get this letter so we can discuss this  Thank you,  Carolin CURRAN  [facility]   [phone]

## 2021-10-24 DIAGNOSIS — I10 BENIGN ESSENTIAL HYPERTENSION: ICD-10-CM

## 2021-10-27 RX ORDER — AMLODIPINE BESYLATE 2.5 MG/1
2.5 TABLET ORAL DAILY
Qty: 30 TABLET | Refills: 0 | Status: SHIPPED | OUTPATIENT
Start: 2021-10-27 | End: 2021-11-26

## 2021-10-27 NOTE — TELEPHONE ENCOUNTER
"Requested Prescriptions   Pending Prescriptions Disp Refills     amLODIPine (NORVASC) 2.5 MG tablet [Pharmacy Med Name: amLODIPine Besylate Oral Tablet 2.5 MG] 90 tablet 0     Sig: Take 1 tablet (2.5 mg) by mouth daily       Calcium Channel Blockers Protocol  Failed - 10/24/2021 10:44 PM        Failed - Normal serum creatinine on file in past 12 months     Recent Labs   Lab Test 03/03/21  1157   CR 1.33*       Ok to refill medication if creatinine is low          Passed - Blood pressure under 140/90 in past 12 months     BP Readings from Last 3 Encounters:   06/02/21 125/74   04/05/21 134/62   03/03/21 (!) 144/78                 Passed - Recent (12 mo) or future (30 days) visit within the authorizing provider's specialty     Patient has had an office visit with the authorizing provider or a provider within the authorizing providers department within the previous 12 mos or has a future within next 30 days. See \"Patient Info\" tab in inbasket, or \"Choose Columns\" in Meds & Orders section of the refill encounter.              Passed - Medication is active on med list        Passed - Patient is age 18 or older           Routing refill request to provider for review/approval because:  Failed protocol.  Henny Infante RN, BSN  Triage nurse  [facility]: [place]      "

## 2021-11-05 DIAGNOSIS — I48.20 CHRONIC ATRIAL FIBRILLATION (H): ICD-10-CM

## 2021-11-05 DIAGNOSIS — Z79.01 LONG TERM CURRENT USE OF ANTICOAGULANT THERAPY: ICD-10-CM

## 2021-11-05 RX ORDER — WARFARIN SODIUM 2.5 MG/1
TABLET ORAL
Qty: 90 TABLET | Refills: 0 | Status: SHIPPED | OUTPATIENT
Start: 2021-11-05 | End: 2022-02-02

## 2021-11-05 NOTE — TELEPHONE ENCOUNTER
Rx approved per [facility] protocol.    Priyanka Hinds RN    [facility] Anticoagulation [facility]

## 2021-11-17 ENCOUNTER — ANTICOAGULATION THERAPY VISIT (OUTPATIENT)
Dept: ANTICOAGULATION | Facility: CLINIC | Age: 81
End: 2021-11-17

## 2021-11-17 ENCOUNTER — LAB (OUTPATIENT)
Dept: LAB | Facility: CLINIC | Age: 81
End: 2021-11-17
Payer: COMMERCIAL

## 2021-11-17 DIAGNOSIS — I48.20 CHRONIC ATRIAL FIBRILLATION (H): ICD-10-CM

## 2021-11-17 DIAGNOSIS — Z79.01 LONG TERM CURRENT USE OF ANTICOAGULANT THERAPY: ICD-10-CM

## 2021-11-17 DIAGNOSIS — Z79.01 LONG TERM CURRENT USE OF ANTICOAGULANT THERAPY: Primary | ICD-10-CM

## 2021-11-17 LAB — INR BLD: 2.2 (ref 0.9–1.1)

## 2021-11-17 PROCEDURE — 85610 PROTHROMBIN TIME: CPT

## 2021-11-17 PROCEDURE — 36415 COLL VENOUS BLD VENIPUNCTURE: CPT

## 2021-11-17 NOTE — PROGRESS NOTES
Anticoagulation Management    Unable to reach Chidi today.    Today's INR result of 2.2 is therapeutic (goal INR of 2.0-3.0).  Result received from: Clinic Lab    Follow up required to confirm warfarin dose taken and assess for changes    Left message to continue current dose of warfarin 1.25 mg tonight.  Left message on both patient and wife phone # voicemail to call back and speak with nurse.    Anticoagulation clinic to follow up    Carolin Gongora RN

## 2021-11-17 NOTE — LETTER
November 23, 2021      Chidi Dubon  [address]  [place] [address]      Dear Chidi,    We are contacting you because we have been unable to reach you to discuss your INR results on the phone. Although your INR has been therapeutic, it is important for us to discuss the results and review your health and any changes or concerns that may affect your warfarin and INR.   It is possible that your phone is screening our calls and that is why our calls go directly to your voicemail.     There are potentially serious risks when taking warfarin without careful monitoring, and we want to make sure you are safely managed.    Please call the INR clinic at [phone] and we will be happy to help you schedule an appointment.  If there has been a change in your care, or other concerns, please let us know so we can help and/or update our records.    Sincerely,    [facility]

## 2021-11-19 NOTE — PROGRESS NOTES
Anticoagulation Management    Unable to reach Chidi today.    Today's INR result of 2.2 is therapeutic (goal INR of 2.0-3.0).  Result received from: Clinic Lab    Follow up required to confirm warfarin dose taken and assess for changes    LMTC with dosing for the weekend.      Anticoagulation clinic to follow up    Priyanka Hinds RN  [phone]

## 2021-11-22 ENCOUNTER — TELEPHONE (OUTPATIENT)
Dept: FAMILY MEDICINE | Facility: CLINIC | Age: 81
End: 2021-11-22
Payer: COMMERCIAL

## 2021-11-22 DIAGNOSIS — I10 BENIGN ESSENTIAL HYPERTENSION: ICD-10-CM

## 2021-11-22 NOTE — PROGRESS NOTES
Left message on all 3 numbers listed in the chart including daughter Anabella for the patient to return call.    Priyanka Hinds RN    [facility] Anticoagulation [facility]

## 2021-11-22 NOTE — TELEPHONE ENCOUNTER
Called patient back and went straight to voicemail.      Priyanka Hinds RN    [facility] Anticoagulation [facility]

## 2021-11-22 NOTE — TELEPHONE ENCOUNTER
Reason for Call:  INR    Who is calling?  Chidi    Phone number:  [phone]    Fax number:  NA    Name of caller:  Chidi    INR Value:  NA    Are there any other concerns:  Yes: Just trying reach Priyanka from his missed call    Can we leave a detailed message on this number? YES    Phone number patient can be reached at: Other phone number:  [phone]      Call taken on 11/22/2021 at 2:01 PM by Daina Motley

## 2021-11-23 ENCOUNTER — TELEPHONE (OUTPATIENT)
Dept: FAMILY MEDICINE | Facility: CLINIC | Age: 81
End: 2021-11-23
Payer: COMMERCIAL

## 2021-11-23 NOTE — TELEPHONE ENCOUNTER
Call attempt and goes directly to voicemail. Letter sent. See anticoagulation encounter.Carolin Gongora RN

## 2021-11-23 NOTE — PROGRESS NOTES
Call goes directly to voicemail.  Printed dose calendar and letter sent to patient asking for call back to follow up. Carolin Gongora RN

## 2021-11-23 NOTE — PATIENT INSTRUCTIONS
Chidi, We have not been able to reach you by phone . We have left numerous messages for you to return our calls and have not been able to reach you.  Please call and speak with a nurse at [phone] when you get this . Thank you.

## 2021-11-23 NOTE — TELEPHONE ENCOUNTER
Reason for Call:  Other returning call    Detailed comments: Patient said INR nurse Fozia called him and his daughter yesterday. Requesting call back.    Phone Number Patient can be reached at: Home number on file [phone] (home)    Best Time: any     Can we leave a detailed message on this number? YES    Call taken on 11/23/2021 at 10:44 AM by Naa Pope

## 2021-11-23 NOTE — TELEPHONE ENCOUNTER
Spoke with wife and she states Chidi is not home and will need call back tomorrow. Carolin Gongora RN

## 2021-11-26 RX ORDER — FUROSEMIDE 20 MG
TABLET ORAL
Qty: 30 TABLET | Refills: 0 | Status: SHIPPED | OUTPATIENT
Start: 2021-11-26 | End: 2021-12-22

## 2021-11-26 RX ORDER — AMLODIPINE BESYLATE 2.5 MG/1
2.5 TABLET ORAL DAILY
Qty: 30 TABLET | Refills: 0 | Status: SHIPPED | OUTPATIENT
Start: 2021-11-26 | End: 2021-12-22

## 2021-11-26 NOTE — TELEPHONE ENCOUNTER
Call goes directly to voicemail. Left message to call back and  Speak with INR nurse.Carolin Gongora RN

## 2021-12-17 DIAGNOSIS — I10 BENIGN ESSENTIAL HYPERTENSION: ICD-10-CM

## 2021-12-20 NOTE — TELEPHONE ENCOUNTER
Routing refill request to provider for review/approval because:  Jaz given x1 and patient did not follow up, please advise  Labs out of range:    Potassium   Date Value Ref Range Status   03/03/2021 3.2 (L) 3.4 - 5.3 mmol/L Final     Creatinine   Date Value Ref Range Status   03/03/2021 1.33 (H) 0.66 - 1.25 mg/dL Final

## 2021-12-22 RX ORDER — FUROSEMIDE 20 MG
TABLET ORAL
Qty: 30 TABLET | Refills: 0 | Status: SHIPPED | OUTPATIENT
Start: 2021-12-22 | End: 2022-01-24

## 2021-12-22 RX ORDER — AMLODIPINE BESYLATE 2.5 MG/1
2.5 TABLET ORAL DAILY
Qty: 30 TABLET | Refills: 0 | Status: SHIPPED | OUTPATIENT
Start: 2021-12-22 | End: 2022-01-24

## 2021-12-29 ENCOUNTER — TELEPHONE (OUTPATIENT)
Dept: FAMILY MEDICINE | Facility: CLINIC | Age: 81
End: 2021-12-29
Payer: COMMERCIAL

## 2021-12-29 NOTE — TELEPHONE ENCOUNTER
ANTICOAGULATION     Chidi Dubon is overdue for INR check.   Call attempt to both #s listed.   Left message for patient to call and schedule lab appointment as soon as possible. If returning call, please schedule.     Carolin Gongora RN

## 2022-01-05 ENCOUNTER — TELEPHONE (OUTPATIENT)
Dept: FAMILY MEDICINE | Facility: CLINIC | Age: 82
End: 2022-01-05
Payer: COMMERCIAL

## 2022-01-05 NOTE — TELEPHONE ENCOUNTER
ANTICOAGULATION     Chidi W Dubon is overdue for INR check.   Call goes directly to voicemail on both #s listed.    left msg on both # to call back and schedule INR in lab  If calls back please help schedule.  Will also send letter as patient has not responded to phone calls in past.     Carolin Gongora RN

## 2022-01-05 NOTE — LETTER
January 5, 2022      Chidi Dubon  [address]  [place] [address]      Dear Chidi,    We are contacting you because our records show you were due for an INR on 12/22/21.  We have not been able to reach you by phone in the past months to discuss your warfarin dose and INR results. We have left multiple messages on your voicemail.    There are potentially serious risks when taking warfarin without careful monitoring, and we want to make sure you are safely managed.    Please call the INR clinic at [phone] and we will be happy to help you schedule an appointment.  If there has been a change in your care, or other concerns, please let us know so we can help and/or update our records.    Sincerely,    [facility]tico[facility]      Martin Schultz PA-C

## 2022-01-12 ENCOUNTER — DOCUMENTATION ONLY (OUTPATIENT)
Dept: FAMILY MEDICINE | Facility: CLINIC | Age: 82
End: 2022-01-12
Payer: COMMERCIAL

## 2022-01-12 NOTE — LETTER
January 12, 2022      Chidi Dubon  [address]  [place] [address]      Dear Chidi,    You are currently under the care of [facility] for your warfarin (Coumadin ) therapy.  We are contacting you because our records show you were due for an INR on 12/22/21.    There are potentially serious risks when taking warfarin without careful monitoring and we want to make sure you are safely managed.  Routine INR monitoring is required for warfarin refills.     Please call [phone] as soon as possible to schedule an appointment.  If there has been a change in your care or other concerns, please let us know so we can help and or update our records.     Sincerely,       [facility]

## 2022-01-12 NOTE — PROGRESS NOTES
ANTICOAGULATION     Chidi Dubon is overdue for INR check.      Reminder letter sent    Lori Fuller RN

## 2022-01-18 ENCOUNTER — LAB (OUTPATIENT)
Dept: LAB | Facility: CLINIC | Age: 82
End: 2022-01-18
Payer: COMMERCIAL

## 2022-01-18 ENCOUNTER — ANTICOAGULATION THERAPY VISIT (OUTPATIENT)
Dept: ANTICOAGULATION | Facility: CLINIC | Age: 82
End: 2022-01-18

## 2022-01-18 DIAGNOSIS — Z79.01 LONG TERM CURRENT USE OF ANTICOAGULANT THERAPY: ICD-10-CM

## 2022-01-18 DIAGNOSIS — I48.20 CHRONIC ATRIAL FIBRILLATION (H): ICD-10-CM

## 2022-01-18 DIAGNOSIS — Z79.01 LONG TERM CURRENT USE OF ANTICOAGULANT THERAPY: Primary | ICD-10-CM

## 2022-01-18 LAB — INR BLD: 2.8 (ref 0.9–1.1)

## 2022-01-18 PROCEDURE — 36415 COLL VENOUS BLD VENIPUNCTURE: CPT

## 2022-01-18 PROCEDURE — 85610 PROTHROMBIN TIME: CPT

## 2022-01-18 NOTE — PROGRESS NOTES
Anticoagulation Management    Unable to reach Chidi today.    Today's INR result of 2.8 is therapeutic (goal INR of 2.0-3.0).  Result received from: Clinic Lab    Follow up required to confirm warfarin dose taken and assess for changes    Left message to continue current dose of warfarin 2.5 mg tonight. Request call back for assessment.      Anticoagulation clinic to follow up    Lori Fuller RN

## 2022-01-19 NOTE — PROGRESS NOTES
Anticoagulation Management    Unable to reach Patient today.    Left message to continue current dose of warfarin 1.25 mg tonight. Request call back for assessment.      Anticoagulation clinic to follow up    Lori Fuller RN

## 2022-01-19 NOTE — PROGRESS NOTES
Dose calendar printed and mailed with instructions to call back and speak with INR nurse.  Carolin Gongora RN

## 2022-01-19 NOTE — PATIENT INSTRUCTIONS
Chidi, Please call the clinic INR nurse at [phone] and update us on your health and warfarin dose.  It is important that we speak with you to discuss your results and warfarin dose with each lab result.

## 2022-01-21 DIAGNOSIS — I10 BENIGN ESSENTIAL HYPERTENSION: ICD-10-CM

## 2022-01-24 RX ORDER — FUROSEMIDE 20 MG
TABLET ORAL
Qty: 30 TABLET | Refills: 0 | Status: SHIPPED | OUTPATIENT
Start: 2022-01-24 | End: 2022-03-09

## 2022-01-24 RX ORDER — AMLODIPINE BESYLATE 2.5 MG/1
2.5 TABLET ORAL DAILY
Qty: 30 TABLET | Refills: 0 | Status: SHIPPED | OUTPATIENT
Start: 2022-01-24 | End: 2022-02-22

## 2022-02-02 DIAGNOSIS — I48.20 CHRONIC ATRIAL FIBRILLATION (H): ICD-10-CM

## 2022-02-02 DIAGNOSIS — Z79.01 LONG TERM CURRENT USE OF ANTICOAGULANT THERAPY: ICD-10-CM

## 2022-02-02 RX ORDER — WARFARIN SODIUM 2.5 MG/1
TABLET ORAL
Qty: 90 TABLET | Refills: 0 | Status: SHIPPED | OUTPATIENT
Start: 2022-02-02 | End: 2022-05-03

## 2022-02-20 DIAGNOSIS — I10 BENIGN ESSENTIAL HYPERTENSION: ICD-10-CM

## 2022-02-22 RX ORDER — AMLODIPINE BESYLATE 2.5 MG/1
2.5 TABLET ORAL DAILY
Qty: 30 TABLET | Refills: 0 | Status: SHIPPED | OUTPATIENT
Start: 2022-02-22 | End: 2022-03-09

## 2022-02-22 NOTE — TELEPHONE ENCOUNTER
Routing refill request to provider for review/approval because:  Needs provider approval. Has appt scheduled.  Hyacinth Montano RN  [facility]

## 2022-03-08 DIAGNOSIS — E78.5 HYPERLIPIDEMIA LDL GOAL <70: ICD-10-CM

## 2022-03-09 ENCOUNTER — ANTICOAGULATION THERAPY VISIT (OUTPATIENT)
Dept: ANTICOAGULATION | Facility: CLINIC | Age: 82
End: 2022-03-09

## 2022-03-09 ENCOUNTER — OFFICE VISIT (OUTPATIENT)
Dept: FAMILY MEDICINE | Facility: CLINIC | Age: 82
End: 2022-03-09
Payer: COMMERCIAL

## 2022-03-09 VITALS
SYSTOLIC BLOOD PRESSURE: 131 MMHG | RESPIRATION RATE: 20 BRPM | TEMPERATURE: 98.2 F | DIASTOLIC BLOOD PRESSURE: 70 MMHG | HEART RATE: 50 BPM | OXYGEN SATURATION: 93 % | HEIGHT: 72 IN | WEIGHT: 261 LBS | BODY MASS INDEX: 35.35 KG/M2

## 2022-03-09 DIAGNOSIS — E87.6 HYPOKALEMIA: ICD-10-CM

## 2022-03-09 DIAGNOSIS — I48.20 CHRONIC ATRIAL FIBRILLATION (H): ICD-10-CM

## 2022-03-09 DIAGNOSIS — I71.40 ABDOMINAL AORTIC ANEURYSM WITHOUT RUPTURE (H): ICD-10-CM

## 2022-03-09 DIAGNOSIS — Z79.01 LONG TERM CURRENT USE OF ANTICOAGULANT THERAPY: ICD-10-CM

## 2022-03-09 DIAGNOSIS — F32.0 MILD MAJOR DEPRESSION (H): ICD-10-CM

## 2022-03-09 DIAGNOSIS — I25.119 CORONARY ARTERY DISEASE INVOLVING NATIVE HEART WITH ANGINA PECTORIS, UNSPECIFIED VESSEL OR LESION TYPE (H): ICD-10-CM

## 2022-03-09 DIAGNOSIS — Z00.00 ENCOUNTER FOR MEDICARE ANNUAL WELLNESS EXAM: ICD-10-CM

## 2022-03-09 DIAGNOSIS — E78.5 HYPERLIPIDEMIA LDL GOAL <70: ICD-10-CM

## 2022-03-09 DIAGNOSIS — I10 BENIGN ESSENTIAL HYPERTENSION: ICD-10-CM

## 2022-03-09 DIAGNOSIS — E66.01 MORBID OBESITY (H): Primary | ICD-10-CM

## 2022-03-09 DIAGNOSIS — Z79.01 LONG TERM CURRENT USE OF ANTICOAGULANT THERAPY: Primary | ICD-10-CM

## 2022-03-09 DIAGNOSIS — I50.32 CHRONIC HEART FAILURE WITH PRESERVED EJECTION FRACTION (H): ICD-10-CM

## 2022-03-09 DIAGNOSIS — Z13.220 SCREENING FOR HYPERLIPIDEMIA: ICD-10-CM

## 2022-03-09 DIAGNOSIS — N18.31 STAGE 3A CHRONIC KIDNEY DISEASE (H): ICD-10-CM

## 2022-03-09 LAB
ALBUMIN SERPL-MCNC: 3.9 G/DL (ref 3.4–5)
ALP SERPL-CCNC: 99 U/L (ref 40–150)
ALT SERPL W P-5'-P-CCNC: 21 U/L (ref 0–70)
ANION GAP SERPL CALCULATED.3IONS-SCNC: 8 MMOL/L (ref 3–14)
AST SERPL W P-5'-P-CCNC: 21 U/L (ref 0–45)
BASOPHILS # BLD AUTO: 0 10E3/UL (ref 0–0.2)
BASOPHILS NFR BLD AUTO: 0 %
BILIRUB SERPL-MCNC: 3.1 MG/DL (ref 0.2–1.3)
BUN SERPL-MCNC: 11 MG/DL (ref 7–30)
CALCIUM SERPL-MCNC: 9.1 MG/DL (ref 8.5–10.1)
CHLORIDE BLD-SCNC: 111 MMOL/L (ref 94–109)
CHOLEST SERPL-MCNC: 143 MG/DL
CO2 SERPL-SCNC: 24 MMOL/L (ref 20–32)
CREAT SERPL-MCNC: 1.21 MG/DL (ref 0.66–1.25)
EOSINOPHIL # BLD AUTO: 0.1 10E3/UL (ref 0–0.7)
EOSINOPHIL NFR BLD AUTO: 1 %
ERYTHROCYTE [DISTWIDTH] IN BLOOD BY AUTOMATED COUNT: 15.2 % (ref 10–15)
FASTING STATUS PATIENT QL REPORTED: YES
GFR SERPL CREATININE-BSD FRML MDRD: 61 ML/MIN/1.73M2
GLUCOSE BLD-MCNC: 107 MG/DL (ref 70–99)
HCT VFR BLD AUTO: 44.1 % (ref 40–53)
HDLC SERPL-MCNC: 42 MG/DL
HGB BLD-MCNC: 14.8 G/DL (ref 13.3–17.7)
INR BLD: 2.1 (ref 0.9–1.1)
LDLC SERPL CALC-MCNC: 85 MG/DL
LYMPHOCYTES # BLD AUTO: 1.7 10E3/UL (ref 0.8–5.3)
LYMPHOCYTES NFR BLD AUTO: 24 %
MCH RBC QN AUTO: 28.8 PG (ref 26.5–33)
MCHC RBC AUTO-ENTMCNC: 33.6 G/DL (ref 31.5–36.5)
MCV RBC AUTO: 86 FL (ref 78–100)
MONOCYTES # BLD AUTO: 0.6 10E3/UL (ref 0–1.3)
MONOCYTES NFR BLD AUTO: 8 %
NEUTROPHILS # BLD AUTO: 4.7 10E3/UL (ref 1.6–8.3)
NEUTROPHILS NFR BLD AUTO: 66 %
NONHDLC SERPL-MCNC: 101 MG/DL
PLATELET # BLD AUTO: 192 10E3/UL (ref 150–450)
POTASSIUM BLD-SCNC: 3.2 MMOL/L (ref 3.4–5.3)
PROT SERPL-MCNC: 7.3 G/DL (ref 6.8–8.8)
RBC # BLD AUTO: 5.13 10E6/UL (ref 4.4–5.9)
SODIUM SERPL-SCNC: 143 MMOL/L (ref 133–144)
TRIGL SERPL-MCNC: 79 MG/DL
WBC # BLD AUTO: 7.1 10E3/UL (ref 4–11)

## 2022-03-09 PROCEDURE — 80061 LIPID PANEL: CPT | Performed by: PHYSICIAN ASSISTANT

## 2022-03-09 PROCEDURE — 80053 COMPREHEN METABOLIC PANEL: CPT | Performed by: PHYSICIAN ASSISTANT

## 2022-03-09 PROCEDURE — 82043 UR ALBUMIN QUANTITATIVE: CPT | Performed by: PHYSICIAN ASSISTANT

## 2022-03-09 PROCEDURE — 85025 COMPLETE CBC W/AUTO DIFF WBC: CPT | Performed by: PHYSICIAN ASSISTANT

## 2022-03-09 PROCEDURE — 85610 PROTHROMBIN TIME: CPT | Performed by: PHYSICIAN ASSISTANT

## 2022-03-09 PROCEDURE — 96127 BRIEF EMOTIONAL/BEHAV ASSMT: CPT | Performed by: PHYSICIAN ASSISTANT

## 2022-03-09 PROCEDURE — 36415 COLL VENOUS BLD VENIPUNCTURE: CPT | Performed by: PHYSICIAN ASSISTANT

## 2022-03-09 PROCEDURE — 36416 COLLJ CAPILLARY BLOOD SPEC: CPT | Performed by: PHYSICIAN ASSISTANT

## 2022-03-09 PROCEDURE — 99397 PER PM REEVAL EST PAT 65+ YR: CPT | Performed by: PHYSICIAN ASSISTANT

## 2022-03-09 PROCEDURE — 99214 OFFICE O/P EST MOD 30 MIN: CPT | Mod: 25 | Performed by: PHYSICIAN ASSISTANT

## 2022-03-09 RX ORDER — FUROSEMIDE 20 MG
20 TABLET ORAL DAILY
Qty: 90 TABLET | Refills: 1 | Status: SHIPPED | OUTPATIENT
Start: 2022-03-09 | End: 2022-11-08

## 2022-03-09 RX ORDER — NITROGLYCERIN 0.4 MG/1
0.4 TABLET SUBLINGUAL EVERY 5 MIN PRN
Qty: 25 TABLET | Refills: 0 | Status: SHIPPED | OUTPATIENT
Start: 2022-03-09

## 2022-03-09 RX ORDER — AMLODIPINE BESYLATE 2.5 MG/1
2.5 TABLET ORAL DAILY
Qty: 90 TABLET | Refills: 1 | Status: SHIPPED | OUTPATIENT
Start: 2022-03-09 | End: 2022-09-21

## 2022-03-09 RX ORDER — ATORVASTATIN CALCIUM 80 MG/1
TABLET, FILM COATED ORAL
Qty: 45 TABLET | Refills: 1 | Status: SHIPPED | OUTPATIENT
Start: 2022-03-09 | End: 2022-09-08

## 2022-03-09 RX ORDER — LOSARTAN POTASSIUM 100 MG/1
100 TABLET ORAL DAILY
Qty: 90 TABLET | Refills: 1 | Status: SHIPPED | OUTPATIENT
Start: 2022-03-09 | End: 2022-09-22

## 2022-03-09 RX ORDER — POTASSIUM CHLORIDE 1.5 G/1.58G
20 POWDER, FOR SOLUTION ORAL DAILY
Qty: 90 EACH | Refills: 3 | Status: SHIPPED | OUTPATIENT
Start: 2022-03-09

## 2022-03-09 ASSESSMENT — ANXIETY QUESTIONNAIRES
7. FEELING AFRAID AS IF SOMETHING AWFUL MIGHT HAPPEN: NOT AT ALL
1. FEELING NERVOUS, ANXIOUS, OR ON EDGE: SEVERAL DAYS
3. WORRYING TOO MUCH ABOUT DIFFERENT THINGS: SEVERAL DAYS
GAD7 TOTAL SCORE: 4
2. NOT BEING ABLE TO STOP OR CONTROL WORRYING: SEVERAL DAYS
GAD7 TOTAL SCORE: 4
5. BEING SO RESTLESS THAT IT IS HARD TO SIT STILL: NOT AT ALL
4. TROUBLE RELAXING: NOT AT ALL
7. FEELING AFRAID AS IF SOMETHING AWFUL MIGHT HAPPEN: NOT AT ALL
GAD7 TOTAL SCORE: 4
6. BECOMING EASILY ANNOYED OR IRRITABLE: SEVERAL DAYS

## 2022-03-09 ASSESSMENT — ENCOUNTER SYMPTOMS
DIARRHEA: 0
DIZZINESS: 1
WEAKNESS: 1
FREQUENCY: 0
ABDOMINAL PAIN: 0
HEARTBURN: 0
NERVOUS/ANXIOUS: 1
SORE THROAT: 0
JOINT SWELLING: 1
COUGH: 0
SHORTNESS OF BREATH: 1
HEADACHES: 0
CONSTIPATION: 0
HEMATURIA: 0
PALPITATIONS: 0
ARTHRALGIAS: 1
DYSURIA: 0
NAUSEA: 0
FEVER: 0
HEMATOCHEZIA: 0
MYALGIAS: 1
EYE PAIN: 0
CHILLS: 0
PARESTHESIAS: 0

## 2022-03-09 ASSESSMENT — PATIENT HEALTH QUESTIONNAIRE - PHQ9
10. IF YOU CHECKED OFF ANY PROBLEMS, HOW DIFFICULT HAVE THESE PROBLEMS MADE IT FOR YOU TO DO YOUR WORK, TAKE CARE OF THINGS AT HOME, OR GET ALONG WITH OTHER PEOPLE: SOMEWHAT DIFFICULT
SUM OF ALL RESPONSES TO PHQ QUESTIONS 1-9: 3
SUM OF ALL RESPONSES TO PHQ QUESTIONS 1-9: 3

## 2022-03-09 ASSESSMENT — ACTIVITIES OF DAILY LIVING (ADL): CURRENT_FUNCTION: NO ASSISTANCE NEEDED

## 2022-03-09 ASSESSMENT — PAIN SCALES - GENERAL: PAINLEVEL: NO PAIN (0)

## 2022-03-09 NOTE — PROGRESS NOTES
"SUBJECTIVE:   Chidi Dubon is a 81 year old male who presents for Preventive Visit.      Patient has been advised of split billing requirements and indicates understanding: Yes  Are you in the first 12 months of your Medicare coverage?  No    Healthy Habits:     In general, how would you rate your overall health?  Good    Frequency of exercise:  2-3 days/week    Duration of exercise:  15-30 minutes    Do you usually eat at least 4 servings of fruit and vegetables a day, include whole grains    & fiber and avoid regularly eating high fat or \"junk\" foods?  No    Taking medications regularly:  Yes    Medication side effects:  None    Ability to successfully perform activities of daily living:  No assistance needed    Home Safety:  No safety concerns identified    Hearing Impairment:  Difficulty following a conversation in a noisy restaurant or crowded room, feel that people are mumbling or not speaking clearly and difficulty understanding soft or whispered speech    In the past 6 months, have you been bothered by leaking of urine?  No    In general, how would you rate your overall mental or emotional health?  Good      PHQ-2 Total Score: 1    Additional concerns today:  Yes    History of cad and chf.   No chest pain.   Still some mild KRAUSE.  Recheck of afib. Denies obvious palpitations   Recheck of his htn. Numbers have looked good .  Do you feel safe in your environment? Yes    Have you ever done Advance Care Planning? (For example, a Health Directive, POLST, or a discussion with a medical provider or your loved ones about your wishes): No, advance care planning information given to patient to review.  Patient plans to discuss their wishes with loved ones or provider.         Fall risk  Fallen 2 or more times in the past year?: No  Any fall with injury in the past year?: No    Cognitive Screening   1) Repeat 3 items (Leader, Season, Table)    2) Clock draw: NORMAL  3) 3 item recall: Recalls 3 objects  Results: 3 " items recalled: COGNITIVE IMPAIRMENT LESS LIKELY    Mini-CogTM Copyright S Jordan. Licensed by the author for use in [facility]; reprinted with permission (jose@[facility]). All rights reserved.      Do you have sleep apnea, excessive snoring or daytime drowsiness?: yes    Reviewed and updated as needed this visit by clinical staff   Tobacco  Allergies  Meds   Med Hx  Surg Hx  Fam Hx  Soc Hx        Reviewed and updated as needed this visit by Provider                 Social History     Tobacco Use     Smoking status: Never Smoker     Smokeless tobacco: Never Used     Tobacco comment: never smoker; non-smoking household   Substance Use Topics     Alcohol use: No     Comment: none         Alcohol Use 3/9/2022   Prescreen: >3 drinks/day or >7 drinks/week? Not Applicable         Current providers sharing in care for this patient include:   Patient Care Team:  Martin Schultz PA-C as PCP - General (Family Medicine)  Martin Schultz PA-C as Assigned PCP    The following health maintenance items are reviewed in Epic and correct as of today:  Health Maintenance Due   Topic Date Due     ZOSTER IMMUNIZATION (1 of 2) Never done     MICROALBUMIN  06/11/2016     DTAP/TDAP/TD IMMUNIZATION (2 - Td or Tdap) 01/04/2021     HEMOGLOBIN  08/17/2021     INFLUENZA VACCINE (1) 09/01/2021     BMP  09/03/2021     PHQ-9  09/03/2021     CMP  03/03/2022     LIPID  03/03/2022     FALL RISK ASSESSMENT  03/03/2022     Lab work is in process  Labs reviewed in EPIC  BP Readings from Last 3 Encounters:   03/09/22 131/70   06/02/21 125/74   04/05/21 134/62    Wt Readings from Last 3 Encounters:   03/09/22 118.4 kg (261 lb)   06/02/21 119.7 kg (263 lb 12.8 oz)   04/05/21 117.5 kg (259 lb)                  Patient Active Problem List   Diagnosis     Hyperlipidemia LDL goal <70     Obesity     Mild major depression (H)     Osteoarthritis, knee     Diverticulosis     Urolithiasis     CAD S/P percutaneous coronary angioplasty      Atrial Fibrillation     Fatigue     Restless leg syndrome     Long-term (current) use of anticoagulants [Z79.01]     Benign essential hypertension     Depression, major, in remission (H)     Aftercare following right hip joint replacement surgery     Hip pain, right     Hip joint replacement status     Posterior capsular opacification of both eyes, obscuring vision     Coronary artery disease involving native heart with angina pectoris, unspecified vessel or lesion type (H)     S/P placement of cardiac pacemaker     Abdominal aortic aneurysm without rupture (H)     (HFpEF) heart failure with preserved ejection fraction (H)     Bilirubinemia     CKD (chronic kidney disease) stage 3, GFR 30-59 ml/min (H)     Non-rheumatic mitral regurgitation     Morbid obesity (H)     Past Surgical History:   Procedure Laterality Date     ANGIOGRAM  age 61     3 cornary artery stents, [facility]     ANGIOGRAM  age 66    1 coronary artery stent, [facility]     ANGIOGRAM  11/2013    1 coronary artery stent, [facility]      ARTHROSCOPY KNEE RT/LT         Social History     Tobacco Use     Smoking status: Never Smoker     Smokeless tobacco: Never Used     Tobacco comment: never smoker; non-smoking household   Substance Use Topics     Alcohol use: No     Comment: none     Family History   Problem Relation Age of Onset     Heart Disease Paternal Grandfather      Heart Disease Brother          Current Outpatient Medications   Medication Sig Dispense Refill     amLODIPine (NORVASC) 2.5 MG tablet Take 1 tablet (2.5 mg) by mouth daily 90 tablet 1     atorvastatin (LIPITOR) 80 MG tablet TAKE 1/2 TABLET BY MOUTH ONCE DAILY 45 tablet 1     fluticasone (FLONASE) 50 MCG/ACT nasal spray Spray 1 spray into both nostrils daily 18.2 mL 0     furosemide (LASIX) 20 MG tablet Take 1 tablet (20 mg) by mouth daily 90 tablet 1     losartan (COZAAR) 100 MG tablet Take 1 tablet (100 mg) by mouth daily 90 tablet 1     nitroGLYcerin (NITROSTAT) 0.4 MG  sublingual tablet Place 1 tablet (0.4 mg) under the tongue every 5 minutes as needed for chest pain 25 tablet 0     potassium chloride (KLOR-CON) 20 MEQ packet Take 20 mEq by mouth daily 90 each 3     warfarin ANTICOAGULANT (COUMADIN) 2.5 MG tablet TAKE ONE-HALF TABLET BY MOUTH ONCE DAILY ON SUNDAYS AND WEDNESDAYS; TAKE 1 TABLET ONCE DAILY ALL OTHER DAYS OF THE WEEK OR TAKE AS DIRECTED BY INR CLINIC 90 tablet 0     Allergies   Allergen Reactions     No Known Allergies      Recent Labs   Lab Test 03/03/21  1157 08/17/20  1425 12/11/19  1142 11/26/19  1438 09/18/19  1326 08/14/19  1254 04/26/19  1641 02/18/19  1142 11/20/18  0806   LDL 82  --   --   --   --   --   --  81 51   HDL 41  --   --   --   --   --   --  41 33*   TRIG 88  --   --   --   --   --   --  104 58   ALT 17  --   --  20 22 26   < > 19  --    CR 1.33* 1.23   < > 1.07 1.07 1.09   < > 1.21 1.19   GFRESTIMATED 50* 56*   < > 66 66 65   < > 57* 59*   GFRESTBLACK 58* 64   < > 77 77 75   < > 66 72   POTASSIUM 3.2* 3.6   < > 3.2* 3.2* 3.7   < > 3.0* 3.6   TSH  --  2.07  --   --   --  2.00  --   --   --     < > = values in this interval not displayed.              Review of Systems   Constitutional: Negative for chills and fever.   HENT: Positive for hearing loss. Negative for congestion, ear pain and sore throat.    Eyes: Negative for pain and visual disturbance.   Respiratory: Positive for shortness of breath. Negative for cough.    Cardiovascular: Positive for peripheral edema. Negative for chest pain and palpitations.   Gastrointestinal: Negative for abdominal pain, constipation, diarrhea, heartburn, hematochezia and nausea.   Genitourinary: Negative for dysuria, frequency, genital sores, hematuria, penile discharge and urgency.   Musculoskeletal: Positive for arthralgias, joint swelling and myalgias.   Skin: Negative for rash.   Neurological: Positive for dizziness and weakness. Negative for headaches and paresthesias.   Psychiatric/Behavioral: Positive  "for mood changes. The patient is nervous/anxious.      Constitutional, HEENT, cardiovascular, pulmonary, GI, , musculoskeletal, neuro, skin, endocrine and psych systems are negative, except as otherwise noted.    OBJECTIVE:   /70   Pulse 50   Temp 98.2  F (36.8  C) (Tympanic)   Resp 20   Ht 1.816 m (5' 11.5\")   Wt 118.4 kg (261 lb)   SpO2 93%   BMI 35.89 kg/m   Estimated body mass index is 35.89 kg/m  as calculated from the following:    Height as of this encounter: 1.816 m (5' 11.5\").    Weight as of this encounter: 118.4 kg (261 lb).  Physical Exam  GENERAL: healthy, alert and no distress  EYES: Eyes grossly normal to inspection, PERRL and conjunctivae and sclerae normal  HENT: ear canals and TM's normal, nose and mouth without ulcers or lesions  NECK: no adenopathy, no asymmetry, masses, or scars and thyroid normal to palpation  RESP: lungs clear to auscultation - no rales, rhonchi or wheezes  CV: paced rhythm , no murmur, click or rub, no peripheral edema and peripheral pulses strong  ABDOMEN: soft, nontender, no hepatosplenomegaly, no masses and bowel sounds normal  MS: no gross musculoskeletal defects noted, no edema  SKIN: no suspicious lesions or rashes  NEURO: Normal strength and tone, mentation intact and speech normal  PSYCH: mentation appears normal, affect normal/bright    Diagnostic Test Results:  Labs reviewed in Epic    ASSESSMENT / PLAN:       ICD-10-CM    1. Morbid obesity (H)  E66.01    2. Screening for hyperlipidemia  Z13.220 Lipid panel reflex to direct LDL Fasting   3. Encounter for Medicare annual wellness exam  Z00.00    4. Hyperlipidemia LDL goal <70  E78.5 nitroGLYcerin (NITROSTAT) 0.4 MG sublingual tablet     atorvastatin (LIPITOR) 80 MG tablet   5. Chronic heart failure with preserved ejection fraction (H)  I50.32 Albumin Random Urine Quantitative with Creat Ratio     COMPREHENSIVE METABOLIC PANEL   6. Mild major depression (H)  F32.0    7. Abdominal aortic aneurysm without " "rupture (H)  I71.4 US Abdominal Aorta Imaging   8. Chronic atrial fibrillation (H)  I48.20 INR   9. Coronary artery disease involving native heart with angina pectoris, unspecified vessel or lesion type (H)  I25.119    10. Stage 3a chronic kidney disease (H)  N18.31 Albumin Random Urine Quantitative with Creat Ratio     CBC with platelets and differential   11. Benign essential hypertension  I10 amLODIPine (NORVASC) 2.5 MG tablet     furosemide (LASIX) 20 MG tablet     losartan (COZAAR) 100 MG tablet   12. Hypokalemia  E87.6 potassium chloride (KLOR-CON) 20 MEQ packet   work on lifestyle modification to aid in losing weight.  Recheck in 6 mos     Patient has been advised of split billing requirements and indicates understanding: Yes    COUNSELING:  Reviewed preventive health counseling, as reflected in patient instructions       Regular exercise       Healthy diet/nutrition    Estimated body mass index is 35.89 kg/m  as calculated from the following:    Height as of this encounter: 1.816 m (5' 11.5\").    Weight as of this encounter: 118.4 kg (261 lb).    Weight management plan: Discussed healthy diet and exercise guidelines    He reports that he has never smoked. He has never used smokeless tobacco.      Appropriate preventive services were discussed with this patient, including applicable screening as appropriate for cardiovascular disease, diabetes, osteopenia/osteoporosis, and glaucoma.  As appropriate for age/gender, discussed screening for colorectal cancer, prostate cancer, breast cancer, and cervical cancer. Checklist reviewing preventive services available has been given to the patient.    Reviewed patients plan of care and provided an AVS. The Basic Care Plan (routine screening as documented in Health Maintenance) for Chidi meets the Care Plan requirement. This Care Plan has been established and reviewed with the Patient.    Counseling Resources:  ATP IV Guidelines  Pooled Cohorts Equation " Calculator  Breast Cancer Risk Calculator  Breast Cancer: Medication to Reduce Risk  FRAX Risk Assessment  ICSI Preventive Guidelines  Dietary Guidelines for Americans, 2010  [employer] MyPlate  ASA Prophylaxis  Lung CA Screening    MINDY Elias [facility]    Identified Health Risks:  Answers for HPI/ROS submitted by the patient on 3/9/2022  If you checked off any problems, how difficult have these problems made it for you to do your work, take care of things at home, or get along with other people?: Somewhat difficult  PHQ9 TOTAL SCORE: 3  DEVIN 7 TOTAL SCORE: 4

## 2022-03-09 NOTE — PATIENT INSTRUCTIONS
Patient Education   Personalized Prevention Plan  You are due for the preventive services outlined below.  Your care team is available to assist you in scheduling these services.  If you have already completed any of these items, please share that information with your care team to update in your medical record.  Health Maintenance Due   Topic Date Due     Zoster (Shingles) Vaccine (1 of 2) Never done     Kidney Microalbumin Urine Test  06/11/2016     Diptheria Tetanus Pertussis (DTAP/TDAP/TD) Vaccine (2 - Td or Tdap) 01/04/2021     Hemoglobin  08/17/2021     Flu Vaccine (1) 09/01/2021     Basic Metabolic Panel  09/03/2021     Depression Assessment  09/03/2021     Comprehensive Metabolic Panel  03/03/2022     Cholesterol Lab  03/03/2022     FALL RISK ASSESSMENT  03/03/2022     Annual Wellness Visit  03/03/2022

## 2022-03-09 NOTE — PROGRESS NOTES
ANTICOAGULATION MANAGEMENT     Chidi Dubon 81 year old male is on warfarin with therapeutic INR result. (Goal INR 2.0-3.0)    Recent labs: (last 7 days)     03/09/22  1252   INR 2.1*       ASSESSMENT       Source(s): Chart Review    Previous INR was Therapeutic last 2(+) visits    Medication, diet, health changes since last INR chart reviewed; none identified           PLAN     Recommended plan for no diet, medication or health factor changes affecting INR     Dosing Instructions: Continue your current warfarin dose with next INR in 6 weeks       Summary  As of 3/9/2022    Full warfarin instructions:  1.25 mg every Sun, Wed; 2.5 mg all other days   Next INR check:  4/20/2022             Detailed voice message left for Chidi with dosing instructions and follow up date.     Contact [phone]  to schedule and with any changes, questions or concerns.     Education provided: None required    Plan made per [facility] anticoagulation protocol    Camilla Gil RN  Anticoagulation Clinic  3/9/2022    _______________________________________________________________________     Anticoagulation Episode Summary     Current INR goal:  2.0-3.0   TTR:  91.2 % (1 y)   Target end date:  Indefinite   Send INR reminders to:  EMELIA SARGENT    Indications    Long-term (current) use of anticoagulants [Z79.01] [Z79.01]  Atrial Fibrillation [I48.20]           Comments:           Anticoagulation Care Providers     Provider Role Specialty Phone number    Martin Schultz PA-C Referring Family Medicine [phone]    Gen Marti PA Referring  [phone]

## 2022-03-10 LAB
CREAT UR-MCNC: 147 MG/DL
MICROALBUMIN UR-MCNC: 75 MG/L
MICROALBUMIN/CREAT UR: 51.02 MG/G CR (ref 0–17)

## 2022-03-10 RX ORDER — ATORVASTATIN CALCIUM 80 MG/1
TABLET, FILM COATED ORAL
Qty: 45 TABLET | Refills: 0 | OUTPATIENT
Start: 2022-03-10

## 2022-03-10 ASSESSMENT — ANXIETY QUESTIONNAIRES: GAD7 TOTAL SCORE: 4

## 2022-03-10 ASSESSMENT — PATIENT HEALTH QUESTIONNAIRE - PHQ9: SUM OF ALL RESPONSES TO PHQ QUESTIONS 1-9: 3

## 2022-03-10 NOTE — TELEPHONE ENCOUNTER
Duplicate refill request refused.   Last written on 3/9/22.     Angela Adrian RN BSN  [facility]

## 2022-03-23 ENCOUNTER — ANCILLARY PROCEDURE (OUTPATIENT)
Dept: ULTRASOUND IMAGING | Facility: CLINIC | Age: 82
End: 2022-03-23
Attending: PHYSICIAN ASSISTANT
Payer: COMMERCIAL

## 2022-03-23 DIAGNOSIS — I71.40 ABDOMINAL AORTIC ANEURYSM WITHOUT RUPTURE (H): ICD-10-CM

## 2022-03-23 PROCEDURE — 76775 US EXAM ABDO BACK WALL LIM: CPT | Performed by: RADIOLOGY

## 2022-05-03 DIAGNOSIS — I48.20 CHRONIC ATRIAL FIBRILLATION (H): ICD-10-CM

## 2022-05-03 DIAGNOSIS — Z79.01 LONG TERM CURRENT USE OF ANTICOAGULANT THERAPY: ICD-10-CM

## 2022-05-03 RX ORDER — WARFARIN SODIUM 2.5 MG/1
TABLET ORAL
Qty: 90 TABLET | Refills: 0 | Status: SHIPPED | OUTPATIENT
Start: 2022-05-03 | End: 2022-05-26

## 2022-05-03 NOTE — TELEPHONE ENCOUNTER
ANTICOAGULATION MANAGEMENT:  Medication Refill    Anticoagulation Summary  As of 3/9/2022    Warfarin maintenance plan:  1.25 mg (2.5 mg x 0.5) every Sun, Wed; 2.5 mg (2.5 mg x 1) all other days   Next INR check:  4/20/2022   Target end date:  Indefinite    Indications    Long-term (current) use of anticoagulants [Z79.01] [Z79.01]  Atrial Fibrillation [I48.20]             Anticoagulation Care Providers     Provider Role Specialty Phone number    Martin Schultz PA-C Referring Family Medicine [phone]    Gen Marti PA Referring  [phone]          Visit with referring provider/group within last year: Yes    [facility] referral signed within last year: Yes    Chidi meets all criteria for refill (current [facility] referral, office visit with referring provider/group in last year, lab monitoring up to date or not exceeding 2 weeks overdue). Rx instructions and quantity supplied updated to match patient's current dosing plan. Warfarin 90 day supply with 1 refill granted per [facility] protocol     Naa Baez, RN  Anticoagulation Clinic

## 2022-05-04 ENCOUNTER — TELEPHONE (OUTPATIENT)
Dept: FAMILY MEDICINE | Facility: CLINIC | Age: 82
End: 2022-05-04
Payer: COMMERCIAL

## 2022-05-06 NOTE — TELEPHONE ENCOUNTER
Left message for patient to return call.    
Orders have been placed  
Patient has lab appointment scheduled for today   
Pt needs to have labs for medications that he needs renewed.  Okay to leave message.  
Will pend lipids and send to PCP to advise on any other labs needed  
Satisfactory

## 2022-05-11 ENCOUNTER — TELEPHONE (OUTPATIENT)
Dept: FAMILY MEDICINE | Facility: CLINIC | Age: 82
End: 2022-05-11
Payer: COMMERCIAL

## 2022-05-19 ENCOUNTER — LAB (OUTPATIENT)
Dept: LAB | Facility: CLINIC | Age: 82
End: 2022-05-19
Payer: COMMERCIAL

## 2022-05-19 ENCOUNTER — DOCUMENTATION ONLY (OUTPATIENT)
Dept: FAMILY MEDICINE | Facility: CLINIC | Age: 82
End: 2022-05-19

## 2022-05-19 ENCOUNTER — ANTICOAGULATION THERAPY VISIT (OUTPATIENT)
Dept: ANTICOAGULATION | Facility: CLINIC | Age: 82
End: 2022-05-19

## 2022-05-19 DIAGNOSIS — I48.20 CHRONIC ATRIAL FIBRILLATION (H): ICD-10-CM

## 2022-05-19 DIAGNOSIS — Z79.01 LONG TERM CURRENT USE OF ANTICOAGULANT THERAPY: ICD-10-CM

## 2022-05-19 DIAGNOSIS — Z79.01 LONG TERM CURRENT USE OF ANTICOAGULANT THERAPY: Primary | ICD-10-CM

## 2022-05-19 DIAGNOSIS — I48.20 CHRONIC ATRIAL FIBRILLATION (H): Primary | ICD-10-CM

## 2022-05-19 LAB — INR BLD: 2.8 (ref 0.9–1.1)

## 2022-05-19 PROCEDURE — 85610 PROTHROMBIN TIME: CPT

## 2022-05-19 PROCEDURE — 36416 COLLJ CAPILLARY BLOOD SPEC: CPT

## 2022-05-19 NOTE — PROGRESS NOTES
ANTICOAGULATION MANAGEMENT      Chidi Dubon due for annual renewal of referral to anticoagulation monitoring. Order pended for your review and signature.      ANTICOAGULATION SUMMARY      Warfarin indication(s)     Atrial fibrillation    Heart valve present?  NO       Current goal range   INR: 2.0-3.0     Goal appropriate for indication? Yes, INR 2-3 appropriate for hx of DVT, PE, hypercoagulable state, Afib, LVAD, or bileaflet AVR without risk factors     Current duration of therapy Indefinite/long term therapy   Time in Therapeutic Range (TTR)  (Goal > 60%) 98%       Office visit with referring provider's group within last year yes on 3/9/22       Camilla Gil RN

## 2022-05-19 NOTE — PROGRESS NOTES
ANTICOAGULATION MANAGEMENT     Chidi Dubon 81 year old male is on warfarin with therapeutic INR result. (Goal INR 2.0-3.0)    Recent labs: (last 7 days)     05/19/22  1305   INR 2.8*       ASSESSMENT       Source(s): Chart Review and Patient/Caregiver Call       Warfarin doses taken: Warfarin taken as instructed    Diet: No new diet changes identified    New illness, injury, or hospitalization: No    Medication/supplement changes: None noted    Signs or symptoms of bleeding or clotting: No    Previous INR: Therapeutic last 2(+) visits    Additional findings: None       PLAN     Recommended plan for no diet, medication or health factor changes affecting INR     Dosing Instructions: continue your current warfarin dose with next INR in 6 weeks       Summary  As of 5/19/2022    Full warfarin instructions:  1.25 mg every Sun, Wed; 2.5 mg all other days   Next INR check:  6/30/2022             Detailed voice message left for Chidi with dosing instructions and follow up date.     Contact [phone]  to schedule and with any changes, questions or concerns.     Education provided: None required and Contact [phone]  with any changes, questions or concerns.     Plan made per [facility] anticoagulation protocol    Camilla Gil, RN  Anticoagulation Clinic  5/19/2022    _______________________________________________________________________     Anticoagulation Episode Summary     Current INR goal:  2.0-3.0   TTR:  98.1 % (1 y)   Target end date:  Indefinite   Send INR reminders to:  EMELIA SARGENT    Indications    Long-term (current) use of anticoagulants [Z79.01] [Z79.01]  Atrial Fibrillation [I48.20]           Comments:           Anticoagulation Care Providers     Provider Role Specialty Phone number    Martin Schultz PA-C Referring Family Medicine [phone]    Gen Marti PA Referring  [phone]

## 2022-05-25 ENCOUNTER — DOCUMENTATION ONLY (OUTPATIENT)
Dept: FAMILY MEDICINE | Facility: CLINIC | Age: 82
End: 2022-05-25
Payer: COMMERCIAL

## 2022-05-25 DIAGNOSIS — Z79.01 LONG TERM CURRENT USE OF ANTICOAGULANT THERAPY: Primary | ICD-10-CM

## 2022-05-25 DIAGNOSIS — I48.20 CHRONIC ATRIAL FIBRILLATION (H): ICD-10-CM

## 2022-05-25 DIAGNOSIS — Z79.01 LONG TERM CURRENT USE OF ANTICOAGULANT THERAPY: ICD-10-CM

## 2022-05-25 NOTE — PROGRESS NOTES
ANTICOAGULATION MANAGEMENT      Chidi Dubon due for annual renewal of referral to anticoagulation monitoring. Order pended for your review and signature.      ANTICOAGULATION SUMMARY      Warfarin indication(s)     Atrial fibrillation    Heart valve present?  NO       Current goal range   INR: 2.0-3.0     Goal appropriate for indication? Yes, INR 2-3 appropriate for hx of DVT, PE, hypercoagulable state, Afib, LVAD, or bileaflet AVR without risk factors     Current duration of therapy Indefinite/long term therapy   Time in Therapeutic Range (TTR)  (Goal > 60%) 98.1%       Office visit with referring provider's group within last year yes on 3/9/2022         Magda Reese RN         To become more independent

## 2022-05-26 RX ORDER — WARFARIN SODIUM 2.5 MG/1
TABLET ORAL
Qty: 90 TABLET | Refills: 1 | Status: SHIPPED | OUTPATIENT
Start: 2022-05-26 | End: 2022-11-11

## 2022-05-26 NOTE — TELEPHONE ENCOUNTER
ANTICOAGULATION MANAGEMENT:  Medication Refill    Anticoagulation Summary  As of 5/19/2022    Warfarin maintenance plan:  1.25 mg (2.5 mg x 0.5) every Sun, Wed; 2.5 mg (2.5 mg x 1) all other days   Next INR check:  6/30/2022   Target end date:  Indefinite    Indications    Long-term (current) use of anticoagulants [Z79.01] [Z79.01]  Atrial Fibrillation [I48.20]             Anticoagulation Care Providers     Provider Role Specialty Phone number    Martin Schultz PA-C Referring Family Medicine [phone]    Gen Marti PA Referring  [phone]          Visit with referring provider/group within last year: Yes    [facility] referral signed within last year: Yes    Chidi meets all criteria for refill (current [facility] referral, office visit with referring provider/group in last year, lab monitoring up to date or not exceeding 2 weeks overdue). Rx instructions and quantity match patient's current dosing plan. Warfarin 90 day supply with 1 refill granted per [facility] protocol     Camilla Gil RN  Anticoagulation Clinic

## 2022-06-29 ENCOUNTER — ANTICOAGULATION THERAPY VISIT (OUTPATIENT)
Dept: ANTICOAGULATION | Facility: CLINIC | Age: 82
End: 2022-06-29

## 2022-06-29 ENCOUNTER — LAB (OUTPATIENT)
Dept: LAB | Facility: CLINIC | Age: 82
End: 2022-06-29
Payer: COMMERCIAL

## 2022-06-29 DIAGNOSIS — Z79.01 LONG TERM CURRENT USE OF ANTICOAGULANT THERAPY: Primary | ICD-10-CM

## 2022-06-29 DIAGNOSIS — I48.20 CHRONIC ATRIAL FIBRILLATION (H): ICD-10-CM

## 2022-06-29 DIAGNOSIS — Z79.01 LONG TERM CURRENT USE OF ANTICOAGULANT THERAPY: ICD-10-CM

## 2022-06-29 LAB — INR BLD: 2.5 (ref 0.9–1.1)

## 2022-06-29 PROCEDURE — 85610 PROTHROMBIN TIME: CPT

## 2022-06-29 PROCEDURE — 36416 COLLJ CAPILLARY BLOOD SPEC: CPT

## 2022-06-29 NOTE — PROGRESS NOTES
ANTICOAGULATION MANAGEMENT     Chidi Dubon 81 year old male is on warfarin with therapeutic INR result. (Goal INR 2.0-3.0)    Recent labs: (last 7 days)     06/29/22  1348   INR 2.5*       ASSESSMENT       Source(s): Chart Review    Previous INR was Therapeutic last 2(+) visits    Medication, diet, health changes since last INR chart reviewed; none identified           PLAN     Recommended plan for no diet, medication or health factor changes affecting INR     Dosing Instructions: continue your current warfarin dose with next INR in 6 weeks       Summary  As of 6/29/2022    Full warfarin instructions:  1.25 mg every Sun, Wed; 2.5 mg all other days   Next INR check:  8/10/2022             Detailed voice message left for Chidi with dosing instructions and follow up date.     Contact [phone]  to schedule and with any changes, questions or concerns.     Education provided: Contact [phone]  with any changes, questions or concerns.     Plan made per [facility] anticoagulation protocol    Camilla Gil RN  Anticoagulation Clinic  6/29/2022    _______________________________________________________________________     Anticoagulation Episode Summary     Current INR goal:  2.0-3.0   TTR:  100.0 % (1 y)   Target end date:  Indefinite   Send INR reminders to:  EMELIA SARGENT    Indications    Long-term (current) use of anticoagulants [Z79.01] [Z79.01]  Atrial Fibrillation [I48.20]           Comments:           Anticoagulation Care Providers     Provider Role Specialty Phone number    Martin Schultz PA-C Referring Family Medicine [phone]    Gen Marti PA Referring  [phone]

## 2022-07-18 DIAGNOSIS — I50.32 CHRONIC HEART FAILURE WITH PRESERVED EJECTION FRACTION (H): ICD-10-CM

## 2022-07-18 DIAGNOSIS — I48.20 CHRONIC ATRIAL FIBRILLATION (H): Primary | ICD-10-CM

## 2022-08-17 ENCOUNTER — TELEPHONE (OUTPATIENT)
Dept: ANTICOAGULATION | Facility: CLINIC | Age: 82
End: 2022-08-17

## 2022-08-17 NOTE — TELEPHONE ENCOUNTER
"ANTICOAGULATION     Chidi Dubon is overdue for INR check.      attempted to call and received a message \"sorry, your call cannot be completed at this time,please try your call again later\"  Reminder letter sent to patient.     Camilla Gil RN    "

## 2022-08-17 NOTE — LETTER
[facility]  [address] [facility] [address]  Phone: [phone]  Fax: [phone]   August 17, 2022        Chidi Dubon  [address] [facility] [address]  [place] [address]            Dear Chidi,    You are currently under the care of [facility] for your warfarin (Coumadin , Jantoven ) therapy.  We are contacting you because our records show you were due for an INR on 8/10/22.    There are potentially serious risks when taking warfarin without careful monitoring and we want to make sure you are safely managed.  Routine lab monitoring is required for warfarin refills.     Please call [phone]  as soon as possible to schedule an appointment.  If there has been a change in your care or other concerns, please let us know so we can help and or update our records.     Sincerely,       [facility]

## 2022-08-22 ENCOUNTER — ANTICOAGULATION THERAPY VISIT (OUTPATIENT)
Dept: ANTICOAGULATION | Facility: CLINIC | Age: 82
End: 2022-08-22

## 2022-08-22 ENCOUNTER — LAB (OUTPATIENT)
Dept: LAB | Facility: CLINIC | Age: 82
End: 2022-08-22
Payer: COMMERCIAL

## 2022-08-22 DIAGNOSIS — I48.20 CHRONIC ATRIAL FIBRILLATION (H): ICD-10-CM

## 2022-08-22 DIAGNOSIS — Z79.01 LONG TERM CURRENT USE OF ANTICOAGULANT THERAPY: ICD-10-CM

## 2022-08-22 DIAGNOSIS — Z79.01 LONG TERM CURRENT USE OF ANTICOAGULANT THERAPY: Primary | ICD-10-CM

## 2022-08-22 LAB — INR BLD: 2.2 (ref 0.9–1.1)

## 2022-08-22 PROCEDURE — 36416 COLLJ CAPILLARY BLOOD SPEC: CPT

## 2022-08-22 PROCEDURE — 85610 PROTHROMBIN TIME: CPT

## 2022-08-22 NOTE — PROGRESS NOTES
ANTICOAGULATION MANAGEMENT     Chidi Dubon 81 year old male is on warfarin with therapeutic INR result. (Goal INR 2.0-3.0)    Recent labs: (last 7 days)     08/22/22  1254   INR 2.2*       ASSESSMENT          PLAN     Unable to reach Chidi today.    No instructions provided. Unable to leave voicemail.    Follow up required to confirm warfarin dose taken and assess for changes and discuss dosing instructions and confirm understanding of instructions    Magda Reese RN  Anticoagulation Clinic  8/22/2022

## 2022-08-23 NOTE — PATIENT INSTRUCTIONS
Please continue your current dosing of 1.25mg Sun/Wed and 2.5 mg all other days.  Please contact us for any concerns or changes and to schedule your next appointment.  You can contact us at [phone].

## 2022-08-23 NOTE — PROGRESS NOTES
Attempted to reach patient several times with no VM set up.  Will mail an AVS.    ANTICOAGULATION MANAGEMENT     Chidi Dubon 81 year old male is on warfarin with therapeutic INR result. (Goal INR 2.0-3.0)    Recent labs: (last 7 days)     08/22/22  1254   INR 2.2*       ASSESSMENT       Source(s): Chart Review    Previous INR was Therapeutic last 2(+) visits    Medication, diet, health changes since last INR chart reviewed; none identified           PLAN     Recommended plan for no diet, medication or health factor changes affecting INR     Dosing Instructions: Continue your current warfarin dose with next INR in 6 weeks       Summary  As of 8/22/2022    Full warfarin instructions:  1.25 mg every Sun, Wed; 2.5 mg all other days   Next INR check:  10/3/2022             AVS mailed to patient.  Several attempts were made to reach by phone.     Contact [phone]  to schedule and with any changes, questions or concerns.     Education provided: Contact [phone]  with any changes, questions or concerns.     Plan made per [facility] anticoagulation protocol    Camilla Gil RN  Anticoagulation Clinic  8/23/2022    _______________________________________________________________________     Anticoagulation Episode Summary     Current INR goal:  2.0-3.0   TTR:  100.0 % (1 y)   Target end date:  Indefinite   Send INR reminders to:  EMELIA SARGENT    Indications    Long-term (current) use of anticoagulants [Z79.01] [Z79.01]  Atrial Fibrillation [I48.20]           Comments:           Anticoagulation Care Providers     Provider Role Specialty Phone number    Martin Schultz PA-C Referring Family Medicine [phone]    Gen Marti PA Referring  [phone]

## 2022-10-11 ENCOUNTER — TELEPHONE (OUTPATIENT)
Dept: ANTICOAGULATION | Facility: CLINIC | Age: 82
End: 2022-10-11

## 2022-10-11 NOTE — LETTER
[facility] ANTICOAGULATION CLINIC  [address] [facility] [address]  Phone: [phone]  Fax: [phone]   October 11, 2022        Chidi Dubon  [address]  [place] [address]            Dear Chidi,    You are currently under the care of [facility] for your warfarin (Coumadin , Jantoven ) therapy.  We are contacting you because our records show you were due for an INR on 10/3.    There are potentially serious risks when taking warfarin without careful monitoring and we want to make sure you are safely managed.  Routine lab monitoring is required for warfarin refills.     Please call [phone]  as soon as possible to schedule an appointment.  If there has been a change in your care or other concerns, please let us know so we can help and or update our records.     Sincerely,       [facility]

## 2022-10-11 NOTE — TELEPHONE ENCOUNTER
ANTICOAGULATION     Chidi Dubon is overdue for INR check.      Reminder letter sent and no voicemail, unable to leave a message     Camilla Gil RN

## 2022-11-03 ENCOUNTER — ANTICOAGULATION THERAPY VISIT (OUTPATIENT)
Dept: ANTICOAGULATION | Facility: CLINIC | Age: 82
End: 2022-11-03

## 2022-11-03 ENCOUNTER — LAB (OUTPATIENT)
Dept: LAB | Facility: CLINIC | Age: 82
End: 2022-11-03
Payer: COMMERCIAL

## 2022-11-03 DIAGNOSIS — I48.20 CHRONIC ATRIAL FIBRILLATION (H): ICD-10-CM

## 2022-11-03 DIAGNOSIS — Z79.01 LONG TERM CURRENT USE OF ANTICOAGULANT THERAPY: ICD-10-CM

## 2022-11-03 DIAGNOSIS — Z79.01 LONG TERM CURRENT USE OF ANTICOAGULANT THERAPY: Primary | ICD-10-CM

## 2022-11-03 LAB — INR BLD: 2.3 (ref 0.9–1.1)

## 2022-11-03 PROCEDURE — 85610 PROTHROMBIN TIME: CPT

## 2022-11-03 PROCEDURE — 36415 COLL VENOUS BLD VENIPUNCTURE: CPT

## 2022-11-03 NOTE — PROGRESS NOTES
Attempted to reach patient x 3 today and no voicemail is on either home phone or cell phone. Did attempt wife's cell phone but there is not a name identifying the person's voicemail so no message left.     Camilla Gil RN  [facility] Anticoagulation [facility]

## 2022-11-04 NOTE — PROGRESS NOTES
Attempted to reach Chidi again today by home number and cell number, no answer and no voicemail available. Called and talked with daughter Anabella that unable to reach Chidi, she will reach out as well, no consent on file, so unable to leave details of results and follow up, but  She will give [facility] phone number to Chidi. Will mail out AVS.

## 2022-11-07 ENCOUNTER — ALLIED HEALTH/NURSE VISIT (OUTPATIENT)
Dept: FAMILY MEDICINE | Facility: CLINIC | Age: 82
End: 2022-11-07
Payer: COMMERCIAL

## 2022-11-07 DIAGNOSIS — Z23 NEED FOR VACCINATION: Primary | ICD-10-CM

## 2022-11-07 PROCEDURE — 0124A COVID-19,PF,PFIZER BOOSTER BIVALENT: CPT

## 2022-11-07 PROCEDURE — G0008 ADMIN INFLUENZA VIRUS VAC: HCPCS | Mod: 59

## 2022-11-07 PROCEDURE — 91312 COVID-19,PF,PFIZER BOOSTER BIVALENT: CPT

## 2022-11-07 PROCEDURE — 90662 IIV NO PRSV INCREASED AG IM: CPT

## 2022-11-07 PROCEDURE — 99207 PR NO CHARGE NURSE ONLY: CPT

## 2022-11-08 LAB — EJECTION FRACTION: NORMAL %

## 2022-12-06 DIAGNOSIS — E78.5 HYPERLIPIDEMIA LDL GOAL <70: ICD-10-CM

## 2022-12-07 RX ORDER — ATORVASTATIN CALCIUM 80 MG/1
TABLET, FILM COATED ORAL
Qty: 45 TABLET | Refills: 0 | Status: SHIPPED | OUTPATIENT
Start: 2022-12-07 | End: 2023-03-08

## 2022-12-15 ENCOUNTER — LAB (OUTPATIENT)
Dept: LAB | Facility: CLINIC | Age: 82
End: 2022-12-15
Payer: COMMERCIAL

## 2022-12-15 ENCOUNTER — ANTICOAGULATION THERAPY VISIT (OUTPATIENT)
Dept: ANTICOAGULATION | Facility: CLINIC | Age: 82
End: 2022-12-15

## 2022-12-15 DIAGNOSIS — Z79.01 LONG TERM CURRENT USE OF ANTICOAGULANT THERAPY: Primary | ICD-10-CM

## 2022-12-15 DIAGNOSIS — I48.20 CHRONIC ATRIAL FIBRILLATION (H): ICD-10-CM

## 2022-12-15 DIAGNOSIS — Z79.01 LONG TERM CURRENT USE OF ANTICOAGULANT THERAPY: ICD-10-CM

## 2022-12-15 LAB — INR BLD: 2.1 (ref 0.9–1.1)

## 2022-12-15 PROCEDURE — 85610 PROTHROMBIN TIME: CPT

## 2022-12-15 PROCEDURE — 36416 COLLJ CAPILLARY BLOOD SPEC: CPT

## 2022-12-15 NOTE — PROGRESS NOTES
ANTICOAGULATION MANAGEMENT     Chidi Dubon 82 year old male is on warfarin with therapeutic INR result. (Goal INR 2.0-3.0)    Recent labs: (last 7 days)     12/15/22  1354   INR 2.1*       ASSESSMENT       Source(s): Chart Review    Previous INR was Therapeutic last 2(+) visits    Medication, diet, health changes since last INR chart reviewed; none identified           PLAN     Unable to reach Chidi today.    LMTCB    Follow up required to confirm warfarin dose taken and assess for changes    Lori Fuller RN  Anticoagulation Clinic  12/15/2022

## 2022-12-15 NOTE — PROGRESS NOTES
Unable to reach Chidi today.    No instructions provided. Unable to leave voicemail.    Follow up required to confirm warfarin dose taken and assess for changes    Lori Fuller RN  Anticoagulation Clinic  12/15/2022

## 2022-12-16 NOTE — PROGRESS NOTES
Unable to reach Inr wnl will set reminder a week early to recheck inr  Suraj Warren Rn  [facility]

## 2023-01-19 ENCOUNTER — MYC MEDICAL ADVICE (OUTPATIENT)
Dept: ANTICOAGULATION | Facility: CLINIC | Age: 83
End: 2023-01-19
Payer: COMMERCIAL

## 2023-01-19 NOTE — TELEPHONE ENCOUNTER
ANTICOAGULATION     Chidi Dubon is overdue for INR check.      Was unable to reach patient and was unable to leave a voicemail. If patient calls, please schedule INR check as soon as possible.     Solomon Rebolledo RN

## 2023-01-26 ENCOUNTER — TELEPHONE (OUTPATIENT)
Dept: ANTICOAGULATION | Facility: CLINIC | Age: 83
End: 2023-01-26
Payer: COMMERCIAL

## 2023-01-26 NOTE — TELEPHONE ENCOUNTER
ANTICOAGULATION     Chidi Dubon is overdue for INR check.      Was unable to reach patient and was unable to leave a voicemail. If patient calls, please schedule INR check as soon as possible.  and Reminder letter sent    Adia Obrien RN

## 2023-01-26 NOTE — LETTER
[facility] ANTICOAGULATION CLINIC  [address] [facility] [address]  Phone: [phone]  Fax: [phone]   January 26, 2023        Chidi Dubon  [address]  [place] [address]            Dear Chidi,    You are currently under the care of [facility] for your warfarin (Coumadin , Jantoven ) therapy.  We are contacting you because our records show you were due for an INR on 1/12/23.    There are potentially serious risks when taking warfarin without careful monitoring and we want to make sure you are safely managed.  Routine lab monitoring is required for warfarin refills.     Please call [phone]  as soon as possible to schedule an appointment.  If there has been a change in your care or other concerns, please let us know so we can help and or update our records.     Sincerely,       [facility]

## 2023-02-08 ENCOUNTER — ANTICOAGULATION THERAPY VISIT (OUTPATIENT)
Dept: ANTICOAGULATION | Facility: CLINIC | Age: 83
End: 2023-02-08

## 2023-02-08 ENCOUNTER — LAB (OUTPATIENT)
Dept: LAB | Facility: CLINIC | Age: 83
End: 2023-02-08
Payer: COMMERCIAL

## 2023-02-08 DIAGNOSIS — Z79.01 LONG TERM CURRENT USE OF ANTICOAGULANT THERAPY: Primary | ICD-10-CM

## 2023-02-08 DIAGNOSIS — I48.20 CHRONIC ATRIAL FIBRILLATION (H): ICD-10-CM

## 2023-02-08 DIAGNOSIS — Z79.01 LONG TERM CURRENT USE OF ANTICOAGULANT THERAPY: ICD-10-CM

## 2023-02-08 LAB — INR BLD: 2.2 (ref 0.9–1.1)

## 2023-02-08 PROCEDURE — 85610 PROTHROMBIN TIME: CPT

## 2023-02-08 PROCEDURE — 36416 COLLJ CAPILLARY BLOOD SPEC: CPT

## 2023-02-08 NOTE — PROGRESS NOTES
ANTICOAGULATION MANAGEMENT     Chidi Dubon 82 year old male is on warfarin with therapeutic INR result. (Goal INR 2.0-3.0)    Recent labs: (last 7 days)     02/08/23  1310   INR 2.2*       ASSESSMENT       Source(s): Chart Review    Previous INR was Therapeutic last 2(+) visits    Medication, diet, health changes since last INR chart reviewed; none identified           PLAN     Unable to reach Chidi today.    No instructions provided. Unable to leave voicemail.    Follow up required to confirm warfarin dose taken and assess for changes    Mark LOPEZ RN  Anticoagulation Clinic  2/8/2023

## 2023-02-09 NOTE — PATIENT INSTRUCTIONS
Please contact the clinic if you have had any concerns or changes @ [phone].  Otherwise continue with your current dosing and recheck your INR in 6 weeks, around 3/22/23.

## 2023-02-09 NOTE — PROGRESS NOTES
ANTICOAGULATION MANAGEMENT     Chidi Dubon 82 year old male is on warfarin with therapeutic INR result. (Goal INR 2.0-3.0)    Recent labs: (last 7 days)     02/08/23  1310   INR 2.2*       ASSESSMENT       Source(s): Chart Review    Previous INR was Therapeutic last 2(+) visits    Medication, diet, health changes since last INR chart reviewed; none identified      Attempted several times to reach patient and no Voicemail on either home or cell phone number.  AVS mailed to patient.      PLAN     Recommended plan for no diet, medication or health factor changes affecting INR     Dosing Instructions: Continue your current warfarin dose with next INR in 6 weeks       Summary  As of 2/8/2023    Full warfarin instructions:  1.25 mg every Sun, Wed; 2.5 mg all other days   Next INR check:  3/22/2023             after visit summary and dosing mailed to patient     Contact [phone]  to schedule and with any changes, questions or concerns.     Education provided:     Contact [phone]  with any changes, questions or concerns.     Plan made per [facility] anticoagulation protocol    Camilla Gil RN  Anticoagulation Clinic  2/9/2023    _______________________________________________________________________     Anticoagulation Episode Summary     Current INR goal:  2.0-3.0   TTR:  100.0 % (1 y)   Target end date:  Indefinite   Send INR reminders to:  EMELIA SARGENT    Indications    Long-term (current) use of anticoagulants [Z79.01] [Z79.01]  Atrial Fibrillation [I48.20]           Comments:           Anticoagulation Care Providers     Provider Role Specialty Phone number    Martin Schultz PA-C Referring Family Medicine [phone]    Gen Marti PA Referring  [phone]

## 2023-03-29 ENCOUNTER — TELEPHONE (OUTPATIENT)
Dept: ANTICOAGULATION | Facility: CLINIC | Age: 83
End: 2023-03-29
Payer: COMMERCIAL

## 2023-03-29 NOTE — LETTER
[facility] ANTICOAGULATION CLINIC  [address] [facility] [address]  Phone: [phone]  Fax: [phone]   March 29, 2023        Chidi Dubon  [address]  [place] [address]            Dear Chidi,    You are currently under the care of [facility] for your warfarin (Coumadin , Jantoven ) therapy.  We are contacting you because our records show you were due for an INR on 3/22/23.    There are potentially serious risks when taking warfarin without careful monitoring and we want to make sure you are safely managed.  Routine lab monitoring is required for warfarin refills.     Please call [phone]  as soon as possible to schedule an appointment.  If there has been a change in your care or other concerns, please let us know so we can help and or update our records.     Sincerely,       [facility]

## 2023-03-29 NOTE — TELEPHONE ENCOUNTER
ANTICOAGULATION     Chidi Dubon is overdue for INR check.      Was unable to reach patient and was unable to leave a voicemail. If patient calls, please schedule INR check as soon as possible.  and Reminder letter sent    Solomon Rebolledo RN

## 2023-04-12 ENCOUNTER — ANTICOAGULATION THERAPY VISIT (OUTPATIENT)
Dept: ANTICOAGULATION | Facility: CLINIC | Age: 83
End: 2023-04-12

## 2023-04-12 ENCOUNTER — TELEPHONE (OUTPATIENT)
Dept: ANTICOAGULATION | Facility: CLINIC | Age: 83
End: 2023-04-12
Payer: COMMERCIAL

## 2023-04-12 ENCOUNTER — LAB (OUTPATIENT)
Dept: LAB | Facility: CLINIC | Age: 83
End: 2023-04-12
Payer: COMMERCIAL

## 2023-04-12 ENCOUNTER — DOCUMENTATION ONLY (OUTPATIENT)
Dept: ANTICOAGULATION | Facility: CLINIC | Age: 83
End: 2023-04-12

## 2023-04-12 DIAGNOSIS — I48.20 CHRONIC ATRIAL FIBRILLATION (H): ICD-10-CM

## 2023-04-12 DIAGNOSIS — I48.20 CHRONIC ATRIAL FIBRILLATION (H): Primary | ICD-10-CM

## 2023-04-12 DIAGNOSIS — Z79.01 LONG TERM CURRENT USE OF ANTICOAGULANT THERAPY: Primary | ICD-10-CM

## 2023-04-12 DIAGNOSIS — Z79.01 LONG TERM CURRENT USE OF ANTICOAGULANT THERAPY: ICD-10-CM

## 2023-04-12 LAB — INR BLD: 2.1 (ref 0.9–1.1)

## 2023-04-12 PROCEDURE — 36416 COLLJ CAPILLARY BLOOD SPEC: CPT

## 2023-04-12 PROCEDURE — 85610 PROTHROMBIN TIME: CPT

## 2023-04-12 NOTE — PROGRESS NOTES
ANTICOAGULATION MANAGEMENT     Chidi Dubon 82 year old male is on warfarin with therapeutic INR result. (Goal INR 2.0-3.0)    Recent labs: (last 7 days)     04/12/23  1236   INR 2.1*       ASSESSMENT       Source(s): Chart Review    Previous INR was Therapeutic last 2(+) visits    Medication, diet, health changes since last INR chart reviewed; none identified             PLAN     Recommended plan for no diet, medication or health factor changes affecting INR     Dosing Instructions: Continue your current warfarin dose with next INR in 6 weeks       Summary  As of 4/12/2023    Full warfarin instructions:  1.25 mg every Sun, Wed; 2.5 mg all other days   Next INR check:               Detailed voice message left for Chidi with dosing instructions and follow up date.  AVS also mailed to patient     Contact [phone]  to schedule and with any changes, questions or concerns.     Education provided:     Contact [phone]  with any changes, questions or concerns.     Plan made per [facility] anticoagulation protocol    Camilla Gil RN  Anticoagulation Clinic  4/12/2023    _______________________________________________________________________     Anticoagulation Episode Summary     Current INR goal:  2.0-3.0   TTR:  100.0 % (1 y)   Target end date:  Indefinite   Send INR reminders to:  EMELIA SARGENT    Indications    Long-term (current) use of anticoagulants [Z79.01] [Z79.01]  Atrial Fibrillation [I48.20]           Comments:           Anticoagulation Care Providers     Provider Role Specialty Phone number    Martin Schultz PA-C Referring Family Medicine [phone]    Gen Marti PA Referring  [phone]

## 2023-04-12 NOTE — LETTER
[facility] ANTICOAGULATION CLINIC  [address] [facility] [address]  Phone: [phone]  Fax: [phone]   April 12, 2023        Chidi Dubon  [address] [facility] [address]  [place] [address]            Dear Chidi,    You are currently under the care of [facility] for your warfarin (Coumadin , Jantoven ) therapy.  We are contacting you because our records show you were due for an INR on 3/22/23.    There are potentially serious risks when taking warfarin without careful monitoring and we want to make sure you are safely managed.  Routine lab monitoring is required for warfarin refills.     Please call [phone]  as soon as possible to schedule an appointment.  If there has been a change in your care or other concerns, please let us know so we can help and or update our records.     Sincerely,       [facility]

## 2023-04-12 NOTE — TELEPHONE ENCOUNTER
ANTICOAGULATION     Chidi Dubon is overdue for INR check.      Was unable to reach patient and was unable to leave a voicemail. If patient calls, please schedule INR check as soon as possible.  and Reminder letter sent    Camilla Gil RN

## 2023-04-12 NOTE — PROGRESS NOTES
ANTICOAGULATION CLINIC REFERRAL RENEWAL REQUEST       An annual renewal order is required for all patients referred to [facility].?  Please review and sign the pended referral order for Chidi Dubon.       ANTICOAGULATION SUMMARY      Warfarin indication(s)   Atrial Fibrillation    Mechanical heart valve present?  NO       Current goal range   INR: 2.0-3.0     Goal appropriate for indication? Goal INR 2-3, standard for indication(s) above     Time in Therapeutic Range (TTR)  (Goal > 60%) 100%       Office visit with referring provider's group within last year no on 3/9/22       Camilla Gil RN  [facility]

## 2023-04-21 DIAGNOSIS — I10 BENIGN ESSENTIAL HYPERTENSION: ICD-10-CM

## 2023-04-22 RX ORDER — LOSARTAN POTASSIUM 100 MG/1
100 TABLET ORAL DAILY
Qty: 30 TABLET | Refills: 0 | Status: SHIPPED | OUTPATIENT
Start: 2023-04-22 | End: 2023-05-08

## 2023-05-08 ENCOUNTER — OFFICE VISIT (OUTPATIENT)
Dept: FAMILY MEDICINE | Facility: CLINIC | Age: 83
End: 2023-05-08
Payer: COMMERCIAL

## 2023-05-08 VITALS
RESPIRATION RATE: 20 BRPM | HEART RATE: 74 BPM | TEMPERATURE: 98.2 F | SYSTOLIC BLOOD PRESSURE: 138 MMHG | DIASTOLIC BLOOD PRESSURE: 80 MMHG | BODY MASS INDEX: 36.71 KG/M2 | OXYGEN SATURATION: 94 % | HEIGHT: 71 IN | WEIGHT: 262.2 LBS

## 2023-05-08 DIAGNOSIS — E78.5 HYPERLIPIDEMIA LDL GOAL <70: ICD-10-CM

## 2023-05-08 DIAGNOSIS — I10 BENIGN ESSENTIAL HYPERTENSION: ICD-10-CM

## 2023-05-08 DIAGNOSIS — I50.32 CHRONIC HEART FAILURE WITH PRESERVED EJECTION FRACTION (H): ICD-10-CM

## 2023-05-08 DIAGNOSIS — G25.81 RESTLESS LEGS SYNDROME: ICD-10-CM

## 2023-05-08 DIAGNOSIS — Z00.00 ENCOUNTER FOR MEDICARE ANNUAL WELLNESS EXAM: Primary | ICD-10-CM

## 2023-05-08 DIAGNOSIS — F32.0 MILD MAJOR DEPRESSION (H): ICD-10-CM

## 2023-05-08 DIAGNOSIS — I48.20 CHRONIC ATRIAL FIBRILLATION (H): ICD-10-CM

## 2023-05-08 DIAGNOSIS — N18.31 STAGE 3A CHRONIC KIDNEY DISEASE (H): ICD-10-CM

## 2023-05-08 DIAGNOSIS — I25.119 CORONARY ARTERY DISEASE INVOLVING NATIVE HEART WITH ANGINA PECTORIS, UNSPECIFIED VESSEL OR LESION TYPE (H): ICD-10-CM

## 2023-05-08 DIAGNOSIS — Z79.01 LONG TERM CURRENT USE OF ANTICOAGULANT THERAPY: ICD-10-CM

## 2023-05-08 DIAGNOSIS — I71.40 ABDOMINAL AORTIC ANEURYSM (AAA) WITHOUT RUPTURE, UNSPECIFIED PART (H): ICD-10-CM

## 2023-05-08 DIAGNOSIS — E66.01 MORBID OBESITY (H): ICD-10-CM

## 2023-05-08 LAB — HGB BLD-MCNC: 14.4 G/DL (ref 13.3–17.7)

## 2023-05-08 PROCEDURE — 85018 HEMOGLOBIN: CPT | Performed by: PHYSICIAN ASSISTANT

## 2023-05-08 PROCEDURE — 36415 COLL VENOUS BLD VENIPUNCTURE: CPT | Performed by: PHYSICIAN ASSISTANT

## 2023-05-08 PROCEDURE — 82570 ASSAY OF URINE CREATININE: CPT | Performed by: PHYSICIAN ASSISTANT

## 2023-05-08 PROCEDURE — 82043 UR ALBUMIN QUANTITATIVE: CPT | Performed by: PHYSICIAN ASSISTANT

## 2023-05-08 PROCEDURE — 99212 OFFICE O/P EST SF 10 MIN: CPT | Mod: 25 | Performed by: PHYSICIAN ASSISTANT

## 2023-05-08 PROCEDURE — 80053 COMPREHEN METABOLIC PANEL: CPT | Performed by: PHYSICIAN ASSISTANT

## 2023-05-08 PROCEDURE — G0439 PPPS, SUBSEQ VISIT: HCPCS | Performed by: PHYSICIAN ASSISTANT

## 2023-05-08 RX ORDER — WARFARIN SODIUM 2.5 MG/1
TABLET ORAL
Qty: 72 TABLET | Refills: 3 | Status: SHIPPED | OUTPATIENT
Start: 2023-05-08 | End: 2024-02-19

## 2023-05-08 RX ORDER — LOSARTAN POTASSIUM 100 MG/1
100 TABLET ORAL DAILY
Qty: 90 TABLET | Refills: 1 | Status: SHIPPED | OUTPATIENT
Start: 2023-05-08 | End: 2023-11-13

## 2023-05-08 RX ORDER — PRAMIPEXOLE DIHYDROCHLORIDE 0.12 MG/1
0.12 TABLET ORAL 3 TIMES DAILY
Qty: 90 TABLET | Refills: 1 | Status: SHIPPED | OUTPATIENT
Start: 2023-05-08 | End: 2023-11-13

## 2023-05-08 RX ORDER — ATORVASTATIN CALCIUM 80 MG/1
40 TABLET, FILM COATED ORAL DAILY
Qty: 45 TABLET | Refills: 3 | Status: SHIPPED | OUTPATIENT
Start: 2023-05-08 | End: 2024-05-28

## 2023-05-08 ASSESSMENT — ACTIVITIES OF DAILY LIVING (ADL): CURRENT_FUNCTION: NO ASSISTANCE NEEDED

## 2023-05-08 ASSESSMENT — PATIENT HEALTH QUESTIONNAIRE - PHQ9
10. IF YOU CHECKED OFF ANY PROBLEMS, HOW DIFFICULT HAVE THESE PROBLEMS MADE IT FOR YOU TO DO YOUR WORK, TAKE CARE OF THINGS AT HOME, OR GET ALONG WITH OTHER PEOPLE: NOT DIFFICULT AT ALL
SUM OF ALL RESPONSES TO PHQ QUESTIONS 1-9: 19
SUM OF ALL RESPONSES TO PHQ QUESTIONS 1-9: 19

## 2023-05-08 ASSESSMENT — ENCOUNTER SYMPTOMS
HEARTBURN: 0
HEADACHES: 1
NERVOUS/ANXIOUS: 1
FREQUENCY: 0
SHORTNESS OF BREATH: 1
HEMATURIA: 0
CHILLS: 0
DYSURIA: 0
FEVER: 0
COUGH: 0
PARESTHESIAS: 1
CONSTIPATION: 0
SORE THROAT: 0
DIARRHEA: 0
DIZZINESS: 1
JOINT SWELLING: 1
WEAKNESS: 1
ARTHRALGIAS: 1
HEMATOCHEZIA: 0
NAUSEA: 0
ABDOMINAL PAIN: 0
PALPITATIONS: 0
MYALGIAS: 1
EYE PAIN: 0

## 2023-05-08 ASSESSMENT — PAIN SCALES - GENERAL: PAINLEVEL: MILD PAIN (2)

## 2023-05-08 NOTE — PROGRESS NOTES
"SUBJECTIVE:   Chidi is a 82 year old who presents for Preventive Visit.      5/8/2023     4:01 PM   Additional Questions   Roomed by Jana Dixon CMA   Accompanied by None     Patient has been advised of split billing requirements and indicates understanding: Yes  Are you in the first 12 months of your Medicare coverage?  No    Healthy Habits:     In general, how would you rate your overall health?  Good    Frequency of exercise:  6-7 days/week    Duration of exercise:  30-45 minutes    Do you usually eat at least 4 servings of fruit and vegetables a day, include whole grains    & fiber and avoid regularly eating high fat or \"junk\" foods?  No    Taking medications regularly:  Yes    Medication side effects:  Not applicable    Ability to successfully perform activities of daily living:  No assistance needed    Home Safety:  No safety concerns identified    Hearing Impairment:  Difficulty following a conversation in a noisy restaurant or crowded room, feel that people are mumbling or not speaking clearly, difficulty following dialogue in the theater, difficult to understand a speaker at a public meeting or [employer] service, need to ask people to speak up or repeat themselves and difficulty understanding soft or whispered speech    In the past 6 months, have you been bothered by leaking of urine?  No    In general, how would you rate your overall mental or emotional health?  Poor      PHQ-2 Total Score: 6    Additional concerns today:  No    Recheck of htn. Blood pressure well controlled.  Recheck of chf. Some mild KRAUSE. No orthopnea. Mild leg edema.  Recheck of depression. Not taking antidepressants . Doesn't want to take any thing. No profound anhedonia   Recheck of his AAA (Infrarenal abdominal aortic aneurysm measures 3.2 cm). No pain .  Taking and tolerating meds.  Trying to stay active.   Recheck of afib. Taking warfarin.  Starting to exercise again.   Recheck of obesity. Discussed a lower calorie diet and " consistent mix of aerobic exercise and strength training. This will help maintain/treat his blood pressure.    Have you ever done Advance Care Planning? (For example, a Health Directive, POLST, or a discussion with a medical provider or your loved ones about your wishes): No, advance care planning information given to patient to review.  Patient declined advance care planning discussion at this time.       Fall risk  Fallen 2 or more times in the past year?: No  Any fall with injury in the past year?: No    Cognitive Screening - Patient declines this year    Do you have sleep apnea, excessive snoring or daytime drowsiness?: no    Reviewed and updated as needed this visit by clinical staff   Tobacco  Allergies  Meds              Reviewed and updated as needed this visit by Provider                 Social History     Tobacco Use     Smoking status: Never     Smokeless tobacco: Never     Tobacco comments:     never smoker; non-smoking household   Vaping Use     Vaping status: Never Used   Substance Use Topics     Alcohol use: No     Comment: none             5/8/2023     4:34 PM   Alcohol Use   Prescreen: >3 drinks/day or >7 drinks/week? Not Applicable         Current providers sharing in care for this patient include:   Patient Care Team:  Martin Schultz PA-C as PCP - General (Family Medicine)  Martin Schultz PA-C as Assigned PCP    The following health maintenance items are reviewed in Epic and correct as of today:  Health Maintenance   Topic Date Due     ZOSTER IMMUNIZATION (1 of 2) Never done     DTAP/TDAP/TD IMMUNIZATION (1 - Tdap) 01/05/2011     BMP  09/09/2022     CMP  03/09/2023     LIPID  03/09/2023     MICROALBUMIN  03/09/2023     HEMOGLOBIN  03/09/2023     PHQ-9  11/08/2023     MEDICARE ANNUAL WELLNESS VISIT  05/08/2024     ANNUAL REVIEW OF HM ORDERS  05/08/2024     FALL RISK ASSESSMENT  05/08/2024     ADVANCE CARE PLANNING  05/08/2028     DEPRESSION ACTION PLAN  Completed     INFLUENZA VACCINE   Completed     Pneumococcal Vaccine: 65+ Years  Completed     URINALYSIS  Completed     COVID-19 Vaccine  Completed     IPV IMMUNIZATION  Aged Out     MENINGITIS IMMUNIZATION  Aged Out     Lab work is in process  Labs reviewed in EPIC  BP Readings from Last 3 Encounters:   05/08/23 138/80   03/09/22 131/70   06/02/21 125/74    Wt Readings from Last 3 Encounters:   05/08/23 118.9 kg (262 lb 3.2 oz)   03/09/22 118.4 kg (261 lb)   06/02/21 119.7 kg (263 lb 12.8 oz)                  Patient Active Problem List   Diagnosis     Hyperlipidemia LDL goal <70     Obesity     Mild major depression (H)     Osteoarthritis, knee     Diverticulosis     Urolithiasis     CAD S/P percutaneous coronary angioplasty     Atrial Fibrillation     Fatigue     Restless leg syndrome     Long-term (current) use of anticoagulants [Z79.01]     Benign essential hypertension     Depression, major, in remission (H)     Aftercare following right hip joint replacement surgery     Hip pain, right     Hip joint replacement status     Posterior capsular opacification of both eyes, obscuring vision     Coronary artery disease involving native heart with angina pectoris, unspecified vessel or lesion type (H)     S/P placement of cardiac pacemaker     Abdominal aortic aneurysm without rupture (H)     (HFpEF) heart failure with preserved ejection fraction (H)     Bilirubinemia     CKD (chronic kidney disease) stage 3, GFR 30-59 ml/min (H)     Non-rheumatic mitral regurgitation     Morbid obesity (H)     Past Surgical History:   Procedure Laterality Date     ANGIOGRAM  age 61     3 cornary artery stents, [facility]     ANGIOGRAM  age 66    1 coronary artery stent, [facility]     ANGIOGRAM  11/2013    1 coronary artery stent, [facility]      ARTHROSCOPY KNEE RT/LT         Social History     Tobacco Use     Smoking status: Never     Smokeless tobacco: Never     Tobacco comments:     never smoker; non-smoking household   Vaping Use     Vaping status: Never  Used   Substance Use Topics     Alcohol use: No     Comment: none     Family History   Problem Relation Age of Onset     Heart Disease Paternal Grandfather      Heart Disease Brother          Current Outpatient Medications   Medication Sig Dispense Refill     atorvastatin (LIPITOR) 80 MG tablet Take 0.5 tablets (40 mg) by mouth daily 45 tablet 3     fluticasone (FLONASE) 50 MCG/ACT nasal spray Spray 1 spray into both nostrils daily 18.2 mL 0     furosemide (LASIX) 20 MG tablet Take 1 tablet (20 mg) by mouth daily +++NEED APPT+++ 90 tablet 0     losartan (COZAAR) 100 MG tablet Take 1 tablet (100 mg) by mouth daily 90 tablet 1     nitroGLYcerin (NITROSTAT) 0.4 MG sublingual tablet Place 1 tablet (0.4 mg) under the tongue every 5 minutes as needed for chest pain 25 tablet 0     potassium chloride (KLOR-CON) 20 MEQ packet Take 20 mEq by mouth daily 90 each 3     pramipexole (MIRAPEX) 0.125 MG tablet Take 1 tablet (0.125 mg) by mouth 3 times daily 90 tablet 1     warfarin ANTICOAGULANT (COUMADIN) 2.5 MG tablet TAKE ONE-HALF TABLET BY MOUTH ONCE DAILY ON SUNDAYS AND WEDNESDAYS; TAKE 1 TABLET ONCE DAILY ALL OTHER DAYS OF THE WEEK OR TAKE AS DIRECTED BY INR CLINIC 72 tablet 3     Allergies   Allergen Reactions     No Known Allergies      Recent Labs   Lab Test 03/09/22  1247 03/03/21  1157 08/17/20  1425 12/11/19  1142 11/26/19  1438 09/18/19  1326 08/14/19  1254 04/26/19  1641 02/18/19  1142   LDL 85 82  --   --   --   --   --   --  81   HDL 42 41  --   --   --   --   --   --  41   TRIG 79 88  --   --   --   --   --   --  104   ALT 21 17  --   --  20   < > 26   < > 19   CR 1.21 1.33* 1.23   < > 1.07   < > 1.09   < > 1.21   GFRESTIMATED 61 50* 56*   < > 66   < > 65   < > 57*   GFRESTBLACK  --  58* 64   < > 77   < > 75   < > 66   POTASSIUM 3.2* 3.2* 3.6   < > 3.2*   < > 3.7   < > 3.0*   TSH  --   --  2.07  --   --   --  2.00  --   --     < > = values in this interval not displayed.              Review of Systems  "  Constitutional: Negative for chills and fever.   HENT: Positive for hearing loss. Negative for congestion, ear pain and sore throat.    Eyes: Positive for visual disturbance. Negative for pain.   Respiratory: Positive for shortness of breath. Negative for cough.    Cardiovascular: Positive for chest pain and peripheral edema. Negative for palpitations.   Gastrointestinal: Negative for abdominal pain, constipation, diarrhea, heartburn, hematochezia and nausea.   Genitourinary: Negative for dysuria, frequency, genital sores, hematuria, impotence, penile discharge and urgency.   Musculoskeletal: Positive for arthralgias, joint swelling and myalgias.   Skin: Negative for rash.   Neurological: Positive for dizziness, weakness, headaches and paresthesias.   Psychiatric/Behavioral: Positive for mood changes. The patient is nervous/anxious.      Constitutional, HEENT, cardiovascular, pulmonary, GI, , musculoskeletal, neuro, skin, endocrine and psych systems are negative, except as otherwise noted.    OBJECTIVE:   /80   Pulse 74   Temp 98.2  F (36.8  C) (Temporal)   Resp 20   Ht 1.803 m (5' 11\")   Wt 118.9 kg (262 lb 3.2 oz)   SpO2 94%   BMI 36.57 kg/m   Estimated body mass index is 36.57 kg/m  as calculated from the following:    Height as of this encounter: 1.803 m (5' 11\").    Weight as of this encounter: 118.9 kg (262 lb 3.2 oz).  Physical Exam  GENERAL: healthy, alert and no distress  EYES: Eyes grossly normal to inspection, PERRL and conjunctivae and sclerae normal  HENT: ear canals and TM's normal, nose and mouth without ulcers or lesions  NECK: no adenopathy, no asymmetry, masses, or scars and thyroid normal to palpation  RESP: lungs clear to auscultation - no rales, rhonchi or wheezes  CV: regular rate and rhythm, normal S1 S2, no S3 or S4, no murmur, click or rub, no peripheral edema and peripheral pulses strong  ABDOMEN: soft, nontender, no hepatosplenomegaly, no masses and bowel sounds " normal  MS: no gross musculoskeletal defects noted, no edema  SKIN: no suspicious lesions or rashes  NEURO: Normal strength and tone, mentation intact and speech normal  PSYCH: mentation appears normal, affect normal/bright    Diagnostic Test Results:  Labs reviewed in Epic    ASSESSMENT / PLAN:       ICD-10-CM    1. Encounter for Medicare annual wellness exam  Z00.00       2. Coronary artery disease involving native heart with angina pectoris, unspecified vessel or lesion type (H)  I25.119       3. Chronic heart failure with preserved ejection fraction (H)  I50.32       4. Mild major depression (H)  F32.0       5. Abdominal aortic aneurysm (AAA) without rupture, unspecified part (H)  I71.40 US Abdominal Aorta Imaging      6. Chronic atrial fibrillation (H)  I48.20 warfarin ANTICOAGULANT (COUMADIN) 2.5 MG tablet      7. Morbid obesity (H)  E66.01       8. Benign essential hypertension  I10 COMPREHENSIVE METABOLIC PANEL     losartan (COZAAR) 100 MG tablet      9. Long term current use of anticoagulant therapy  Z79.01 warfarin ANTICOAGULANT (COUMADIN) 2.5 MG tablet      10. Hyperlipidemia LDL goal <70  E78.5 COMPREHENSIVE METABOLIC PANEL     atorvastatin (LIPITOR) 80 MG tablet      11. Stage 3a chronic kidney disease (H)  N18.31 COMPREHENSIVE METABOLIC PANEL     Albumin Random Urine Quantitative with Creat Ratio     Hemoglobin      12. Restless legs syndrome  G25.81 pramipexole (MIRAPEX) 0.125 MG tablet          Patient has been advised of split billing requirements and indicates understanding: Yes      COUNSELING:  Reviewed preventive health counseling, as reflected in patient instructions       Regular exercise       Healthy diet/nutrition        He reports that he has never smoked. He has never used smokeless tobacco.      Appropriate preventive services were discussed with this patient, including applicable screening as appropriate for cardiovascular disease, diabetes, osteopenia/osteoporosis, and glaucoma.  As  appropriate for age/gender, discussed screening for colorectal cancer, prostate cancer, breast cancer, and cervical cancer. Checklist reviewing preventive services available has been given to the patient.    Reviewed patients plan of care and provided an AVS. The Basic Care Plan (routine screening as documented in Health Maintenance) for Chidi meets the Care Plan requirement. This Care Plan has been established and reviewed with the Patient.            Martin Schultz PA-C  [facility]    Identified Health Risks:    I have reviewed Opioid Use Disorder and Substance Use Disorder risk factors and made any needed referrals.       The patient s PHQ-9 score is consistent with moderate depression. He was provided with information regarding depression and was advised to schedule a follow up appointment in 1 year to further address this issue.  Answers for HPI/ROS submitted by the patient on 5/8/2023  If you checked off any problems, how difficult have these problems made it for you to do your work, take care of things at home, or get along with other people?: Not difficult at all  PHQ9 TOTAL SCORE: 19

## 2023-05-08 NOTE — LETTER
May 10, 2023      Chidi Dubon  [address]  [place] [address]        Dear ,    Let chidi know that his lab work came back stable. Continue all meds and to work on a lower sugar/fat diet.    Resulted Orders   COMPREHENSIVE METABOLIC PANEL   Result Value Ref Range    Sodium 142 133 - 144 mmol/L    Potassium 3.5 3.4 - 5.3 mmol/L    Chloride 110 (H) 94 - 109 mmol/L    Carbon Dioxide (CO2) 25 20 - 32 mmol/L    Anion Gap 7 3 - 14 mmol/L    Urea Nitrogen 12 7 - 30 mg/dL    Creatinine 1.00 0.66 - 1.25 mg/dL    Calcium 9.3 8.5 - 10.1 mg/dL    Glucose 74 70 - 99 mg/dL    Alkaline Phosphatase 94 40 - 150 U/L    AST 30 0 - 45 U/L    ALT 19 0 - 70 U/L    Protein Total 7.6 6.8 - 8.8 g/dL    Albumin 3.6 3.4 - 5.0 g/dL    Bilirubin Total 4.1 (H) 0.2 - 1.3 mg/dL    GFR Estimate 76 >60 mL/min/1.73m2      Comment:      eGFR calculated using 2021 CKD-EPI equation.   Albumin Random Urine Quantitative with Creat Ratio   Result Value Ref Range    Creatinine Urine mg/dL 146 mg/dL    Albumin Urine mg/L 155 mg/L    Albumin Urine mg/g Cr 106.16 (H) 0.00 - 17.00 mg/g Cr   Hemoglobin   Result Value Ref Range    Hemoglobin 14.4 13.3 - 17.7 g/dL       If you have any questions or concerns, please call the clinic at the number listed above.       Sincerely,      Martin Schultz PA-C

## 2023-05-08 NOTE — PATIENT INSTRUCTIONS
"  Patient Education   Personalized Prevention Plan  You are due for the preventive services outlined below.  Your care team is available to assist you in scheduling these services.  If you have already completed any of these items, please share that information with your care team to update in your medical record.  Health Maintenance Due   Topic Date Due     Zoster (Shingles) Vaccine (1 of 2) Never done     Diptheria Tetanus Pertussis (DTAP/TDAP/TD) Vaccine (1 - Tdap) 01/05/2011     ANNUAL REVIEW OF HM ORDERS  06/02/2022     Basic Metabolic Panel  09/09/2022     Annual Wellness Visit  03/09/2023     Comprehensive Metabolic Panel  03/09/2023     Cholesterol Lab  03/09/2023     Kidney Microalbumin Urine Test  03/09/2023     Hemoglobin  03/09/2023       Depression and Suicide in Older Adults  Nearly 2 million older adults in the [place] have some type of depression. Some of them even take their own lives. Yet depression among older adults is often ignored. Learning about the warning signs of depression may help spare a loved one needless pain. You may also save a life.   What is depression?  Depression is a common and serious illness. It affects the way you think and feel. It is not a normal part of aging. It is not a sign of weakness, a character flaw, or something you can \"snap out of.\" Most people with depression need treatment to get better. The most common symptom is a feeling of deep sadness. People who are depressed also may seem tired and listless. And nothing seems to give them pleasure. It s normal to grieve or be sad sometimes. But sadness lessens or passes with time. Depression rarely goes away or improves on its own. Other symptoms of depression are:     Sleeping more or less than normal    Eating more or less than normal    Having headaches, stomachaches, or other pains that don t go away    Feeling nervous,  empty,  or worthless    Crying a lot    Thinking or talking about suicide or death    Loss of " interest in activities previously enjoyed    Social isolation    Feeling confused or forgetful  What causes it?  The causes of depression aren t fully known. But it is thought to result from a complex blend of these factors:     Biochemistry. Certain chemicals in the brain play a role.    Genes. Depression does run in families.    Life stress. Life stresses can also trigger depression in some people. Older adults often face many stressors. These may include isolation, the death of friends or a spouse, health problems, and financial concerns.    Chronic health conditions. This includes diabetes, heart disease, or cancer. These can cause symptoms of depression. Medicine side effects can cause changes in thoughts and behaviors.  Giving support    Depressed people often may not want to ask for help. When they do, they may be ignored. Or they may get the wrong treatment. You can help by showing parents and older friends love and support. If they seem depressed, don t lecture the person or ignore the symptoms. Don't discount the symptoms as a  normal  part of aging. They are not. Get involved, listen, and show interest and support.   Help them understand that depression is a treatable illness. Tell them you can help them find the right treatment. Offer to go to their healthcare provider's appointment with them for support when the symptoms are discussed. With their approval, contact a local mental health center, social service agency, or hospital about services.   Helping at healthcare visits  You can be an advocate for them at healthcare appointments. Many older adults have chronic illnesses. Many of these can cause symptoms of depression. Medicine side effects can change thoughts and behaviors.   You can help make sure that the healthcare provider looks at all of these factors. They should refer your family member or friend to a mental healthcare provider when needed. In some cases, untreated depression can lead to a  misdiagnosis. A person may be diagnosed with a brain disorder such as dementia. If the healthcare provider does not take the issue of depression seriously, help your family member or friend find another provider.   Asking about self-harm thoughts  If you think an older person you care about could be suicidal, ask,  Have you thought about suicide?  Most people will tell you the truth. If they say yes, they may already have a plan for how and when they will attempt it. Find out as much as you can. The more detailed the plan, and the easier it is to carry out, the more danger the person is in right now. Tell the person you are there for them and you do not want them to get harmed. Don't wait to get help for the person. Call the person's healthcare provider, local hospital, or emergency services.   Call 988 in a crisis   Never leave the person alone. A person who is actively suicidal needs crisis care right away. They need constant supervision. Never leave the person out of sight. Call 988 Tell the crisis counselor you need help for a person who is thinking about suicide. The counselor will help you get the right level of crisis help. You may be advised to take the person to the nearest emergency room.   The National Suicide Prevention Lifeline is available at 988, or 521-658-ZXGX (466-482-4512), or www.suicidepreventionlifeline.org. When you call or text 988, you will be connected to trained counselors who are part of the National Suicide Prevention Lifeline network. An online chat option is also available. Lifeline is free and available 24/7.   To learn more    National Suicide Prevention Lifeline at www.suicidepreventionlifeline.org  or 872-348-HZAU (447-185-9417)    National Fence on Mental Illness at www.bry.org  or 733-091-VPKH (909-960-7546)    Mental Health [facility] at www.nmha.org  or 389-554-7469    National Everetts of Mental Health at www.nimh.nih.gov  or 841-983-8466    Lucia last reviewed this  educational content on 7/1/2022 2000-2022 [employer]. All rights reserved. This information is not intended as a substitute for professional medical care. Always follow your healthcare professional's instructions.

## 2023-05-09 LAB
ALBUMIN SERPL-MCNC: 3.6 G/DL (ref 3.4–5)
ALP SERPL-CCNC: 94 U/L (ref 40–150)
ALT SERPL W P-5'-P-CCNC: 19 U/L (ref 0–70)
ANION GAP SERPL CALCULATED.3IONS-SCNC: 7 MMOL/L (ref 3–14)
AST SERPL W P-5'-P-CCNC: 30 U/L (ref 0–45)
BILIRUB SERPL-MCNC: 4.1 MG/DL (ref 0.2–1.3)
BUN SERPL-MCNC: 12 MG/DL (ref 7–30)
CALCIUM SERPL-MCNC: 9.3 MG/DL (ref 8.5–10.1)
CHLORIDE BLD-SCNC: 110 MMOL/L (ref 94–109)
CO2 SERPL-SCNC: 25 MMOL/L (ref 20–32)
CREAT SERPL-MCNC: 1 MG/DL (ref 0.66–1.25)
CREAT UR-MCNC: 146 MG/DL
GFR SERPL CREATININE-BSD FRML MDRD: 76 ML/MIN/1.73M2
GLUCOSE BLD-MCNC: 74 MG/DL (ref 70–99)
MICROALBUMIN UR-MCNC: 155 MG/L
MICROALBUMIN/CREAT UR: 106.16 MG/G CR (ref 0–17)
POTASSIUM BLD-SCNC: 3.5 MMOL/L (ref 3.4–5.3)
PROT SERPL-MCNC: 7.6 G/DL (ref 6.8–8.8)
SODIUM SERPL-SCNC: 142 MMOL/L (ref 133–144)

## 2023-05-11 ENCOUNTER — TELEPHONE (OUTPATIENT)
Dept: FAMILY MEDICINE | Facility: CLINIC | Age: 83
End: 2023-05-11

## 2023-05-11 ENCOUNTER — ANCILLARY PROCEDURE (OUTPATIENT)
Dept: ULTRASOUND IMAGING | Facility: CLINIC | Age: 83
End: 2023-05-11
Attending: PHYSICIAN ASSISTANT
Payer: COMMERCIAL

## 2023-05-11 DIAGNOSIS — I71.40 ABDOMINAL AORTIC ANEURYSM (AAA) WITHOUT RUPTURE, UNSPECIFIED PART (H): ICD-10-CM

## 2023-05-11 LAB — RADIOLOGIST FLAGS: ABNORMAL

## 2023-05-11 PROCEDURE — 76775 US EXAM ABDO BACK WALL LIM: CPT | Mod: TC | Performed by: RADIOLOGY

## 2023-05-11 NOTE — TELEPHONE ENCOUNTER
Jill with [facility] calling to alert provider of urgent finding on today's abdominal aorta US.  Call back to imaging is [phone] for questions.        [phone] is call back number for radiologist Nikolai Teresa      She says they no longer indicate specific part of report that is of concern but I do see note on result:      IMPRESSION:    1. Distal infrarenal abdominal aortic aneurysm is slightly longer in  the cranial caudal length measuring 5.1 cm, previously 4.3 cm. The  transverse plane size is stable measuring 3.6 cm.  2. Apparent interval prominent enlargement of the left iliac artery  that is approximately 4.8 cm in maximal size. On the prior ultrasound  this was 1.3 cm. Recommend CT angiogram for further assessment.  3. The right iliac arterial system is obscured for accurate  assessment.     [Access Center: See above impressions, notably impression 2.]      Routed high priority to Martin Schultz to address.  I plan to verbally alert him of this as well, appears he is still in clinic.    Galina Waters RN  [facility]

## 2023-05-13 DIAGNOSIS — I71.40 ABDOMINAL AORTIC ANEURYSM (AAA) WITHOUT RUPTURE, UNSPECIFIED PART (H): Primary | ICD-10-CM

## 2023-05-15 ENCOUNTER — TELEPHONE (OUTPATIENT)
Dept: FAMILY MEDICINE | Facility: CLINIC | Age: 83
End: 2023-05-15
Payer: COMMERCIAL

## 2023-05-15 ENCOUNTER — TELEPHONE (OUTPATIENT)
Dept: OTHER | Facility: CLINIC | Age: 83
End: 2023-05-15
Payer: COMMERCIAL

## 2023-05-15 DIAGNOSIS — I71.40 ABDOMINAL AORTIC ANEURYSM WITHOUT RUPTURE (H): Primary | ICD-10-CM

## 2023-05-15 NOTE — TELEPHONE ENCOUNTER
Needs cta chest abd pelvis with runoff- ok for when returns from paternity with tarbunou- could see hoang in June also

## 2023-05-15 NOTE — TELEPHONE ENCOUNTER
Appointment details printed to be mailed to this patient today.  Phone call reminder note added to the patient's chart.

## 2023-05-15 NOTE — TELEPHONE ENCOUNTER
He is supposed to be leaving the state in the next week. He may be staying a bit longer. He is waiting on a surgery date for his daughter's cancer. He asked how urgent it is that he be seen. He was planning to do some work in the yard and asked if he should be restricting his activities?    His wife recently passed away and she took care of his medical appointments and helped him to understand and ask questions. He was really hoping to see someone in [place] or [place]. He does not know where [place] is but would consider going to [place] if urgent.    Please complete order for CTA. He is hoping to do this in [place]. I can assist with locations and scheduling. He can't do a video visit but he said a phone call would be okay if it is something he should address before he leaves [facility].

## 2023-05-15 NOTE — TELEPHONE ENCOUNTER
Let rosmery know that his abdominal aortic aneurysm has grown over the past couple of yrs. To the extent that i'd like him to consult with a vascular surgeon. Help him to schedule an appt.     Martin

## 2023-05-15 NOTE — TELEPHONE ENCOUNTER
[facility] to schedule CTA and consult with either Dr. Boo or Dr. Jaimes [phone]

## 2023-05-15 NOTE — TELEPHONE ENCOUNTER
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     Routing to [place] as this a [place] pt, needs to vascular surgery.     Lisa Schultz, BSN, RN  [facility]

## 2023-05-15 NOTE — TELEPHONE ENCOUNTER
Pt returned call, relayed providers message below and patient verbalized understanding and will call vascular surgery number [phone] to schedule an appointment. Pt stated no further questions.    Jaz Mcintosh RN

## 2023-05-15 NOTE — TELEPHONE ENCOUNTER
Attempted to call pt, no answer , left message to return call to the clinic @ [phone]    Looks like vascular has also tried to reach him     Teri Epps RN  [facility]

## 2023-05-15 NOTE — TELEPHONE ENCOUNTER
Appt and cta scheduled- please print itinerary and mail to pt. Please make note to call for reminders 2 days before.

## 2023-05-16 NOTE — TELEPHONE ENCOUNTER
Closing this encounter, I see PCP started a new call regarding this yesterday.  Patient has been notified.    Galina Waters RN  [facility]

## 2023-06-01 ENCOUNTER — TELEPHONE (OUTPATIENT)
Dept: ANTICOAGULATION | Facility: CLINIC | Age: 83
End: 2023-06-01
Payer: COMMERCIAL

## 2023-06-01 NOTE — TELEPHONE ENCOUNTER
ANTICOAGULATION     Chidi Dubon is overdue for INR check.      Left message for patient to call and schedule lab appointment as soon as possible. If returning call, please schedule.     Adia Obrien RN

## 2023-06-13 ENCOUNTER — TELEPHONE (OUTPATIENT)
Dept: ANTICOAGULATION | Facility: CLINIC | Age: 83
End: 2023-06-13
Payer: COMMERCIAL

## 2023-06-13 NOTE — TELEPHONE ENCOUNTER
ANTICOAGULATION     Chidi Dubon is overdue for INR check.      Left message for patient to call and schedule lab appointment as soon as possible. If returning call, please schedule.  and Reminder letter sent    Mark LOPEZ RN

## 2023-06-13 NOTE — LETTER
[facility] ANTICOAGULATION CLINIC  [address] [facility] [address]  Phone: [phone]  Fax: [phone]     June 13, 2023        Chidi Dubon  [address]  [place] [address]            Dear Chidi,    You are currently under the care of [facility]agulation [facility] for your warfarin (Coumadin , Jantoven ) therapy.  We are contacting you because our records show you were due for an INR on 5/24/23.    There are potentially serious risks when taking warfarin without careful monitoring and we want to make sure you are safely managed.  Routine lab monitoring is required for warfarin refills.     Please call [phone]  as soon as possible to schedule an appointment.  If there has been a change in your care or other concerns, please let us know so we can help and or update our records.     Sincerely,       [facility]

## 2023-06-19 ENCOUNTER — OFFICE VISIT (OUTPATIENT)
Dept: VASCULAR SURGERY | Facility: CLINIC | Age: 83
End: 2023-06-19
Attending: SURGERY
Payer: COMMERCIAL

## 2023-06-19 ENCOUNTER — ANCILLARY PROCEDURE (OUTPATIENT)
Dept: VASCULAR ULTRASOUND | Facility: CLINIC | Age: 83
End: 2023-06-19
Attending: STUDENT IN AN ORGANIZED HEALTH CARE EDUCATION/TRAINING PROGRAM
Payer: COMMERCIAL

## 2023-06-19 ENCOUNTER — HOSPITAL ENCOUNTER (OUTPATIENT)
Dept: CT IMAGING | Facility: HOSPITAL | Age: 83
Discharge: HOME | End: 2023-06-19
Attending: SURGERY
Payer: COMMERCIAL

## 2023-06-19 DIAGNOSIS — I72.3 ILIAC ANEURYSM (H): ICD-10-CM

## 2023-06-19 DIAGNOSIS — Z09 ENCOUNTER FOR FOLLOW-UP EXAMINATION AFTER COMPLETED TREATMENT FOR CONDITIONS OTHER THAN MALIGNANT NEOPLASM: ICD-10-CM

## 2023-06-19 DIAGNOSIS — I87.2 VENOUS INSUFFICIENCY OF BOTH LOWER EXTREMITIES: Primary | ICD-10-CM

## 2023-06-19 DIAGNOSIS — I71.40 ABDOMINAL AORTIC ANEURYSM (AAA) WITHOUT RUPTURE, UNSPECIFIED PART (H): ICD-10-CM

## 2023-06-19 DIAGNOSIS — M79.89 OTHER SPECIFIED SOFT TISSUE DISORDERS: ICD-10-CM

## 2023-06-19 DIAGNOSIS — I71.40 ABDOMINAL AORTIC ANEURYSM WITHOUT RUPTURE (H): ICD-10-CM

## 2023-06-19 DIAGNOSIS — I72.3 ILIAC ANEURYSM (H): Primary | ICD-10-CM

## 2023-06-19 DIAGNOSIS — I83.893 VARICOSE VEINS OF LOWER EXTREMITY WITH EDEMA, BILATERAL: ICD-10-CM

## 2023-06-19 LAB
CREAT BLD-MCNC: 1.2 MG/DL (ref 0.7–1.3)
GFR SERPL CREATININE-BSD FRML MDRD: >60 ML/MIN/1.73M2

## 2023-06-19 PROCEDURE — 93925 LOWER EXTREMITY STUDY: CPT

## 2023-06-19 PROCEDURE — 99203 OFFICE O/P NEW LOW 30 MIN: CPT | Performed by: STUDENT IN AN ORGANIZED HEALTH CARE EDUCATION/TRAINING PROGRAM

## 2023-06-19 PROCEDURE — 93926 LOWER EXTREMITY STUDY: CPT | Mod: 26 | Performed by: SURGERY

## 2023-06-19 PROCEDURE — 250N000011 HC RX IP 250 OP 636: Performed by: SURGERY

## 2023-06-19 PROCEDURE — 74174 CTA ABD&PLVS W/CONTRAST: CPT

## 2023-06-19 PROCEDURE — 82565 ASSAY OF CREATININE: CPT

## 2023-06-19 PROCEDURE — G0463 HOSPITAL OUTPT CLINIC VISIT: HCPCS | Mod: 25 | Performed by: STUDENT IN AN ORGANIZED HEALTH CARE EDUCATION/TRAINING PROGRAM

## 2023-06-19 PROCEDURE — 93926 LOWER EXTREMITY STUDY: CPT

## 2023-06-19 RX ORDER — IOPAMIDOL 755 MG/ML
90 INJECTION, SOLUTION INTRAVASCULAR ONCE
Status: COMPLETED | OUTPATIENT
Start: 2023-06-19 | End: 2023-06-19

## 2023-06-19 RX ADMIN — IOPAMIDOL 90 ML: 755 INJECTION, SOLUTION INTRAVENOUS at 13:27

## 2023-06-19 NOTE — PROGRESS NOTES
VASCULAR SURGERY CLINIC CONSULTATION   VASCULAR SURGEON: Dr. 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  He complains of worsening dyspnea on exertion and swelling of bilateral legs, and reports he is working to schedule an appointment with his cardiologist.      RECOMMENDATION:    Patient will follow-up with repeat CTA abdomen pelvis in 1 year.  He will also have a screening carotid duplex.  He will start taking baby aspirin and continue statin in the meantime.      Discussed with staff, Dr. Boo.    Reuben Haines MD      HPI:  Chidi Dubon is a 82 year old male who was seen today in consultation for left common iliac artery aneurysm seen on screening ultrasound.  He is a never smoker with a history of hyperlipidemia, coronary artery disease status post PCI in 2013, heart failure with preserved ejection fraction, CKD, mitral valve regurgitation, A-fib on warfarin, previous pacemaker, hypertrophic cardiomyopathy.  He denies any abdominal or back pain.  His chief complaint is worsening dyspnea.  He can walk up a flight of stairs but has to stop to catch his breath afterwards.  He notes a lot of stress in his personal life after the recent death of his wife, a devastating stroke and his son-in-law, and newly diagnosed colon cancer and his daughter.  He has no family members with connective tissue disease or  aortoiliac aneurysms.  Recent  LDL is 85 and he is compliant with statin therapy.  He does not take aspirin.    REVIEW OF SYSTEMS:    A 12 point ROS was reviewed and except for what is listed in the HPI above, all others are negative    PHH:    Past Medical History:   Diagnosis Date     CAD (coronary artery disease)      Dyslipidemia      HTN (hypertension)         Past Surgical History:   Procedure Laterality Date     ANGIOGRAM  age 61     3 cornary artery stents, [facility]     ANGIOGRAM  age 66    1 coronary artery stent, [facility]     ANGIOGRAM  11/2013    1 coronary artery stent, [facility]      ARTHROSCOPY KNEE RT/LT         ALLERGIES:    Allergies   Allergen Reactions     No Known Allergies        MEDS:    Current Outpatient Medications:      atorvastatin (LIPITOR) 80 MG tablet, Take 0.5 tablets (40 mg) by mouth daily, Disp: 45 tablet, Rfl: 3     fluticasone (FLONASE) 50 MCG/ACT nasal spray, Spray 1 spray into both nostrils daily, Disp: 18.2 mL, Rfl: 0     furosemide (LASIX) 20 MG tablet, Take 1 tablet (20 mg) by mouth daily +++NEED APPT+++, Disp: 90 tablet, Rfl: 0     losartan (COZAAR) 100 MG tablet, Take 1 tablet (100 mg) by mouth daily, Disp: 90 tablet, Rfl: 1     nitroGLYcerin (NITROSTAT) 0.4 MG sublingual tablet, Place 1 tablet (0.4 mg) under the tongue every 5 minutes as needed for chest pain, Disp: 25 tablet, Rfl: 0     potassium chloride (KLOR-CON) 20 MEQ packet, Take 20 mEq by mouth daily, Disp: 90 each, Rfl: 3     pramipexole (MIRAPEX) 0.125 MG tablet, Take 1 tablet (0.125 mg) by mouth 3 times daily, Disp: 90 tablet, Rfl: 1     warfarin ANTICOAGULANT (COUMADIN) 2.5 MG tablet, TAKE ONE-HALF TABLET BY MOUTH ONCE DAILY ON SUNDAYS AND WEDNESDAYS; TAKE 1 TABLET ONCE DAILY ALL OTHER DAYS OF THE WEEK OR TAKE AS DIRECTED BY INR CLINIC, Disp: 72 tablet, Rfl: 3  No current facility-administered medications for this visit.    SOCIAL HABITS:   Social History    Substance and Sexual Activity      Alcohol use: No        Comment: none       History   Drug Use Unknown      History   Smoking Status     Never   Smokeless Tobacco     Never     Comment: never smoker; non-smoking household        FAMILY HISTORY:    Family History   Problem Relation Age of Onset     Heart Disease Paternal Grandfather      Heart Disease Brother        PE:  There were no vitals taken for this visit.  Wt Readings from Last 1 Encounters:   05/08/23 262 lb 3.2 oz (118.9 kg)     There is no height or weight on file to calculate BMI.    EXAM:    Gen: no acute distress, sitting comfortably in exam chair  HEENT: Atraumatic, normocephalic  Neuro: Alert and oriented. No gross neurologic deficits   Pulm: nonlabored breathing on room air, no cough  CV: RRR by radial pulse, noncyanotic   ABD:soft, non-tender, nondistended  MSK: Significant pitting edema in the bilateral lower extremities.  Chronic venous stasis changes in the bilateral Feet.  Skin: Warm, dry, no rashes or abrasions on exposed skin  Psych: Normal affect, cooperative  Pulses: Palpable bilateral dorsalis pedis and left posterior tibial artery pulses.        DIAGNOSTIC STUDIES:     Images:  I reviewed the results of his CTA abdomen and pelvis today that demonstrates a 3.3 cm infrarenal abdominal aortic aneurysm.  Bilateral common iliac artery aneurysms measure approximate 24 mm each.  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  Limited arterial duplex ultrasound demonstrates no popliteal artery aneurysms in either leg.    LABS:      Sodium   Date Value Ref Range Status   05/08/2023 142 133 - 144 mmol/L Final   03/09/2022 143 133 - 144 mmol/L Final   03/03/2021 144 133 - 144 mmol/L Final   08/17/2020 140 133 - 144 mmol/L Final   12/11/2019 142 133 - 144 mmol/L Final     Urea Nitrogen   Date Value Ref Range Status   05/08/2023 12 7 - 30 mg/dL Final   03/09/2022 11 7 - 30 mg/dL Final   03/03/2021 12 7 - 30 mg/dL Final   08/17/2020 14 7 - 30 mg/dL Final   12/11/2019 17 7 - 30 mg/dL Final     Hemoglobin   Date Value  Ref Range Status   05/08/2023 14.4 13.3 - 17.7 g/dL Final   03/09/2022 14.8 13.3 - 17.7 g/dL Final   08/17/2020 15.5 13.3 - 17.7 g/dL Final   03/12/2020 15.6 13.3 - 17.7 g/dL Final   12/11/2019 16.3 13.3 - 17.7 g/dL Final     Platelet Count   Date Value Ref Range Status   03/09/2022 192 150 - 450 10e3/uL Final   08/17/2020 207 150 - 450 10e9/L Final   03/12/2020 195 150 - 450 10e9/L Final   12/11/2019 215 150 - 450 10e9/L Final     INR   Date Value Ref Range Status   04/12/2023 2.1 (H) 0.9 - 1.1 Final   02/08/2023 2.2 (H) 0.9 - 1.1 Final   12/15/2022 2.1 (H) 0.9 - 1.1 Final   06/28/2021 2.00 (H) 0.86 - 1.14 Final     Comment:     This test is intended for monitoring Coumadin therapy.  Results are not   accurate in patients with prolonged INR due to factor deficiency.     06/02/2021 2.10 (H) 0.86 - 1.14 Final     Comment:     This test is intended for monitoring Coumadin therapy.  Results are not   accurate in patients with prolonged INR due to factor deficiency.     05/19/2021 1.90 (H) 0.86 - 1.14 Final     Comment:     This test is intended for monitoring Coumadin therapy.  Results are not   accurate in patients with prolonged INR due to factor deficiency.

## 2023-06-19 NOTE — PROGRESS NOTES
[facility]        Patient is here for a consult to discuss Abdominal aortic aneurysm (AAA) and Iliac aneurysm. Pt also has significant venous insufficiency.  Pt is currently taking Statin and Warfarin.Need to start aspirin    /78   Pulse 88   Temp 97.6  F (36.4  C)     The provider has been notified that the patient has no concerns.     Questions patient would like addressed today are: N/A.    Refills are needed: No    Has homecare services and agency name:  Roxanne Carcamo RN

## 2023-06-19 NOTE — PATIENT INSTRUCTIONS
Amos Murillo,    Thank you for entrusting your care with us today. After your visit today with MD Reuben Haines this is the plan that was discussed at your appointment.    Repeat your studies in one year time    Ultrasound of your carotid aspirin    Start baby aspirin daily    I am including additional information on these things and our contact information if you have any questions or concerns.   Please do not hesitate to reach out to us if you felt we did not answer your questions or you are unsure of the treatment plan after your visit today. Our number is [phone].Thank you for trusting us with your care.         Again thank you for your time.     Understanding Abdominal Aortic Aneurysm  You may have been told that you have an aneurysm . This is when a weakened part of a blood vessel expands like a balloon. An aneurysm in the main blood vessel in your stomach area is called an abdominal aortic aneurysm (AAA).    What is AAA?  Front view of abdominal aorta with aneurysms. Dotted line shows normal width of aorta.  An aneurysm happens when a weakened part of the aorta wall stretches and expands.  The aorta is the large artery that carries blood from the heart to the rest of the body. With AAA, part of the aorta weakens and expands. If an aneurysm gets large enough, it may burst. This is very serious, and usually fatal.        How is an aneurysm found?  AAA usually causes no symptoms. It's often found when tests (such as an X-ray, MRI, or CT scan) are done for an unrelated problem. Or your healthcare provider may find it while feeling your stomach during a routine exam.      Who develops AAA?  These things increase your chances of having AAA:    AAA runs in your family  Your age. AAA is more likely as you get older.  Men are more likely than women to have AAA  Smoking  High blood pressure  High cholesterol level. This is a buildup of fat and other materials in the blood.  Injury, such as a car accident    What  can be done?  Surgery can be done to remove an aneurysm. Your healthcare provider will weigh the chances that the aneurysm will burst against the risks of treatment. Because a small and slow growing aneurysm is not likely to burst, it may be watched for a while. When it reaches a certain size, you may have surgery to replace that section of your aorta.        3445-6725 [employer]. [address] [place] [address]. All rights reserved. This information is not intended as a substitute for professional medical care. Always follow your healthcare professional's instructions.

## 2023-06-20 ENCOUNTER — TELEPHONE (OUTPATIENT)
Dept: ANTICOAGULATION | Facility: CLINIC | Age: 83
End: 2023-06-20
Payer: COMMERCIAL

## 2023-06-20 VITALS — HEART RATE: 88 BPM | SYSTOLIC BLOOD PRESSURE: 132 MMHG | TEMPERATURE: 97.6 F | DIASTOLIC BLOOD PRESSURE: 78 MMHG

## 2023-06-20 NOTE — LETTER
[facility] ANTICOAGULATION CLINIC  [address] [facility] [address]  Phone: [phone]  Fax: [phone]   June 20, 2023        Chidi Dubon  [address]  [place] [address]            Dear Chidi,    You are currently under the care of [facility]tico[facility] for your warfarin (Coumadin , Jantoven ) therapy.  We are contacting you because our records show you were due for an INR on 5/24/23.    There are potentially serious risks when taking warfarin without careful monitoring and we want to make sure you are safely managed.  Routine lab monitoring is required for warfarin refills.     Please call [phone]  as soon as possible to schedule an appointment.  If there has been a change in your care or other concerns, please let us know so we can help and or update our records.     Sincerely,       [facility]

## 2023-06-20 NOTE — TELEPHONE ENCOUNTER
ANTICOAGULATION     Chidi Dubon is overdue for INR check.      Reminder letter sent    Camilla Gil RN

## 2023-07-03 ENCOUNTER — ANCILLARY PROCEDURE (OUTPATIENT)
Dept: VASCULAR ULTRASOUND | Facility: CLINIC | Age: 83
End: 2023-07-03
Attending: STUDENT IN AN ORGANIZED HEALTH CARE EDUCATION/TRAINING PROGRAM
Payer: COMMERCIAL

## 2023-07-03 DIAGNOSIS — I71.40 ABDOMINAL AORTIC ANEURYSM (AAA) WITHOUT RUPTURE, UNSPECIFIED PART (H): ICD-10-CM

## 2023-07-03 DIAGNOSIS — I83.893 VARICOSE VEINS OF LOWER EXTREMITY WITH EDEMA, BILATERAL: ICD-10-CM

## 2023-07-03 DIAGNOSIS — I87.2 VENOUS INSUFFICIENCY OF BOTH LOWER EXTREMITIES: ICD-10-CM

## 2023-07-03 DIAGNOSIS — Z09 ENCOUNTER FOR FOLLOW-UP EXAMINATION AFTER COMPLETED TREATMENT FOR CONDITIONS OTHER THAN MALIGNANT NEOPLASM: ICD-10-CM

## 2023-07-03 PROCEDURE — 93970 EXTREMITY STUDY: CPT

## 2023-07-03 PROCEDURE — 93970 EXTREMITY STUDY: CPT | Mod: 26 | Performed by: SURGERY

## 2023-07-12 ENCOUNTER — ANTICOAGULATION THERAPY VISIT (OUTPATIENT)
Dept: ANTICOAGULATION | Facility: CLINIC | Age: 83
End: 2023-07-12

## 2023-07-12 ENCOUNTER — LAB (OUTPATIENT)
Dept: LAB | Facility: CLINIC | Age: 83
End: 2023-07-12
Payer: COMMERCIAL

## 2023-07-12 DIAGNOSIS — I48.20 CHRONIC ATRIAL FIBRILLATION (H): ICD-10-CM

## 2023-07-12 DIAGNOSIS — Z79.01 LONG TERM CURRENT USE OF ANTICOAGULANT THERAPY: Primary | ICD-10-CM

## 2023-07-12 LAB — INR BLD: 2.4 (ref 0.9–1.1)

## 2023-07-12 PROCEDURE — 85610 PROTHROMBIN TIME: CPT

## 2023-07-12 PROCEDURE — 36416 COLLJ CAPILLARY BLOOD SPEC: CPT

## 2023-07-12 NOTE — PROGRESS NOTES
ANTICOAGULATION MANAGEMENT     Chidi Dubon 82 year old male is on warfarin with therapeutic INR result. (Goal INR 2.0-3.0)    Recent labs: (last 7 days)     07/12/23  1138   INR 2.4*       ASSESSMENT       Source(s): Chart Review    Previous INR was Therapeutic last 2(+) visits    Medication, diet, health changes since last INR chart reviewed; none identified             PLAN     Recommended plan for no diet, medication or health factor changes affecting INR     Dosing Instructions: Continue your current warfarin dose with next INR in 6 weeks       Summary  As of 7/12/2023    Full warfarin instructions:  1.25 mg every Sun, Wed; 2.5 mg all other days   Next INR check:  8/23/2023             Detailed voice message left for Chidi with dosing instructions and follow up date.   AVS also mailed to patient.     Contact [phone]  to schedule and with any changes, questions or concerns.     Education provided:     Contact [phone]  with any changes, questions or concerns.     Plan made per [facility] anticoagulation protocol    Camilla Gil RN  Anticoagulation Clinic  7/12/2023    _______________________________________________________________________     Anticoagulation Episode Summary     Current INR goal:  2.0-3.0   TTR:  100.0 % (9.1 mo)   Target end date:  Indefinite   Send INR reminders to:  EMELIA SARGENT    Indications    Long-term (current) use of anticoagulants [Z79.01] [Z79.01]  Atrial Fibrillation [I48.20]           Comments:           Anticoagulation Care Providers     Provider Role Specialty Phone number    Martin Schultz PA-C Referring Family Medicine [phone]    Gen Marti PA Referring  [phone]

## 2023-07-31 ENCOUNTER — TELEPHONE (OUTPATIENT)
Dept: VASCULAR SURGERY | Facility: CLINIC | Age: 83
End: 2023-07-31
Payer: COMMERCIAL

## 2023-07-31 ENCOUNTER — NURSE TRIAGE (OUTPATIENT)
Dept: FAMILY MEDICINE | Facility: CLINIC | Age: 83
End: 2023-07-31
Payer: COMMERCIAL

## 2023-07-31 NOTE — TELEPHONE ENCOUNTER
Caller: Chidi    Provider: MD Elidia Boo    Detailed reason for call: Chidi is calling requesting a call back to review the results from the test he had done earlier in the month. He also states he is having difficulty walking. He is wondering if a diet change is needed or a new medication?     Best phone number to contact: [phone]    Best time to contact: any    Ok to leave a detailed message: Yes    Ok to speak to authorized person if needed: No      (Noted to patient if reason is related to wound or incision, to please send a photo via email or [employer].)

## 2023-07-31 NOTE — TELEPHONE ENCOUNTER
"He is calling because his daughter told him to make a visit. Patient reports using a Qtip on left ear that pushed too far on Friday 7/28/23. He reports mild pain with this at time of injury. He stopped and he felt okay until Sunday. He reports he cleaned his ear again and there was blood on it.     Today he reports his ear has been having mild blood with placing tissue in ear, but reports it is \"not running out\" of his ear. There has been no pain/No dizziness/hearing loss with this. Reports that he has little blots of blood, smaller than \"match head\". It is dark red in color.  He reports it does not bleed without putting tissue in the ear.     RN educated to avoid placing anything in the ear. He verbalized understanding.     Blood pressure 150/75 - Blood pressure usually runs 175/78 (RN encouraged following up with PCP regarding this)    Patient is on WARFARIN (patients recheck is 8/16/23). RN recommended reaching out to INR team to update.      1) Routing to PCP to advise with patients warfarin.     2) Can we approve 8/14/23 visit (for hypertension) at 11am (it indicates hospital follow up) since he has an INR draw this day?       Nettie Muller RN on 7/31/2023 at 12:13 PM      Reason for Disposition   Pointed object was inserted into the ear canal (e.g., a pencil, stick, or wire) (Exception: cotton swabs or doctor's ear exam)    Additional Information   Negative: Major bleeding that can't be stopped   Negative: Sounds like a life-threatening emergency to the triager   Negative: Pierced ear site has been injured   Negative: Injury behind the ear   Negative: Wound infection suspected (cut or other wound now looks infected)   Negative: Bleeding that won't stop after 10 minutes of direct pressure   Negative: Skin is split open or gaping (if unsure, refer in if cut length > 1/2 inch or 12 mm on the skin, 1/4 inch or 6 mm on the face)   Negative: Sounds like a serious injury to the triager    Answer Assessment - Initial " "Assessment Questions  1. MECHANISM: \"How did the injury happen?\"       Using a QTIP  2. WHEN: \"When did the injury happen?\" (Minutes or hours ago)       7/28/23  3. LOCATION: \"What part of the ear is injured?\"       Inner canal of left ear  4. APPEARANCE of INJURY: \"What does the ear look like?\"       Normal - no redness/swelling  5. HEARING: \"Was the hearing damaged?\"       Hearing normally for that ear  6. SIZE: For cuts, bruises, or lumps, ask: \"How large is it?\" (Inches or centimeters)       Cannot see   7. PAIN: \"Is it painful?\" If so, ask: \"How bad is the pain?\"       NO  8. TETANUS: For any breaks in the skin, ask: \"When was the last tetanus booster?\"      He states \"Not too long ago\"    Protocols used: Ear Injury-P-OH    "

## 2023-08-01 NOTE — TELEPHONE ENCOUNTER
Patient returning call; See providers(Martin Schultz) message below; patient scheduled 8/14/2023 with Martin Schultz; patient verbalized understanding and agreement.    Patient noted he no longer has bleeding in ear; resolved; no longer using Q-tips; informed to use wash cloth/tissue with finger to clean out ears in the future.    Ludmila Parisi RN on 8/1/2023 at 12:06 PM      Per Martin Schultz:  Not to worried about bleeding from his external canal. Likely an abrasion. Continues to bleed due to being on warfarin. Will ok a visit with me on 8/14 to recheck his blood pressure. With out any other signs of excessive bruising or bleeding I feel he can wait to recheck his inr for a couple of weeks.

## 2023-08-01 NOTE — TELEPHONE ENCOUNTER
RN left message to return call to clinic [phone]    RN scheduled patient on 8/14/23 at 10:40 for a visit for hypertension.     Nettie Muller RN on 8/1/2023 at 8:19 AM

## 2023-08-04 DIAGNOSIS — I10 BENIGN ESSENTIAL HYPERTENSION: ICD-10-CM

## 2023-08-04 RX ORDER — FUROSEMIDE 20 MG
20 TABLET ORAL DAILY
Qty: 90 TABLET | Refills: 0 | Status: SHIPPED | OUTPATIENT
Start: 2023-08-04 | End: 2024-04-23

## 2023-08-14 ENCOUNTER — LAB (OUTPATIENT)
Dept: LAB | Facility: CLINIC | Age: 83
End: 2023-08-14
Payer: COMMERCIAL

## 2023-08-14 ENCOUNTER — ANTICOAGULATION THERAPY VISIT (OUTPATIENT)
Dept: ANTICOAGULATION | Facility: CLINIC | Age: 83
End: 2023-08-14

## 2023-08-14 ENCOUNTER — OFFICE VISIT (OUTPATIENT)
Dept: FAMILY MEDICINE | Facility: CLINIC | Age: 83
End: 2023-08-14
Payer: COMMERCIAL

## 2023-08-14 VITALS
SYSTOLIC BLOOD PRESSURE: 134 MMHG | DIASTOLIC BLOOD PRESSURE: 68 MMHG | OXYGEN SATURATION: 95 % | WEIGHT: 272 LBS | HEART RATE: 61 BPM | BODY MASS INDEX: 36.05 KG/M2 | TEMPERATURE: 98.1 F | HEIGHT: 73 IN | RESPIRATION RATE: 20 BRPM

## 2023-08-14 DIAGNOSIS — I10 BENIGN ESSENTIAL HYPERTENSION: Primary | ICD-10-CM

## 2023-08-14 DIAGNOSIS — I48.20 CHRONIC ATRIAL FIBRILLATION (H): ICD-10-CM

## 2023-08-14 DIAGNOSIS — N18.30 CKD (CHRONIC KIDNEY DISEASE) STAGE 3, GFR 30-59 ML/MIN (H): ICD-10-CM

## 2023-08-14 DIAGNOSIS — Z79.01 LONG TERM CURRENT USE OF ANTICOAGULANT THERAPY: Primary | ICD-10-CM

## 2023-08-14 DIAGNOSIS — I25.119 CORONARY ARTERY DISEASE INVOLVING NATIVE HEART WITH ANGINA PECTORIS, UNSPECIFIED VESSEL OR LESION TYPE (H): Primary | ICD-10-CM

## 2023-08-14 DIAGNOSIS — R60.0 LEG EDEMA: ICD-10-CM

## 2023-08-14 DIAGNOSIS — I25.119 CORONARY ARTERY DISEASE INVOLVING NATIVE HEART WITH ANGINA PECTORIS, UNSPECIFIED VESSEL OR LESION TYPE (H): ICD-10-CM

## 2023-08-14 DIAGNOSIS — R09.89 DECREASED PULSES IN FEET: ICD-10-CM

## 2023-08-14 DIAGNOSIS — I50.32 CHRONIC HEART FAILURE WITH PRESERVED EJECTION FRACTION (H): ICD-10-CM

## 2023-08-14 DIAGNOSIS — R53.83 OTHER FATIGUE: ICD-10-CM

## 2023-08-14 LAB
ANION GAP SERPL CALCULATED.3IONS-SCNC: 12 MMOL/L (ref 7–15)
BASOPHILS # BLD AUTO: 0 10E3/UL (ref 0–0.2)
BASOPHILS NFR BLD AUTO: 1 %
BUN SERPL-MCNC: 13.8 MG/DL (ref 8–23)
CALCIUM SERPL-MCNC: 9.1 MG/DL (ref 8.8–10.2)
CHLORIDE SERPL-SCNC: 108 MMOL/L (ref 98–107)
CHOLEST SERPL-MCNC: 99 MG/DL
CREAT SERPL-MCNC: 1.11 MG/DL (ref 0.67–1.17)
DEPRECATED HCO3 PLAS-SCNC: 23 MMOL/L (ref 22–29)
EOSINOPHIL # BLD AUTO: 0.2 10E3/UL (ref 0–0.7)
EOSINOPHIL NFR BLD AUTO: 3 %
ERYTHROCYTE [DISTWIDTH] IN BLOOD BY AUTOMATED COUNT: 15.9 % (ref 10–15)
GFR SERPL CREATININE-BSD FRML MDRD: 67 ML/MIN/1.73M2
GLUCOSE SERPL-MCNC: 148 MG/DL (ref 70–99)
HCT VFR BLD AUTO: 42 % (ref 40–53)
HDLC SERPL-MCNC: 32 MG/DL
HGB BLD-MCNC: 13.5 G/DL (ref 13.3–17.7)
IMM GRANULOCYTES # BLD: 0 10E3/UL
IMM GRANULOCYTES NFR BLD: 0 %
INR BLD: 2.2 (ref 0.9–1.1)
LDLC SERPL CALC-MCNC: 55 MG/DL
LYMPHOCYTES # BLD AUTO: 1.3 10E3/UL (ref 0.8–5.3)
LYMPHOCYTES NFR BLD AUTO: 23 %
MCH RBC QN AUTO: 28.3 PG (ref 26.5–33)
MCHC RBC AUTO-ENTMCNC: 32.1 G/DL (ref 31.5–36.5)
MCV RBC AUTO: 88 FL (ref 78–100)
MONOCYTES # BLD AUTO: 0.5 10E3/UL (ref 0–1.3)
MONOCYTES NFR BLD AUTO: 9 %
NEUTROPHILS # BLD AUTO: 3.8 10E3/UL (ref 1.6–8.3)
NEUTROPHILS NFR BLD AUTO: 65 %
NONHDLC SERPL-MCNC: 67 MG/DL
NT-PROBNP SERPL-MCNC: 1098 PG/ML (ref 0–1800)
PLATELET # BLD AUTO: 149 10E3/UL (ref 150–450)
POTASSIUM SERPL-SCNC: 3.1 MMOL/L (ref 3.4–5.3)
RBC # BLD AUTO: 4.77 10E6/UL (ref 4.4–5.9)
SODIUM SERPL-SCNC: 143 MMOL/L (ref 136–145)
TRIGL SERPL-MCNC: 61 MG/DL
TSH SERPL DL<=0.005 MIU/L-ACNC: 1.63 UIU/ML (ref 0.3–4.2)
WBC # BLD AUTO: 5.8 10E3/UL (ref 4–11)

## 2023-08-14 PROCEDURE — 80061 LIPID PANEL: CPT | Performed by: PHYSICIAN ASSISTANT

## 2023-08-14 PROCEDURE — 99214 OFFICE O/P EST MOD 30 MIN: CPT | Performed by: PHYSICIAN ASSISTANT

## 2023-08-14 PROCEDURE — 85025 COMPLETE CBC W/AUTO DIFF WBC: CPT | Performed by: PHYSICIAN ASSISTANT

## 2023-08-14 PROCEDURE — 36416 COLLJ CAPILLARY BLOOD SPEC: CPT

## 2023-08-14 PROCEDURE — 85610 PROTHROMBIN TIME: CPT

## 2023-08-14 PROCEDURE — 84443 ASSAY THYROID STIM HORMONE: CPT | Performed by: PHYSICIAN ASSISTANT

## 2023-08-14 PROCEDURE — 83880 ASSAY OF NATRIURETIC PEPTIDE: CPT | Performed by: PHYSICIAN ASSISTANT

## 2023-08-14 PROCEDURE — 80048 BASIC METABOLIC PNL TOTAL CA: CPT | Performed by: PHYSICIAN ASSISTANT

## 2023-08-14 PROCEDURE — 36415 COLL VENOUS BLD VENIPUNCTURE: CPT | Performed by: PHYSICIAN ASSISTANT

## 2023-08-14 ASSESSMENT — PATIENT HEALTH QUESTIONNAIRE - PHQ9
10. IF YOU CHECKED OFF ANY PROBLEMS, HOW DIFFICULT HAVE THESE PROBLEMS MADE IT FOR YOU TO DO YOUR WORK, TAKE CARE OF THINGS AT HOME, OR GET ALONG WITH OTHER PEOPLE: SOMEWHAT DIFFICULT
SUM OF ALL RESPONSES TO PHQ QUESTIONS 1-9: 11
SUM OF ALL RESPONSES TO PHQ QUESTIONS 1-9: 11

## 2023-08-14 ASSESSMENT — ANXIETY QUESTIONNAIRES
6. BECOMING EASILY ANNOYED OR IRRITABLE: NOT AT ALL
3. WORRYING TOO MUCH ABOUT DIFFERENT THINGS: SEVERAL DAYS
5. BEING SO RESTLESS THAT IT IS HARD TO SIT STILL: NOT AT ALL
IF YOU CHECKED OFF ANY PROBLEMS ON THIS QUESTIONNAIRE, HOW DIFFICULT HAVE THESE PROBLEMS MADE IT FOR YOU TO DO YOUR WORK, TAKE CARE OF THINGS AT HOME, OR GET ALONG WITH OTHER PEOPLE: NOT DIFFICULT AT ALL
7. FEELING AFRAID AS IF SOMETHING AWFUL MIGHT HAPPEN: NOT AT ALL
4. TROUBLE RELAXING: NOT AT ALL
GAD7 TOTAL SCORE: 1
2. NOT BEING ABLE TO STOP OR CONTROL WORRYING: NOT AT ALL
1. FEELING NERVOUS, ANXIOUS, OR ON EDGE: NOT AT ALL
GAD7 TOTAL SCORE: 1

## 2023-08-14 ASSESSMENT — PAIN SCALES - GENERAL: PAINLEVEL: NO PAIN (0)

## 2023-08-14 NOTE — PROGRESS NOTES
ANTICOAGULATION MANAGEMENT     Chidi Dubon 82 year old male is on warfarin with therapeutic INR result. (Goal INR 2.0-3.0)    Recent labs: (last 7 days)     08/14/23  1051   INR 2.2*       ASSESSMENT     Source(s): Chart Review  Previous INR was Therapeutic last 2(+) visits  Medication, diet, health changes since last INR chart reviewed; none identified         PLAN     Recommended plan for no diet, medication or health factor changes affecting INR     Dosing Instructions: Continue your current warfarin dose with next INR in 6 weeks       Summary  As of 8/14/2023      Full warfarin instructions:  1.25 mg every Sun, Wed; 2.5 mg all other days   Next INR check:  9/25/2023               Detailed voice message left for Chidi with dosing instructions and follow up date.     Contact [phone]  to schedule and with any changes, questions or concerns.     Education provided:   Please call back if any changes to your diet, medications or how you've been taking warfarin    Plan made per [facility] anticoagulation protocol    Solomon Rebolledo RN  Anticoagulation Clinic  8/14/2023    _______________________________________________________________________     Anticoagulation Episode Summary       Current INR goal:  2.0-3.0   TTR:  100.0 % (9.1 mo)   Target end date:  Indefinite   Send INR reminders to:  EMELIA SARGENT    Indications    Long-term (current) use of anticoagulants [Z79.01] [Z79.01]  Atrial Fibrillation [I48.20]             Comments:               Anticoagulation Care Providers       Provider Role Specialty Phone number    Martin Schultz PA-C Referring Family Medicine [phone]    Gen Marti PA Referring  [phone]

## 2023-08-14 NOTE — PROGRESS NOTES
"    Kareen Murillo is a 82 year old, presenting for the following health issues:  Hypertension, Results (Imaging June/July 2023), and Mental Health Problem        8/14/2023    10:58 AM   Additional Questions   Roomed by Jana Dixon CMA   Accompanied by None         8/14/2023    10:58 AM   Patient Reported Additional Medications   Patient reports taking the following new medications None       History of Present Illness       Mental Health Follow-up:  Patient presents to follow-up on Depression & Anxiety.Patient's depression since last visit has been:  Better  The patient is not having other symptoms associated with depression.  Patient's anxiety since last visit has been:  Better  The patient is not having other symptoms associated with anxiety.  Any significant life events: grief or loss and health concerns  Patient is not feeling anxious or having panic attacks.  Patient has no concerns about alcohol or drug use.    Vascular Disease:  He presents for follow up of vascular disease.      He takes daily aspirin.    He eats 2-3 servings of fruits and vegetables daily.He consumes 6 sweetened beverage(s) daily.He exercises with enough effort to increase his heart rate 10 to 19 minutes per day.  He exercises with enough effort to increase his heart rate 3 or less days per week.   He is taking medications regularly.       History of chf. Some patten , especially stairs.  No chest pain/palpitations.  Recheck of htn.   Fatigue.   No profound depression.     Review of Systems   Constitutional, HEENT, cardiovascular, pulmonary, GI, , musculoskeletal, neuro, skin, endocrine and psych systems are negative, except as otherwise noted.      Objective    /68   Pulse 61   Temp 98.1  F (36.7  C) (Temporal)   Resp 20   Ht 1.861 m (6' 1.25\")   Wt 123.4 kg (272 lb)   SpO2 95%   BMI 35.64 kg/m    Body mass index is 35.64 kg/m .  Physical Exam     Eye exam - right eye normal lid, conjunctiva, cornea, pupil and fundus, " left eye normal lid, conjunctiva, cornea, pupil and fundus.  Thyroid not palpable, not enlarged, no nodules detected.  CHEST:chest clear to IPPA, no tachypnea, retractions or cyanosis, and Heart exam detail:S1, S2 normal (paced rhythm), regular rate and rhythm, 2/6 JOSE murmur is heard at 2nd left intercostal space, chest is clear without rales or wheezing, no pedal edema, no JVD, no hepatosplenomegaly.  Mild lower extremity edema.    Chidi was seen today for hypertension, results and mental health problem.    Diagnoses and all orders for this visit:    Benign essential hypertension  -     Basic metabolic panel  (Ca, Cl, CO2, Creat, Gluc, K, Na, BUN); Future    Chronic heart failure with preserved ejection fraction (H)  -     BNP-N terminal pro; Future  -     Adult Cardiology Eval  Referral; Future    Chronic atrial fibrillation (H)  -     Adult Cardiology Eval  Referral; Future    Leg edema  -     Compression Sleeve/Stocking Order for DME - ONLY FOR DME    Other fatigue  -     Basic metabolic panel  (Ca, Cl, CO2, Creat, Gluc, K, Na, BUN); Future  -     CBC with platelets and differential; Future  -     TSH with free T4 reflex; Future    Decreased pulses in feet  -     US SOUTH Doppler No Exercise; Future      Continue all current meds.   Wear compression socks.  Lower sodium diet.   Due for a f/up with his cardiologist.

## 2023-08-31 DIAGNOSIS — I50.32 CHRONIC HEART FAILURE WITH PRESERVED EJECTION FRACTION (H): Primary | ICD-10-CM

## 2023-09-02 ENCOUNTER — LAB (OUTPATIENT)
Dept: LAB | Facility: CLINIC | Age: 83
End: 2023-09-02
Payer: COMMERCIAL

## 2023-09-02 DIAGNOSIS — I50.32 CHRONIC HEART FAILURE WITH PRESERVED EJECTION FRACTION (H): ICD-10-CM

## 2023-09-02 LAB — POTASSIUM SERPL-SCNC: 3.7 MMOL/L (ref 3.4–5.3)

## 2023-09-02 PROCEDURE — 36415 COLL VENOUS BLD VENIPUNCTURE: CPT

## 2023-09-02 PROCEDURE — 84132 ASSAY OF SERUM POTASSIUM: CPT

## 2023-10-02 ENCOUNTER — TELEPHONE (OUTPATIENT)
Dept: ANTICOAGULATION | Facility: CLINIC | Age: 83
End: 2023-10-02
Payer: COMMERCIAL

## 2023-10-02 NOTE — TELEPHONE ENCOUNTER
ANTICOAGULATION     Chidi Dubon is overdue for INR check.      Left message for patient to call and schedule lab appointment as soon as possible. If returning call, please schedule.     Solomon Rebolledo RN

## 2023-10-10 ENCOUNTER — TELEPHONE (OUTPATIENT)
Dept: ANTICOAGULATION | Facility: CLINIC | Age: 83
End: 2023-10-10
Payer: COMMERCIAL

## 2023-10-10 NOTE — TELEPHONE ENCOUNTER
ANTICOAGULATION     Chidi Dubon is overdue for INR check.      Left message for patient to call and schedule lab appointment as soon as possible. If returning call, please schedule.     Camilla Gil RN

## 2023-10-17 ENCOUNTER — DOCUMENTATION ONLY (OUTPATIENT)
Dept: ANTICOAGULATION | Facility: CLINIC | Age: 83
End: 2023-10-17
Payer: COMMERCIAL

## 2023-10-17 NOTE — LETTER
January 30, 2024      Chidi Dubon  [address]  [place] [address]        Dear ,    We are writing to inform you of your test results.    {results letter list:[id number]}    No results found from the In Basket message.    If you have any questions or concerns, please call the clinic at the number listed above.       Sincerely,

## 2023-10-17 NOTE — PROGRESS NOTES
ANTICOAGULATION     Chidi Dubon is overdue for an INR check.      Reminder letter sent     Mark Wall RN, BSN, PHN  Anticoagulation Clinic   [phone]

## 2023-10-17 NOTE — LETTER
January 30, 2024      Chidi Dubon  [address]  [place] [address]        Dear ,    We are writing to inform you of your test results.    {results letter list:[id number]}    No results found from the In Basket message.    If you have any questions or concerns, please call the clinic at the number listed above.       Sincerely,

## 2023-10-17 NOTE — LETTER
[facility]  [address]  [facility] [address]  Phone: [phone]  Fax: [phone]     October 17, 2023        Chidi Dubon  [address]  [place] [address]            Dear Chidi,    You are currently under the care of [facility] for your warfarin (Coumadin , Jantoven ) therapy.  We are contacting you because our records show you were due for an INR on 9/25/23.    There are potentially serious risks when taking warfarin without careful monitoring and we want to make sure you are safely managed.  Routine lab monitoring is required for warfarin refills.     Please call [phone]  as soon as possible to schedule an appointment.  If there has been a change in your care or other concerns, please let us know so we can help and/or update our records.     Sincerely,       [facility]

## 2023-10-23 ENCOUNTER — ANTICOAGULATION THERAPY VISIT (OUTPATIENT)
Dept: ANTICOAGULATION | Facility: CLINIC | Age: 83
End: 2023-10-23

## 2023-10-23 ENCOUNTER — LAB (OUTPATIENT)
Dept: LAB | Facility: CLINIC | Age: 83
End: 2023-10-23
Payer: COMMERCIAL

## 2023-10-23 ENCOUNTER — TELEPHONE (OUTPATIENT)
Dept: FAMILY MEDICINE | Facility: CLINIC | Age: 83
End: 2023-10-23

## 2023-10-23 DIAGNOSIS — I48.20 CHRONIC ATRIAL FIBRILLATION (H): ICD-10-CM

## 2023-10-23 DIAGNOSIS — Z79.01 LONG TERM CURRENT USE OF ANTICOAGULANT THERAPY: Primary | ICD-10-CM

## 2023-10-23 LAB — INR BLD: 2.7 (ref 0.9–1.1)

## 2023-10-23 PROCEDURE — 85610 PROTHROMBIN TIME: CPT

## 2023-10-23 PROCEDURE — 36416 COLLJ CAPILLARY BLOOD SPEC: CPT

## 2023-10-23 NOTE — PROGRESS NOTES
ANTICOAGULATION MANAGEMENT     Chidi Dubon 83 year old male is on warfarin with therapeutic INR result. (Goal INR 2.0-3.0)    Recent labs: (last 7 days)     10/23/23  1514   INR 2.7*       ASSESSMENT     Source(s): Chart Review  Previous INR was Therapeutic last 2(+) visits  Medication, diet, health changes since last INR chart reviewed; none identified         PLAN     Recommended plan for no diet, medication or health factor changes affecting INR     Dosing Instructions: Continue your current warfarin dose with next INR in 6 weeks       Summary  As of 10/23/2023      Full warfarin instructions:  1.25 mg every Sun, Wed; 2.5 mg all other days   Next INR check:  12/4/2023               Detailed voice message left for Chidi with dosing instructions and follow up date.     Contact [phone]  to schedule and with any changes, questions or concerns.     Education provided:   Please call back if any changes to your diet, medications or how you've been taking warfarin    Plan made per [facility] anticoagulation protocol    Solomon Rebolledo RN  Anticoagulation Clinic  10/23/2023    _______________________________________________________________________     Anticoagulation Episode Summary       Current INR goal:  2.0-3.0   TTR:  100.0% (9.1 mo)   Target end date:  Indefinite   Send INR reminders to:  EMELIA SARGENT    Indications    Long-term (current) use of anticoagulants [Z79.01] [Z79.01]  Atrial Fibrillation [I48.20]             Comments:               Anticoagulation Care Providers       Provider Role Specialty Phone number    Martin Schultz PA-C Referring Family Medicine [phone]    Gen Marti PA Referring  [phone]

## 2023-10-23 NOTE — TELEPHONE ENCOUNTER
General Call    Contacts         Type Contact Phone/Fax    10/23/2023 03:45 PM CDT Phone (Incoming) Chidi Dubon (Self) [phone] (M)          Reason for Call:  Patient states someone called him from this department and he is returning the call. Patient did have an INR done today.    Okay to leave a detailed message?: Yes at Home number on file [phone] (home)    Angela Buitrago   [facility]  Central Scheduler

## 2023-11-01 NOTE — TELEPHONE ENCOUNTER
Caller: Chidi    Provider: MD Elidia Boo    Detailed reason for call: Patient is asking for a call back to go over his results from this past summer.  States no one ever called him back and he wants to make sure he is okay to travel over the holidays.  He would also like a copy of the imaging that is discussed after that call to be mailed to him.  He plans to bring the imaging results with him when he travels just in case something happens and to have in his files.      Best phone number to contact: [phone]    Best time to contact: any    Ok to leave a detailed message: No- he is not good with messages    Ok to speak to authorized person if needed: No      (Noted to patient if reason is related to wound or incision, to please send a photo via email or [employer].)

## 2023-11-12 DIAGNOSIS — I10 BENIGN ESSENTIAL HYPERTENSION: ICD-10-CM

## 2023-11-12 DIAGNOSIS — G25.81 RESTLESS LEGS SYNDROME: ICD-10-CM

## 2023-11-13 RX ORDER — LOSARTAN POTASSIUM 100 MG/1
100 TABLET ORAL DAILY
Qty: 90 TABLET | Refills: 0 | Status: SHIPPED | OUTPATIENT
Start: 2023-11-13 | End: 2024-02-19

## 2023-11-13 RX ORDER — PRAMIPEXOLE DIHYDROCHLORIDE 0.12 MG/1
0.12 TABLET ORAL 3 TIMES DAILY
Qty: 90 TABLET | Refills: 0 | Status: SHIPPED | OUTPATIENT
Start: 2023-11-13 | End: 2024-02-19

## 2023-11-14 NOTE — TELEPHONE ENCOUNTER
Discussed with patient, he will need 1 year follow-up in June 2024 with CTA abd/pelvis and carotid screening ultrasound:  Serene Reyes PA-C  You13 minutes ago (3:11 PM)     RW  Aneurysms stable, no significant growth. We screened him for popliteal aneurysms as well, no evidence of popliteal aneurysm on LE ultrasound. Venous competency shows reflux at the R SSV mid calf and L GSV mid thigh, nothing amendable for treatment.    Will need ongoing surveillance of his AAA and iliac aneurysms. Thanks

## 2023-11-17 ENCOUNTER — TRANSFERRED RECORDS (OUTPATIENT)
Dept: HEALTH INFORMATION MANAGEMENT | Facility: CLINIC | Age: 83
End: 2023-11-17
Payer: COMMERCIAL

## 2023-12-01 ENCOUNTER — ANTICOAGULATION THERAPY VISIT (OUTPATIENT)
Dept: ANTICOAGULATION | Facility: CLINIC | Age: 83
End: 2023-12-01

## 2023-12-01 ENCOUNTER — LAB (OUTPATIENT)
Dept: LAB | Facility: CLINIC | Age: 83
End: 2023-12-01
Payer: COMMERCIAL

## 2023-12-01 DIAGNOSIS — I48.20 CHRONIC ATRIAL FIBRILLATION (H): ICD-10-CM

## 2023-12-01 DIAGNOSIS — Z79.01 LONG TERM CURRENT USE OF ANTICOAGULANT THERAPY: Primary | ICD-10-CM

## 2023-12-01 DIAGNOSIS — I50.9 CHRONIC CONGESTIVE HEART FAILURE, UNSPECIFIED HEART FAILURE TYPE (H): Primary | ICD-10-CM

## 2023-12-01 LAB — INR BLD: 2.2 (ref 0.9–1.1)

## 2023-12-01 PROCEDURE — 36415 COLL VENOUS BLD VENIPUNCTURE: CPT

## 2023-12-01 PROCEDURE — 84132 ASSAY OF SERUM POTASSIUM: CPT

## 2023-12-01 PROCEDURE — 36416 COLLJ CAPILLARY BLOOD SPEC: CPT

## 2023-12-01 PROCEDURE — 85610 PROTHROMBIN TIME: CPT

## 2023-12-01 PROCEDURE — 82565 ASSAY OF CREATININE: CPT

## 2023-12-01 NOTE — PROGRESS NOTES
ANTICOAGULATION MANAGEMENT     Chidi Dubon 83 year old male is on warfarin with therapeutic INR result. (Goal INR 2.0-3.0)    Recent labs: (last 7 days)     12/01/23  1533   INR 2.2*       ASSESSMENT     Source(s): Chart Review  Previous INR was Therapeutic last 2(+) visits  Medication, diet, health changes since last INR chart reviewed; none identified         PLAN     Recommended plan for no diet, medication or health factor changes affecting INR     Dosing Instructions: Continue your current warfarin dose with next INR in 6 weeks       Summary  As of 12/1/2023      Full warfarin instructions:  1.25 mg every Sun, Wed; 2.5 mg all other days   Next INR check:  1/12/2024               Detailed voice message left for Chidi with dosing instructions and follow up date.     Contact [phone]  to schedule and with any changes, questions or concerns.     Education provided:   Please call back if any changes to your diet, medications or how you've been taking warfarin    Plan made per [facility] anticoagulation protocol    Solomon Rebolledo RN  Anticoagulation Clinic  12/1/2023    _______________________________________________________________________     Anticoagulation Episode Summary       Current INR goal:  2.0-3.0   TTR:  100.0% (9.1 mo)   Target end date:  Indefinite   Send INR reminders to:  EMELIA SARGENT    Indications    Long-term (current) use of anticoagulants [Z79.01] [Z79.01]  Atrial Fibrillation [I48.20]             Comments:               Anticoagulation Care Providers       Provider Role Specialty Phone number    Martin Schultz PA-C Referring Family Medicine [phone]    Gen Marti PA Referring  [phone]

## 2023-12-02 LAB
CREAT SERPL-MCNC: 1.14 MG/DL (ref 0.67–1.17)
EGFRCR SERPLBLD CKD-EPI 2021: 64 ML/MIN/1.73M2
POTASSIUM SERPL-SCNC: 3.4 MMOL/L (ref 3.4–5.3)

## 2023-12-14 NOTE — LETTER
My Depression Action Plan  Name: Chidi Dubon   Date of Birth 1940  Date: 4/20/2017    My doctor: Martin Schultz   My clinic: [facility]  [address]  [place] [address] [phone]          GREEN    ZONE   Good Control    What it looks like:     Things are going generally well. You have normal up s and down s. You may even feel depressed from time to time, but bad moods usually last less than a day.   What you need to do:  1. Continue to care for yourself (see self care plan)  2. Check your depression survival kit and update it as needed  3. Follow your physician s recommendations including any medication.  4. Do not stop taking medication unless you consult with your physician first.           YELLOW         ZONE Getting Worse    What it looks like:     Depression is starting to interfere with your life.     It may be hard to get out of bed; you may be starting to isolate yourself from others.    Symptoms of depression are starting to last most all day and this has happened for several days.     You may have suicidal thoughts but they are not constant.   What you need to do:     1. Call your care team, your response to treatment will improve if you keep your care team informed of your progress. Yellow periods are signs an adjustment may need to be made.     2. Continue your self-care, even if you have to fake it!    3. Talk to someone in your support network    4. Open up your depression survival kit           RED    ZONE Medical Alert - Get Help    What it looks like:     Depression is seriously interfering with your life.     You may experience these or other symptoms: You can t get out of bed most days, can t work or engage in other necessary activities, you have trouble taking care of basic hygiene, or basic responsibilities, thoughts of suicide or death that will not go away, self-injurious behavior.     What you need to do:  1. Call your care team  Dr Levine and request a same-day appointment. If they are not available (weekends or after hours) call your local crisis line, emergency room or 911.      Electronically signed by: Kerry Osborn, April 20, 2017    Depression Self Care Plan / Survival Kit    Self-Care for Depression  Here s the deal. Your body and mind are really not as separate as most people think.  What you do and think affects how you feel and how you feel influences what you do and think. This means if you do things that people who feel good do, it will help you feel better.  Sometimes this is all it takes.  There is also a place for medication and therapy depending on how severe your depression is, so be sure to consult with your medical provider and/ or Behavioral Health Consultant if your symptoms are worsening or not improving.     In order to better manage my stress, I will:    Exercise  Get some form of exercise, every day. This will help reduce pain and release endorphins, the  feel good  chemicals in your brain. This is almost as good as taking antidepressants!  This is not the same as joining a gym and then never going! (they count on that by the way ) It can be as simple as just going for a walk or doing some gardening, anything that will get you moving.      Hygiene   Maintain good hygiene (Get out of bed in the morning, Make your bed, Brush your teeth, Take a shower, and Get dressed like you were going to work, even if you are unemployed).  If your clothes don't fit try to get ones that do.    Diet  I will strive to eat foods that are good for me, drink plenty of water, and avoid excessive sugar, caffeine, alcohol, and other mood-altering substances.  Some foods that are helpful in depression are: complex carbohydrates, B vitamins, flaxseed, fish or fish oil, fresh fruits and vegetables.    Psychotherapy  I agree to participate in Individual Therapy (if recommended).    Medication  If prescribed medications, I agree to take them.  Missing  Brady Almazan MD doses can result in serious side effects.  I understand that drinking alcohol, or other illicit drug use, may cause potential side effects.  I will not stop my medication abruptly without first discussing it with my provider.    Staying Connected With Others  I will stay in touch with my friends, family members, and my primary care provider/team.    Use your imagination  Be creative.  We all have a creative side; it doesn t matter if it s oil painting, sand castles, or mud pies! This will also kick up the endorphins.    Witness Beauty  (AKA stop and smell the roses) Take a look outside, even in mid-winter. Notice colors, textures. Watch the squirrels and birds.     Service to others  Be of service to others.  There is always someone else in need.  By helping others we can  get out of ourselves  and remember the really important things.  This also provides opportunities for practicing all the other parts of the program.    Humor  Laugh and be silly!  Adjust your TV habits for less news and crime-drama and more comedy.    Control your stress  Try breathing deep, massage therapy, biofeedback, and meditation. Find time to relax each day.     My support system    Clinic Contact:  Phone number:    Contact 1:  Phone number:    Contact 2:  Phone number:    Presybeterian/:  Phone number:    Therapist:  Phone number:    Local crisis center:    Phone number:    Other community support:  Phone number:       Dr Almazan

## 2024-01-09 LAB — INR (EXTERNAL): 2.6 (ref 0–1.3)

## 2024-01-10 LAB — INR (EXTERNAL): 2 (ref 0–1.3)

## 2024-01-12 LAB — INR (EXTERNAL): 1.5 (ref 0–1.3)

## 2024-01-13 LAB — INR (EXTERNAL): 1.9 (ref 0–1.3)

## 2024-01-18 ENCOUNTER — ANCILLARY PROCEDURE (OUTPATIENT)
Dept: ULTRASOUND IMAGING | Facility: CLINIC | Age: 84
End: 2024-01-18
Attending: PHYSICIAN ASSISTANT
Payer: COMMERCIAL

## 2024-01-18 ENCOUNTER — OFFICE VISIT (OUTPATIENT)
Dept: FAMILY MEDICINE | Facility: CLINIC | Age: 84
End: 2024-01-18
Payer: COMMERCIAL

## 2024-01-18 VITALS
BODY MASS INDEX: 34.67 KG/M2 | HEART RATE: 73 BPM | RESPIRATION RATE: 24 BRPM | TEMPERATURE: 98.2 F | WEIGHT: 261.6 LBS | HEIGHT: 73 IN | DIASTOLIC BLOOD PRESSURE: 72 MMHG | OXYGEN SATURATION: 96 % | SYSTOLIC BLOOD PRESSURE: 126 MMHG

## 2024-01-18 DIAGNOSIS — I50.33 ACUTE ON CHRONIC HEART FAILURE WITH PRESERVED EJECTION FRACTION (H): ICD-10-CM

## 2024-01-18 DIAGNOSIS — Z90.49 S/P CHOLECYSTECTOMY: ICD-10-CM

## 2024-01-18 DIAGNOSIS — I48.20 CHRONIC ATRIAL FIBRILLATION (H): ICD-10-CM

## 2024-01-18 DIAGNOSIS — R06.2 WHEEZING: ICD-10-CM

## 2024-01-18 DIAGNOSIS — I71.40 ABDOMINAL AORTIC ANEURYSM (AAA) WITHOUT RUPTURE, UNSPECIFIED PART (H): ICD-10-CM

## 2024-01-18 DIAGNOSIS — F32.5 DEPRESSION, MAJOR, IN REMISSION (H): ICD-10-CM

## 2024-01-18 DIAGNOSIS — R60.0 BILATERAL LEG EDEMA: ICD-10-CM

## 2024-01-18 DIAGNOSIS — N18.32 STAGE 3B CHRONIC KIDNEY DISEASE (H): ICD-10-CM

## 2024-01-18 DIAGNOSIS — I25.119 CORONARY ARTERY DISEASE INVOLVING NATIVE HEART WITH ANGINA PECTORIS, UNSPECIFIED VESSEL OR LESION TYPE (H): ICD-10-CM

## 2024-01-18 DIAGNOSIS — Z09 HOSPITAL DISCHARGE FOLLOW-UP: Primary | ICD-10-CM

## 2024-01-18 DIAGNOSIS — E66.01 MORBID OBESITY (H): ICD-10-CM

## 2024-01-18 DIAGNOSIS — N50.89 SCROTAL SWELLING: ICD-10-CM

## 2024-01-18 PROBLEM — I50.30 (HFPEF) HEART FAILURE WITH PRESERVED EJECTION FRACTION (H): Status: RESOLVED | Noted: 2019-05-17 | Resolved: 2024-01-18

## 2024-01-18 PROBLEM — N18.30 CKD (CHRONIC KIDNEY DISEASE) STAGE 3, GFR 30-59 ML/MIN (H): Status: RESOLVED | Noted: 2019-06-19 | Resolved: 2024-01-18

## 2024-01-18 LAB
BASOPHILS # BLD AUTO: 0.1 10E3/UL (ref 0–0.2)
BASOPHILS NFR BLD AUTO: 1 %
EOSINOPHIL # BLD AUTO: 0.3 10E3/UL (ref 0–0.7)
EOSINOPHIL NFR BLD AUTO: 5 %
ERYTHROCYTE [DISTWIDTH] IN BLOOD BY AUTOMATED COUNT: 17.1 % (ref 10–15)
HCT VFR BLD AUTO: 41.4 % (ref 40–53)
HGB BLD-MCNC: 13 G/DL (ref 13.3–17.7)
IMM GRANULOCYTES # BLD: 0 10E3/UL
IMM GRANULOCYTES NFR BLD: 0 %
INR PPP: 1.76 (ref 0.85–1.15)
LYMPHOCYTES # BLD AUTO: 1.5 10E3/UL (ref 0.8–5.3)
LYMPHOCYTES NFR BLD AUTO: 27 %
MCH RBC QN AUTO: 27.4 PG (ref 26.5–33)
MCHC RBC AUTO-ENTMCNC: 31.4 G/DL (ref 31.5–36.5)
MCV RBC AUTO: 87 FL (ref 78–100)
MONOCYTES # BLD AUTO: 0.5 10E3/UL (ref 0–1.3)
MONOCYTES NFR BLD AUTO: 9 %
NEUTROPHILS # BLD AUTO: 3.3 10E3/UL (ref 1.6–8.3)
NEUTROPHILS NFR BLD AUTO: 58 %
PLATELET # BLD AUTO: 192 10E3/UL (ref 150–450)
RBC # BLD AUTO: 4.75 10E6/UL (ref 4.4–5.9)
WBC # BLD AUTO: 5.7 10E3/UL (ref 4–11)

## 2024-01-18 PROCEDURE — 80048 BASIC METABOLIC PNL TOTAL CA: CPT | Performed by: PHYSICIAN ASSISTANT

## 2024-01-18 PROCEDURE — 99214 OFFICE O/P EST MOD 30 MIN: CPT | Performed by: PHYSICIAN ASSISTANT

## 2024-01-18 PROCEDURE — 85610 PROTHROMBIN TIME: CPT | Performed by: PHYSICIAN ASSISTANT

## 2024-01-18 PROCEDURE — 85025 COMPLETE CBC W/AUTO DIFF WBC: CPT | Performed by: PHYSICIAN ASSISTANT

## 2024-01-18 PROCEDURE — 93970 EXTREMITY STUDY: CPT | Mod: TC | Performed by: RADIOLOGY

## 2024-01-18 PROCEDURE — 36415 COLL VENOUS BLD VENIPUNCTURE: CPT | Performed by: PHYSICIAN ASSISTANT

## 2024-01-18 RX ORDER — ACETAMINOPHEN 325 MG/1
TABLET ORAL EVERY 6 HOURS
COMMUNITY
Start: 2024-01-11 | End: 2024-04-23

## 2024-01-18 RX ORDER — ASPIRIN 81 MG/1
81 TABLET ORAL
COMMUNITY

## 2024-01-18 RX ORDER — SPIRONOLACTONE 25 MG/1
1 TABLET ORAL DAILY
COMMUNITY
Start: 2023-11-17

## 2024-01-18 RX ORDER — ESCITALOPRAM OXALATE 5 MG/1
5 TABLET ORAL DAILY
Qty: 45 TABLET | Refills: 0 | Status: SHIPPED | OUTPATIENT
Start: 2024-01-18 | End: 2024-04-23

## 2024-01-18 RX ORDER — ALBUTEROL SULFATE 90 UG/1
2 AEROSOL, METERED RESPIRATORY (INHALATION) EVERY 6 HOURS PRN
Qty: 18 G | Refills: 0 | Status: SHIPPED | OUTPATIENT
Start: 2024-01-18

## 2024-01-18 ASSESSMENT — PAIN SCALES - GENERAL: PAINLEVEL: NO PAIN (0)

## 2024-01-18 NOTE — PROGRESS NOTES
"    Kareen Murillo is a 83 year old, presenting for the following health issues:  Hospital F/U (1/9/24 - 1/13/24/Abdominal pain, Atrial Fibrillation/[facility])        1/18/2024    11:41 AM   Additional Questions   Roomed by Jana Dixon CMA   Accompanied by None         1/18/2024    11:41 AM   Patient Reported Additional Medications   Patient reports taking the following new medications none     HPI       Hospital Follow-up Visit:    Hospital/Nursing Home/ Rehab Facility:  [facility]  Date of Admission: 1/9/24  Date of Discharge: 1/13/24  Reason(s) for Admission: Abdominal Pain, Atrial Fibrilation    Was your hospitalization related to COVID-19? No   Problems taking medications regularly:  None  Medication changes since discharge: None  Problems adhering to non-medication therapy:  None    Summary of hospitalization:  CareEverywhere information obtained and reviewed  Doing well overall. No abd pain. Some scrotal swelling and abd bruising.  Dependent edema.  Recheck of chf and ckd.  Not wearing compression socks.  Depression stable.   A fib stable. On warfarin.  No chest pain/sob/palpitations/dizziness/ha's  Lower potassium in  hospital.  Taking a potassium supplement daily.   No calf pain  No fevers. No irritative/obstructive voiding symptoms.       Review of Systems  Constitutional, neuro, ENT, endocrine, pulmonary, cardiac, gastrointestinal, genitourinary, musculoskeletal, integument and psychiatric systems are negative, except as otherwise noted.      Objective    /72   Pulse 73   Temp 98.2  F (36.8  C) (Temporal)   Resp 24   Ht 1.861 m (6' 1.25\")   Wt 118.7 kg (261 lb 9.6 oz)   SpO2 96%   BMI 34.28 kg/m    Body mass index is 34.28 kg/m .  Physical Exam   Incisions healing well. Some mild subcutaneous bruising.  Scrotal swelling. No pain. Some bilateral lower extremity edema.  CHEST:chest clear to IPPA, no tachypnea, retractions or cyanosis, and Heart exam detail:irregularly irregular " rhythm, 1-2/6 JOSE murmur is heard at 2nd left intercostal space, chest is clear without rales or wheezing, no pedal edema, no JVD, no hepatosplenomegaly.  Thyroid not palpable, not enlarged, no nodules detected.  Eye exam - right eye normal lid, conjunctiva, cornea, pupil and fundus, left eye normal lid, conjunctiva, cornea, pupil and fundus.    Chidi was seen today for hospital f/u.    Diagnoses and all orders for this visit:    Hospital discharge follow-up    Acute on chronic heart failure with preserved ejection fraction (H)    Depression, major, in remission (H24)  -     escitalopram (LEXAPRO) 5 MG tablet; Take 1 tablet (5 mg) by mouth daily    Chronic atrial fibrillation (H)  -     INR; Future    Abdominal aortic aneurysm (AAA) without rupture, unspecified part (H24)    Morbid obesity (H)    Coronary artery disease involving native heart with angina pectoris, unspecified vessel or lesion type (H24)    Stage 3b chronic kidney disease (H)  -     Basic metabolic panel  (Ca, Cl, CO2, Creat, Gluc, K, Na, BUN); Future  -     CBC with platelets and differential; Future    S/P cholecystectomy    Scrotal swelling  -     US Testicular & Scrotum w Doppler Ltd; Future      Continue all current meds.     Signed Electronically by: Martin Schultz PA-C

## 2024-01-19 ENCOUNTER — ANTICOAGULATION THERAPY VISIT (OUTPATIENT)
Dept: ANTICOAGULATION | Facility: CLINIC | Age: 84
End: 2024-01-19
Payer: COMMERCIAL

## 2024-01-19 ENCOUNTER — TELEPHONE (OUTPATIENT)
Dept: FAMILY MEDICINE | Facility: CLINIC | Age: 84
End: 2024-01-19
Payer: COMMERCIAL

## 2024-01-19 DIAGNOSIS — Z79.01 LONG TERM CURRENT USE OF ANTICOAGULANT THERAPY: Primary | ICD-10-CM

## 2024-01-19 DIAGNOSIS — I48.20 CHRONIC ATRIAL FIBRILLATION (H): ICD-10-CM

## 2024-01-19 LAB
ANION GAP SERPL CALCULATED.3IONS-SCNC: 9 MMOL/L (ref 7–15)
BUN SERPL-MCNC: 8.9 MG/DL (ref 8–23)
CALCIUM SERPL-MCNC: 8.9 MG/DL (ref 8.8–10.2)
CHLORIDE SERPL-SCNC: 106 MMOL/L (ref 98–107)
CREAT SERPL-MCNC: 1.09 MG/DL (ref 0.67–1.17)
DEPRECATED HCO3 PLAS-SCNC: 27 MMOL/L (ref 22–29)
EGFRCR SERPLBLD CKD-EPI 2021: 68 ML/MIN/1.73M2
GLUCOSE SERPL-MCNC: 87 MG/DL (ref 70–99)
POTASSIUM SERPL-SCNC: 4.1 MMOL/L (ref 3.4–5.3)
SODIUM SERPL-SCNC: 142 MMOL/L (ref 135–145)

## 2024-01-19 NOTE — PROGRESS NOTES
ANTICOAGULATION  MANAGEMENT: Discharge Review    Chidi Dubon chart reviewed for anticoagulation continuity of care    Hospital Admission on 1/9-1/13 for symptomatic cholelithiasis s/p lap cholecystectomy.    Discharge disposition: Home    Results:  Recent labs: (last 7 days)     01/18/24  1246   INR 1.76*     Anticoagulation inpatient management:     reversed with IV vit K on1/9 (2.5mg) and 1/10 (2mg) and held warfarin due to lap brandyn  and resumed on 1/12/24    Anticoagulation discharge instructions:     Warfarin dosing: home regimen continued and Next INR 1/15/24   Bridging: No   INR goal change: No      Medication changes affecting anticoagulation: No-- did have cefdinir IV x2 doses and metronidazole  x1 dose IV, x3 doses PO    Additional factors affecting anticoagulation: Yes: recent surgery

## 2024-01-19 NOTE — PROGRESS NOTES
ANTICOAGULATION MANAGEMENT     Chidi Dubon 83 year old male is on warfarin with subtherapeutic INR result. (Goal INR 2.0-3.0)    Recent labs: (last 7 days)     01/18/24  1246   INR 1.76*       ASSESSMENT     Source(s): Chart Review and Patient/Caregiver Call     Warfarin doses taken: While hospitalized on 1/9-1/13: reversed with IV vit K on 1/9 and 1/10 and held warfarin due to surgery  and resumed on 1/12.  Discharged on: home regimen continued  Diet: No new diet changes identified  Medication/supplement changes: None noted  New illness, injury, or hospitalization: recent surgery  Signs or symptoms of bleeding or clotting: No  Previous result: Subtherapeutic  Additional findings: None       PLAN     Recommended plan for temporary change(s) affecting INR     Dosing Instructions: Continue your current warfarin dose with next INR in 1 week       Summary  As of 1/19/2024      Full warfarin instructions:  1.25 mg every Sun, Wed; 2.5 mg all other days   Next INR check:  1/23/2024               Telephone call with Chidi who verbalizes understanding and agrees to plan    States that he will check his INR on Tuesday when he is in for an     Education provided:   Contact [phone]  with any changes, questions or concerns.     Plan made per [facility] anticoagulation protocol    Sharda Moreira RN  Anticoagulation Clinic  1/19/2024    _______________________________________________________________________     Anticoagulation Episode Summary       Current INR goal:  2.0-3.0   TTR:  97.0% (9.1 mo)   Target end date:  Indefinite   Send INR reminders to:  [facility] [place]    Indications    Long-term (current) use of anticoagulants [Z79.01] [Z79.01]  Atrial Fibrillation [I48.20]             Comments:               Anticoagulation Care Providers       Provider Role Specialty Phone number    Martin Schultz PA-C Referring Family Medicine [phone]    Gen Marti PA Referring  [phone]

## 2024-01-19 NOTE — TELEPHONE ENCOUNTER
Patient Returning Call    Reason for call:  Chidi is returning a call from [facility] from [facility]. He would like to get a call back.    Information relayed to patient:  The INR team will give you a call back once they receive this message    Patient has additional questions:  No      Okay to leave a detailed message?: Yes at Cell number on file:    Telephone Information:   Mobile [phone]

## 2024-01-19 NOTE — TELEPHONE ENCOUNTER
See anticoagulation encounter from today.    Sharda Moreira RN  [facility]  [phone] today

## 2024-01-23 ENCOUNTER — LAB (OUTPATIENT)
Dept: LAB | Facility: CLINIC | Age: 84
End: 2024-01-23
Payer: COMMERCIAL

## 2024-01-23 ENCOUNTER — ANCILLARY PROCEDURE (OUTPATIENT)
Dept: ULTRASOUND IMAGING | Facility: CLINIC | Age: 84
End: 2024-01-23
Attending: PHYSICIAN ASSISTANT
Payer: COMMERCIAL

## 2024-01-23 ENCOUNTER — ANTICOAGULATION THERAPY VISIT (OUTPATIENT)
Dept: ANTICOAGULATION | Facility: CLINIC | Age: 84
End: 2024-01-23

## 2024-01-23 DIAGNOSIS — I48.20 CHRONIC ATRIAL FIBRILLATION (H): ICD-10-CM

## 2024-01-23 DIAGNOSIS — Z79.01 LONG TERM CURRENT USE OF ANTICOAGULANT THERAPY: Primary | ICD-10-CM

## 2024-01-23 DIAGNOSIS — N50.89 SCROTAL SWELLING: ICD-10-CM

## 2024-01-23 LAB — INR BLD: 2 (ref 0.9–1.1)

## 2024-01-23 PROCEDURE — 76870 US EXAM SCROTUM: CPT | Mod: TC | Performed by: RADIOLOGY

## 2024-01-23 PROCEDURE — 93976 VASCULAR STUDY: CPT | Mod: TC | Performed by: RADIOLOGY

## 2024-01-23 PROCEDURE — 36416 COLLJ CAPILLARY BLOOD SPEC: CPT

## 2024-01-23 PROCEDURE — 85610 PROTHROMBIN TIME: CPT

## 2024-01-23 NOTE — PROGRESS NOTES
ANTICOAGULATION MANAGEMENT     Chidi Dubon 83 year old male is on warfarin with therapeutic INR result. (Goal INR 2.0-3.0)    Recent labs: (last 7 days)     01/23/24  1251   INR 2.0*       ASSESSMENT     Source(s): Chart Review  Previous INR was Subtherapeutic  Medication, diet, health changes since last INR chart reviewed; none identified         PLAN     Recommended plan for no diet, medication or health factor changes affecting INR     Dosing Instructions: Continue your current warfarin dose with next INR in 2 weeks       Summary  As of 1/23/2024      Full warfarin instructions:  1.25 mg every Sun, Wed; 2.5 mg all other days   Next INR check:  2/6/2024               Detailed voice message left for Chidi with dosing instructions and follow up date.     Lab visit scheduled    Education provided:   Please call back if any changes to your diet, medications or how you've been taking warfarin    Plan made per [facility] anticoagulation protocol    Naa Baez, RN  Anticoagulation Clinic  1/23/2024    _______________________________________________________________________     Anticoagulation Episode Summary       Current INR goal:  2.0-3.0   TTR:  95.2% (9.1 mo)   Target end date:  Indefinite   Send INR reminders to:  EMELIA SARGENT    Indications    Long-term (current) use of anticoagulants [Z79.01] [Z79.01]  Atrial Fibrillation [I48.20]             Comments:               Anticoagulation Care Providers       Provider Role Specialty Phone number    Martin Schultz PA-C Referring Family Medicine [phone]    Gen Marti PA Referring  [phone]

## 2024-01-26 ENCOUNTER — TELEPHONE (OUTPATIENT)
Dept: FAMILY MEDICINE | Facility: CLINIC | Age: 84
End: 2024-01-26
Payer: COMMERCIAL

## 2024-01-26 NOTE — LETTER
January 31, 2024      Chidi Dubon  [address]  [place] [address]        Dear ,      We are writing to inform you of your test results.      No concerning findings on your recent scrotal US.         If you have any questions or concerns, please call the clinic at the number listed above.       Sincerely,      Martin Schultz PA-C

## 2024-01-26 NOTE — LETTER
January 31, 2024      Chidi Dubon  [address]  [place] [address]        Dear ,    We are writing to inform you of your test results.    Your recent US was negative for a blood clot.     Resulted Orders   US Lower Extremity Venous Duplex Bilateral    Narrative    VENOUS ULTRASOUND BILATERAL LOWER EXTREMITIES January 18, 2024 2:38 PM      HISTORY: Bilateral leg edema.    COMPARISON: None.    TECHNIQUE: Color Doppler and spectral waveform analysis performed  throughout the deep veins of both lower extremities.    FINDINGS: Both common femoral, proximal great saphenous, femoral, and  popliteal veins demonstrate normal blood flow, compression, and  augmentation. Posterior tibial and peroneal veins are compressible.      Impression    IMPRESSION: Negative for deep venous thrombosis throughout both lower  extremities.    KATLYN RANDLE MD         SYSTEM ID:  [id number]       If you have any questions or concerns, please call the clinic at the number listed above.       Sincerely,    Martin Schultz PA-C

## 2024-01-26 NOTE — TELEPHONE ENCOUNTER
----- Message from Martin Schultz PA-C sent at 1/26/2024  5:43 AM CST -----  Let rosmery know that his recent US was negative for a blood clot.    Martin Schultz PA-C  1/26/2024  5:53 AM CST Back to Top      No concerning findings on your recent scrotal US.

## 2024-01-26 NOTE — TELEPHONE ENCOUNTER
RN left message to return call to clinic [phone]    Nettie Muller RN on 1/26/2024 at 10:57 AM

## 2024-01-30 NOTE — TELEPHONE ENCOUNTER
I called and spoke to patient, advised him of scrotal US result.    He says he has been advised that he has no blood clot in the leg.    He says this US was done due to swelling due to fluid build up when he was taken off his diuretic while he was in the hospital.   Since he has come home and is back on his diuretic he says it has decreased about 50%.    He says he is not in any pain.       He doesn't own a computer, he had me decline [facility]     He wants hard copy of his 1/18 and 1/23 US reports mailed to him.    Routed to TC team to print US reports from 1/18 and 1/23 and mail to him.    Galina GARDNER RN  [facility] Triage

## 2024-02-08 ENCOUNTER — TELEPHONE (OUTPATIENT)
Dept: ANTICOAGULATION | Facility: CLINIC | Age: 84
End: 2024-02-08
Payer: COMMERCIAL

## 2024-02-08 NOTE — TELEPHONE ENCOUNTER
ANTICOAGULATION     Chidi Dubon is overdue for an INR check.     Left message for patient to call and schedule lab appointment as soon as possible. If returning call, please schedule.     Solomon Rebolledo RN

## 2024-02-15 ENCOUNTER — TELEPHONE (OUTPATIENT)
Dept: ANTICOAGULATION | Facility: CLINIC | Age: 84
End: 2024-02-15
Payer: COMMERCIAL

## 2024-02-15 NOTE — TELEPHONE ENCOUNTER
ANTICOAGULATION     Chidi Dubon is overdue for an INR check.     Left message for patient to call and schedule lab appointment as soon as possible. If returning call, please schedule.     Camilla Gil RN

## 2024-02-22 ENCOUNTER — TELEPHONE (OUTPATIENT)
Dept: ANTICOAGULATION | Facility: CLINIC | Age: 84
End: 2024-02-22
Payer: COMMERCIAL

## 2024-02-22 ENCOUNTER — TELEPHONE (OUTPATIENT)
Dept: FAMILY MEDICINE | Facility: CLINIC | Age: 84
End: 2024-02-22

## 2024-02-22 NOTE — TELEPHONE ENCOUNTER
RN received call from patient.    Patient has small lump where he had his gall bladder out.    Painful on and off.  Currently no pain.    No redness warmth fever.    RN assisted in scheduling an appointment for patient to be evaluated.      Patient verbalized understanding.    Jose Maria Wall, RN, BSN, PHN  [facility]

## 2024-02-22 NOTE — LETTER
[facility]  [address]  [facility] [address]  Phone: [phone]  Fax: [phone]   February 22, 2024        Chidi Dubon  [address]  [place] [address]            Dear Chidi,    You are currently under the care of [facility] for your warfarin (Coumadin , Jantoven ) therapy.  We are contacting you because our records show you were due for an INR on 2/6/24.    There are potentially serious risks when taking warfarin without careful monitoring and we want to make sure you are safely managed.  Routine lab monitoring is required for warfarin refills.     Please call [phone]  as soon as possible to schedule a lab appointment. If it is difficult for you to get to lab, please call us to discuss options.  If there has been a change in your care or other concerns, please let us know so we can help and/or update our records.         Sincerely,       [facility]

## 2024-02-23 ENCOUNTER — ANCILLARY PROCEDURE (OUTPATIENT)
Dept: CT IMAGING | Facility: CLINIC | Age: 84
End: 2024-02-23
Attending: STUDENT IN AN ORGANIZED HEALTH CARE EDUCATION/TRAINING PROGRAM
Payer: COMMERCIAL

## 2024-02-23 ENCOUNTER — OFFICE VISIT (OUTPATIENT)
Dept: FAMILY MEDICINE | Facility: CLINIC | Age: 84
End: 2024-02-23
Payer: COMMERCIAL

## 2024-02-23 VITALS
HEIGHT: 73 IN | BODY MASS INDEX: 32.63 KG/M2 | DIASTOLIC BLOOD PRESSURE: 58 MMHG | TEMPERATURE: 98.2 F | OXYGEN SATURATION: 97 % | SYSTOLIC BLOOD PRESSURE: 120 MMHG | WEIGHT: 246.2 LBS | HEART RATE: 68 BPM | RESPIRATION RATE: 18 BRPM

## 2024-02-23 DIAGNOSIS — Z90.49 S/P CHOLECYSTECTOMY: ICD-10-CM

## 2024-02-23 DIAGNOSIS — R19.09 BULGE IN GROIN AREA: Primary | ICD-10-CM

## 2024-02-23 DIAGNOSIS — R10.2 PELVIC PAIN IN MALE: ICD-10-CM

## 2024-02-23 DIAGNOSIS — R19.09 BULGE IN GROIN AREA: ICD-10-CM

## 2024-02-23 LAB
ALBUMIN SERPL BCG-MCNC: 3.8 G/DL (ref 3.5–5.2)
ALBUMIN UR-MCNC: NEGATIVE MG/DL
ALP SERPL-CCNC: 120 U/L (ref 40–150)
ALT SERPL W P-5'-P-CCNC: 18 U/L (ref 0–70)
ANION GAP SERPL CALCULATED.3IONS-SCNC: 8 MMOL/L (ref 7–15)
APPEARANCE UR: CLEAR
AST SERPL W P-5'-P-CCNC: 38 U/L (ref 0–45)
BILIRUB SERPL-MCNC: 2.6 MG/DL
BILIRUB UR QL STRIP: NEGATIVE
BUN SERPL-MCNC: 15.3 MG/DL (ref 8–23)
CALCIUM SERPL-MCNC: 9.1 MG/DL (ref 8.8–10.2)
CHLORIDE SERPL-SCNC: 107 MMOL/L (ref 98–107)
COLOR UR AUTO: YELLOW
CREAT BLD-MCNC: 1.1 MG/DL (ref 0.7–1.3)
CREAT SERPL-MCNC: 1.06 MG/DL (ref 0.67–1.17)
DEPRECATED HCO3 PLAS-SCNC: 24 MMOL/L (ref 22–29)
EGFRCR SERPLBLD CKD-EPI 2021: 70 ML/MIN/1.73M2
EGFRCR SERPLBLD CKD-EPI 2021: >60 ML/MIN/1.73M2
ERYTHROCYTE [DISTWIDTH] IN BLOOD BY AUTOMATED COUNT: 17.3 % (ref 10–15)
GLUCOSE SERPL-MCNC: 96 MG/DL (ref 70–99)
GLUCOSE UR STRIP-MCNC: NEGATIVE MG/DL
HCT VFR BLD AUTO: 41.2 % (ref 40–53)
HGB BLD-MCNC: 12.9 G/DL (ref 13.3–17.7)
HGB UR QL STRIP: ABNORMAL
KETONES UR STRIP-MCNC: NEGATIVE MG/DL
LEUKOCYTE ESTERASE UR QL STRIP: NEGATIVE
MCH RBC QN AUTO: 27.9 PG (ref 26.5–33)
MCHC RBC AUTO-ENTMCNC: 31.3 G/DL (ref 31.5–36.5)
MCV RBC AUTO: 89 FL (ref 78–100)
NITRATE UR QL: NEGATIVE
PH UR STRIP: 6 [PH] (ref 5–7)
PLATELET # BLD AUTO: 153 10E3/UL (ref 150–450)
POTASSIUM SERPL-SCNC: 4.1 MMOL/L (ref 3.4–5.3)
PROT SERPL-MCNC: 7 G/DL (ref 6.4–8.3)
RBC # BLD AUTO: 4.63 10E6/UL (ref 4.4–5.9)
RBC #/AREA URNS AUTO: NORMAL /HPF
SODIUM SERPL-SCNC: 139 MMOL/L (ref 135–145)
SP GR UR STRIP: >=1.03 (ref 1–1.03)
UROBILINOGEN UR STRIP-ACNC: 1 E.U./DL
WBC # BLD AUTO: 5.4 10E3/UL (ref 4–11)
WBC #/AREA URNS AUTO: NORMAL /HPF

## 2024-02-23 PROCEDURE — 82565 ASSAY OF CREATININE: CPT | Performed by: STUDENT IN AN ORGANIZED HEALTH CARE EDUCATION/TRAINING PROGRAM

## 2024-02-23 PROCEDURE — 80053 COMPREHEN METABOLIC PANEL: CPT

## 2024-02-23 PROCEDURE — 36415 COLL VENOUS BLD VENIPUNCTURE: CPT | Performed by: STUDENT IN AN ORGANIZED HEALTH CARE EDUCATION/TRAINING PROGRAM

## 2024-02-23 PROCEDURE — 99214 OFFICE O/P EST MOD 30 MIN: CPT | Performed by: STUDENT IN AN ORGANIZED HEALTH CARE EDUCATION/TRAINING PROGRAM

## 2024-02-23 PROCEDURE — 81001 URINALYSIS AUTO W/SCOPE: CPT | Performed by: STUDENT IN AN ORGANIZED HEALTH CARE EDUCATION/TRAINING PROGRAM

## 2024-02-23 PROCEDURE — 85027 COMPLETE CBC AUTOMATED: CPT | Performed by: STUDENT IN AN ORGANIZED HEALTH CARE EDUCATION/TRAINING PROGRAM

## 2024-02-23 PROCEDURE — 74177 CT ABD & PELVIS W/CONTRAST: CPT | Mod: TC | Performed by: RADIOLOGY

## 2024-02-23 RX ORDER — IOPAMIDOL 755 MG/ML
200 INJECTION, SOLUTION INTRAVASCULAR ONCE
Status: COMPLETED | OUTPATIENT
Start: 2024-02-23 | End: 2024-02-23

## 2024-02-23 RX ADMIN — IOPAMIDOL 135 ML: 755 INJECTION, SOLUTION INTRAVASCULAR at 15:18

## 2024-02-23 RX ADMIN — Medication 79 ML: at 15:18

## 2024-02-23 NOTE — PATIENT INSTRUCTIONS
Willy Murillo,    Thank you for allowing [facility] to manage your care.    I ordered some blood work, please go to the laboratory to get your laboratory studies.    I ordered a ct , please call diagnostic imaging [phone] to schedule your test.      For your convenience, test results are released as soon as they are available  Please allow 1-2 business days for me to send you a comment about your results.  If not done so, I encourage you to login into [employer] ([email or web address] to review your results in real time.     If you have any questions or concerns, please feel free to call us at [phone].    Sincerely,    Dr. De La Vega    Did you know?      You can schedule a video visit for follow-up appointments as well as future appointments for certain conditions.  Please see the below link.     [email or web address]    If you have not already done so,  I encourage you to sign up for [employer] ([email or web address]).  This will allow you to review your results, securely communicate with a provider, and schedule virtual visits as well.

## 2024-02-23 NOTE — LETTER
February 27, 2024      Chidi Dubon  [address]  [place] [address]        Chidi,     Your urine does not show any sign of infection.  You do have trace blood in your urine which was present 4 years ago but further testing did not show any red blood cell.  Your kidney function and liver function are normal.  Your electrolytes are normal.   Your hemoglobin is stable.     Please call me with any questions or concerns.     Resulted Orders   UA Macroscopic with reflex to Microscopic and Culture - Lab Collect   Result Value Ref Range    Color Urine Yellow Colorless, Straw, Light Yellow, Yellow    Appearance Urine Clear Clear    Glucose Urine Negative Negative mg/dL    Bilirubin Urine Negative Negative    Ketones Urine Negative Negative mg/dL    Specific Gravity Urine >=1.030 1.003 - 1.035    Blood Urine Trace (A) Negative    pH Urine 6.0 5.0 - 7.0    Protein Albumin Urine Negative Negative mg/dL    Urobilinogen Urine 1.0 0.2, 1.0 E.U./dL    Nitrite Urine Negative Negative    Leukocyte Esterase Urine Negative Negative   Comprehensive metabolic panel (BMP + Alb, Alk Phos, ALT, AST, Total. Bili, TP)   Result Value Ref Range    Sodium 139 135 - 145 mmol/L      Comment:      Reference intervals for this test were updated on 09/26/2023 to more accurately reflect our healthy population. There may be differences in the flagging of prior results with similar values performed with this method. Interpretation of those prior results can be made in the context of the updated reference intervals.     Potassium 4.1 3.4 - 5.3 mmol/L    Carbon Dioxide (CO2) 24 22 - 29 mmol/L    Anion Gap 8 7 - 15 mmol/L    Urea Nitrogen 15.3 8.0 - 23.0 mg/dL    Creatinine 1.06 0.67 - 1.17 mg/dL    GFR Estimate 70 >60 mL/min/1.73m2    Calcium 9.1 8.8 - 10.2 mg/dL    Chloride 107 98 - 107 mmol/L    Glucose 96 70 - 99 mg/dL    Alkaline Phosphatase 120 40 - 150 U/L      Comment:      Reference intervals for this test were updated on  11/14/2023 to more accurately reflect our healthy population. There may be differences in the flagging of prior results with similar values performed with this method. Interpretation of those prior results can be made in the context of the updated reference intervals.    AST 38 0 - 45 U/L      Comment:      Reference intervals for this test were updated on 6/12/2023 to more accurately reflect our healthy population. There may be differences in the flagging of prior results with similar values performed with this method. Interpretation of those prior results can be made in the context of the updated reference intervals.    ALT 18 0 - 70 U/L      Comment:      Reference intervals for this test were updated on 6/12/2023 to more accurately reflect our healthy population. There may be differences in the flagging of prior results with similar values performed with this method. Interpretation of those prior results can be made in the context of the updated reference intervals.      Protein Total 7.0 6.4 - 8.3 g/dL    Albumin 3.8 3.5 - 5.2 g/dL    Bilirubin Total 2.6 (H) <=1.2 mg/dL   CBC with platelets   Result Value Ref Range    WBC Count 5.4 4.0 - 11.0 10e3/uL    RBC Count 4.63 4.40 - 5.90 10e6/uL    Hemoglobin 12.9 (L) 13.3 - 17.7 g/dL    Hematocrit 41.2 40.0 - 53.0 %    MCV 89 78 - 100 fL    MCH 27.9 26.5 - 33.0 pg    MCHC 31.3 (L) 31.5 - 36.5 g/dL    RDW 17.3 (H) 10.0 - 15.0 %    Platelet Count 153 150 - 450 10e3/uL   UA Microscopic with Reflex to Culture   Result Value Ref Range    RBC Urine 0-2 0-2 /HPF /HPF    WBC Urine 0-5 0-5 /HPF /HPF    Narrative    Urine Culture not indicated     Sincerely,      Gisselle De La Vega MD

## 2024-02-23 NOTE — PROGRESS NOTES
Assessment & Plan     Bulge in groin area  No evidence of incarceration or obstruction.  Patient is nontoxic-appearing.  Vitals are within the normal limits.Likely an inguinal hernia. I will obtain CT given recent surgery.  Warning signs reviewed with patients.Patient is advised to call or go to the ED if he experiences any of the warning signs.    - CBC with platelets; Future  - CT Abdomen Pelvis w Contrast; Future  - Comprehensive metabolic panel (BMP + Alb, Alk Phos, ALT, AST, Total. Bili, TP); Future  - UA Macroscopic with reflex to Microscopic and Culture - Lab Collect; Future  - UA Macroscopic with reflex to Microscopic and Culture - Lab Collect  - Comprehensive metabolic panel (BMP + Alb, Alk Phos, ALT, AST, Total. Bili, TP)  - CBC with platelets  - UA Microscopic with Reflex to Culture    Pelvic pain in male    - CBC with platelets; Future  - CT Abdomen Pelvis w Contrast; Future  - Comprehensive metabolic panel (BMP + Alb, Alk Phos, ALT, AST, Total. Bili, TP); Future  - UA Macroscopic with reflex to Microscopic and Culture - Lab Collect; Future  - UA Macroscopic with reflex to Microscopic and Culture - Lab Collect  - Comprehensive metabolic panel (BMP + Alb, Alk Phos, ALT, AST, Total. Bili, TP)  - CBC with platelets  - UA Microscopic with Reflex to Culture    S/P cholecystectomy    - CBC with platelets; Future  - CT Abdomen Pelvis w Contrast; Future  - Comprehensive metabolic panel (BMP + Alb, Alk Phos, ALT, AST, Total. Bili, TP); Future  - Comprehensive metabolic panel (BMP + Alb, Alk Phos, ALT, AST, Total. Bili, TP)  - CBC with platelets              Kareen Murillo is a 83 year old, presenting for the following health issues:  Wound Check      2/23/2024    12:41 PM   Additional Questions   Roomed by Anuradha ISIDRO         2/23/2024    12:41 PM   Patient Reported Additional Medications   Patient reports taking the following new medications No new medications to add     Wound Check  This is a new (states  "swelling in the area of where gallbladder was removed and hardness) problem. The current episode started 1 to 4 weeks ago (7-8 weeks). The problem has been waxing and waning. Associated symptoms comments: Burning sensation in the area, and gets shooting pains when walking up in the morning.    He feels it is swollen. He had a temp of 99.2 this morning and improved to 89          Review of Systems  Constitutional, neuro, ENT, endocrine, pulmonary, cardiac, gastrointestinal, genitourinary, musculoskeletal, integument and psychiatric systems are negative, except as otherwise noted.      Objective    /58 (BP Location: Right arm, Patient Position: Chair, Cuff Size: Adult Large)   Pulse 68   Temp 98.2  F (36.8  C) (Temporal)   Resp 18   Ht 1.861 m (6' 1.25\")   Wt 111.7 kg (246 lb 3.2 oz)   SpO2 97%   BMI 32.26 kg/m    Body mass index is 32.26 kg/m .  Physical Exam   GENERAL: alert and no distress    RESP: lungs clear to auscultation - no rales, rhonchi or wheezes  CV: regular rate and rhythm, normal S1 S2, no S3 or S4, no murmur, click or rub,   ABDOMEN: Scar from recent surgery is  I/c//d soft, nontender, and bowel sounds normal, there is bulging mass in the right groin to obstruction or incarceration  MS: no gross musculoskeletal defects noted,             Signed Electronically by: Gisselle De La Vega MD    "

## 2024-02-26 ENCOUNTER — TELEPHONE (OUTPATIENT)
Dept: FAMILY MEDICINE | Facility: CLINIC | Age: 84
End: 2024-02-26
Payer: COMMERCIAL

## 2024-02-26 DIAGNOSIS — K40.90 NON-RECURRENT UNILATERAL INGUINAL HERNIA WITHOUT OBSTRUCTION OR GANGRENE: Primary | ICD-10-CM

## 2024-02-26 DIAGNOSIS — N20.0 KIDNEY STONE: ICD-10-CM

## 2024-02-26 NOTE — TELEPHONE ENCOUNTER
Test Results        Who ordered the test:  Dr. Dana Bundy    Type of test: CT    Date of test:  2/23/24    Where was the test performed:  [facility]    What are your questions/concerns?:  Pt would like a call back with the results. He would like to decide if he should go to [place] or not.    Okay to leave a detailed message?: Yes at Cell number on file:    Telephone Information:   Mobile [phone]

## 2024-02-26 NOTE — TELEPHONE ENCOUNTER
I reviewed the directives from Dr. Garcia with patient. He verbalized a good understanding. Per his request, I provided the phone numbers for scheduling the appointments with Surgery and Urology.     Angela REEVES  [facility]

## 2024-02-26 NOTE — TELEPHONE ENCOUNTER
Please call patient with result    Chidi,    Your CT showed kidney stone that is non obstructing and non -obstructing right inguinal hernia, there is no incarceration or strangulation.I will like you to see a surgeon as soon as possible. Please go to the ED if you have excruciating/constant pain      I will also like you to see a urology for the stone, this is non urgent   Please contact me if you have additional questions or concerns

## 2024-02-27 ENCOUNTER — TELEPHONE (OUTPATIENT)
Dept: SURGERY | Facility: CLINIC | Age: 84
End: 2024-02-27

## 2024-02-27 ENCOUNTER — OFFICE VISIT (OUTPATIENT)
Dept: SURGERY | Facility: CLINIC | Age: 84
End: 2024-02-27
Attending: STUDENT IN AN ORGANIZED HEALTH CARE EDUCATION/TRAINING PROGRAM
Payer: COMMERCIAL

## 2024-02-27 VITALS
WEIGHT: 246.25 LBS | TEMPERATURE: 96.7 F | HEART RATE: 57 BPM | SYSTOLIC BLOOD PRESSURE: 144 MMHG | DIASTOLIC BLOOD PRESSURE: 63 MMHG | HEIGHT: 73 IN | BODY MASS INDEX: 32.64 KG/M2

## 2024-02-27 DIAGNOSIS — K40.90 NON-RECURRENT UNILATERAL INGUINAL HERNIA WITHOUT OBSTRUCTION OR GANGRENE: ICD-10-CM

## 2024-02-27 PROCEDURE — 99203 OFFICE O/P NEW LOW 30 MIN: CPT | Performed by: SURGERY

## 2024-02-27 ASSESSMENT — PAIN SCALES - GENERAL: PAINLEVEL: NO PAIN (0)

## 2024-02-27 NOTE — LETTER
2/27/2024         RE: Chidi Dubon  [address]  [place] [address]        Dear Colleague,    Thank you for referring your patient, Chidi Dubon, to the [facility]. Please see a copy of my visit note below.    Surgical Consultation/History and Physical  [facility] Surgery    Chidi is seen in consultation for Hernia, at the request of Martin Schultz PA-C.    Chief Complaint:  Hernia    History of Present Illness: Chidi Dubon is a 83 year old male presents with hernia.  He noted some generalized abdominal discomfort following laparoscopic cholecystectomy.  He underwent CT showing a right groin hernia.  He notes a bulge at the area which he can massage back into place.  Denies constipation or obstipation.  No prior hernias.    Patient Active Problem List   Diagnosis     Hyperlipidemia LDL goal <70     Obesity     Mild major depression (H)     Osteoarthritis, knee     Diverticulosis     Urolithiasis     CAD S/P percutaneous coronary angioplasty     Atrial Fibrillation     Fatigue     Restless leg syndrome     Long-term (current) use of anticoagulants [Z79.01]     Benign essential hypertension     Depression, major, in remission (H24)     Aftercare following right hip joint replacement surgery     Hip pain, right     Hip joint replacement status     Posterior capsular opacification of both eyes, obscuring vision     Coronary artery disease involving native heart with angina pectoris, unspecified vessel or lesion type (H24)     S/P placement of cardiac pacemaker     Abdominal aortic aneurysm without rupture (H24)     Bilirubinemia     Non-rheumatic mitral regurgitation     Morbid obesity (H)     Past Medical History:   Diagnosis Date     CAD (coronary artery disease)      Dyslipidemia      HTN (hypertension)      Past Surgical History:   Procedure Laterality Date     ANGIOGRAM  age 61     3 cornary artery stents, [facility]     ANGIOGRAM  age 66    1  coronary artery stent, [facility]     ANGIOGRAM  11/2013    1 coronary artery stent, [facility]      ARTHROSCOPY KNEE RT/LT       Family History   Problem Relation Age of Onset     Heart Disease Paternal Grandfather      Heart Disease Brother      Social History     Tobacco Use     Smoking status: Never     Smokeless tobacco: Never     Tobacco comments:     never smoker; non-smoking household   Substance Use Topics     Alcohol use: No     Comment: none      History   Drug Use Unknown     Current Outpatient Medications   Medication Sig Dispense Refill     acetaminophen (TYLENOL) 325 MG tablet Take by mouth every 6 hours       albuterol (PROAIR HFA/PROVENTIL HFA/VENTOLIN HFA) 108 (90 Base) MCG/ACT inhaler Inhale 2 puffs into the lungs every 6 hours as needed 18 g 0     aspirin 81 MG EC tablet Take 81 mg by mouth       atorvastatin (LIPITOR) 80 MG tablet Take 0.5 tablets (40 mg) by mouth daily 45 tablet 3     escitalopram (LEXAPRO) 5 MG tablet Take 1 tablet (5 mg) by mouth daily 45 tablet 0     fluticasone (FLONASE) 50 MCG/ACT nasal spray Spray 1 spray into both nostrils daily 18.2 mL 0     furosemide (LASIX) 20 MG tablet Take 1 tablet (20 mg) by mouth daily +++NEED APPT+++ 90 tablet 0     losartan (COZAAR) 100 MG tablet TAKE ONE TABLET BY MOUTH ONCE DAILY. 90 tablet 0     nitroGLYcerin (NITROSTAT) 0.4 MG sublingual tablet Place 1 tablet (0.4 mg) under the tongue every 5 minutes as needed for chest pain 25 tablet 0     potassium chloride (KLOR-CON) 20 MEQ packet Take 20 mEq by mouth daily 90 each 3     pramipexole (MIRAPEX) 0.125 MG tablet Take 1 tablet (0.125 mg) by mouth 3 times daily 90 tablet 0     spironolactone (ALDACTONE) 25 MG tablet Take 1 tablet by mouth daily       warfarin ANTICOAGULANT (COUMADIN) 2.5 MG tablet TAKE ONE-HALF TABLET BY MOUTH ONCE DAILY ON SUNDAYS AND WEDNESDAYS; TAKE 1 TABLET ONCE DAILY ALL OTHER DAYS OF THE WEEK OR TAKE AS DIRECTED BY INR CLINIC 72 tablet 0     Allergies   Allergen  "Reactions     No Known Allergies      Review of Systems:   5 point ROS otherwise negative    Physical Exam:  BP (!) 144/63 (BP Location: Right arm, Patient Position: Sitting, Cuff Size: Adult Large)   Pulse 57   Temp (!) 96.7  F (35.9  C) (Tympanic)   Ht 1.861 m (6' 1.27\")   Wt 111.7 kg (246 lb 4.1 oz)   BMI 32.25 kg/m      Constitutional- No acute distress, well nourished, non-toxic  Eyes: Anicteric, no injection.  PERRL  ENT:  Normocephalic, atraumatic, Nose midline, moist mucus membranes  Neck - supple, trachea midline  Respiratory- Good inspiratory effort  Cardiovascular -  No peripheral edema.  No clubbing.  Abdomen - Soft, non-tender, +BS, no hepatosplenomegaly, no palpable masses, well healed laparoscopic incision sites.  Small incarcerated umbilical hernia  Groins - Easily reducible right inguinal hernia.  No left inguinal hernia  Neuro - No focal neuro deficits, Alert and oriented x 3  Psych: Appropriate mood and affect  Musculoskeletal: Normal gait, symmetric strength.  FROM upper and lower extremities.  Skin: Warm, Dry    CT Abdomen/Pelvis:  FINDINGS:   LOWER CHEST: Focal groundglass infiltrate in the lateral left lower  lobe.     HEPATOBILIARY: Hepatic steatosis. Cholecystectomy. 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     PANCREAS: Normal.     SPLEEN: Normal.     ADRENAL GLANDS: Normal.     KIDNEYS/BLADDER: Benign bilateral renal cysts. 5 mm nonobstructing  left renal calculus.     BOWEL: Sigmoid colonic diverticulosis without diverticulitis.     PELVIC ORGANS: Normal.     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 There is also a similar finding in the proximal  right common iliac artery, unchanged. Small 3.5 cm distal abdominal  aortic aneurysm is stable. Small, 2.3 cm left common iliac artery  aneurysm also stable.   "   MUSCULOSKELETAL: A right inguinal hernia contains nonobstructed small  bowel. Right hip arthroplasty.                                                                      IMPRESSION:   1.  Right inguinal hernia contains nonobstructed small bowel.  2.  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      JOHN KERN MD     Assessment:  1. Non-recurrent unilateral inguinal hernia without obstruction or gangrene      Plan:   Chidi Dubon presents with symptomatic  reducible right inguinal hernia and incidental umbilical hernia . Symptoms  are  progressively worsening recently. The patient may benefit from open right inguinal hernia repair with mesh, and the indications, risks, benefits and alternatives to surgery were discussed in detail, and the patient understood the counseling offered and wishes to proceed as planned and outlined. Risks specifically discussed include bleeding, infection, seroma, need for additional treatment, chronic pain, mesh complications (erosion, infection), nontherapeutic intervention, recurrent hernia, potential need for mesh, potential need for temporary surgical drain, wound complication (such as dehiscence), and rare complications related to surgery and/or anesthesia such as venous thromboembolism and cardiorespiratory complications.    MARCO guide reviewed.  Signs/symptoms of incarceration/strangulation reviewed.  He is going on trip to [place] for 1-2 months. Advised of need to seek medical care should he develop worsening symptoms or obstipation symptoms immediately.  All questions answered.    Abad Haddad DO on 2/27/2024 at 10:06 AM      Again, thank you for allowing me to participate in the care of your patient.        Sincerely,        Abad Haddad DO

## 2024-02-27 NOTE — PROGRESS NOTES
Surgical Consultation/History and Physical  [facility] Surgery    Chidi is seen in consultation for Hernia, at the request of Martin Schultz PA-C.    Chief Complaint:  Hernia    History of Present Illness: Chidi Dubon is a 83 year old male presents with hernia.  He noted some generalized abdominal discomfort following laparoscopic cholecystectomy.  He underwent CT showing a right groin hernia.  He notes a bulge at the area which he can massage back into place.  Denies constipation or obstipation.  No prior hernias.    Patient Active Problem List   Diagnosis    Hyperlipidemia LDL goal <70    Obesity    Mild major depression (H)    Osteoarthritis, knee    Diverticulosis    Urolithiasis    CAD S/P percutaneous coronary angioplasty    Atrial Fibrillation    Fatigue    Restless leg syndrome    Long-term (current) use of anticoagulants [Z79.01]    Benign essential hypertension    Depression, major, in remission (H24)    Aftercare following right hip joint replacement surgery    Hip pain, right    Hip joint replacement status    Posterior capsular opacification of both eyes, obscuring vision    Coronary artery disease involving native heart with angina pectoris, unspecified vessel or lesion type (H24)    S/P placement of cardiac pacemaker    Abdominal aortic aneurysm without rupture (H24)    Bilirubinemia    Non-rheumatic mitral regurgitation    Morbid obesity (H)     Past Medical History:   Diagnosis Date    CAD (coronary artery disease)     Dyslipidemia     HTN (hypertension)      Past Surgical History:   Procedure Laterality Date    ANGIOGRAM  age 61     3 cornary artery stents, [facility]    ANGIOGRAM  age 66    1 coronary artery stent, [facility]    ANGIOGRAM  11/2013    1 coronary artery stent, [facility]     ARTHROSCOPY KNEE RT/LT       Family History   Problem Relation Age of Onset    Heart Disease Paternal Grandfather     Heart Disease Brother      Social History     Tobacco Use    Smoking  "status: Never    Smokeless tobacco: Never    Tobacco comments:     never smoker; non-smoking household   Substance Use Topics    Alcohol use: No     Comment: none      History   Drug Use Unknown     Current Outpatient Medications   Medication Sig Dispense Refill    acetaminophen (TYLENOL) 325 MG tablet Take by mouth every 6 hours      albuterol (PROAIR HFA/PROVENTIL HFA/VENTOLIN HFA) 108 (90 Base) MCG/ACT inhaler Inhale 2 puffs into the lungs every 6 hours as needed 18 g 0    aspirin 81 MG EC tablet Take 81 mg by mouth      atorvastatin (LIPITOR) 80 MG tablet Take 0.5 tablets (40 mg) by mouth daily 45 tablet 3    escitalopram (LEXAPRO) 5 MG tablet Take 1 tablet (5 mg) by mouth daily 45 tablet 0    fluticasone (FLONASE) 50 MCG/ACT nasal spray Spray 1 spray into both nostrils daily 18.2 mL 0    furosemide (LASIX) 20 MG tablet Take 1 tablet (20 mg) by mouth daily +++NEED APPT+++ 90 tablet 0    losartan (COZAAR) 100 MG tablet TAKE ONE TABLET BY MOUTH ONCE DAILY. 90 tablet 0    nitroGLYcerin (NITROSTAT) 0.4 MG sublingual tablet Place 1 tablet (0.4 mg) under the tongue every 5 minutes as needed for chest pain 25 tablet 0    potassium chloride (KLOR-CON) 20 MEQ packet Take 20 mEq by mouth daily 90 each 3    pramipexole (MIRAPEX) 0.125 MG tablet Take 1 tablet (0.125 mg) by mouth 3 times daily 90 tablet 0    spironolactone (ALDACTONE) 25 MG tablet Take 1 tablet by mouth daily      warfarin ANTICOAGULANT (COUMADIN) 2.5 MG tablet TAKE ONE-HALF TABLET BY MOUTH ONCE DAILY ON SUNDAYS AND WEDNESDAYS; TAKE 1 TABLET ONCE DAILY ALL OTHER DAYS OF THE WEEK OR TAKE AS DIRECTED BY INR CLINIC 72 tablet 0     Allergies   Allergen Reactions    No Known Allergies      Review of Systems:   5 point ROS otherwise negative    Physical Exam:  BP (!) 144/63 (BP Location: Right arm, Patient Position: Sitting, Cuff Size: Adult Large)   Pulse 57   Temp (!) 96.7  F (35.9  C) (Tympanic)   Ht 1.861 m (6' 1.27\")   Wt 111.7 kg (246 lb 4.1 oz)   BMI " 32.25 kg/m      Constitutional- No acute distress, well nourished, non-toxic  Eyes: Anicteric, no injection.  PERRL  ENT:  Normocephalic, atraumatic, Nose midline, moist mucus membranes  Neck - supple, trachea midline  Respiratory- Good inspiratory effort  Cardiovascular -  No peripheral edema.  No clubbing.  Abdomen - Soft, non-tender, +BS, no hepatosplenomegaly, no palpable masses, well healed laparoscopic incision sites.  Small incarcerated umbilical hernia  Groins - Easily reducible right inguinal hernia.  No left inguinal hernia  Neuro - No focal neuro deficits, Alert and oriented x 3  Psych: Appropriate mood and affect  Musculoskeletal: Normal gait, symmetric strength.  FROM upper and lower extremities.  Skin: Warm, Dry    CT Abdomen/Pelvis:  FINDINGS:   LOWER CHEST: Focal groundglass infiltrate in the lateral left lower  lobe.     HEPATOBILIARY: Hepatic steatosis. Cholecystectomy. 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     PANCREAS: Normal.     SPLEEN: Normal.     ADRENAL GLANDS: Normal.     KIDNEYS/BLADDER: Benign bilateral renal cysts. 5 mm nonobstructing  left renal calculus.     BOWEL: Sigmoid colonic diverticulosis without diverticulitis.     PELVIC ORGANS: Normal.     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 There is also a similar finding in the proximal  right common iliac artery, unchanged. Small 3.5 cm distal abdominal  aortic aneurysm is stable. Small, 2.3 cm left common iliac artery  aneurysm also stable.     MUSCULOSKELETAL: A right inguinal hernia contains nonobstructed small  bowel. Right hip arthroplasty.                                                                      IMPRESSION:   1.  Right inguinal hernia contains nonobstructed small bowel.  2.  Vascular findings, including a 3.5 cm abdominal aortic aneurysm  and 3  cm left internal iliac artery aneurysm are unchanged from  previous.      JOHN KERN MD     Assessment:  1. Non-recurrent unilateral inguinal hernia without obstruction or gangrene      Plan:   Chidi Dubon presents with symptomatic  reducible right inguinal hernia and incidental umbilical hernia . Symptoms  are  progressively worsening recently. The patient may benefit from open right inguinal hernia repair with mesh, and the indications, risks, benefits and alternatives to surgery were discussed in detail, and the patient understood the counseling offered and wishes to proceed as planned and outlined. Risks specifically discussed include bleeding, infection, seroma, need for additional treatment, chronic pain, mesh complications (erosion, infection), nontherapeutic intervention, recurrent hernia, potential need for mesh, potential need for temporary surgical drain, wound complication (such as dehiscence), and rare complications related to surgery and/or anesthesia such as venous thromboembolism and cardiorespiratory complications.    MARCO guide reviewed.  Signs/symptoms of incarceration/strangulation reviewed.  He is going on trip to [place] for 1-2 months. Advised of need to seek medical care should he develop worsening symptoms or obstipation symptoms immediately.  All questions answered.    Abad Haddad,  on 2/27/2024 at 10:06 AM

## 2024-02-27 NOTE — NURSING NOTE
"Initial BP (!) 144/63 (BP Location: Right arm, Patient Position: Sitting, Cuff Size: Adult Large)   Pulse 57   Temp (!) 96.7  F (35.9  C) (Tympanic)   Ht 1.861 m (6' 1.27\")   Wt 111.7 kg (246 lb 4.1 oz)   BMI 32.25 kg/m   Estimated body mass index is 32.25 kg/m  as calculated from the following:    Height as of this encounter: 1.861 m (6' 1.27\").    Weight as of this encounter: 111.7 kg (246 lb 4.1 oz). .  Yudelka Nichols MA    "

## 2024-03-04 NOTE — TELEPHONE ENCOUNTER
BRIA to schedule US, CTA, and 1yr follow up with Serene liu in June. *CTA should be a few days prior to seeing RW. [phone]

## 2024-03-07 ENCOUNTER — TELEPHONE (OUTPATIENT)
Dept: ANTICOAGULATION | Facility: CLINIC | Age: 84
End: 2024-03-07
Payer: COMMERCIAL

## 2024-03-07 NOTE — TELEPHONE ENCOUNTER
ANTICOAGULATION     Chidi Dubon is overdue for an INR check.     Left message for patient to call and schedule lab appointment as soon as possible. If returning call, please schedule.  and Reminder letter sent    Solomon Rebolledo RN

## 2024-03-07 NOTE — TELEPHONE ENCOUNTER
Anticoagulation Clinic Notification    Chidi, is past due for an INR. Their last result was 2.0 on 1/23/24 and was due to come back on 2/6/24.    he received phone calls and letters over the last several weeks in attempt to arrange follow up labs. Chidi CAGLE Dubon will be contacted again today.     Please contact patient directly to discuss compliance with monitoring or schedule visit to review ongoing anticoagulation therapy.    Thank you,     [facility] Anticoagulation Clinic

## 2024-03-07 NOTE — LETTER
[facility] ANTICOAGULATION CLINIC  [address]  [facility] [address]  Phone: [phone]  Fax: [phone]   March 7, 2024        Chidi Dubon  [address]  [place] [address]            Dear Chidi,    You are currently under the care of [facility]tico[facility] for your warfarin (Coumadin , Jantoven ) therapy.  We are contacting you because our records show you were due for an INR on 2/6/24.    There are potentially serious risks when taking warfarin without careful monitoring and we want to make sure you are safely managed.  Routine lab monitoring is required for warfarin refills.     Please call [phone]  as soon as possible to schedule a lab appointment. If it is difficult for you to get to lab, please call us to discuss options.  If there has been a change in your care or other concerns, please let us know so we can help and/or update our records.         Sincerely,       [facility] Anticoagulation [facility]

## 2024-03-18 ENCOUNTER — TELEPHONE (OUTPATIENT)
Dept: VASCULAR SURGERY | Facility: CLINIC | Age: 84
End: 2024-03-18
Payer: COMMERCIAL

## 2024-03-18 NOTE — TELEPHONE ENCOUNTER
[facility] to schedule 1 year vascular follow up with fellow clinic and imaging before. This is due in June 2024. [phone]

## 2024-04-04 ENCOUNTER — TELEPHONE (OUTPATIENT)
Dept: ANTICOAGULATION | Facility: CLINIC | Age: 84
End: 2024-04-04
Payer: COMMERCIAL

## 2024-04-04 NOTE — TELEPHONE ENCOUNTER
ANTICOAGULATION MANAGEMENT PROGRAM    Martin Schultz,     Our records indicate that Chidi Dubon remains past due to check an INR. Chidi Dubon was contacted multiple times over at least the last 8 weeks to attempt to arrange a follow up appointment.    Chidi Dubon last had an an INR checked on 1/23/24 and was due for follow up on 2/6/24     Called patient,Left message for patient to call and schedule lab appointment as soon as possible. If returning call, please schedule.      At this time Chidi Dubon will be moved to noncompliant status within the program until their referral expires on 4/12/24. While in noncompliant status the patient would continue to be contacted every 6 weeks by the anticoagulation program to attempt to schedule patient. You will be notified of each contact attempt to make you aware of patient's ongoing noncompliance.        Thank you,     [facility] Anticoagulation Management Program

## 2024-04-08 ENCOUNTER — PATIENT OUTREACH (OUTPATIENT)
Dept: CARE COORDINATION | Facility: CLINIC | Age: 84
End: 2024-04-08
Payer: COMMERCIAL

## 2024-04-15 NOTE — TELEPHONE ENCOUNTER
Surgery Scheduling left message for patient to call back to schedule surgery [phone]. Returning patients call to schedule surgery

## 2024-04-17 ENCOUNTER — TELEPHONE (OUTPATIENT)
Dept: SURGERY | Facility: CLINIC | Age: 84
End: 2024-04-17
Payer: COMMERCIAL

## 2024-04-17 ENCOUNTER — HOSPITAL ENCOUNTER (OUTPATIENT)
Facility: CLINIC | Age: 84
End: 2024-04-17
Attending: SURGERY | Admitting: SURGERY
Payer: COMMERCIAL

## 2024-04-17 NOTE — TELEPHONE ENCOUNTER
Left message for patient to call us back to discuss surgery dates    Palma Mancuso M.A.  Specialty surgery Scheduler

## 2024-04-17 NOTE — TELEPHONE ENCOUNTER
Type of surgery:     HERNIORRHAPHY, INGUINAL, OPEN (Right)     Location of surgery: [place] OR  Date and time of surgery: 5-10-24  Surgeon: Catie  Pre-Op Appt Date: 4-23-24  Post-Op Appt Date: 5-28-24   Packet sent out: Yes  Pre-cert/Authorization completed:    Date:

## 2024-04-19 ENCOUNTER — TELEPHONE (OUTPATIENT)
Dept: ANTICOAGULATION | Facility: CLINIC | Age: 84
End: 2024-04-19

## 2024-04-19 ENCOUNTER — LAB (OUTPATIENT)
Dept: LAB | Facility: CLINIC | Age: 84
End: 2024-04-19
Payer: COMMERCIAL

## 2024-04-19 ENCOUNTER — ANTICOAGULATION THERAPY VISIT (OUTPATIENT)
Dept: ANTICOAGULATION | Facility: CLINIC | Age: 84
End: 2024-04-19

## 2024-04-19 DIAGNOSIS — I48.20 CHRONIC ATRIAL FIBRILLATION (H): Primary | ICD-10-CM

## 2024-04-19 DIAGNOSIS — I48.20 CHRONIC ATRIAL FIBRILLATION (H): ICD-10-CM

## 2024-04-19 DIAGNOSIS — Z79.01 LONG TERM CURRENT USE OF ANTICOAGULANT THERAPY: Primary | ICD-10-CM

## 2024-04-19 LAB — INR BLD: 1.8 (ref 0.9–1.1)

## 2024-04-19 PROCEDURE — 36416 COLLJ CAPILLARY BLOOD SPEC: CPT

## 2024-04-19 PROCEDURE — 85610 PROTHROMBIN TIME: CPT

## 2024-04-19 NOTE — PROGRESS NOTES
ANTICOAGULATION MANAGEMENT     Chidi Dubon 83 year old male is on warfarin with subtherapeutic INR result. (Goal INR 2.0-3.0)    Recent labs: (last 7 days)     04/19/24  1147   INR 1.8*       ASSESSMENT     Source(s): Chart Review  Previous INR was Therapeutic last visit; previously outside of goal range  Medication, diet, health changes since last INR chart reviewed; none identified         PLAN     Unable to reach Chidi today.    Left message to take a booster dose of warfarin,  5 mg tonight. Request call back for assessment.    Follow up required to confirm warfarin dose taken and assess for changes, confirm warfarin dose taken, and assess for changes     Naa Baez, RN  Anticoagulation Clinic  4/19/2024

## 2024-04-19 NOTE — TELEPHONE ENCOUNTER
ANTICOAGULATION CLINIC REFERRAL RENEWAL REQUEST       An annual renewal order is required for all patients referred to [facility].?  Please review and sign the pended referral order for Chidi Dubon.       ANTICOAGULATION SUMMARY      Warfarin indication(s)   Atrial Fibrillation    Mechanical heart valve present?  NO       Current goal range   INR: 2.0-3.0     Goal appropriate for indication? Goal INR 2-3, standard for indication(s) above     Time in Therapeutic Range (TTR)  (Goal > 60%) 65%       Office visit with referring provider's group within last year yes on 1/18/2024       Naa Baez RN  [facility] Anticoagulation Clinic

## 2024-04-22 ENCOUNTER — TELEPHONE (OUTPATIENT)
Dept: FAMILY MEDICINE | Facility: CLINIC | Age: 84
End: 2024-04-22
Payer: COMMERCIAL

## 2024-04-22 ENCOUNTER — PATIENT OUTREACH (OUTPATIENT)
Dept: CARE COORDINATION | Facility: CLINIC | Age: 84
End: 2024-04-22
Payer: COMMERCIAL

## 2024-04-22 DIAGNOSIS — Z79.01 LONG TERM CURRENT USE OF ANTICOAGULANT THERAPY: Primary | ICD-10-CM

## 2024-04-22 DIAGNOSIS — I48.20 CHRONIC ATRIAL FIBRILLATION (H): ICD-10-CM

## 2024-04-22 NOTE — PROGRESS NOTES
ANTICOAGULATION MANAGEMENT     Chidi Dubon 83 year old male is on warfarin with subtherapeutic INR result. (Goal INR 2.0-3.0)    Recent labs: (last 7 days)     04/19/24  1147   INR 1.8*       ASSESSMENT     Source(s): Chart Review  Previous INR was Therapeutic last visit; previously outside of goal range  Medication, diet, health changes since last INR chart reviewed; none identified  Patient was overdue for follow up INR  Has hernia repair scheduled for 5/10/24, message sent to [facility] for hold instructions.         PLAN     Recommended plan for no diet, medication or health factor changes affecting INR     Dosing Instructions: booster dose then continue your current warfarin dose with next INR in 2 weeks       Summary  As of 4/19/2024      Full warfarin instructions:  4/19: 5 mg; Otherwise 1.25 mg every Sun, Wed; 2.5 mg all other days   Next INR check:  5/3/2024             Attempted to reach patient several times with no response   Detailed voice message left for Chidi with dosing instructions and follow up date.   AVS also mailed to patient's home with dosing instructions     Contact [phone]  to schedule and with any changes, questions or concerns.     Education provided:   Contact [phone]  with any changes, questions or concerns.     Plan made per [facility] anticoagulation protocol    Camilla Gil, RN  Anticoagulation Clinic  4/22/2024    _______________________________________________________________________     Anticoagulation Episode Summary       Current INR goal:  2.0-3.0   TTR:  64.5% (9.4 mo)   Target end date:  Indefinite   Send INR reminders to:  ANTICO[facility] [place]    Indications    Long-term (current) use of anticoagulants [Z79.01] [Z79.01]  Atrial Fibrillation [I48.20]             Comments:               Anticoagulation Care Providers       Provider Role Specialty Phone number    Martin Schultz PA-C Referring Family Medicine [phone]    Gen Marti PA Referring  [phone]

## 2024-04-22 NOTE — TELEPHONE ENCOUNTER
[facility] ANTICOAGULATION  MANAGEMENT    Chidi requesting pre-procedure hold orders for warfarin and review for bridging      Procedure date: 5/10/24       Procedure:  Herniorrhaphy, inguinal       Procedure location and phone number (if external):  [facility]      Number of warfarin hold days requested and/or target INR: unknown    Pre-op date:  4/23/24      Routing to Anticoagulation Pharmacist for review.      Camilla Gil RN

## 2024-04-22 NOTE — PATIENT INSTRUCTIONS
Willy Murillo  Please update the INR clinic if you have had any changes or concerns.  You should recheck your INR in 1-2 weeks.  You can reach us at [phone].      We will reach out to your primary care provider for hold instructions on your warfarin for your upcoming procedure on 5/10/24. We will call you with the information once we have that in place.

## 2024-04-23 ENCOUNTER — OFFICE VISIT (OUTPATIENT)
Dept: FAMILY MEDICINE | Facility: CLINIC | Age: 84
End: 2024-04-23
Payer: COMMERCIAL

## 2024-04-23 VITALS
DIASTOLIC BLOOD PRESSURE: 64 MMHG | OXYGEN SATURATION: 96 % | BODY MASS INDEX: 32.37 KG/M2 | SYSTOLIC BLOOD PRESSURE: 124 MMHG | WEIGHT: 244.2 LBS | HEIGHT: 73 IN | RESPIRATION RATE: 20 BRPM | HEART RATE: 49 BPM | TEMPERATURE: 97.6 F

## 2024-04-23 DIAGNOSIS — I10 BENIGN ESSENTIAL HYPERTENSION: ICD-10-CM

## 2024-04-23 DIAGNOSIS — I48.20 CHRONIC ATRIAL FIBRILLATION (H): ICD-10-CM

## 2024-04-23 DIAGNOSIS — Z01.818 PREOP GENERAL PHYSICAL EXAM: Primary | ICD-10-CM

## 2024-04-23 DIAGNOSIS — K40.90 NON-RECURRENT UNILATERAL INGUINAL HERNIA WITHOUT OBSTRUCTION OR GANGRENE: ICD-10-CM

## 2024-04-23 LAB
BASOPHILS # BLD AUTO: 0 10E3/UL (ref 0–0.2)
BASOPHILS NFR BLD AUTO: 1 %
EOSINOPHIL # BLD AUTO: 0.2 10E3/UL (ref 0–0.7)
EOSINOPHIL NFR BLD AUTO: 4 %
ERYTHROCYTE [DISTWIDTH] IN BLOOD BY AUTOMATED COUNT: 16 % (ref 10–15)
HCT VFR BLD AUTO: 41.7 % (ref 40–53)
HGB BLD-MCNC: 13.4 G/DL (ref 13.3–17.7)
IMM GRANULOCYTES # BLD: 0 10E3/UL
IMM GRANULOCYTES NFR BLD: 0 %
LYMPHOCYTES # BLD AUTO: 1.5 10E3/UL (ref 0.8–5.3)
LYMPHOCYTES NFR BLD AUTO: 30 %
MCH RBC QN AUTO: 28.8 PG (ref 26.5–33)
MCHC RBC AUTO-ENTMCNC: 32.1 G/DL (ref 31.5–36.5)
MCV RBC AUTO: 90 FL (ref 78–100)
MONOCYTES # BLD AUTO: 0.5 10E3/UL (ref 0–1.3)
MONOCYTES NFR BLD AUTO: 10 %
NEUTROPHILS # BLD AUTO: 2.7 10E3/UL (ref 1.6–8.3)
NEUTROPHILS NFR BLD AUTO: 55 %
PLATELET # BLD AUTO: 166 10E3/UL (ref 150–450)
RBC # BLD AUTO: 4.65 10E6/UL (ref 4.4–5.9)
WBC # BLD AUTO: 5 10E3/UL (ref 4–11)

## 2024-04-23 PROCEDURE — 85025 COMPLETE CBC W/AUTO DIFF WBC: CPT | Performed by: PHYSICIAN ASSISTANT

## 2024-04-23 PROCEDURE — 36415 COLL VENOUS BLD VENIPUNCTURE: CPT | Performed by: PHYSICIAN ASSISTANT

## 2024-04-23 PROCEDURE — 99214 OFFICE O/P EST MOD 30 MIN: CPT | Performed by: PHYSICIAN ASSISTANT

## 2024-04-23 PROCEDURE — 80048 BASIC METABOLIC PNL TOTAL CA: CPT | Performed by: PHYSICIAN ASSISTANT

## 2024-04-23 ASSESSMENT — PAIN SCALES - GENERAL: PAINLEVEL: NO PAIN (0)

## 2024-04-23 ASSESSMENT — PATIENT HEALTH QUESTIONNAIRE - PHQ9
SUM OF ALL RESPONSES TO PHQ QUESTIONS 1-9: 6
10. IF YOU CHECKED OFF ANY PROBLEMS, HOW DIFFICULT HAVE THESE PROBLEMS MADE IT FOR YOU TO DO YOUR WORK, TAKE CARE OF THINGS AT HOME, OR GET ALONG WITH OTHER PEOPLE: NOT DIFFICULT AT ALL
SUM OF ALL RESPONSES TO PHQ QUESTIONS 1-9: 6

## 2024-04-23 NOTE — PROGRESS NOTES
Preoperative Evaluation  [facility] [place]  [address]  [place] [address]  Phone: [phone]  Primary Provider: Azucena Gillespie  Pre-op Performing Provider: AZUCENA GILLESPIE  Apr 23, 2024       Chidi is a 83 year old, presenting for the following:  Pre-Op Exam        4/23/2024     1:44 PM   Additional Questions   Roomed by Carla DICKINSON CMA   Accompanied by alone         4/23/2024     1:44 PM   Patient Reported Additional Medications   Patient reports taking the following new medications none     Surgical Information  Surgery/Procedure: Hernia  Surgery Location: [facility]  Surgeon: Dr. Abad Haddad   Surgery Date: 5/10/24  Time of Surgery: TBD  Where patient plans to recover: At home with family  Fax number for surgical facility: Note does not need to be faxed, will be available electronically in Epic.  Chidi was seen today for pre-op exam.    Diagnoses and all orders for this visit:    Preop general physical exam    Non-recurrent unilateral inguinal hernia without obstruction or gangrene    Takes all meds in the evening.  He may take his meds the noc before his surgery.    [id number] score: 5 (high risk)  Will recommend bridging.  Hold warfarin and aspirin one week prior to surgery. Will bridge with lovenox.     Chidi is cleared for surgery and appropriate anesthesia    Subjective       HPI related to upcoming procedure: right inguinal hernia        4/23/2024     2:06 PM   Preop Questions   1. Have you ever had a heart attack or stroke? No   2. Have you ever had surgery on your heart or blood vessels, such as a stent placement, a coronary artery bypass, or surgery on an artery in your head, neck, heart, or legs? YES - stents (multiple). No chest pain   Pacemaker   3. Do you have chest pain with activity? No   4. Do you have a history of  heart failure? No   5. Do you currently have a cold, bronchitis or symptoms of other infection? No   6. Do you have a cough, shortness  of breath, or wheezing? NO   7. Do you or anyone in your family have previous history of blood clots? No   8. Do you or does anyone in your family have a serious bleeding problem such as prolonged bleeding following surgeries or cuts? No   9. Have you ever had problems with anemia or been told to take iron pills? No   10. Have you had any abnormal blood loss such as black, tarry or bloody stools? No   11. Have you ever had a blood transfusion? YES   11a. Have you ever had a transfusion reaction? No   12. Are you willing to have a blood transfusion if it is medically needed before, during, or after your surgery? Yes   13. Have you or any of your relatives ever had problems with anesthesia? No   14. Do you have sleep apnea, excessive snoring or daytime drowsiness? No   15. Do you have any artifical heart valves or other implanted medical devices like a pacemaker, defibrillator, or continuous glucose monitor? YES    15a. What type of device do you have? pacemaker   15b. Name of the clinic that manages your device:  [facility]   16. Do you have artificial joints? YES - right hip, right knee   17. Are you allergic to latex? No       Health Care Directive  Patient does not have a Health Care Directive or Living Will: Discussed advance care planning with patient; however, patient declined at this time.    Preoperative Review of    reviewed - no record of controlled substances prescribed.      Status of Chronic Conditions:  See problem list for active medical problems.  Problems all longstanding and stable, except as noted/documented.  See ROS for pertinent symptoms related to these conditions.    Patient Active Problem List    Diagnosis Date Noted    Morbid obesity (H) 03/09/2022     Priority: Medium    Non-rheumatic mitral regurgitation 07/23/2019     Priority: Medium    Abdominal aortic aneurysm without rupture (H24) 05/17/2019     Priority: Medium    Bilirubinemia 05/17/2019     Priority: Medium     Coronary artery disease involving native heart with angina pectoris, unspecified vessel or lesion type (H24) 08/08/2018     Priority: Medium    S/P placement of cardiac pacemaker 08/08/2018     Priority: Medium    Posterior capsular opacification of both eyes, obscuring vision 05/01/2018     Priority: Medium    Aftercare following right hip joint replacement surgery 06/06/2017     Priority: Medium    Hip pain, right 06/06/2017     Priority: Medium    Hip joint replacement status 06/06/2017     Priority: Medium    Depression, major, in remission (H24) 04/20/2017     Priority: Medium    Benign essential hypertension 02/29/2016     Priority: Medium    Long-term (current) use of anticoagulants [Z79.01] 02/16/2016     Priority: Medium    Restless leg syndrome 12/09/2014     Priority: Medium     Begin with gabapentin and supplement with iron daily for borderline ferritin.       Fatigue 05/09/2014     Priority: Medium     HGB     14.9   10/21/2013. TSH     1.73   8/5/2013 LDL      104   8/20/2013.  TSH     1.73   8/5/2013.       Atrial Fibrillation 05/03/2013     Priority: Medium     Wafarin therapy, managed by [facility]  November 25, 2014 - CHADS2 2.8%.  HAS-BLED 1.8%.      Diverticulosis 08/10/2012     Priority: Medium     Noted on CT but no inflammation 8/12.      Urolithiasis 08/10/2012     Priority: Medium     Noted 8/8/12.  Lemon juice as uric acid.  Will hold Flomax as wanting to minimize medications and possibly help fatigue.       Osteoarthritis, knee 07/06/2011     Priority: Medium     Synvisc with some benefit.  Getting shots.  Trying to lose weight and exercise. Minimal benefit from injections.       CAD S/P percutaneous coronary angioplasty 06/25/2010     Priority: Medium     Stents x 2.  Stable.  Flipped T's inferiorly.  On good medical management. 8/13 restended. Diffuse disease. Medical management.       Hyperlipidemia LDL goal <70 12/14/2009     Priority: Medium     Using half dose Crestor due to cost. May  benefit from atorvastatin when generic available.  Accepting that goal will not be met. Simvastatin 40 mg prescription.   LDL      100   5/9/2014 - will review benefit for atorvastatin 40 every other day.       Obesity 12/14/2009     Priority: Medium     Strongly encouraged exercise.  Weight Watchers might help as well. Considering.       Mild major depression (H) 12/14/2009     Priority: Medium     Increase to 20 mg.         Past Medical History:   Diagnosis Date    CAD (coronary artery disease)     Dyslipidemia     HTN (hypertension)      Past Surgical History:   Procedure Laterality Date    ANGIOGRAM  age 61     3 cornary artery stents, [facility]    ANGIOGRAM  age 66    1 coronary artery stent, [facility]    ANGIOGRAM  11/2013    1 coronary artery stent, [facility]     ARTHROSCOPY KNEE RT/LT       Current Outpatient Medications   Medication Sig Dispense Refill    albuterol (PROAIR HFA/PROVENTIL HFA/VENTOLIN HFA) 108 (90 Base) MCG/ACT inhaler Inhale 2 puffs into the lungs every 6 hours as needed 18 g 0    aspirin 81 MG EC tablet Take 81 mg by mouth      atorvastatin (LIPITOR) 80 MG tablet Take 0.5 tablets (40 mg) by mouth daily 45 tablet 3    fluticasone (FLONASE) 50 MCG/ACT nasal spray Spray 1 spray into both nostrils daily 18.2 mL 0    losartan (COZAAR) 100 MG tablet TAKE ONE TABLET BY MOUTH ONCE DAILY. 90 tablet 0    nitroGLYcerin (NITROSTAT) 0.4 MG sublingual tablet Place 1 tablet (0.4 mg) under the tongue every 5 minutes as needed for chest pain 25 tablet 0    potassium chloride (KLOR-CON) 20 MEQ packet Take 20 mEq by mouth daily 90 each 3    pramipexole (MIRAPEX) 0.125 MG tablet Take 1 tablet (0.125 mg) by mouth 3 times daily 90 tablet 0    spironolactone (ALDACTONE) 25 MG tablet Take 1 tablet by mouth daily      warfarin ANTICOAGULANT (COUMADIN) 2.5 MG tablet TAKE ONE-HALF TABLET BY MOUTH ONCE DAILY ON SUNDAYS AND WEDNESDAYS; TAKE 1 TABLET ONCE DAILY ALL OTHER DAYS OF THE WEEK OR TAKE AS DIRECTED BY  "INR CLINIC 72 tablet 0    acetaminophen (TYLENOL) 325 MG tablet Take by mouth every 6 hours (Patient not taking: Reported on 4/23/2024)      escitalopram (LEXAPRO) 5 MG tablet Take 1 tablet (5 mg) by mouth daily (Patient not taking: Reported on 2/27/2024) 45 tablet 0    furosemide (LASIX) 20 MG tablet Take 1 tablet (20 mg) by mouth daily +++NEED APPT+++ (Patient not taking: Reported on 4/23/2024) 90 tablet 0       Allergies   Allergen Reactions    No Known Allergies         Social History     Tobacco Use    Smoking status: Never    Smokeless tobacco: Never    Tobacco comments:     never smoker; non-smoking household   Substance Use Topics    Alcohol use: No     Comment: none     Family History   Problem Relation Age of Onset    Heart Disease Paternal Grandfather     Heart Disease Brother      History   Drug Use Unknown         Review of Systems    Review of Systems  Constitutional, neuro, ENT, endocrine, pulmonary, cardiac, gastrointestinal, genitourinary, musculoskeletal, integument and psychiatric systems are negative, except as otherwise noted.    Objective    /64   Pulse (!) 49   Temp 97.6  F (36.4  C) (Temporal)   Resp 20   Ht 1.861 m (6' 1.27\")   Wt 110.8 kg (244 lb 3.2 oz)   SpO2 96%   BMI 31.98 kg/m     Estimated body mass index is 31.98 kg/m  as calculated from the following:    Height as of this encounter: 1.861 m (6' 1.27\").    Weight as of this encounter: 110.8 kg (244 lb 3.2 oz).  Physical Exam  GENERAL: alert and no distress  EYES: Eyes grossly normal to inspection, PERRL and conjunctivae and sclerae normal  HENT: ear canals and TM's normal, nose and mouth without ulcers or lesions  NECK: no adenopathy, no asymmetry, masses, or scars  RESP: lungs clear to auscultation - no rales, rhonchi or wheezes  CV: regular rate and rhythm, normal S1 S2, no S3 or S4, no murmur, click or rub, no peripheral edema  ABDOMEN: soft, nontender, no hepatosplenomegaly, no masses and bowel sounds normal  MS: no " gross musculoskeletal defects noted, no edema  SKIN: no suspicious lesions or rashes  NEURO: Normal strength and tone, mentation intact and speech normal  PSYCH: mentation appears normal, affect normal/bright    Recent Labs   Lab Test 04/19/24  1147 02/23/24  1552 02/23/24  1335 01/23/24  1251 01/18/24  1246   HGB  --   --  12.9*  --  13.0*   PLT  --   --  153  --  192   INR 1.8*  --   --  2.0* 1.76*   NA  --   --  139  --  142   POTASSIUM  --   --  4.1  --  4.1   CR  --  1.1 1.06  --  1.09        Diagnostics  Labs pending at this time.  Results will be reviewed when available.   No ekg. Chronic ventricular pacing via his pacemaker.     Revised Cardiac Risk Index (RCRI)  The patient has the following serious cardiovascular risks for perioperative complications:   - No serious cardiac risks = 0 points     RCRI Interpretation: 0 points: Class I (very low risk - 0.4% complication rate)         Signed Electronically by: Martin Schultz PA-C  Copy of this evaluation report is provided to requesting physician.         .undefined[^^  Answers submitted by the patient for this visit:  Patient Health Questionnaire (Submitted on 4/23/2024)  If you checked off any problems, how difficult have these problems made it for you to do your work, take care of things at home, or get along with other people?: Not difficult at all  PHQ9 TOTAL SCORE: 6

## 2024-04-24 LAB
ANION GAP SERPL CALCULATED.3IONS-SCNC: 10 MMOL/L (ref 7–15)
BUN SERPL-MCNC: 14.8 MG/DL (ref 8–23)
CALCIUM SERPL-MCNC: 9.5 MG/DL (ref 8.8–10.2)
CHLORIDE SERPL-SCNC: 109 MMOL/L (ref 98–107)
CREAT SERPL-MCNC: 1.15 MG/DL (ref 0.67–1.17)
DEPRECATED HCO3 PLAS-SCNC: 23 MMOL/L (ref 22–29)
EGFRCR SERPLBLD CKD-EPI 2021: 64 ML/MIN/1.73M2
GLUCOSE SERPL-MCNC: 90 MG/DL (ref 70–99)
POTASSIUM SERPL-SCNC: 4.5 MMOL/L (ref 3.4–5.3)
SODIUM SERPL-SCNC: 142 MMOL/L (ref 135–145)

## 2024-04-29 ENCOUNTER — TELEPHONE (OUTPATIENT)
Dept: FAMILY MEDICINE | Facility: CLINIC | Age: 84
End: 2024-04-29
Payer: COMMERCIAL

## 2024-04-29 NOTE — TELEPHONE ENCOUNTER
"LEVI-PROCEDURAL ANTICOAGULATION  MANAGEMENT    ASSESSMENT     Warfarin interruption plan for herniorrhaphy, inguinal on 05/10/2024.    Indication for Anticoagulation: Atrial Fibrillation    [id number] = 5 (CHF, Hypertension, Age >= 75, and Vascular- PCI )  Non-rheumatic mitral regurgitation     Levi-Procedure Risk stratification for thromboembolism: moderate ( Chest guidelines and  ACC periprocedure pathway for NVAF Expert Consensus)    AFIB:  CHEST Perioperative Management guidelines recommends against bridging for patients with atrial fibrillation except in high risk stratification patients.  NVAF: 2017 ACC periprocedure pathway for NVAF advises likely NO bridge for moderate risk stratification (RSV0FQ4-XFKs score 5-6)  without a hx of stroke, TIA or systemic embolism    RECOMMENDATION     Pre-Procedure:  Hold warfarin for 5 days, until after procedure startin2024   No Bridge    Post-Procedure:  Resume warfarin dose if okay with provider doing procedure on night of procedure, 05/10/2024 PM: 5mg  Recheck INR ~ 7 days after resuming warfarin     Plan routed to referring provider for approval  ?   Jill Carranza [facility]    SUBJECTIVE/OBJECTIVE     Chidi Dubon, a 83 year old male    Goal INR Range: 2.0-3.0     Wt Readings from Last 3 Encounters:   24 110.8 kg (244 lb 3.2 oz)   24 111.7 kg (246 lb 4.1 oz)   24 111.7 kg (246 lb 3.2 oz)      Ideal body weight: 80.5 kg (177 lb 8.1 oz)  Adjusted ideal body weight: 92.6 kg (204 lb 2.9 oz)     Estimated body mass index is 31.98 kg/m  as calculated from the following:    Height as of 24: 1.861 m (6' 1.27\").    Weight as of 24: 110.8 kg (244 lb 3.2 oz).    Lab Results   Component Value Date    INR 1.8 (H) 2024    INR 2.0 (H) 2024    INR 1.76 (H) 2024     Lab Results   Component Value Date    HGB 13.4 2024    HCT 41.7 2024     2024     Lab Results   Component Value Date    CR " 1.15 04/23/2024    CR 1.1 02/23/2024    CR 1.06 02/23/2024     Estimated Creatinine Clearance: 64.9 mL/min (based on SCr of 1.15 mg/dL).

## 2024-04-29 NOTE — TELEPHONE ENCOUNTER
Spoke with patient. Relayed provider's message as written. Patient verbalized understanding and has no further questions at this time.    LASHON MurrayN RN  [facility]

## 2024-04-29 NOTE — TELEPHONE ENCOUNTER
----- Message from Martin Schultz PA-C sent at 4/28/2024  9:17 AM CDT -----  Chidi,  Your recent lab work came back nice and stable.

## 2024-04-29 NOTE — TELEPHONE ENCOUNTER
Left message on voice mail for patient to call clinic.   [phone] & ask to speak with a Triage Nurse.    Angela Adrian RN BSN  [facility]

## 2024-05-03 NOTE — TELEPHONE ENCOUNTER
Left message for Warrant to call [facility] back to discuss procedure holds/plan below. Will try again later if he does not call back by early afternoon.     Solomon Rebolledo RN, BSN  [facility] Anticoagulation Team

## 2024-05-03 NOTE — TELEPHONE ENCOUNTER
Spoke with Chidi and advised him of his procedure plan below. Lab appt scheduled and [facility] calendar updated. Patient verbalized understanding and agrees with plan of care. Pt had no further questions or concerns at this time.    Solomon Rebolledo RN, BSN  [facility] Anticoagulation Team

## 2024-05-06 ENCOUNTER — TELEPHONE (OUTPATIENT)
Dept: FAMILY MEDICINE | Facility: CLINIC | Age: 84
End: 2024-05-06
Payer: COMMERCIAL

## 2024-05-06 NOTE — TELEPHONE ENCOUNTER
"Patient calling, states that he has an upcoming procedure on Friday 5/10/24 for Non-recurrent unilateral inguinal hernia without obstruction or gangrene. Had preop with Martin RASHID on 4/23/24.    Patient states that he has questions for PCP:  On AVS from visit 4/23/24, patient given instructions below:  \"[id number] score: 5 (high risk)  Will recommend bridging.  Hold warfarin and aspirin one week prior to surgery. Will bridge with lovenox.\"    Patient states that he did start holding his Warfarin and Aspirin starting on Saturday 5/4/24, last dose 5/3/24. Patient asking when he will receive the bridging with the Lovenox? Is that something his surgery team will discharge him with or should he be using that now? States that he has not done injections before and would need education on this.    2. Patient states that now with his surgery scheduled for Friday 5/10/24, he is to be there by 9am and surgery will begin at 10:30am. Patient notes that he is concerned that he is \"high risk\", notes that his brother and sister both had issues with bleeding during their surgeries. Patient also notes that the site of the hernia has grown (notes that he did discuss this with surgery team and they are aware). Patient also notes that his daughter will be flying in from [place] to be his care taker but will not be here until Sunday 5/12/24 after the surgery. Patient states that with all these concerns he is hoping to be admitted for 1-2 nights following surgery until he is able to have a caretaker. States that he spoke with his surgery team and they mentioned that likely he will spend the night Friday, but if he was hoping to be admitted for 2 days due to concerns and caretaker, he would need orders from PCP communicated with their surgery team. Patient states that he is asking PCP for 2 nights in the hospital following surgery.      Pharmacy: [id number]  [place] - [address]#: [phone], dvm ok " or wife's () #: [phone] as another way to reach patient    Thanks,  LASHON HelmsN RN  [facility]

## 2024-05-07 NOTE — TELEPHONE ENCOUNTER
Epic secure chat sent to pcp for review as pt has surgery 5/10/24 and needing bridge for lovenox.     Jaz Mcintosh RN on 5/7/2024 at 3:40 PM

## 2024-05-07 NOTE — TELEPHONE ENCOUNTER
Pt called back asking for answers to these questions; he is looking to have answers as soon as possible.    LASHON HastingsN MAGDY  [facility]

## 2024-05-08 NOTE — OR NURSING
Pt called multiple times to multiple sites with concerns regarding his upcoming surgery. The pt stated that he was instructed to stop his coumadin, on Ben may 5th(which he did) and start lovenox. He states he does not have the med, any knowledge of the medication and why he is suppose to be on it. He also states that he lives alone and his daughter will not be able to be here from [place] until Saturday may 11th and he would like to spend the night at the hospital till she is here.   Pre call done and pt states understanding of the information, Text sent to Dr. Tamayo for direction and also manager of [facility] for her to follow up .

## 2024-05-08 NOTE — TELEPHONE ENCOUNTER
Reason for Call:  Other call back    Detailed comments: Patient is calling back with questions for his PCP regarding his surgery on 05/10. Please call. Thank you     Phone Number Patient can be reached at: Home number on file [phone] (home)    Best Time: any    Can we leave a detailed message on this number? YES    Call taken on 5/8/2024 at 11:45 AM by Jill Demarco

## 2024-05-09 ENCOUNTER — ANESTHESIA EVENT (OUTPATIENT)
Dept: SURGERY | Facility: CLINIC | Age: 84
End: 2024-05-09
Payer: COMMERCIAL

## 2024-05-09 DIAGNOSIS — G25.81 RESTLESS LEGS SYNDROME: ICD-10-CM

## 2024-05-09 DIAGNOSIS — I10 BENIGN ESSENTIAL HYPERTENSION: ICD-10-CM

## 2024-05-09 RX ORDER — ACETAMINOPHEN 325 MG/1
975 TABLET ORAL ONCE
Status: CANCELLED | OUTPATIENT
Start: 2024-05-09 | End: 2024-05-09

## 2024-05-09 RX ORDER — MAGNESIUM SULFATE HEPTAHYDRATE 40 MG/ML
2 INJECTION, SOLUTION INTRAVENOUS ONCE
Status: CANCELLED | OUTPATIENT
Start: 2024-05-09 | End: 2024-05-09

## 2024-05-09 RX ORDER — SODIUM CHLORIDE, SODIUM LACTATE, POTASSIUM CHLORIDE, CALCIUM CHLORIDE 600; 310; 30; 20 MG/100ML; MG/100ML; MG/100ML; MG/100ML
INJECTION, SOLUTION INTRAVENOUS CONTINUOUS
Status: CANCELLED | OUTPATIENT
Start: 2024-05-09

## 2024-05-09 RX ORDER — LIDOCAINE 40 MG/G
CREAM TOPICAL
Status: CANCELLED | OUTPATIENT
Start: 2024-05-09

## 2024-05-09 ASSESSMENT — ENCOUNTER SYMPTOMS: DYSRHYTHMIAS: 1

## 2024-05-09 NOTE — TELEPHONE ENCOUNTER
Attempted to call patient at mobile number, no answer, left message on voicemail; patient was instructed to return call to [facility] at [phone].    Galina GARDNER RN  [facility] Triage

## 2024-05-09 NOTE — TELEPHONE ENCOUNTER
Anti coag monitoring pharmacist advised against the need for bridging. I was under the impression they would be letting Chidi know this.   I am ok with no bridging.  This is the recommendations presented to me:    Pre-Procedure:  Hold warfarin for 5 days, until after procedure startin2024   No Bridge     Post-Procedure:  Resume warfarin dose if okay with provider doing procedure on night of procedure, 05/10/2024 PM: 5mg  Recheck INR ~ 7 days after resuming warfarin

## 2024-05-09 NOTE — ANESTHESIA PREPROCEDURE EVALUATION
Anesthesia Pre-Procedure Evaluation    Patient: Chidi Dubon   MRN: [id number] : 1940        Procedure : Procedure(s):  HERNIORRHAPHY, INGUINAL, OPEN          Past Medical History:   Diagnosis Date     CAD (coronary artery disease)      Dyslipidemia      HTN (hypertension)       Past Surgical History:   Procedure Laterality Date     ANGIOGRAM  age 61     3 cornary artery stents, [facility]     ANGIOGRAM  age 66    1 coronary artery stent, [facility]     ANGIOGRAM  2013    1 coronary artery stent, [facility]      ARTHROSCOPY KNEE RT/LT        Allergies   Allergen Reactions     No Known Allergies       Social History     Tobacco Use     Smoking status: Never     Smokeless tobacco: Never     Tobacco comments:     never smoker; non-smoking household   Substance Use Topics     Alcohol use: No     Comment: none      Wt Readings from Last 1 Encounters:   24 110.8 kg (244 lb 3.2 oz)        Anesthesia Evaluation   Pt has had prior anesthetic. Type: MAC and General.        ROS/MED HX  ENT/Pulmonary:       Neurologic:       Cardiovascular:     (+) Dyslipidemia hypertension- -  CAD angina-  - stent-   Taking blood thinners                     dysrhythmias, a-fib,  valvular problems/murmurs type: MR          METS/Exercise Tolerance:     Hematologic:       Musculoskeletal:   (+)  arthritis,             GI/Hepatic:       Renal/Genitourinary:       Endo:     (+)               Obesity,       Psychiatric/Substance Use:     (+) psychiatric history anxiety and depression       Infectious Disease:       Malignancy:       Other:             OUTSIDE LABS:  CBC:   Lab Results   Component Value Date    WBC 5.0 2024    WBC 5.4 2024    HGB 13.4 2024    HGB 12.9 (L) 2024    HCT 41.7 2024    HCT 41.2 2024     2024     2024     BMP:   Lab Results   Component Value Date     2024     2024    POTASSIUM 4.5 2024    POTASSIUM 4.1  "02/23/2024    CHLORIDE 109 (H) 04/23/2024    CHLORIDE 107 02/23/2024    CO2 23 04/23/2024    CO2 24 02/23/2024    BUN 14.8 04/23/2024    BUN 15.3 02/23/2024    CR 1.15 04/23/2024    CR 1.1 02/23/2024    GLC 90 04/23/2024    GLC 96 02/23/2024     COAGS:   Lab Results   Component Value Date    INR 1.8 (H) 04/19/2024     POC: No results found for: \"BGM\", \"HCG\", \"HCGS\"  HEPATIC:   Lab Results   Component Value Date    ALBUMIN 3.8 02/23/2024    PROTTOTAL 7.0 02/23/2024    ALT 18 02/23/2024    AST 38 02/23/2024    ALKPHOS 120 02/23/2024    BILITOTAL 2.6 (H) 02/23/2024     OTHER:   Lab Results   Component Value Date    ISIDRO 9.5 04/23/2024    TSH 1.63 08/14/2023    T4 0.82 01/04/2011    CRP <2.9 12/11/2019    SED 12 12/11/2019              KELSY Neil CRNA    I have reviewed the pertinent notes and labs in the chart from the past 30 days and (re)examined the patient.  Any updates or changes from those notes are reflected in this note.            # Coagulation Defect: INR = 1.8 (Ref range: 0.9 - 1.1) and/or PTT = N/A, will monitor for bleeding   # Obesity: Estimated body mass index is 31.98 kg/m  as calculated from the following:    Height as of 4/23/24: 1.861 m (6' 1.27\").    Weight as of 4/23/24: 110.8 kg (244 lb 3.2 oz).      "

## 2024-05-09 NOTE — OR NURSING
RN to creat team conversation with surgeon and primary care provider due to patient concerns related to blood thinner and related to patient request for hospital stay, as he has no one to take care of him after discharge due to daughter not returning from [place] as scheduled. RN has spoken with daughter-bonny who was supposed to be caregiver, patient and MD to discuss patient concerns.  Per dr Wharton, patient doesn't need lovenox bridging. Patient also unable to stay in hospital due to no caregiver at home for elective case. Per Dr Wharton, case to be cancelled and rescheduled for future when caregiver available. Per bonny she will be back in town next week and likely stay for a few months. Plan of care discussed with patient. Pt also advised to restart coumadin and contact coumadin clinic, per Dr wharton. Per dr wharton patient doesn't need lovenox bridge and that his team will call to reschedule. Pt understands this.

## 2024-05-10 ENCOUNTER — ANESTHESIA (OUTPATIENT)
Dept: SURGERY | Facility: CLINIC | Age: 84
End: 2024-05-10
Payer: COMMERCIAL

## 2024-05-10 RX ORDER — PRAMIPEXOLE DIHYDROCHLORIDE 0.12 MG/1
0.12 TABLET ORAL 3 TIMES DAILY
Qty: 90 TABLET | Refills: 1 | Status: SHIPPED | OUTPATIENT
Start: 2024-05-10 | End: 2024-09-23

## 2024-05-10 RX ORDER — LOSARTAN POTASSIUM 100 MG/1
100 TABLET ORAL DAILY
Qty: 90 TABLET | Refills: 1 | Status: SHIPPED | OUTPATIENT
Start: 2024-05-10

## 2024-05-14 NOTE — TELEPHONE ENCOUNTER
RN following up as pt should have had procedure by now. Upon further review, see 4/22/24 TE, pcp approved of pharmacist recommendations and pt was informed of this 5/3/24.     RN called pt to confirm pcp's recommendations were followed through. No answer, left voicemail to call clinic back at [phone].     Jaz Mcintosh RN on 5/14/2024 at 9:23 AM

## 2024-05-14 NOTE — TELEPHONE ENCOUNTER
RN called patient and relayed providers message. Patient verbalized understanding.     Patient updated his procedure on 5/10/24 had to be cancelled due his care giver being unavailable to help him post procedure. Patient is going to reschedule when care giver will be available.     Patient asked what he should do for his warfarin when procedure is rescheduled. RN stated patient may need another pre-op and to clarify that when rescheduling procedure. At that appointment PCP would review meds and instruct on what to hold prior to surgery again. Patient verbalized understanding. Patient will call clinic back once procedure is rescheduled.     Merced Kahn RN on 5/14/2024 at 4:30 PM

## 2024-05-17 ENCOUNTER — TELEPHONE (OUTPATIENT)
Dept: ANTICOAGULATION | Facility: CLINIC | Age: 84
End: 2024-05-17

## 2024-05-17 ENCOUNTER — LAB (OUTPATIENT)
Dept: LAB | Facility: CLINIC | Age: 84
End: 2024-05-17
Payer: COMMERCIAL

## 2024-05-17 ENCOUNTER — ANTICOAGULATION THERAPY VISIT (OUTPATIENT)
Dept: ANTICOAGULATION | Facility: CLINIC | Age: 84
End: 2024-05-17

## 2024-05-17 DIAGNOSIS — I48.20 CHRONIC ATRIAL FIBRILLATION (H): ICD-10-CM

## 2024-05-17 DIAGNOSIS — Z79.01 LONG TERM CURRENT USE OF ANTICOAGULANT THERAPY: Primary | ICD-10-CM

## 2024-05-17 DIAGNOSIS — K40.90 REDUCIBLE RIGHT INGUINAL HERNIA: Primary | ICD-10-CM

## 2024-05-17 LAB — INR BLD: 1.7 (ref 0.9–1.1)

## 2024-05-17 PROCEDURE — 36416 COLLJ CAPILLARY BLOOD SPEC: CPT

## 2024-05-17 PROCEDURE — 85610 PROTHROMBIN TIME: CPT

## 2024-05-17 NOTE — TELEPHONE ENCOUNTER
General Call      Reason for Call:  Discuss INR    What are your questions or concerns:  Patient states he is not sure if it is necessary to take an additional dose of his medication tonight since his INR was 1.7. He would like to discuss with a nurse.    Date of last appointment with provider: 5/17/24    Okay to leave a detailed message?: No at Cell number on file:    Telephone Information:   Mobile [phone]

## 2024-05-17 NOTE — PROGRESS NOTES
ANTICOAGULATION MANAGEMENT     Chidi Dubon 83 year old male is on warfarin with subtherapeutic INR result. (Goal INR 2.0-3.0)    Recent labs: (last 7 days)     05/17/24  1130   INR 1.7*       ASSESSMENT     Source(s): Chart Review  Previous INR was Subtherapeutic  Medication, diet, health changes since last INR chart reviewed; none identified         PLAN     Recommended plan for temporary change(s) affecting INR     Dosing Instructions:  had a hold but surgery was rescheudled will booster tonight and recheck inr in  2 weeks  with next INR in 2 weeks       Summary  As of 5/17/2024      Full warfarin instructions:  5/17: 5 mg; 5/29: Hold; 5/30: Hold; 5/31: Hold; 6/1: Hold; 6/2: Hold; 6/3: 5 mg; Otherwise 1.25 mg every Sun, Wed; 2.5 mg all other days   Next INR check:                 Telephone call with daughter  who verbalizes understanding and agrees to plan    Will have her dad make apt    Education provided:   Please call back if any changes to your diet, medications or how you've been taking warfarin    Plan made per [facility] anticoagulation protocol    Naa Baez, RN  Anticoagulation Clinic  5/17/2024    _______________________________________________________________________     Anticoagulation Episode Summary       Current INR goal:  2.0-3.0   TTR:  58.7% (10.3 mo)   Target end date:  Indefinite   Send INR reminders to:  EMELIA SARGENT    Indications    Long-term (current) use of anticoagulants [Z79.01] [Z79.01]  Atrial Fibrillation [I48.20]             Comments:               Anticoagulation Care Providers       Provider Role Specialty Phone number    Martin Schultz PA-C Referring Family Medicine [phone]    Gen Marti PA Referring  [phone]

## 2024-05-17 NOTE — TELEPHONE ENCOUNTER
Left message to discuss.  Suraj Warren Rn  [facility]     [[id number]] [[id number]] [[id number]]

## 2024-05-20 ENCOUNTER — OFFICE VISIT (OUTPATIENT)
Dept: FAMILY MEDICINE | Facility: CLINIC | Age: 84
End: 2024-05-20
Payer: COMMERCIAL

## 2024-05-20 VITALS
OXYGEN SATURATION: 97 % | RESPIRATION RATE: 20 BRPM | SYSTOLIC BLOOD PRESSURE: 122 MMHG | WEIGHT: 250.2 LBS | BODY MASS INDEX: 33.16 KG/M2 | HEIGHT: 73 IN | HEART RATE: 54 BPM | TEMPERATURE: 98.3 F | DIASTOLIC BLOOD PRESSURE: 52 MMHG

## 2024-05-20 DIAGNOSIS — K40.90 NON-RECURRENT UNILATERAL INGUINAL HERNIA WITHOUT OBSTRUCTION OR GANGRENE: ICD-10-CM

## 2024-05-20 DIAGNOSIS — I10 BENIGN ESSENTIAL HYPERTENSION: ICD-10-CM

## 2024-05-20 DIAGNOSIS — Z01.818 PREOP GENERAL PHYSICAL EXAM: Primary | ICD-10-CM

## 2024-05-20 LAB
BASOPHILS # BLD AUTO: 0.1 10E3/UL (ref 0–0.2)
BASOPHILS NFR BLD AUTO: 1 %
EOSINOPHIL # BLD AUTO: 0.1 10E3/UL (ref 0–0.7)
EOSINOPHIL NFR BLD AUTO: 2 %
ERYTHROCYTE [DISTWIDTH] IN BLOOD BY AUTOMATED COUNT: 15.4 % (ref 10–15)
HCT VFR BLD AUTO: 40.7 % (ref 40–53)
HGB BLD-MCNC: 13 G/DL (ref 13.3–17.7)
IMM GRANULOCYTES # BLD: 0 10E3/UL
IMM GRANULOCYTES NFR BLD: 0 %
LYMPHOCYTES # BLD AUTO: 1.6 10E3/UL (ref 0.8–5.3)
LYMPHOCYTES NFR BLD AUTO: 32 %
MCH RBC QN AUTO: 28.4 PG (ref 26.5–33)
MCHC RBC AUTO-ENTMCNC: 31.9 G/DL (ref 31.5–36.5)
MCV RBC AUTO: 89 FL (ref 78–100)
MONOCYTES # BLD AUTO: 0.4 10E3/UL (ref 0–1.3)
MONOCYTES NFR BLD AUTO: 8 %
NEUTROPHILS # BLD AUTO: 2.8 10E3/UL (ref 1.6–8.3)
NEUTROPHILS NFR BLD AUTO: 56 %
PLATELET # BLD AUTO: 149 10E3/UL (ref 150–450)
RBC # BLD AUTO: 4.58 10E6/UL (ref 4.4–5.9)
WBC # BLD AUTO: 5 10E3/UL (ref 4–11)

## 2024-05-20 PROCEDURE — 85025 COMPLETE CBC W/AUTO DIFF WBC: CPT | Performed by: PHYSICIAN ASSISTANT

## 2024-05-20 PROCEDURE — 80048 BASIC METABOLIC PNL TOTAL CA: CPT | Performed by: PHYSICIAN ASSISTANT

## 2024-05-20 PROCEDURE — 36415 COLL VENOUS BLD VENIPUNCTURE: CPT | Performed by: PHYSICIAN ASSISTANT

## 2024-05-20 PROCEDURE — 99214 OFFICE O/P EST MOD 30 MIN: CPT | Performed by: PHYSICIAN ASSISTANT

## 2024-05-20 ASSESSMENT — PAIN SCALES - GENERAL: PAINLEVEL: NO PAIN (0)

## 2024-05-20 NOTE — TELEPHONE ENCOUNTER
Left a detailed voicemail for Chidi to call back if he has further questions. [facility] did speak to his daughter on Friday 5/17/24. See Anticoagulation Encounter. Solomon Rebolledo, RN, BSN

## 2024-05-20 NOTE — PROGRESS NOTES
Preoperative Evaluation  [facility] [place]  [address]  [place] [address]  Phone: [phone]  Primary Provider: Martin Schultz PA-C  Pre-op Performing Provider: Martin Schultz PA-C  May 20, 2024             2024   Surgical Information   What procedure is being done? Hernia   Facility or Hospital where procedure/surgery will be performed: [facility]   Who is doing the procedure / surgery? Dr. Haddad   Date of surgery / procedure: 6/3/24   Time of surgery / procedure: 12:30 pm   Where do you plan to recover after surgery? at home with family     Fax number for surgical facility: Note does not need to be faxed, will be available electronically in Epic.  Chidi was seen today for pre-op exam.    Diagnoses and all orders for this visit:    Preop general physical exam    Non-recurrent unilateral inguinal hernia without obstruction or gangrene            Pre-Procedure:  Hold warfarin for 5 days, until after procedure startin2024   No Bridge     Post-Procedure:  Resume warfarin dose if okay with provider doing procedure on night of procedure, 2024 PM: 5mg  Recheck INR ~ 7 days after resuming warfarin    Takes all other meds in the evening.  He may take his meds the noc before his surgery.   Chidi is cleared for surgery and appropriate anesthesia    Subjective   Chidi is a 83 year old, presenting for the following:  Pre-Op Exam (6/3/24/[facility]/Hernia/Dr. Haddad)          2024     1:20 PM   Additional Questions   Roomed by Jana Dixon CMA   Accompanied by None         2024     1:20 PM   Patient Reported Additional Medications   Patient reports taking the following new medications none     HPI related to upcoming procedure: Hernia        2024   Pre-Op Questionnaire   Have you ever had a heart attack or stroke? No   Have you ever had surgery on your heart or blood vessels, such as a stent placement, a coronary artery bypass,  or surgery on an artery in your head, neck, heart, or legs? YES - stents (multiple). No chest pain   Pacemaker   Do you have chest pain with activity? No   Do you have a history of heart failure? No   Do you currently have a cold, bronchitis or symptoms of other infection? No   Do you have a cough, shortness of breath, or wheezing? No   Do you or anyone in your family have previous history of blood clots? No   Do you or does anyone in your family have a serious bleeding problem such as prolonged bleeding following surgeries or cuts? YES    Have you ever had problems with anemia or been told to take iron pills? No   Have you had any abnormal blood loss such as black, tarry or bloody stools? No   Have you ever had a blood transfusion?  No   Have you ever had a transfusion reaction? No   Are you willing to have a blood transfusion if it is medically needed before, during, or after your surgery? Yes   Have you or any of your relatives ever had problems with anesthesia? No   Do you have sleep apnea, excessive snoring or daytime drowsiness? No   Do you have any artifical heart valves or other implanted medical devices like a pacemaker, defibrillator, or continuous glucose monitor? YES   What type of device do you have? pacemaker   Name of the clinic that manages your device Cardiologist - [facility]   Do you have artificial joints? Yes - right hip and right knee    Are you allergic to latex? No     Health Care Directive  Patient does not have a Health Care Directive or Living Will: Discussed advance care planning with patient; however, patient declined at this time.    Preoperative Review of    reviewed - no record of controlled substances prescribed.      Status of Chronic Conditions:  See problem list for active medical problems.  Problems all longstanding and stable, except as noted/documented.  See ROS for pertinent symptoms related to these conditions.    Patient Active  Problem List    Diagnosis Date Noted    Morbid obesity (H) 03/09/2022     Priority: Medium    Non-rheumatic mitral regurgitation 07/23/2019     Priority: Medium    Abdominal aortic aneurysm without rupture (H24) 05/17/2019     Priority: Medium    Bilirubinemia 05/17/2019     Priority: Medium    Coronary artery disease involving native heart with angina pectoris, unspecified vessel or lesion type (H24) 08/08/2018     Priority: Medium    S/P placement of cardiac pacemaker 08/08/2018     Priority: Medium    Posterior capsular opacification of both eyes, obscuring vision 05/01/2018     Priority: Medium    Aftercare following right hip joint replacement surgery 06/06/2017     Priority: Medium    Hip pain, right 06/06/2017     Priority: Medium    Hip joint replacement status 06/06/2017     Priority: Medium    Depression, major, in remission (H24) 04/20/2017     Priority: Medium    Benign essential hypertension 02/29/2016     Priority: Medium    Long-term (current) use of anticoagulants [Z79.01] 02/16/2016     Priority: Medium    Restless leg syndrome 12/09/2014     Priority: Medium     Begin with gabapentin and supplement with iron daily for borderline ferritin.       Fatigue 05/09/2014     Priority: Medium     HGB     14.9   10/21/2013. TSH     1.73   8/5/2013 LDL      104   8/20/2013.  TSH     1.73   8/5/2013.       Atrial Fibrillation 05/03/2013     Priority: Medium     Wafarin therapy, managed by [facility]  November 25, 2014 - CHADS2 2.8%.  HAS-BLED 1.8%.      Diverticulosis 08/10/2012     Priority: Medium     Noted on CT but no inflammation 8/12.      Urolithiasis 08/10/2012     Priority: Medium     Noted 8/8/12.  Lemon juice as uric acid.  Will hold Flomax as wanting to minimize medications and possibly help fatigue.       Osteoarthritis, knee 07/06/2011     Priority: Medium     Synvisc with some benefit.  Getting shots.  Trying to lose weight and exercise. Minimal benefit from injections.       CAD S/P percutaneous  coronary angioplasty 06/25/2010     Priority: Medium     Stents x 2.  Stable.  Flipped T's inferiorly.  On good medical management. 8/13 restended. Diffuse disease. Medical management.       Hyperlipidemia LDL goal <70 12/14/2009     Priority: Medium     Using half dose Crestor due to cost. May benefit from atorvastatin when generic available.  Accepting that goal will not be met. Simvastatin 40 mg prescription.   LDL      100   5/9/2014 - will review benefit for atorvastatin 40 every other day.       Obesity 12/14/2009     Priority: Medium     Strongly encouraged exercise.  Weight Watchers might help as well. Considering.       Mild major depression (H) 12/14/2009     Priority: Medium     Increase to 20 mg.         Past Medical History:   Diagnosis Date    CAD (coronary artery disease)     Dyslipidemia     HTN (hypertension)      Past Surgical History:   Procedure Laterality Date    ANGIOGRAM  age 61     3 cornary artery stents, [facility]    ANGIOGRAM  age 66    1 coronary artery stent, [facility]    ANGIOGRAM  11/2013    1 coronary artery stent, [facility]     ARTHROSCOPY KNEE RT/LT       Current Outpatient Medications   Medication Sig Dispense Refill    albuterol (PROAIR HFA/PROVENTIL HFA/VENTOLIN HFA) 108 (90 Base) MCG/ACT inhaler Inhale 2 puffs into the lungs every 6 hours as needed 18 g 0    aspirin 81 MG EC tablet Take 81 mg by mouth      atorvastatin (LIPITOR) 80 MG tablet Take 0.5 tablets (40 mg) by mouth daily 45 tablet 3    fluticasone (FLONASE) 50 MCG/ACT nasal spray Spray 1 spray into both nostrils daily 18.2 mL 0    losartan (COZAAR) 100 MG tablet TAKE ONE TABLET BY MOUTH ONCE DAILY. 90 tablet 1    nitroGLYcerin (NITROSTAT) 0.4 MG sublingual tablet Place 1 tablet (0.4 mg) under the tongue every 5 minutes as needed for chest pain 25 tablet 0    potassium chloride (KLOR-CON) 20 MEQ packet Take 20 mEq by mouth daily 90 each 3    pramipexole (MIRAPEX) 0.125 MG tablet Take 1 tablet (0.125 mg) by mouth  "3 times daily 90 tablet 1    spironolactone (ALDACTONE) 25 MG tablet Take 1 tablet by mouth daily      warfarin ANTICOAGULANT (COUMADIN) 2.5 MG tablet TAKE ONE-HALF TABLET BY MOUTH ONCE DAILY ON SUNDAYS AND WEDNESDAYS; TAKE 1 TABLET ONCE DAILY ALL OTHER DAYS OF THE WEEK OR TAKE AS DIRECTED BY INR CLINIC 72 tablet 0       Allergies   Allergen Reactions    No Known Allergies         Social History     Tobacco Use    Smoking status: Never    Smokeless tobacco: Never    Tobacco comments:     never smoker; non-smoking household   Substance Use Topics    Alcohol use: No     Comment: none     Family History   Problem Relation Age of Onset    Heart Disease Paternal Grandfather     Heart Disease Brother      History   Drug Use Unknown             Review of Systems  Constitutional, HEENT, cardiovascular, pulmonary, GI, , musculoskeletal, neuro, skin, endocrine and psych systems are negative, except as otherwise noted.    Objective    /52   Pulse 54   Temp 98.3  F (36.8  C) (Oral)   Resp 20   Ht 1.848 m (6' 0.75\")   Wt 113.5 kg (250 lb 3.2 oz)   SpO2 97%   BMI 33.24 kg/m     Estimated body mass index is 33.24 kg/m  as calculated from the following:    Height as of this encounter: 1.848 m (6' 0.75\").    Weight as of this encounter: 113.5 kg (250 lb 3.2 oz).  Physical Exam  GENERAL: alert and no distress  EYES: Eyes grossly normal to inspection, PERRL and conjunctivae and sclerae normal  HENT: ear canals and TM's normal, nose and mouth without ulcers or lesions  NECK: no adenopathy, no asymmetry, masses, or scars  RESP: lungs clear to auscultation - no rales, rhonchi or wheezes  CV: regular rate and rhythm, normal S1 S2, no S3 or S4, no murmur, click or rub, no peripheral edema  MS: no gross musculoskeletal defects noted, no edema  SKIN: no suspicious lesions or rashes  NEURO: Normal strength and tone, mentation intact and speech normal  PSYCH: mentation appears normal, affect normal/bright    Recent Labs   Lab " Test 05/17/24  1130 04/23/24  1445 04/19/24  1147 02/23/24  1552 02/23/24  1335   HGB  --  13.4  --   --  12.9*   PLT  --  166  --   --  153   INR 1.7*  --  1.8*  --   --    NA  --  142  --   --  139   POTASSIUM  --  4.5  --   --  4.1   CR  --  1.15  --  1.1 1.06        Diagnostics  Labs pending at this time.  Results will be reviewed when available.   No ekg. Chronic ventricular pacing via his pacemaker.     Revised Cardiac Risk Index (RCRI)  The patient has the following serious cardiovascular risks for perioperative complications:   - No serious cardiac risks = 0 points     RCRI Interpretation: 0 points: Class I (very low risk - 0.4% complication rate)         Signed Electronically by: Martin Schultz PA-C  Copy of this evaluation report is provided to requesting physician.

## 2024-05-21 LAB
ANION GAP SERPL CALCULATED.3IONS-SCNC: 12 MMOL/L (ref 7–15)
BUN SERPL-MCNC: 14.6 MG/DL (ref 8–23)
CALCIUM SERPL-MCNC: 9.4 MG/DL (ref 8.8–10.2)
CHLORIDE SERPL-SCNC: 108 MMOL/L (ref 98–107)
CREAT SERPL-MCNC: 1.18 MG/DL (ref 0.67–1.17)
DEPRECATED HCO3 PLAS-SCNC: 20 MMOL/L (ref 22–29)
EGFRCR SERPLBLD CKD-EPI 2021: 62 ML/MIN/1.73M2
GLUCOSE SERPL-MCNC: 97 MG/DL (ref 70–99)
POTASSIUM SERPL-SCNC: 4.3 MMOL/L (ref 3.4–5.3)
SODIUM SERPL-SCNC: 140 MMOL/L (ref 135–145)

## 2024-05-28 DIAGNOSIS — E78.5 HYPERLIPIDEMIA LDL GOAL <70: ICD-10-CM

## 2024-05-28 RX ORDER — ATORVASTATIN CALCIUM 80 MG/1
40 TABLET, FILM COATED ORAL DAILY
Qty: 45 TABLET | Refills: 0 | Status: SHIPPED | OUTPATIENT
Start: 2024-05-28 | End: 2024-09-06

## 2024-05-30 ENCOUNTER — ANESTHESIA EVENT (OUTPATIENT)
Dept: SURGERY | Facility: CLINIC | Age: 84
End: 2024-05-30
Payer: COMMERCIAL

## 2024-05-30 ENCOUNTER — TELEPHONE (OUTPATIENT)
Dept: FAMILY MEDICINE | Facility: CLINIC | Age: 84
End: 2024-05-30
Payer: COMMERCIAL

## 2024-05-30 ASSESSMENT — ENCOUNTER SYMPTOMS: DYSRHYTHMIAS: 1

## 2024-05-30 NOTE — LETTER
May 30, 2024      Chidi Dubon  [address]  [place] [address]    Your team at [facility] cares about your health. We have reviewed your chart and based on our findings; we are making the following recommendations to better manage your health.     You are in particular need of attention regarding the following:     PREVENTATIVE VISIT: Annual Medicare Wellness:Schedule an Annual Medicare Wellness Exam. Please call your [facility] clinic to set up your appointment.    If you have already completed these items, please contact the clinic via phone or   MyChart so your care team can review and update your records. Thank you for   choosing [facility] for your healthcare needs. For any questions,   concerns, or to schedule an appointment please contact our clinic.    Healthy Regards,      Your [facility] Care Team

## 2024-05-30 NOTE — TELEPHONE ENCOUNTER
Patient Quality Outreach    Patient is due for the following:   Physical Annual Wellness Visit    Next Steps:   Schedule a Annual Wellness Visit    Type of outreach:    Sent letter.      Questions for provider review:    None           Jana Dixon

## 2024-05-30 NOTE — ANESTHESIA PREPROCEDURE EVALUATION
Anesthesia Pre-Procedure Evaluation    Patient: Chidi Dubon   MRN: [id number] : 1940        Procedure : Procedure(s):  HERNIORRHAPHY, INGUINAL, OPEN          Past Medical History:   Diagnosis Date    CAD (coronary artery disease)     Dyslipidemia     HTN (hypertension)       Past Surgical History:   Procedure Laterality Date    ANGIOGRAM  age 61     3 cornary artery stents, [facility]    ANGIOGRAM  age 66    1 coronary artery stent, [facility]    ANGIOGRAM  2013    1 coronary artery stent, [facility]     ARTHROSCOPY KNEE RT/LT        Allergies   Allergen Reactions    No Known Allergies       Social History     Tobacco Use    Smoking status: Never    Smokeless tobacco: Never    Tobacco comments:     never smoker; non-smoking household   Substance Use Topics    Alcohol use: No     Comment: none      Wt Readings from Last 1 Encounters:   24 113.5 kg (250 lb 3.2 oz)        Anesthesia Evaluation   Pt has had prior anesthetic. Type: MAC and General.        ROS/MED HX  ENT/Pulmonary:  - neg pulmonary ROS     Neurologic:  - neg neurologic ROS     Cardiovascular:     (+) Dyslipidemia hypertension- -  CAD angina-  - stent-   Taking blood thinners           pacemaker,    - Patientt is not dependent on pacemaker.      dysrhythmias, a-fib,  valvular problems/murmurs type: MR          METS/Exercise Tolerance:     Hematologic:       Musculoskeletal:   (+)  arthritis,             GI/Hepatic:  - neg GI/hepatic ROS     Renal/Genitourinary:  - neg Renal ROS     Endo:     (+)               Obesity,       Psychiatric/Substance Use:     (+) psychiatric history anxiety and depression       Infectious Disease:  - neg infectious disease ROS     Malignancy:  - neg malignancy ROS     Other:            Physical Exam    Airway  airway exam normal      Mallampati: II   TM distance: > 3 FB   Neck ROM: full   Mouth opening: > 3 cm    Respiratory Devices and Support         Dental       (+) Multiple crowns, permanant  "bridges      Cardiovascular          Rhythm and rate: irregular and normal     Pulmonary   pulmonary exam normal        breath sounds clear to auscultation           OUTSIDE LABS:  CBC:   Lab Results   Component Value Date    WBC 5.0 05/20/2024    WBC 5.0 04/23/2024    HGB 13.0 (L) 05/20/2024    HGB 13.4 04/23/2024    HCT 40.7 05/20/2024    HCT 41.7 04/23/2024     (L) 05/20/2024     04/23/2024     BMP:   Lab Results   Component Value Date     05/20/2024     04/23/2024    POTASSIUM 4.3 05/20/2024    POTASSIUM 4.5 04/23/2024    CHLORIDE 108 (H) 05/20/2024    CHLORIDE 109 (H) 04/23/2024    CO2 20 (L) 05/20/2024    CO2 23 04/23/2024    BUN 14.6 05/20/2024    BUN 14.8 04/23/2024    CR 1.18 (H) 05/20/2024    CR 1.15 04/23/2024    GLC 97 05/20/2024    GLC 90 04/23/2024     COAGS:   Lab Results   Component Value Date    INR 1.7 (H) 05/17/2024     POC: No results found for: \"BGM\", \"HCG\", \"HCGS\"  HEPATIC:   Lab Results   Component Value Date    ALBUMIN 3.8 02/23/2024    PROTTOTAL 7.0 02/23/2024    ALT 18 02/23/2024    AST 38 02/23/2024    ALKPHOS 120 02/23/2024    BILITOTAL 2.6 (H) 02/23/2024     OTHER:   Lab Results   Component Value Date    ISIDRO 9.4 05/20/2024    TSH 1.63 08/14/2023    T4 0.82 01/04/2011    CRP <2.9 12/11/2019    SED 12 12/11/2019       Anesthesia Plan    ASA Status:  3    NPO Status:  NPO Appropriate    Anesthesia Type: MAC.     - Reason for MAC: straight local not clinically adequate              Consents    Anesthesia Plan(s) and associated risks, benefits, and realistic alternatives discussed. Questions answered and patient/representative(s) expressed understanding.     - Discussed: Risks, Benefits and Alternatives for BOTH SEDATION and the PROCEDURE were discussed     - Discussed with:  Patient       - Patient is DNR/DNI Status: No          Postoperative Care    Pain management: Oral pain medications, IV analgesics, Multi-modal analgesia.   PONV prophylaxis: Ondansetron (or " "other 5HT-3)     Comments:               KELSY Ferguson CRNA    I have reviewed the pertinent notes and labs in the chart from the past 30 days and (re)examined the patient.  Any updates or changes from those notes are reflected in this note.            # Coagulation Defect: INR = 1.7 (Ref range: 0.9 - 1.1) and/or PTT = N/A, will monitor for bleeding   # Obesity: Estimated body mass index is 33.24 kg/m  as calculated from the following:    Height as of 5/20/24: 1.848 m (6' 0.75\").    Weight as of 5/20/24: 113.5 kg (250 lb 3.2 oz).      "

## 2024-06-03 ENCOUNTER — HOSPITAL ENCOUNTER (OUTPATIENT)
Facility: CLINIC | Age: 84
Discharge: HOME | End: 2024-06-03
Attending: SURGERY | Admitting: SURGERY
Payer: COMMERCIAL

## 2024-06-03 ENCOUNTER — TELEPHONE (OUTPATIENT)
Dept: ANTICOAGULATION | Facility: CLINIC | Age: 84
End: 2024-06-03
Payer: COMMERCIAL

## 2024-06-03 ENCOUNTER — ANESTHESIA (OUTPATIENT)
Dept: SURGERY | Facility: CLINIC | Age: 84
End: 2024-06-03
Payer: COMMERCIAL

## 2024-06-03 ENCOUNTER — DOCUMENTATION ONLY (OUTPATIENT)
Dept: ANTICOAGULATION | Facility: CLINIC | Age: 84
End: 2024-06-03

## 2024-06-03 VITALS
TEMPERATURE: 97.9 F | RESPIRATION RATE: 15 BRPM | SYSTOLIC BLOOD PRESSURE: 118 MMHG | WEIGHT: 250 LBS | HEART RATE: 51 BPM | OXYGEN SATURATION: 96 % | BODY MASS INDEX: 33.21 KG/M2 | DIASTOLIC BLOOD PRESSURE: 58 MMHG

## 2024-06-03 DIAGNOSIS — K40.90 RIGHT INGUINAL HERNIA: Primary | ICD-10-CM

## 2024-06-03 PROCEDURE — 710N000012 HC RECOVERY PHASE 2, PER MINUTE: Performed by: SURGERY

## 2024-06-03 PROCEDURE — 272N000001 HC OR GENERAL SUPPLY STERILE: Performed by: SURGERY

## 2024-06-03 PROCEDURE — 250N000009 HC RX 250: Performed by: NURSE ANESTHETIST, CERTIFIED REGISTERED

## 2024-06-03 PROCEDURE — 999N000141 HC STATISTIC PRE-PROCEDURE NURSING ASSESSMENT: Performed by: SURGERY

## 2024-06-03 PROCEDURE — 250N000011 HC RX IP 250 OP 636: Performed by: SURGERY

## 2024-06-03 PROCEDURE — 250N000013 HC RX MED GY IP 250 OP 250 PS 637: Performed by: NURSE ANESTHETIST, CERTIFIED REGISTERED

## 2024-06-03 PROCEDURE — 250N000013 HC RX MED GY IP 250 OP 250 PS 637: Performed by: SURGERY

## 2024-06-03 PROCEDURE — 49505 PRP I/HERN INIT REDUC >5 YR: CPT | Mod: RT | Performed by: SURGERY

## 2024-06-03 PROCEDURE — 250N000011 HC RX IP 250 OP 636: Performed by: NURSE ANESTHETIST, CERTIFIED REGISTERED

## 2024-06-03 PROCEDURE — 49505 PRP I/HERN INIT REDUC >5 YR: CPT | Mod: RT | Performed by: PHYSICIAN ASSISTANT

## 2024-06-03 PROCEDURE — 360N000075 HC SURGERY LEVEL 2, PER MIN: Performed by: SURGERY

## 2024-06-03 PROCEDURE — 370N000017 HC ANESTHESIA TECHNICAL FEE, PER MIN: Performed by: SURGERY

## 2024-06-03 PROCEDURE — C1781 MESH (IMPLANTABLE): HCPCS | Performed by: SURGERY

## 2024-06-03 PROCEDURE — 88302 TISSUE EXAM BY PATHOLOGIST: CPT | Mod: TC | Performed by: SURGERY

## 2024-06-03 PROCEDURE — 258N000003 HC RX IP 258 OP 636: Performed by: NURSE ANESTHETIST, CERTIFIED REGISTERED

## 2024-06-03 PROCEDURE — 271N000001 HC OR GENERAL SUPPLY NON-STERILE: Performed by: SURGERY

## 2024-06-03 PROCEDURE — 250N000009 HC RX 250: Performed by: SURGERY

## 2024-06-03 DEVICE — SELF-GRIPPING POLYESTER MESH,RIGHT ANATOMICAL, POLYESTER WITH POLYLACTIC ACID GRIPS
Type: IMPLANTABLE DEVICE | Site: GROIN | Status: FUNCTIONAL
Brand: PROGRIP

## 2024-06-03 RX ORDER — ONDANSETRON 2 MG/ML
INJECTION INTRAMUSCULAR; INTRAVENOUS PRN
Status: DISCONTINUED | OUTPATIENT
Start: 2024-06-03 | End: 2024-06-03

## 2024-06-03 RX ORDER — NALOXONE HYDROCHLORIDE 0.4 MG/ML
0.1 INJECTION, SOLUTION INTRAMUSCULAR; INTRAVENOUS; SUBCUTANEOUS
Status: DISCONTINUED | OUTPATIENT
Start: 2024-06-03 | End: 2024-06-03 | Stop reason: HOSPADM

## 2024-06-03 RX ORDER — LIDOCAINE 40 MG/G
CREAM TOPICAL
Status: DISCONTINUED | OUTPATIENT
Start: 2024-06-03 | End: 2024-06-03 | Stop reason: HOSPADM

## 2024-06-03 RX ORDER — OXYCODONE HYDROCHLORIDE 5 MG/1
5 TABLET ORAL
Status: DISCONTINUED | OUTPATIENT
Start: 2024-06-03 | End: 2024-06-03 | Stop reason: HOSPADM

## 2024-06-03 RX ORDER — ONDANSETRON 4 MG/1
4 TABLET, ORALLY DISINTEGRATING ORAL EVERY 30 MIN PRN
Status: DISCONTINUED | OUTPATIENT
Start: 2024-06-03 | End: 2024-06-03 | Stop reason: HOSPADM

## 2024-06-03 RX ORDER — BUPIVACAINE HYDROCHLORIDE 5 MG/ML
INJECTION, SOLUTION PERINEURAL PRN
Status: DISCONTINUED | OUTPATIENT
Start: 2024-06-03 | End: 2024-06-03 | Stop reason: HOSPADM

## 2024-06-03 RX ORDER — DEXAMETHASONE SODIUM PHOSPHATE 4 MG/ML
4 INJECTION, SOLUTION INTRA-ARTICULAR; INTRALESIONAL; INTRAMUSCULAR; INTRAVENOUS; SOFT TISSUE
Status: DISCONTINUED | OUTPATIENT
Start: 2024-06-03 | End: 2024-06-03 | Stop reason: HOSPADM

## 2024-06-03 RX ORDER — OXYCODONE HYDROCHLORIDE 5 MG/1
5 TABLET ORAL
Status: COMPLETED | OUTPATIENT
Start: 2024-06-03 | End: 2024-06-03

## 2024-06-03 RX ORDER — LIDOCAINE HYDROCHLORIDE AND EPINEPHRINE 10; 10 MG/ML; UG/ML
INJECTION, SOLUTION INFILTRATION; PERINEURAL PRN
Status: DISCONTINUED | OUTPATIENT
Start: 2024-06-03 | End: 2024-06-03 | Stop reason: HOSPADM

## 2024-06-03 RX ORDER — OXYCODONE HYDROCHLORIDE 5 MG/1
5 TABLET ORAL EVERY 6 HOURS PRN
Qty: 12 TABLET | Refills: 0 | Status: SHIPPED | OUTPATIENT
Start: 2024-06-03 | End: 2024-06-06

## 2024-06-03 RX ORDER — LIDOCAINE HYDROCHLORIDE 20 MG/ML
INJECTION, SOLUTION INFILTRATION; PERINEURAL PRN
Status: DISCONTINUED | OUTPATIENT
Start: 2024-06-03 | End: 2024-06-03

## 2024-06-03 RX ORDER — ACETAMINOPHEN 325 MG/1
975 TABLET ORAL ONCE
Status: DISCONTINUED | OUTPATIENT
Start: 2024-06-03 | End: 2024-06-03 | Stop reason: HOSPADM

## 2024-06-03 RX ORDER — ONDANSETRON 2 MG/ML
4 INJECTION INTRAMUSCULAR; INTRAVENOUS EVERY 30 MIN PRN
Status: DISCONTINUED | OUTPATIENT
Start: 2024-06-03 | End: 2024-06-03 | Stop reason: HOSPADM

## 2024-06-03 RX ORDER — FENTANYL CITRATE 50 UG/ML
25 INJECTION, SOLUTION INTRAMUSCULAR; INTRAVENOUS
Status: DISCONTINUED | OUTPATIENT
Start: 2024-06-03 | End: 2024-06-03 | Stop reason: HOSPADM

## 2024-06-03 RX ORDER — FENTANYL CITRATE 50 UG/ML
INJECTION, SOLUTION INTRAMUSCULAR; INTRAVENOUS PRN
Status: DISCONTINUED | OUTPATIENT
Start: 2024-06-03 | End: 2024-06-03

## 2024-06-03 RX ORDER — CEFAZOLIN SODIUM/WATER 2 G/20 ML
2 SYRINGE (ML) INTRAVENOUS
Status: COMPLETED | OUTPATIENT
Start: 2024-06-03 | End: 2024-06-03

## 2024-06-03 RX ORDER — PROPOFOL 10 MG/ML
INJECTION, EMULSION INTRAVENOUS CONTINUOUS PRN
Status: DISCONTINUED | OUTPATIENT
Start: 2024-06-03 | End: 2024-06-03

## 2024-06-03 RX ORDER — CEFAZOLIN SODIUM/WATER 2 G/20 ML
2 SYRINGE (ML) INTRAVENOUS SEE ADMIN INSTRUCTIONS
Status: DISCONTINUED | OUTPATIENT
Start: 2024-06-03 | End: 2024-06-03 | Stop reason: HOSPADM

## 2024-06-03 RX ORDER — DOCUSATE SODIUM 100 MG/1
100 CAPSULE, LIQUID FILLED ORAL 2 TIMES DAILY
COMMUNITY
Start: 2024-06-03 | End: 2024-10-07

## 2024-06-03 RX ORDER — ACETAMINOPHEN 325 MG/1
975 TABLET ORAL ONCE
Status: COMPLETED | OUTPATIENT
Start: 2024-06-03 | End: 2024-06-03

## 2024-06-03 RX ORDER — SODIUM CHLORIDE, SODIUM LACTATE, POTASSIUM CHLORIDE, CALCIUM CHLORIDE 600; 310; 30; 20 MG/100ML; MG/100ML; MG/100ML; MG/100ML
INJECTION, SOLUTION INTRAVENOUS CONTINUOUS
Status: DISCONTINUED | OUTPATIENT
Start: 2024-06-03 | End: 2024-06-03 | Stop reason: HOSPADM

## 2024-06-03 RX ORDER — GABAPENTIN 100 MG/1
100 CAPSULE ORAL
Status: COMPLETED | OUTPATIENT
Start: 2024-06-03 | End: 2024-06-03

## 2024-06-03 RX ADMIN — LIDOCAINE HYDROCHLORIDE 0.2 ML: 10 INJECTION, SOLUTION EPIDURAL; INFILTRATION; INTRACAUDAL; PERINEURAL at 12:35

## 2024-06-03 RX ADMIN — Medication 2 G: at 13:10

## 2024-06-03 RX ADMIN — GABAPENTIN 100 MG: 100 CAPSULE ORAL at 12:27

## 2024-06-03 RX ADMIN — ONDANSETRON 4 MG: 2 INJECTION INTRAMUSCULAR; INTRAVENOUS at 13:29

## 2024-06-03 RX ADMIN — OXYCODONE HYDROCHLORIDE 5 MG: 5 TABLET ORAL at 15:43

## 2024-06-03 RX ADMIN — FENTANYL CITRATE 25 MCG: 50 INJECTION INTRAMUSCULAR; INTRAVENOUS at 13:45

## 2024-06-03 RX ADMIN — PROPOFOL 200 MCG/KG/MIN: 10 INJECTION, EMULSION INTRAVENOUS at 13:24

## 2024-06-03 RX ADMIN — SODIUM CHLORIDE, POTASSIUM CHLORIDE, SODIUM LACTATE AND CALCIUM CHLORIDE: 600; 310; 30; 20 INJECTION, SOLUTION INTRAVENOUS at 12:35

## 2024-06-03 RX ADMIN — LIDOCAINE HYDROCHLORIDE 80 MG: 20 INJECTION, SOLUTION INFILTRATION; PERINEURAL at 13:24

## 2024-06-03 ASSESSMENT — ACTIVITIES OF DAILY LIVING (ADL)
ADLS_ACUITY_SCORE: 33

## 2024-06-03 NOTE — OP NOTE
Procedure Date: Rosa 3, 2024    PREOPERATIVE DIAGNOSIS: 1. Reducible right inguinal hernia    POSTOPERATIVE DIAGNOSIS: Same    PROCEDURE: Right Inguinal Hernia repair with implantation of mesh    SURGEON and Assistants:   Surgeon(s):  Abad Haddad DO Kohlhase, Mark E, PA-C (needed for retraction, suction, assistance with closure)    ESTIMATED BLOOD LOSS: 30 mL    INDICATIONS: Mr. Chidi Dubon is a 83 year old white male who was seen and evaluated for worsening symptoms related to a right inguinal hernia. The patient will benefit from elective hernia repair, and was counseled about the indications, risks, benefits and alternatives to surgery (including but not limited to risks of bleeding, infection, recurrent hernia, chronic pain, conversion to open surgery, cardiopulmonary complications, and hematoma), and consented to the aforementioned procedure.     INTRAOPERATIVE FINDINGS: There was a indirect inguinal hernia on the right.     PROCEDURE: The patient was brought to the operating room and placed supine on the operating table. After administration of anesthesia, the abdominal wall and bilateral groins were prepped and draped in the standard fashion. Local anesthetic was delivered in preparation for incision, and as an ilioinguinal nerve block.  An oblique incision was made through the skin, and the subcutaneous tissue and Ariel's fascia were dissected using electrocautery. The external oblique aponeurosis was incised along the line of its fibers and the incision extended medial to open the external ring. The ilioinguinal nerve was not  identified and was protected from injury. The spermatic cord was then dissected along its course, and encircled with a penrose drain. The hernia sac was identified and dissected free from the spermatic cord, the sac was quite large with some serous fluid within. The sac was entered at its apex, and once any contents were reduced appropriately to the intra-abdominal  position, the sac was ligated at the base with 2-0 Vicryl suture. The sac was then transected and the closed portion allowed to return to an anatomic position. The distal/transected portion of sac was sent routine to pathology. A large lipoma of the cord was ligated and excised.     A pro- mesh was secured first to the pubic tubercle with 0-0 Vicryl, and subsequently applied to the fascial reflections. The mesh was prefashioned to accommodate the spermatic cord, and the leaflets joined to one another, avoiding undue tension to the cord.  Hemostasis was assessed and appeared satisfactory. An instrument count, including laparotomy pads, sponges and needles was performed and found to be correct. The external oblique aponeurosis was reapproximated with a continuous 2-0 Vicryl suture. Ariel's fascia and the superficial subcutaneous tissue was approximated with interrupted 3-0 Vicryl. The skin was reapproximated with continuous 4-0 Monocryl, and was cleansed and dried prior to application of sterile glue.    The patient tolerated the procedure well, was allowed to recover from anesthesia. His testes were both within the scrotum at the end of the case, and he was transported to the recovery room in stable condition.     Abad Haddad DO on 6/3/2024 at 3:03 PM

## 2024-06-03 NOTE — DISCHARGE INSTRUCTIONS
HOME CARE FOLLOWING INGUINAL/FEMORAL HERNIA REPAIR      DIET:  No restrictions.  Increased fluid intake is recommended. While taking pain medications, increase dietary fiber or add a fiber supplementation like Metamucil or Citrucel to help prevent constipation - a possible side effect of pain medications.    NAUSEA:  If nauseated from the anesthetic/pain meds; rest in bed, get up cautiously with assistance, and drink clear liquids (juice, tea, broth).    ACTIVITY:  Light Activity -- you may immediately be up and about as tolerated.  Driving -- you may drive when comfortable and off narcotic pain medications.  Light Work -- resume when comfortable off pain medications.  (If you can drive, you probably can work.)  Strenuous Work/Activity -- limit lifting to 20 pounds for 3 weeks.  Active Sports (running, biking, etc.) -- cautiously resume after 4 weeks.    INCISIONAL CARE:  If you have a dressing in place, keep clean and dry for 48 hours; you may replace the gauze if it becomes soiled.  After 48 hours you may remove the dressing and shower.  Do not submerse incision in water for 1 week.  If you have a Dermabond dressing (a type of skin glue), you may shower immediately.  Sutures will absorb and need not be removed.  If present, leave the steri-strips (white paper tapes) in place for 14 days after surgery.  If present, leave Dermabond glue in place until it wears/flakes off.  Expect a variable amount of swelling/black and blue discoloration that may involve the penis/scrotum or labia.  Some numbness around the incision is common.  A lump/ridge under the incision is normal and will gradually resolve.    DISCOMFORT:  Local anesthetic placed at surgery should provide relief for 4-8 hours.  Begin taking pain pills before discomfort is severe.  Take the pain medication with some food, when possible, to minimize side effects.  Intermittent use of ice packs to the hernia repair site may help during the first 1-3 weeks after  surgery.  Expect gradual improvement.    Over-the-counter anti-inflammatory medications (i.e. Ibuprofen/Advil/Motrin or Naprosyn/Aleve) may be used per package instructions in addition to or while tapering off the narcotic pain medications to decrease swelling and sensitivity at the repair site.  DO NOT TAKE these Anti-inflammatory medications if your primary physician has advised against doing so, or if you have acid reflux, ulcer, or bleeding disorder, or take blood-thinner medications.  Call your primary physician or the surgery office if you have medication questions.    RETURN APPOINTMENT:  Schedule a follow-up visit 2-3 weeks post-op if one has not been scheduled for you already.  Office Phone:  [phone]     CONTACT US IF THE FOLLOWING DEVELOPS:   1. A fever that is above 101     2. If there is a large amount of drainage, bleeding, or swelling.   3. Severe pain that is not relieved by your prescription.   4. Drainage that is thick, cloudy, yellow, green or white.   5. Any other questions not answered by  Frequently Asked Questions  sheet.      FREQUENTLY ASKED QUESTIONS:    Q:  How should my incision look?    A:  Normally your incision will appear slightly swollen with light redness directly along the incision itself as it heals.  It may feel like a bump or ridge as the healing/scarring happens, and over time (3-4 months) this bump or ridge feeling should slowly go away.  In general, clear or pink watery drainage can be normal at first as your incision heals, but should decrease over time.    Q:  How do I know if my incision is infected?  A:  Look at your incision for signs of infection, like redness around the incision spreading to surrounding skin, or drainage of cloudy or foul-smelling drainage.  If you feel warm, check your temperature to see if you are running a fever.    **If any of these things occur, please notify the nurse at our office.  We may need you to come into the office for an incision  check.      Q:  How do I take care of my incision?  A:  If you have a dressing in place - Starting the day after surgery, replace the dressing 1-2 times a day until there is no further drainage from the incision.  At that time, a dressing is no longer needed.  Try to minimize tape on the skin if irritation is occurring at the tape sites.  If you have significant irritation from tape on the skin, please call the office to discuss other method of dressing your incision.    Small pieces of tape called  steri-strips  may be present directly overlying your incision; these may be removed 10 days after surgery unless otherwise specified by your surgeon.  If these tapes start to loosen at the ends, you may trim them back until they fall off or are removed.    A:  If you had  Dermabond  tissue glue used as a dressing (this causes your incision to look shiny with a clear covering over it) - This type of dressing wears off with time and does not require more dressings over the top unless it is draining around the glue as it wears off.  Do not apply ointments or lotions over the incisions until the glue has completely worn off.    Q:  There is a piece of tape or a sticky  lead  still on my skin.  Can I remove this?  A:  Sometimes the sticky  leads  used for monitoring during surgery or for evaluation in the emergency department are not all removed while you are in the hospital.  These sometimes have a tab or metal dot on them.  You can easily remove these on your own, like taking off a band-aid.  If there is a gel substance under the  lead , simply wipe/clean it off with a washcloth or paper towel.      Q:  What can I do to minimize constipation (very hard stools, or lack of stools)?  A:  Stay well hydrated.  Increase your dietary fiber intake or take a fiber supplement -with plenty of water.  Walk around frequently.  You may consider an over-the-counter stool-softener.  Your Pharmacist can assist you with choosing one that is  stocked at your pharmacy.  Constipation is also one of the most common side effects of pain medication.  If you are using pain medication, be pro-active and try to PREVENT problems with constipation by taking the steps above BEFORE constipation becomes a problem.    Q:  What do I do if I need more pain medications?  A:  Call the office to receive refills.  Be aware that certain pain meds cannot be called into a pharmacy and actually require a paper prescription.  A change may be made in your pain med as you progress thru your recovery period or if you have side effects to certain meds.    --Pain meds are NOT refilled after 5pm on weekdays, and NOT AT ALL on the weekends, so please look ahead to prevent problems.      Q:  Why am I having a hard time sleeping now that I am at home?  A:  Many medications you receive while you are in the hospital can impact your sleep for a number of days after your surgery/hospitalization.  Decreased level of activity and naps during the day may also make sleeping at night difficult.  Try to minimize day-time naps, and get up frequently during the day to walk around your home during your recovery time.  Sleep aides may be of some help, but are not recommended for long-term use.      Q:  I am having some back discomfort.  What should I do?  A:  This may be related to certain positioning that was required for your surgery, extended periods of time in bed, or other changes in your overall activity level.  You may try ice, heat, acetaminophen, or ibuprofen to treat this temporarily.  Note that many pain medications have acetaminophen in them and would state this on the prescription bottle.  Be sure not to exceed the maximum of 4000mg per day of acetaminophen.     **If the pain you are having does not resolve, is severe, or is a flare of back pain you have had on other occasions prior to surgery, please contact your primary physician for further recommendations or for an appointment to be  examined at their office.    Q:  Why am I having headaches?  A:  Headaches can be caused by many things:  caffeine withdrawal, use of pain meds, dehydration, high blood pressure, lack of sleep, over-activity/exhaustion, flare-up of usual migraine headaches.  If you feel this is related to muscle tension (a band-like feeling around the head, or a pressure at the low-back of the head) you may try ice or heat to this area.  You may need to drink more fluids (try electrolyte drink like Gatorade), rest, or take your usual migraine medications.   **If your headaches do not resolve, worsen, are accompanied by other symptoms, or if your blood pressure is high, please call your primary physician for recommendation and/or examination.    Q:  I am unable to urinate.  What do I do?  A:  A small percentage of people can have difficulty urinating initially after surgery.  This includes being able to urinate only a very small amount at a time and feeling discomfort or pressure in the very low abdomen.  This is called  urinary retention , and is actually an urgent situation.  Proceed to your nearest Emergency department for evaluation (not an Urgent Care Center).  Sometimes the bladder does not work correctly after certain medications you receive during surgery, or related to certain procedures.  You may need to have a catheter placed until your bladder recovers.  When planning to go to an Emergency department, it may help to call the ER to let them know you are coming in for this problem after a surgery.  This may help you get in quicker to be evaluated.  **If you have symptoms of a urinary tract infection, please contact your primary physician for the proper evaluation and treatment.        If you have other questions, please call the office Monday thru Friday between 8am and 5pm to discuss with the nurse.  #[phone]    There is a surgeon ON CALL on weekday evenings and over the weekend in case of urgent need only, and may be  contacted at the same number.    If you are having an emergency, call 911 or proceed to your nearest emergency department.                        Same Day Surgery Discharge Instructions  Special Precautions After Surgery - Adult    It is not unusual to feel lightheaded or faint, up to 24 hours after surgery or while taking pain medication.  If you have these symptoms; sit for a few minutes before standing and have someone assist you when getting up.  You should rest and relax for the next 24 hours and must have someone stay with you for at least 24 hours after your discharge.  DO NOT DRIVE any vehicle or operate mechanical equipment for 24 hours following the end of your surgery.  DO NOT DRIVE while taking narcotic pain medications that have been prescribed by your physician.  If you had a limb operated on, you must be able to use it fully to drive.  DO NOT drink alcoholic beverages for 24 hours following surgery or while taking prescription pain medication.  Drink clear liquids (apple juice, ginger ale, broth, 7-Up, etc.).  Progress to your regular diet as you feel able.  Any questions call your physician and do not make important decisions for 24 hours.    Nausea and Vomiting: Nausea and vomiting can occur any time after receiving anesthesia. If you experience nausea and vomiting we encourage you to move to a clear liquid diet and advance your diet as tolerated. If nausea and vomiting do not improve within 12 hours please call the surgeon or present to the Emergency department.     Break-through Bleeding: If your experience bleeding from your surgical site apply pressure and additional dressing per nurse instruction. For simple problems such as a saturated dressing, you may need to reinforce the dressing with more gauze and tape and put slight pressure on the site. If bleeding does not subside contact the surgeon or present to the Emergency Department.    Post-op Infection: If you develop a fever of 100.4 or  greater, have pus like drainage, redness, swelling or severe pain at the surgical site not alleviated with pain medications; please contact the surgeon or present to the Emergency Department.   __________________________________________________________________________________________________________________________________  IMPORTANT NUMBERS:    [facility] [place]:  [phone], [phone]  Pharmacy:  [phone]  Same Day Surgery:  [phone], for general post-op questions call Monday - Thursday until 8:30 p.m., Fridays until 6:00 p.m.  Nurse Advice Line:  [phone]                                                                         Surgery Specialty Clinic:  [phone]

## 2024-06-03 NOTE — ANESTHESIA CARE TRANSFER NOTE
Patient: Chidi Dubon    Procedure: Procedure(s):  HERNIORRHAPHY, INGUINAL, OPEN with mesh       Diagnosis: Reducible right inguinal hernia [K40.90]  Diagnosis Additional Information: No value filed.    Anesthesia Type:   No value filed.     Note:    Oropharynx: oropharynx clear of all foreign objects and spontaneously breathing  Level of Consciousness: awake  Oxygen Supplementation: room air      Dentition: dentition unchanged  Vital Signs Stable: post-procedure vital signs reviewed and stable  Report to RN Given: handoff report given  Patient transferred to: Phase II    Handoff Report: Identifed the Patient, Identified the Reponsible Provider, Reviewed the pertinent medical history, Discussed the surgical course, Reviewed Intra-OP anesthesia mangement and issues during anesthesia, Set expectations for post-procedure period and Allowed opportunity for questions and acknowledgement of understanding      Vitals:  Vitals Value Taken Time   BP     Temp     Pulse     Resp     SpO2         Electronically Signed By: KELSY Paulson CRNA  Rosa 3, 2024  2:54 PM

## 2024-06-03 NOTE — PROGRESS NOTES
[facility] [facility] DISCHARGE NOTE    Patient discharged to home at 3:55 PM via wheel chair. Accompanied by son and daughter and staff. Discharge instructions reviewed with patient, son, and daughter, opportunity offered to ask questions. Prescriptions sent to patients preferred pharmacy. All belongings sent with patient.    Angela Méndez RN

## 2024-06-03 NOTE — ANESTHESIA POSTPROCEDURE EVALUATION
Patient: Chidi Dubon    Procedure: Procedure(s):  HERNIORRHAPHY, INGUINAL, OPEN with mesh       Anesthesia Type:  No value filed.    Note:  Disposition: Outpatient   Postop Pain Control: Uneventful            Sign Out: Well controlled pain   PONV: No   Neuro/Psych: Uneventful            Sign Out: Acceptable/Baseline neuro status   Airway/Respiratory: Uneventful            Sign Out: Acceptable/Baseline resp. status   CV/Hemodynamics: Uneventful            Sign Out: Acceptable CV status; No obvious hypovolemia; No obvious fluid overload   Other NRE: NONE   DID A NON-ROUTINE EVENT OCCUR? No           Last vitals:  Vitals:    06/03/24 1149   BP: 134/54   Pulse: 52   Resp: 18   Temp: 36.6  C (97.8  F)   SpO2: 95%       Electronically Signed By: KELSY Paulson CRNA  Rosa 3, 2024  2:55 PM

## 2024-06-03 NOTE — PROGRESS NOTES
"ANTICOAGULATION  MANAGEMENT: Discharge Review    Chidi Dubon chart reviewed for anticoagulation continuity of care    Outpatient surgery/procedure on 6/3/24 for inguinal herniorrhaphy .    Discharge disposition: Home    Results:    No results for input(s): \"INR\", \"AEISBW89DQFT\", \"F2\", \"ALMWH\", \"AAUFH\" in the last 168 hours.  Anticoagulation inpatient management:     not applicable     Anticoagulation discharge instructions:     Warfarin dosing:  take 5mg the first night and then resume home dosing    Bridging: No   INR goal change: No      Medication changes affecting anticoagulation: No    Additional factors affecting anticoagulation: Yes: surgical procedure, inflammation, activity level  and pain may affect INR      PLAN     No adjustment to anticoagulation plan needed    Left a detailed message for Chidi to schedule an INR in one week     Anticoagulation Calendar updated    Camilla Gil RN  "

## 2024-06-05 LAB
PATH REPORT.COMMENTS IMP SPEC: NORMAL
PATH REPORT.COMMENTS IMP SPEC: NORMAL
PATH REPORT.FINAL DX SPEC: NORMAL
PATH REPORT.GROSS SPEC: NORMAL
PATH REPORT.MICROSCOPIC SPEC OTHER STN: NORMAL
PATH REPORT.RELEVANT HX SPEC: NORMAL
PHOTO IMAGE: NORMAL

## 2024-06-05 PROCEDURE — 88302 TISSUE EXAM BY PATHOLOGIST: CPT | Mod: 26 | Performed by: PATHOLOGY

## 2024-06-07 ENCOUNTER — TELEPHONE (OUTPATIENT)
Dept: SURGERY | Facility: CLINIC | Age: 84
End: 2024-06-07
Payer: COMMERCIAL

## 2024-06-07 ENCOUNTER — NURSE TRIAGE (OUTPATIENT)
Dept: NURSING | Facility: CLINIC | Age: 84
End: 2024-06-07
Payer: COMMERCIAL

## 2024-06-07 NOTE — TELEPHONE ENCOUNTER
Nurse Triage SBAR    Is this a 2nd Level Triage? NO    Situation: Patient calling with post operative swelling/bruising and pain in his penis and scrotum  Consent: not needed    Background: Inguinal hernia surgery - Monday with Dr Haddad- patient reports that they surgical sites are healing well     Assessment:   Indicates that his scrotum and penis are enlarged and painful  Started Tuesday/Wednesday of this week  Both testicles are swollen and his penis is also swollen  Black and blue  Slight discomfort at the area- rating 2/10 while sitting, while getting in and out of bed is more uncomfortable   Scrotum is the size of a grape fruit   Currently on diuretics- notes he is urinating well     98.6 f temp today    Protocol Recommended Disposition:       Recommendation: Advised to speak with surgeons office - difficulty connecting with clinic- soft page to provider who contacted patient directly to discuss symptoms.      Paged to provider    Does the patient meet one of the following criteria for ADS visit consideration? 16+ years old, with an [facility] PCP     TIP  Providers, please consider if this condition is appropriate for management at one of our Acute and Diagnostic Services sites.     If patient is a good candidate, please use dotphrase <dot>triageresponse and select Refer to ADS to document.      Juli Pompa RN 11:30 AM 6/7/2024  Reason for Disposition   Scrotum is painful or tender to touch    Additional Information   Negative: Rash or color change of scrotum BUT no swelling or pain   Negative: Inguinal hernia previously diagnosed by a doctor (or NP/PA)   Negative: Swelling followed a genital area injury (e.g., penis, scrotum)   Negative: Pain in scrotum is main symptom    Protocols used: Scrotum Swelling-A-[place]

## 2024-06-07 NOTE — TELEPHONE ENCOUNTER
Called patient to discuss concerns.  Admits swelling of scrotum and bruising.  Voiding well.  Passing gas.  No blood from incision.  Denies fevers.    Advised ice packs, compressive underwear.  Call with further concerns.    Abad Haddad,  on 6/7/2024 at 3:54 PM

## 2024-06-12 ENCOUNTER — TELEPHONE (OUTPATIENT)
Dept: ANTICOAGULATION | Facility: CLINIC | Age: 84
End: 2024-06-12
Payer: COMMERCIAL

## 2024-06-12 NOTE — TELEPHONE ENCOUNTER
ANTICOAGULATION     Chidi Dubon is overdue for an INR check.     Left message for patient to call and schedule lab appointment as soon as possible. If returning call, please schedule.     Candelaria Alexandra RN

## 2024-06-13 ENCOUNTER — OFFICE VISIT (OUTPATIENT)
Dept: SURGERY | Facility: CLINIC | Age: 84
End: 2024-06-13
Payer: COMMERCIAL

## 2024-06-13 VITALS
HEART RATE: 50 BPM | TEMPERATURE: 97.6 F | SYSTOLIC BLOOD PRESSURE: 111 MMHG | OXYGEN SATURATION: 99 % | WEIGHT: 250 LBS | BODY MASS INDEX: 33.86 KG/M2 | HEIGHT: 72 IN | DIASTOLIC BLOOD PRESSURE: 61 MMHG

## 2024-06-13 DIAGNOSIS — Z98.890 S/P INGUINAL HERNIA REPAIR: Primary | ICD-10-CM

## 2024-06-13 DIAGNOSIS — Z87.19 S/P INGUINAL HERNIA REPAIR: Primary | ICD-10-CM

## 2024-06-13 PROCEDURE — 99024 POSTOP FOLLOW-UP VISIT: CPT | Performed by: SURGERY

## 2024-06-13 NOTE — PROGRESS NOTES
General Surgery Post Op    Pt returns for follow up visit s/p right inguinal hernia repair with mesh on 6/3/2024.    Patient has been doing well, tolerating diet. Bowels moving well. Pain controlled. No issues with wound healing/redness/drainage. No fevers.      Physical exam: Vitals: /61   Pulse 50   Temp 97.6  F (36.4  C) (Tympanic)   Ht 1.829 m (6')   Wt 113.4 kg (250 lb)   SpO2 99%   BMI 33.91 kg/m    BMI= Body mass index is 33.91 kg/m .    Exam:  Constitutional: healthy, alert, and no distress  Cardiovascular: 2+ Peripheral edema  Respiratory: negative  Scrotum is edematous bilaterally (anasarca).  Testicles non-tender.    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 S/P inguinal hernia repair  Z98.890     Z87.19           Plan: Chidi Dubon was seen for follow-up after open right inguinal hernia repair.  Patient is doing well and recovering without issue at this time.  We reviewed the pathology which was benign.  We discussed routine post-operative care of wounds including avoiding sun exposure to wounds to limit scarring, no need for overlying ointments, and weight restrictions going forward.      Patient appears to have mild generalized edema in both scrotum (he stated this occurred after last operation--cholecystectomy) and admits bilateral lower extremity edema.  He has not increased his dose of Lasix.  Denies shortness of breath.    I advised him to see his primary for potential dose adjustment.    Patient was instructed to call with any questions or concerns.  Chidi CAGLE Dubon can follow-up on an as needed basis.        Abad Haddad, DO on 6/13/2024 at 9:56 AM

## 2024-06-13 NOTE — LETTER
6/13/2024      Chidi Dubon  [address]  [place] [address]      Dear Colleague,    Thank you for referring your patient, Chidi Dubon, to the [facility]. Please see a copy of my visit note below.    General Surgery Post Op    Pt returns for follow up visit s/p right inguinal hernia repair with mesh on 6/3/2024.    Patient has been doing well, tolerating diet. Bowels moving well. Pain controlled. No issues with wound healing/redness/drainage. No fevers.      Physical exam: Vitals: /61   Pulse 50   Temp 97.6  F (36.4  C) (Tympanic)   Ht 1.829 m (6')   Wt 113.4 kg (250 lb)   SpO2 99%   BMI 33.91 kg/m    BMI= Body mass index is 33.91 kg/m .    Exam:  Constitutional: healthy, alert, and no distress  Cardiovascular: 2+ Peripheral edema  Respiratory: negative  Scrotum is edematous bilaterally (anasarca).  Testicles non-tender.    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 S/P inguinal hernia repair  Z98.890     Z87.19           Plan: Chidi Dubon was seen for follow-up after open right inguinal hernia repair.  Patient is doing well and recovering without issue at this time.  We reviewed the pathology which was benign.  We discussed routine post-operative care of wounds including avoiding sun exposure to wounds to limit scarring, no need for overlying ointments, and weight restrictions going forward.      Patient appears to have mild generalized edema in both scrotum (he stated this occurred after last operation--cholecystectomy) and admits bilateral lower extremity edema.  He has not increased his dose of Lasix.  Denies shortness of breath.    I advised him to see his primary for potential dose adjustment.    Patient was instructed to call with any questions or concerns.  Chidi Dubon can follow-up on an as needed basis.        Abad Haddad, DO on  6/13/2024 at 9:56 AM        Again, thank you for allowing me to participate in the care of your patient.        Sincerely,        Abad Haddad, DO

## 2024-06-19 ENCOUNTER — TELEPHONE (OUTPATIENT)
Dept: ANTICOAGULATION | Facility: CLINIC | Age: 84
End: 2024-06-19
Payer: COMMERCIAL

## 2024-07-02 ENCOUNTER — ANTICOAGULATION THERAPY VISIT (OUTPATIENT)
Dept: ANTICOAGULATION | Facility: CLINIC | Age: 84
End: 2024-07-02

## 2024-07-02 ENCOUNTER — LAB (OUTPATIENT)
Dept: LAB | Facility: CLINIC | Age: 84
End: 2024-07-02
Payer: COMMERCIAL

## 2024-07-02 DIAGNOSIS — Z79.01 LONG TERM CURRENT USE OF ANTICOAGULANT THERAPY: Primary | ICD-10-CM

## 2024-07-02 DIAGNOSIS — I48.20 CHRONIC ATRIAL FIBRILLATION (H): ICD-10-CM

## 2024-07-02 LAB — INR BLD: 2.4 (ref 0.9–1.1)

## 2024-07-02 PROCEDURE — 36416 COLLJ CAPILLARY BLOOD SPEC: CPT

## 2024-07-02 PROCEDURE — 85610 PROTHROMBIN TIME: CPT

## 2024-07-02 NOTE — PROGRESS NOTES
ANTICOAGULATION MANAGEMENT     Chidi Dubon 83 year old male is on warfarin with therapeutic INR result. (Goal INR 2.0-3.0)    Recent labs: (last 7 days)     07/02/24  1433   INR 2.4*       ASSESSMENT     Source(s): Chart Review  Previous INR was Subtherapeutic  Medication, diet, health changes since last INR chart reviewed; none identified         PLAN     Recommended plan for no diet, medication or health factor changes affecting INR     Dosing Instructions: Continue your current warfarin dose with next INR in 4 weeks       Summary  As of 7/2/2024      Full warfarin instructions:  1.25 mg every Sun, Wed; 2.5 mg all other days   Next INR check:  7/30/2024               Detailed voice message left for Chidi with dosing instructions and follow up date.         Education provided: Please call back if any changes to your diet, medications or how you've been taking warfarin    Plan made per [facility] anticoagulation protocol    Naa Baez RN  Anticoagulation Clinic  7/2/2024    _______________________________________________________________________     Anticoagulation Episode Summary       Current INR goal:  2.0-3.0   TTR:  58.5% (11.9 mo)   Target end date:  Indefinite   Send INR reminders to:  EMELIA SARGENT    Indications    Long-term (current) use of anticoagulants [Z79.01] [Z79.01]  Atrial Fibrillation [I48.20]             Comments:               Anticoagulation Care Providers       Provider Role Specialty Phone number    Martin Schultz PA-C Referring Family Medicine [phone]    Gen Marti PA Referring  [phone]

## 2024-07-09 DIAGNOSIS — I48.20 CHRONIC ATRIAL FIBRILLATION (H): ICD-10-CM

## 2024-07-09 DIAGNOSIS — Z79.01 LONG TERM CURRENT USE OF ANTICOAGULANT THERAPY: ICD-10-CM

## 2024-07-09 RX ORDER — WARFARIN SODIUM 2.5 MG/1
TABLET ORAL
Qty: 72 TABLET | Refills: 1 | Status: SHIPPED | OUTPATIENT
Start: 2024-07-09

## 2024-07-09 NOTE — TELEPHONE ENCOUNTER
ANTICOAGULATION MANAGEMENT:  Medication Refill    Anticoagulation Summary  As of 7/2/2024      Warfarin maintenance plan:  1.25 mg (2.5 mg x 0.5) every Sun, Wed; 2.5 mg (2.5 mg x 1) all other days   Next INR check:  7/30/2024   Target end date:  Indefinite    Indications    Long-term (current) use of anticoagulants [Z79.01] [Z79.01]  Atrial Fibrillation [I48.20]                 Anticoagulation Care Providers       Provider Role Specialty Phone number    Martin Schultz PA-C Referring Family Medicine [phone]    Gen Marti PA Referring  [phone]            Refill Criteria    Visit with referring provider/group: Meets criteria: office visit within referring provider group in the last 1 year on 5/20/24    [facility] referral last signed: 04/19/2024; within last year: Yes    Lab monitoring not exceeding 2 weeks overdue: Yes    Chidi meets all criteria for refill. Rx instructions and quantity match patient's current dosing plan. Warfarin 90 day supply with 1 refill granted per [facility] protocol     Camilla Gil, RN  Anticoagulation Clinic

## 2024-08-06 ENCOUNTER — TELEPHONE (OUTPATIENT)
Dept: ANTICOAGULATION | Facility: CLINIC | Age: 84
End: 2024-08-06
Payer: COMMERCIAL

## 2024-08-14 ENCOUNTER — TELEPHONE (OUTPATIENT)
Dept: ANTICOAGULATION | Facility: CLINIC | Age: 84
End: 2024-08-14
Payer: COMMERCIAL

## 2024-08-22 ENCOUNTER — DOCUMENTATION ONLY (OUTPATIENT)
Dept: ANTICOAGULATION | Facility: CLINIC | Age: 84
End: 2024-08-22
Payer: COMMERCIAL

## 2024-08-22 NOTE — PROGRESS NOTES
ANTICOAGULATION     Chidi Dubon is overdue for an INR check.     Reminder letter sent    Priyanka Desouza RN

## 2024-08-22 NOTE — LETTER
[facility]  [address]  [facility] [address]  Phone: [phone]  Fax: [phone]   August 22, 2024        Chidi Dubon  [address]  [place] [address]            Dear Chidi,    You are currently under the care of [facility] for your warfarin (Coumadin , Jantoven ) therapy.  We are contacting you because our records show you were due for an INR on 7-.    There are potentially serious risks when taking warfarin without careful monitoring and we want to make sure you are safely managed.  Routine lab monitoring is required for warfarin refills.     Please call [phone] as soon as possible to schedule a lab appointment. If it is difficult for you to get to lab, please call us to discuss options.  If there has been a change in your care or other concerns, please let us know so we can help and/or update our records.         Sincerely,       [facility]

## 2024-09-10 ENCOUNTER — LAB (OUTPATIENT)
Dept: LAB | Facility: CLINIC | Age: 84
End: 2024-09-10
Payer: COMMERCIAL

## 2024-09-10 ENCOUNTER — ANTICOAGULATION THERAPY VISIT (OUTPATIENT)
Dept: ANTICOAGULATION | Facility: CLINIC | Age: 84
End: 2024-09-10

## 2024-09-10 DIAGNOSIS — I48.20 CHRONIC ATRIAL FIBRILLATION (H): ICD-10-CM

## 2024-09-10 DIAGNOSIS — Z79.01 LONG TERM CURRENT USE OF ANTICOAGULANT THERAPY: Primary | ICD-10-CM

## 2024-09-10 LAB — INR BLD: 1.7 (ref 0.9–1.1)

## 2024-09-10 PROCEDURE — 85610 PROTHROMBIN TIME: CPT

## 2024-09-10 PROCEDURE — 36416 COLLJ CAPILLARY BLOOD SPEC: CPT

## 2024-09-10 NOTE — PROGRESS NOTES
ANTICOAGULATION MANAGEMENT     Chidi Dubon 83 year old male is on warfarin with subtherapeutic INR result. (Goal INR 2.0-3.0)    Recent labs: (last 7 days)     09/10/24  1418   INR 1.7*       ASSESSMENT     Source(s): Chart Review  Previous INR was Therapeutic last visit; previously outside of goal range  Medication, diet, health changes since last INR chart reviewed; none identified         PLAN     Unable to reach Chidi today.    Left message to continue current dose of warfarin 2.5 mg tonight. Request call back for assessment.    Follow up required to confirm warfarin dose taken and assess for changes    Mark LOPEZ RN  9/10/2024  Anticoagulation Clinic  [facility] for routing messages: dennis SARGENT  [facility] patient phone line: [phone]

## 2024-09-11 NOTE — PROGRESS NOTES
ANTICOAGULATION MANAGEMENT     Chidi Dubon 83 year old male is on warfarin with subtherapeutic INR result. (Goal INR 2.0-3.0)    Recent labs: (last 7 days)     09/10/24  1418   INR 1.7*       ASSESSMENT     Source(s): Chart Review  Previous INR was Therapeutic last visit; previously outside of goal range  Medication, diet, health changes since last INR chart reviewed; none identified         PLAN     Recommended plan for no diet, medication or health factor changes affecting INR     Dosing Instructions: Increase your warfarin dose (8.3% change) with next INR in 2 weeks       Summary  As of 9/10/2024      Full warfarin instructions:  1.25 mg every Sun; 2.5 mg all other days   Next INR check:  9/24/2024               AVS mailed. Unable to reach by phone.     Contact [phone] to schedule and with any changes, questions or concerns.     Education provided: Symptom monitoring: monitoring for clotting signs and symptoms, monitoring for stroke signs and symptoms, and when to seek medical attention/emergency care    Plan made per [facility] anticoagulation protocol    Mark LOPEZ RN  9/11/2024  Anticoagulation Clinic  [employer] for routing messages: dennis SARGENT  [facility] patient phone line: [phone]        _______________________________________________________________________     Anticoagulation Episode Summary       Current INR goal:  2.0-3.0   TTR:  51.2% (1 y)   Target end date:  Indefinite   Send INR reminders to:  EMELIA SARGENT    Indications    Long-term (current) use of anticoagulants [Z79.01] [Z79.01]  Atrial Fibrillation [I48.20]             Comments:               Anticoagulation Care Providers       Provider Role Specialty Phone number    Martin Schultz PA-C Referring Family Medicine [phone]    Gen Marti PA Referring  [phone]

## 2024-09-21 DIAGNOSIS — G25.81 RESTLESS LEGS SYNDROME: ICD-10-CM

## 2024-09-23 RX ORDER — PRAMIPEXOLE DIHYDROCHLORIDE 0.12 MG/1
0.12 TABLET ORAL 3 TIMES DAILY
Qty: 90 TABLET | Refills: 0 | Status: SHIPPED | OUTPATIENT
Start: 2024-09-23

## 2024-09-26 ENCOUNTER — LAB (OUTPATIENT)
Dept: LAB | Facility: CLINIC | Age: 84
End: 2024-09-26
Payer: COMMERCIAL

## 2024-09-26 ENCOUNTER — ANTICOAGULATION THERAPY VISIT (OUTPATIENT)
Dept: ANTICOAGULATION | Facility: CLINIC | Age: 84
End: 2024-09-26

## 2024-09-26 DIAGNOSIS — Z79.01 LONG TERM CURRENT USE OF ANTICOAGULANT THERAPY: Primary | ICD-10-CM

## 2024-09-26 DIAGNOSIS — I48.20 CHRONIC ATRIAL FIBRILLATION (H): ICD-10-CM

## 2024-09-26 LAB — INR BLD: 2.1 (ref 0.9–1.1)

## 2024-09-26 PROCEDURE — 36416 COLLJ CAPILLARY BLOOD SPEC: CPT

## 2024-09-26 PROCEDURE — 85610 PROTHROMBIN TIME: CPT

## 2024-09-26 NOTE — PROGRESS NOTES
ANTICOAGULATION MANAGEMENT     Chidi Dubon 83 year old male is on warfarin with therapeutic INR result. (Goal INR 2.0-3.0)    Recent labs: (last 7 days)     09/26/24  1300   INR 2.1*       ASSESSMENT     Source(s): Chart Review  Previous INR was Subtherapeutic  Medication, diet, health changes since last INR chart reviewed; none identified         PLAN     Recommended plan for no diet, medication or health factor changes affecting INR     Dosing Instructions: Continue your current warfarin dose with next INR in 3 weeks       Summary  As of 9/26/2024      Full warfarin instructions:  1.25 mg every Sun; 2.5 mg all other days   Next INR check:  10/17/2024               Detailed voice message left for Chidi with dosing instructions and follow up date.     Contact [phone] to schedule and with any changes, questions or concerns.     Education provided: Contact [phone] with any changes, questions or concerns.     Plan made per [facility] anticoagulation protocol    Camilla Gil RN  9/26/2024  Anticoagulation Clinic  [employer] for routing messages: dennis SARGENT  [facility] patient phone line: [phone]        _______________________________________________________________________     Anticoagulation Episode Summary       Current INR goal:  2.0-3.0   TTR:  48.1% (1 y)   Target end date:  Indefinite   Send INR reminders to:  EMELIA SARGENT    Indications    Long-term (current) use of anticoagulants [Z79.01] [Z79.01]  Atrial Fibrillation [I48.20]             Comments:               Anticoagulation Care Providers       Provider Role Specialty Phone number    Martin Schultz PA-C Referring Family Medicine [phone]    Gen Marti PA Referring  [phone]

## 2024-10-07 ENCOUNTER — OFFICE VISIT (OUTPATIENT)
Dept: FAMILY MEDICINE | Facility: CLINIC | Age: 84
End: 2024-10-07
Payer: COMMERCIAL

## 2024-10-07 VITALS
HEART RATE: 53 BPM | BODY MASS INDEX: 34.91 KG/M2 | HEIGHT: 73 IN | DIASTOLIC BLOOD PRESSURE: 52 MMHG | RESPIRATION RATE: 20 BRPM | TEMPERATURE: 97.5 F | SYSTOLIC BLOOD PRESSURE: 102 MMHG | WEIGHT: 263.4 LBS | OXYGEN SATURATION: 94 %

## 2024-10-07 DIAGNOSIS — Z00.00 ENCOUNTER FOR ANNUAL WELLNESS EXAM IN MEDICARE PATIENT: Primary | ICD-10-CM

## 2024-10-07 DIAGNOSIS — M25.561 CHRONIC PAIN OF BOTH KNEES: ICD-10-CM

## 2024-10-07 DIAGNOSIS — I10 PRIMARY HYPERTENSION: ICD-10-CM

## 2024-10-07 DIAGNOSIS — M25.562 CHRONIC PAIN OF BOTH KNEES: ICD-10-CM

## 2024-10-07 DIAGNOSIS — I25.119 CORONARY ARTERY DISEASE INVOLVING NATIVE HEART WITH ANGINA PECTORIS, UNSPECIFIED VESSEL OR LESION TYPE (H): ICD-10-CM

## 2024-10-07 DIAGNOSIS — N18.32 STAGE 3B CHRONIC KIDNEY DISEASE (H): ICD-10-CM

## 2024-10-07 DIAGNOSIS — Z71.89 ADVANCED DIRECTIVES, COUNSELING/DISCUSSION: ICD-10-CM

## 2024-10-07 DIAGNOSIS — G89.29 CHRONIC PAIN OF BOTH KNEES: ICD-10-CM

## 2024-10-07 DIAGNOSIS — N64.4 BREAST TENDERNESS IN MALE: ICD-10-CM

## 2024-10-07 PROCEDURE — 80048 BASIC METABOLIC PNL TOTAL CA: CPT | Performed by: PHYSICIAN ASSISTANT

## 2024-10-07 PROCEDURE — G0439 PPPS, SUBSEQ VISIT: HCPCS | Performed by: PHYSICIAN ASSISTANT

## 2024-10-07 PROCEDURE — 84403 ASSAY OF TOTAL TESTOSTERONE: CPT | Performed by: PHYSICIAN ASSISTANT

## 2024-10-07 PROCEDURE — 99213 OFFICE O/P EST LOW 20 MIN: CPT | Mod: 25 | Performed by: PHYSICIAN ASSISTANT

## 2024-10-07 PROCEDURE — G0008 ADMIN INFLUENZA VIRUS VAC: HCPCS | Performed by: PHYSICIAN ASSISTANT

## 2024-10-07 PROCEDURE — 90662 IIV NO PRSV INCREASED AG IM: CPT | Performed by: PHYSICIAN ASSISTANT

## 2024-10-07 PROCEDURE — 36415 COLL VENOUS BLD VENIPUNCTURE: CPT | Performed by: PHYSICIAN ASSISTANT

## 2024-10-07 RX ORDER — FUROSEMIDE 20 MG/1
TABLET ORAL
COMMUNITY
Start: 2024-09-22

## 2024-10-07 RX ORDER — OXYCODONE HYDROCHLORIDE 5 MG/1
2.5-5 TABLET ORAL
COMMUNITY
Start: 2024-01-11

## 2024-10-07 ASSESSMENT — ANXIETY QUESTIONNAIRES
GAD7 TOTAL SCORE: 0
7. FEELING AFRAID AS IF SOMETHING AWFUL MIGHT HAPPEN: NOT AT ALL
5. BEING SO RESTLESS THAT IT IS HARD TO SIT STILL: NOT AT ALL
2. NOT BEING ABLE TO STOP OR CONTROL WORRYING: NOT AT ALL
6. BECOMING EASILY ANNOYED OR IRRITABLE: NOT AT ALL
3. WORRYING TOO MUCH ABOUT DIFFERENT THINGS: NOT AT ALL
IF YOU CHECKED OFF ANY PROBLEMS ON THIS QUESTIONNAIRE, HOW DIFFICULT HAVE THESE PROBLEMS MADE IT FOR YOU TO DO YOUR WORK, TAKE CARE OF THINGS AT HOME, OR GET ALONG WITH OTHER PEOPLE: NOT DIFFICULT AT ALL
1. FEELING NERVOUS, ANXIOUS, OR ON EDGE: NOT AT ALL
GAD7 TOTAL SCORE: 0

## 2024-10-07 ASSESSMENT — PATIENT HEALTH QUESTIONNAIRE - PHQ9
SUM OF ALL RESPONSES TO PHQ QUESTIONS 1-9: 6
5. POOR APPETITE OR OVEREATING: NOT AT ALL

## 2024-10-07 NOTE — PROGRESS NOTES
Kareen Murillo is a 83 year old, presenting for the following health issues:  Recheck Medication (Would like to discuss pain control) and Wellness Visit  Breast tenderness.  Query spironolactone.  Recheck of htn and a fib.  Doing well. No chest pain/sob/palpitations.   Bilateral knee pain. History of bilateral arthroplasties.         10/7/2024     1:44 PM   Additional Questions   Roomed by Jana Dixon CMA   Accompanied by Daughter         10/7/2024     1:44 PM   Patient Reported Additional Medications   Patient reports taking the following new medications none     HPI   Annual Wellness Visit     Patient has been advised of split billing requirements and indicates understanding: Yes          Health Care Directive  Patient does not have a Health Care Directive or Living Will: Discussed advance care planning with patient; information given to patient to review.  In general, how would you rate your overall physical health? (!) FAIR   Discussed with patient their rating of physical health; information has been provided.   Do you have a special diet?  Regular (no restrictions)         No data to display              Do you see a dentist two times every year?  (!) NO  The patient was instructed to see the dentist every 6 months.   Have you been more tired than usual lately?  (!) YES   Discussed possible causes of fatigue.   If you drink alcohol do you typically have >3 drinks per day or >7 drinks per week? No  Do you have a current opioid prescription? (!) YES   How severe is your pain on a scale from 1-10? 0/10     Do you use any other controlled substances or medications that are not prescribed by a provider? None  Social History     Tobacco Use    Smoking status: Never    Smokeless tobacco: Never    Tobacco comments:     never smoker; non-smoking household   Vaping Use    Vaping status: Never Used   Substance Use Topics    Alcohol use: No     Comment: none    Drug use: Not Currently       Needs assistance  for the following daily activities: no assistance needed  Which of the following safety concerns are present in your home?  none identified   Do you (or your family members) have any concerns about your safety while driving?  No  Do you have any of the following hearing concerns?: - is having it evaluated this month  In the past 6 months, have you been bothered by leaking of urine? No           2024   Fall Risk   Fallen 2 or more times in the past year? No   Trouble with walking or balance? Yes             Today's PHQ-9 Score:       2024     2:01 PM   PHQ-9 SCORE   PHQ-9 Total Score MyChart 6 (Mild depression)   PHQ-9 Total Score 6         Fracture Risk Assessment Tool  Link to Frax Calculator  Use the information below to complete the Frax calculator  : 1940  Sex: male  Weight (kg): 119.5 kg (actual weight)  Height (cm): 185.4 cm  Previous Fragility Fracture:  No  History of parent with fractured hip:  No  Current Smoking:  No  Patient has been on glucocorticoids for more than 3 months (5mg/day or more): No  Rheumatoid Arthritis on Problem List:  No  Secondary Osteoporosis on Problem List:  No  Consumes 3 or more units of alcohol per day: No  Femoral Neck BMD (g/cm2)            Reviewed and updated as needed this visit by Provider                    Past Medical History:   Diagnosis Date    CAD (coronary artery disease)     Dyslipidemia     HTN (hypertension)      Past Surgical History:   Procedure Laterality Date    ANGIOGRAM  age 61     3 cornary artery stents, [facility]    ANGIOGRAM  age 66    1 coronary artery stent, [facility]    ANGIOGRAM  2013    1 coronary artery stent, [facility]     ARTHROSCOPY KNEE RT/LT      HERNIORRHAPHY INGUINAL Right 6/3/2024    Procedure: Right Inguinal Hernia repair with implantation of mesh;  Surgeon: Abad Haddad DO;  Location: [facility] OR     BP Readings from Last 3 Encounters:   10/07/24 102/52   24 111/61   24 118/58    Wt Readings  from Last 3 Encounters:   10/07/24 119.5 kg (263 lb 6.4 oz)   06/13/24 113.4 kg (250 lb)   06/03/24 113.4 kg (250 lb)                  Patient Active Problem List   Diagnosis    Hyperlipidemia LDL goal <70    Obesity    Mild major depression (H)    Osteoarthritis, knee    Diverticulosis    Urolithiasis    CAD S/P percutaneous coronary angioplasty    Atrial Fibrillation    Fatigue    Restless leg syndrome    Long-term (current) use of anticoagulants [Z79.01]    Benign essential hypertension    Depression, major, in remission (H)    Aftercare following right hip joint replacement surgery    Hip pain, right    Hip joint replacement status    Posterior capsular opacification of both eyes, obscuring vision    Coronary artery disease involving native heart with angina pectoris, unspecified vessel or lesion type (H)    S/P placement of cardiac pacemaker    Abdominal aortic aneurysm without rupture (H)    Bilirubinemia    Non-rheumatic mitral regurgitation    Morbid obesity (H)     Past Surgical History:   Procedure Laterality Date    ANGIOGRAM  age 61     3 cornary artery stents, [facility]    ANGIOGRAM  age 66    1 coronary artery stent, [facility]    ANGIOGRAM  11/2013    1 coronary artery stent, [facility]     ARTHROSCOPY KNEE RT/LT      HERNIORRHAPHY INGUINAL Right 6/3/2024    Procedure: Right Inguinal Hernia repair with implantation of mesh;  Surgeon: Abad Haddad DO;  Location: [facility] OR       Social History     Tobacco Use    Smoking status: Never    Smokeless tobacco: Never    Tobacco comments:     never smoker; non-smoking household   Substance Use Topics    Alcohol use: No     Comment: none     Family History   Problem Relation Age of Onset    Heart Disease Paternal Grandfather     Heart Disease Brother          Current Outpatient Medications   Medication Sig Dispense Refill    albuterol (PROAIR HFA/PROVENTIL HFA/VENTOLIN HFA) 108 (90 Base) MCG/ACT inhaler Inhale 2 puffs into the lungs every 6 hours  as needed 18 g 0    aspirin 81 MG EC tablet Take 81 mg by mouth      atorvastatin (LIPITOR) 80 MG tablet TAKE 1/2 TABLET (40 MG) BY MOUTH DAILY 45 tablet 1    fluticasone (FLONASE) 50 MCG/ACT nasal spray Spray 1 spray into both nostrils daily 18.2 mL 0    furosemide (LASIX) 20 MG tablet Take 1 Tablet (20 mg) by mouth two times daily.*      losartan (COZAAR) 100 MG tablet TAKE ONE TABLET BY MOUTH ONCE DAILY. 90 tablet 1    nitroGLYcerin (NITROSTAT) 0.4 MG sublingual tablet Place 1 tablet (0.4 mg) under the tongue every 5 minutes as needed for chest pain 25 tablet 0    oxyCODONE (ROXICODONE) 5 MG tablet Take 2.5-5 mg by mouth.      potassium chloride (KLOR-CON) 20 MEQ packet Take 20 mEq by mouth daily 90 each 3    pramipexole (MIRAPEX) 0.125 MG tablet TAKE ONE TABLET BY MOUTH THREE TIMES DAILY 90 tablet 0    spironolactone (ALDACTONE) 25 MG tablet Take 1 tablet by mouth daily      warfarin ANTICOAGULANT (COUMADIN) 2.5 MG tablet TAKE ONE-HALF TABLET BY MOUTH ONCE DAILY ON SUNDAYS AND WEDNESDAYS; TAKE 1 TABLET ONCE DAILY ALL OTHER DAYS OF THE WEEK OR TAKE AS DIRECTED BY INR CLINIC 72 tablet 1     Allergies   Allergen Reactions    No Known Allergies      Recent Labs   Lab Test 05/20/24  1359 04/23/24  1445 02/23/24  1552 02/23/24  1335 09/02/23  1012 08/14/23  1200 06/19/23  1310 05/08/23  1734 03/09/22  1247 03/09/22  1247 03/03/21  1157 08/17/20  1425   LDL  --   --   --   --   --  55  --   --   --  85 82  --    HDL  --   --   --   --   --  32*  --   --   --  42 41  --    TRIG  --   --   --   --   --  61  --   --   --  79 88  --    ALT  --   --   --  18  --   --   --  19  --  21 17  --    CR 1.18* 1.15   < > 1.06   < > 1.11   < > 1.00   < > 1.21 1.33* 1.23   GFRESTIMATED 62 64   < > 70   < > 67   < > 76   < > 61 50* 56*   GFRESTBLACK  --   --   --   --   --   --   --   --   --   --  58* 64   POTASSIUM 4.3 4.5  --  4.1   < > 3.1*  --  3.5   < > 3.2* 3.2* 3.6   TSH  --   --   --   --   --  1.63  --   --   --   --   --   2.07    < > = values in this interval not displayed.        Current providers sharing in care for this patient include:  Patient Care Team:  Martin Schultz PA-C as PCP - General (Family Medicine)  Martin Schultz PA-C as Assigned PCP  Abad Haddad DO as Assigned Surgical Provider    The following health maintenance items are reviewed in Epic and correct as of today:  Health Maintenance   Topic Date Due    ZOSTER IMMUNIZATION (1 of 2) Never done    DTAP/TDAP/TD IMMUNIZATION (1 - Tdap) 01/05/2011    RSV VACCINE (1 - 1-dose 75+ series) Never done    HF ACTION PLAN  06/19/2022    LIPID  08/14/2024    INFLUENZA VACCINE (1) 09/01/2024    COVID-19 Vaccine (7 - 2024-25 season) 09/01/2024    PHQ-9  10/23/2024    BMP  11/20/2024    ALT  02/23/2025    CMP  02/23/2025    FALL RISK ASSESSMENT  04/23/2025    CBC  05/20/2025    MEDICARE ANNUAL WELLNESS VISIT  10/07/2025    ANNUAL REVIEW OF HM ORDERS  10/07/2025    ADVANCE CARE PLANNING  10/07/2029    TSH W/FREE T4 REFLEX  Completed    DEPRESSION ACTION PLAN  Completed    Pneumococcal Vaccine: 65+ Years  Completed    HPV IMMUNIZATION  Aged Out    MENINGITIS IMMUNIZATION  Aged Out    RSV MONOCLONAL ANTIBODY  Aged Out       Appropriate preventive services were discussed with this patient, including applicable screening as appropriate for fall prevention, nutrition, physical activity, Tobacco-use cessation, weight loss and cognition.  Checklist reviewing preventive services available has been given to the patient.           10/7/2024   Mini Cog   Clock Draw Score 2 Normal    2 Normal   3 Item Recall 1 object recalled    1 object recalled   Mini Cog Total Score 3    3       Multiple values from one day are sorted in reverse-chronological order         Vision Screen             Review of Systems  Constitutional, HEENT, cardiovascular, pulmonary, GI, , musculoskeletal, neuro, skin, endocrine and psych systems are negative, except as otherwise noted.      Objective   "  /52   Pulse 53   Temp 97.5  F (36.4  C) (Oral)   Resp 20   Ht 1.854 m (6' 1\")   Wt 119.5 kg (263 lb 6.4 oz)   SpO2 94%   BMI 34.75 kg/m    Body mass index is 34.75 kg/m .  Physical Exam   GENERAL: alert and no distress  EYES: Eyes grossly normal to inspection, PERRL and conjunctivae and sclerae normal  HENT: ear canals and TM's normal, nose and mouth without ulcers or lesions  NECK: no adenopathy, no asymmetry, masses, or scars  RESP: lungs clear to auscultation - no rales, rhonchi or wheezes  CV: regular rate and rhythm, normal S1 S2, no S3 or S4, no murmur, click or rub, no peripheral edema. Paced rhythm.   ABDOMEN: soft, nontender, no hepatosplenomegaly, no masses and bowel sounds normal  MS: no gross musculoskeletal defects noted, no edema  SKIN: no suspicious lesions or rashes  NEURO: Normal strength and tone, mentation intact and speech normal  PSYCH: mentation appears normal, affect normal/bright  Chidi was seen today for recheck medication and wellness visit.    Diagnoses and all orders for this visit:    Encounter for annual wellness exam in Medicare patient    Coronary artery disease involving native heart with angina pectoris, unspecified vessel or lesion type (H)    Advanced directives, counseling/discussion    Stage 3b chronic kidney disease (H)  -     Basic metabolic panel  (Ca, Cl, CO2, Creat, Gluc, K, Na, BUN); Future    Breast tenderness in male  -     Testosterone, total; Future    Primary hypertension  -     REVIEW OF HEALTH MAINTENANCE PROTOCOL ORDERS    Chronic pain of both knees  -     Orthopedic  Referral; Future    Other orders  -     INFLUENZA HIGH DOSE, TRIVALENT, PF (FLUZONE)      Continue all meds.  Continue to work on a lower sugar/starch diet and more exercise.  Lower fat, higher fiber diet and consistent exercise.  Lower sodium diet.     Recheck in 6 mos         Signed Electronically by: Martin Schultz PA-C    "

## 2024-10-08 LAB
ANION GAP SERPL CALCULATED.3IONS-SCNC: 11 MMOL/L (ref 7–15)
BUN SERPL-MCNC: 25.1 MG/DL (ref 8–23)
CALCIUM SERPL-MCNC: 9.7 MG/DL (ref 8.8–10.4)
CHLORIDE SERPL-SCNC: 107 MMOL/L (ref 98–107)
CREAT SERPL-MCNC: 1.32 MG/DL (ref 0.67–1.17)
EGFRCR SERPLBLD CKD-EPI 2021: 54 ML/MIN/1.73M2
GLUCOSE SERPL-MCNC: 77 MG/DL (ref 70–99)
HCO3 SERPL-SCNC: 22 MMOL/L (ref 22–29)
POTASSIUM SERPL-SCNC: 4.7 MMOL/L (ref 3.4–5.3)
SODIUM SERPL-SCNC: 140 MMOL/L (ref 135–145)

## 2024-10-09 LAB — TESTOST SERPL-MCNC: 698 NG/DL (ref 240–950)

## 2024-10-18 ENCOUNTER — TELEPHONE (OUTPATIENT)
Dept: ANTICOAGULATION | Facility: CLINIC | Age: 84
End: 2024-10-18
Payer: COMMERCIAL

## 2024-10-18 NOTE — TELEPHONE ENCOUNTER
ANTICOAGULATION     Chidi Dubon is overdue for an INR check.     Left message for patient to call and schedule lab appointment as soon as possible. If returning call, please schedule.     Solomon Rebolledo RN  10/18/2024  Anticoagulation Clinic  [facility] for routing messages: dennis SARGENT  [facility] patient phone line: [phone]

## 2024-10-23 NOTE — PROGRESS NOTES
HISTORY OF PRESENT ILLNESS    Chidi Dubon is a 84 year old male, on coumadin, who is seen in consultation at the request of Martin Schultz PA-C  for evaluation of  bilateral knee pain.  Problems x 3 years     He did have his right knee replaced on 7/14/2015 with Dr Ismael Bowling. Was better than preop for about a year.      Has had night pain.  Similar pain of each knee, but RIGHT knee is much more symptomatic.   When first gets up, has trouble getting going due to the pain.  Has become dependent on a cane because he feels more confident.    Sometimes bilateral knee pain is anterior, distal thigh, and sometimes distal to knee.  No history of low back pain issues. Was told in the past that he has some arthritis in his back though. Neuropathy in feet.  Has had weakness in the right leg, difficulty lifting the right leg.    Treatments tried to this point:   Tried old oxycodone in his cabinet. Was able to sleep, and felt great for a couple of days. Has done that twice in the past 2 years  Glucosamine has started  Has had many shots in the right knee    Orthopedic PMH: right total hip arthroplasty 5/9/17    Other PMH:  has a past medical history of CAD (coronary artery disease), Dyslipidemia, and HTN (hypertension).  Patient Active Problem List   Diagnosis    Hyperlipidemia LDL goal <70    Obesity    Mild major depression (H)    Osteoarthritis, knee    Diverticulosis    Urolithiasis    CAD S/P percutaneous coronary angioplasty    Atrial Fibrillation    Fatigue    Restless leg syndrome    Long-term (current) use of anticoagulants [Z79.01]    Benign essential hypertension    Depression, major, in remission (H)    Aftercare following right hip joint replacement surgery    Hip pain, right    Hip joint replacement status    Posterior capsular opacification of both eyes, obscuring vision    Coronary artery disease involving native heart with angina pectoris, unspecified vessel or lesion type (H)    S/P placement of cardiac  "pacemaker    Abdominal aortic aneurysm without rupture (H)    Bilirubinemia    Non-rheumatic mitral regurgitation    Morbid obesity (H)        Surgical:  has a past surgical history that includes angiogram (age 61 ); arthroscopy knee rt/lt; angiogram (age 66); angiogram (11/2013); and Herniorrhaphy inguinal (Right, 6/3/2024).    Family Hx:  family history includes Heart Disease in his brother and paternal grandfather.    Social Hx:  reports that he has never smoked. He has never used smokeless tobacco. He reports that he does not currently use drugs. He reports that he does not drink alcohol.    REVIEW OF SYSTEMS:    CONSTITUTIONAL:  NEGATIVE for fever, chills, change in weight  INTEGUMENTARY/SKIN:  NEGATIVE for worrisome rashes, moles or lesions  EYES:  NEGATIVE for vision changes or irritation  ENT/MOUTH:  NEGATIVE for ear, mouth and throat problems  RESP:  NEGATIVE for significant cough or SOB  BREAST:  NEGATIVE for masses, tenderness or discharge  CV:  NEGATIVE for chest pain, palpitations or peripheral edema  GI:  NEGATIVE for nausea, abdominal pain, heartburn, or change in bowel habits  :  Negative   MUSCULOSKELETAL:  See HPI above  NEURO:  NEGATIVE for weakness, dizziness or paresthesias  ENDOCRINE:  NEGATIVE for temperature intolerance, skin/hair changes  HEME/ALLERGY/IMMUNE:  NEGATIVE for bleeding problems  PSYCHIATRIC:  NEGATIVE for changes in mood or affect    PHYSICAL EXAM:  /68 (BP Location: Left arm, Patient Position: Sitting, Cuff Size: Adult Large)   Pulse 60   Ht 1.854 m (6' 1\")   Wt 118.8 kg (262 lb)   SpO2 97%   BMI 34.57 kg/m     GENERAL APPEARANCE: healthy, alert, and no distress   SKIN: no suspicious lesions or rashes  NEURO: Normal strength and tone, mentation intact, and speech normal  VASCULAR:  good pulses, and cappillary refill   LYMPH: no lymphadenopathy   PSYCH:  mentation appears normal, affect normal/bright, states that he is depressed.  RESP: no increased work of " breathing     KNEE EXAM:     Alignment: normal on right, mild varus on left     Patellofemoral joint: right has difficulty holding in full extension. no crepitations in the patellofemoral joints.  Effusion: mild bilateral   ROM: right 5-95*. Left 0-120  Tender: no specific area of tenderness on right.  Masses: none  Ligaments:  laxity with varus and valgus stress.  no pain with Varus/Valgus stress testing.      X-RAY:  From today 10/23/2024 bilateral knees:   Postoperative changes right total knee replacement.  Hardware is well seated without evidence of acute fracture or  periprosthetic lucency to suggest hardware loosening. No significant  joint effusion. Normal position of the patella.     Left knee shows tricompartmental degenerative changes which are severe  in the medial and patellofemoral compartment. There is a joint  effusion. No evidence of an acute fracture.       2017 pelvis. Right leg shorter.    Lumbar xray 2021 : Five lumbar type vertebral bodies are identified.  Satisfactory height. Degenerative osteophytes and interspace narrowing  most marked at upper lumbar levels. Progressive facet joint  arthropathy in the lumbar spine.       Impression:   Encounter Diagnoses   Name Primary?    Pain due to total right knee replacement, initial encounter (H) Yes    Right leg weakness     Status post total right knee replacement     Primary osteoarthritis of left knee     Chronic pain of left knee     Chronic pain of both knees         Plan:  physical therapy ordered  If no improvement of function, I would consider MRI of lumbar spine  I'm not sure if there's anything besides revision of the right knee that would possibly help the right knee pain, but he is not interested in that.  Discussed that any narcotic prescriptions for him would need to be followed by his primary care provider     Return to clinic 6, weeks    KAMLESH Landeros MD  Dept. Orthopedic Surgery  [facility]

## 2024-10-24 ENCOUNTER — OFFICE VISIT (OUTPATIENT)
Dept: ORTHOPEDICS | Facility: CLINIC | Age: 84
End: 2024-10-24
Attending: PHYSICIAN ASSISTANT
Payer: COMMERCIAL

## 2024-10-24 ENCOUNTER — ANCILLARY PROCEDURE (OUTPATIENT)
Dept: GENERAL RADIOLOGY | Facility: CLINIC | Age: 84
End: 2024-10-24
Attending: ORTHOPAEDIC SURGERY
Payer: COMMERCIAL

## 2024-10-24 VITALS
OXYGEN SATURATION: 97 % | HEART RATE: 60 BPM | BODY MASS INDEX: 34.72 KG/M2 | DIASTOLIC BLOOD PRESSURE: 68 MMHG | WEIGHT: 262 LBS | HEIGHT: 73 IN | SYSTOLIC BLOOD PRESSURE: 130 MMHG

## 2024-10-24 DIAGNOSIS — Z96.651 PAIN DUE TO TOTAL RIGHT KNEE REPLACEMENT, INITIAL ENCOUNTER (H): Primary | ICD-10-CM

## 2024-10-24 DIAGNOSIS — M17.12 PRIMARY OSTEOARTHRITIS OF LEFT KNEE: ICD-10-CM

## 2024-10-24 DIAGNOSIS — M25.562 CHRONIC PAIN OF LEFT KNEE: ICD-10-CM

## 2024-10-24 DIAGNOSIS — G89.29 CHRONIC PAIN OF LEFT KNEE: ICD-10-CM

## 2024-10-24 DIAGNOSIS — Z96.651 STATUS POST TOTAL RIGHT KNEE REPLACEMENT: ICD-10-CM

## 2024-10-24 DIAGNOSIS — T84.84XA PAIN DUE TO TOTAL RIGHT KNEE REPLACEMENT, INITIAL ENCOUNTER (H): Primary | ICD-10-CM

## 2024-10-24 DIAGNOSIS — M25.561 CHRONIC PAIN OF BOTH KNEES: ICD-10-CM

## 2024-10-24 DIAGNOSIS — M25.562 CHRONIC PAIN OF BOTH KNEES: ICD-10-CM

## 2024-10-24 DIAGNOSIS — R29.898 RIGHT LEG WEAKNESS: ICD-10-CM

## 2024-10-24 DIAGNOSIS — G89.29 CHRONIC PAIN OF BOTH KNEES: ICD-10-CM

## 2024-10-24 DIAGNOSIS — M17.12 OSTEOARTHRITIS OF LEFT KNEE: ICD-10-CM

## 2024-10-24 PROCEDURE — 73562 X-RAY EXAM OF KNEE 3: CPT | Mod: TC | Performed by: RADIOLOGY

## 2024-10-24 PROCEDURE — 99203 OFFICE O/P NEW LOW 30 MIN: CPT | Performed by: ORTHOPAEDIC SURGERY

## 2024-10-24 ASSESSMENT — PAIN SCALES - GENERAL: PAINLEVEL_OUTOF10: SEVERE PAIN (7)

## 2024-10-24 NOTE — LETTER
10/24/2024      Chidi Dubon  [address]  [place] [address]      Dear Colleague,    Thank you for referring your patient, Chidi Dubon, to the [facility]. Please see a copy of my visit note below.    HISTORY OF PRESENT ILLNESS    Chidi Dubon is a 84 year old male, on coumadin, who is seen in consultation at the request of Martin Schultz PA-C  for evaluation of  bilateral knee pain.  Problems x 3 years     He did have his right knee replaced on 7/14/2015 with Dr Ismael Bowling. Was better than preop for about a year.      Has had night pain.  Similar pain of each knee, but RIGHT knee is much more symptomatic.   When first gets up, has trouble getting going due to the pain.  Has become dependent on a cane because he feels more confident.    Sometimes bilateral knee pain is anterior, distal thigh, and sometimes distal to knee.  No history of low back pain issues. Was told in the past that he has some arthritis in his back though. Neuropathy in feet.  Has had weakness in the right leg, difficulty lifting the right leg.    Treatments tried to this point:   Tried old oxycodone in his cabinet. Was able to sleep, and felt great for a couple of days. Has done that twice in the past 2 years  Glucosamine has started  Has had many shots in the right knee    Orthopedic PMH: right total hip arthroplasty 5/9/17    Other PMH:  has a past medical history of CAD (coronary artery disease), Dyslipidemia, and HTN (hypertension).  Patient Active Problem List   Diagnosis     Hyperlipidemia LDL goal <70     Obesity     Mild major depression (H)     Osteoarthritis, knee     Diverticulosis     Urolithiasis     CAD S/P percutaneous coronary angioplasty     Atrial Fibrillation     Fatigue     Restless leg syndrome     Long-term (current) use of anticoagulants [Z79.01]     Benign essential hypertension     Depression, major, in remission (H)     Aftercare following right hip joint replacement surgery      "Hip pain, right     Hip joint replacement status     Posterior capsular opacification of both eyes, obscuring vision     Coronary artery disease involving native heart with angina pectoris, unspecified vessel or lesion type (H)     S/P placement of cardiac pacemaker     Abdominal aortic aneurysm without rupture (H)     Bilirubinemia     Non-rheumatic mitral regurgitation     Morbid obesity (H)        Surgical:  has a past surgical history that includes angiogram (age 61 ); arthroscopy knee rt/lt; angiogram (age 66); angiogram (11/2013); and Herniorrhaphy inguinal (Right, 6/3/2024).    Family Hx:  family history includes Heart Disease in his brother and paternal grandfather.    Social Hx:  reports that he has never smoked. He has never used smokeless tobacco. He reports that he does not currently use drugs. He reports that he does not drink alcohol.    REVIEW OF SYSTEMS:    CONSTITUTIONAL:  NEGATIVE for fever, chills, change in weight  INTEGUMENTARY/SKIN:  NEGATIVE for worrisome rashes, moles or lesions  EYES:  NEGATIVE for vision changes or irritation  ENT/MOUTH:  NEGATIVE for ear, mouth and throat problems  RESP:  NEGATIVE for significant cough or SOB  BREAST:  NEGATIVE for masses, tenderness or discharge  CV:  NEGATIVE for chest pain, palpitations or peripheral edema  GI:  NEGATIVE for nausea, abdominal pain, heartburn, or change in bowel habits  :  Negative   MUSCULOSKELETAL:  See HPI above  NEURO:  NEGATIVE for weakness, dizziness or paresthesias  ENDOCRINE:  NEGATIVE for temperature intolerance, skin/hair changes  HEME/ALLERGY/IMMUNE:  NEGATIVE for bleeding problems  PSYCHIATRIC:  NEGATIVE for changes in mood or affect    PHYSICAL EXAM:  /68 (BP Location: Left arm, Patient Position: Sitting, Cuff Size: Adult Large)   Pulse 60   Ht 1.854 m (6' 1\")   Wt 118.8 kg (262 lb)   SpO2 97%   BMI 34.57 kg/m     GENERAL APPEARANCE: healthy, alert, and no distress   SKIN: no suspicious lesions or rashes  NEURO: " Normal strength and tone, mentation intact, and speech normal  VASCULAR:  good pulses, and cappillary refill   LYMPH: no lymphadenopathy   PSYCH:  mentation appears normal, affect normal/bright, states that he is depressed.  RESP: no increased work of breathing     KNEE EXAM:     Alignment: normal on right, mild varus on left     Patellofemoral joint: right has difficulty holding in full extension. no crepitations in the patellofemoral joints.  Effusion: mild bilateral   ROM: right 5-95*. Left 0-120  Tender: no specific area of tenderness on right.  Masses: none  Ligaments:  laxity with varus and valgus stress.  no pain with Varus/Valgus stress testing.      X-RAY:  From today 10/23/2024 bilateral knees:   Postoperative changes right total knee replacement.  Hardware is well seated without evidence of acute fracture or  periprosthetic lucency to suggest hardware loosening. No significant  joint effusion. Normal position of the patella.     Left knee shows tricompartmental degenerative changes which are severe  in the medial and patellofemoral compartment. There is a joint  effusion. No evidence of an acute fracture.       2017 pelvis. Right leg shorter.    Lumbar xray 2021 : Five lumbar type vertebral bodies are identified.  Satisfactory height. Degenerative osteophytes and interspace narrowing  most marked at upper lumbar levels. Progressive facet joint  arthropathy in the lumbar spine.       Impression:   Encounter Diagnoses   Name Primary?     Pain due to total right knee replacement, initial encounter (H) Yes     Right leg weakness      Status post total right knee replacement      Primary osteoarthritis of left knee      Chronic pain of left knee      Chronic pain of both knees         Plan:  physical therapy ordered  If no improvement of function, I would consider MRI of lumbar spine  I'm not sure if there's anything besides revision of the right knee that would possibly help the right knee pain, but he is not  interested in that.  Discussed that any narcotic prescriptions for him would need to be followed by his primary care provider     Return to clinic 6, weeks    KAMLESH Landeros MD  Dept. Orthopedic Surgery  [facility]       Again, thank you for allowing me to participate in the care of your patient.        Sincerely,        Phu Landeros MD

## 2024-10-28 ENCOUNTER — TELEPHONE (OUTPATIENT)
Dept: ANTICOAGULATION | Facility: CLINIC | Age: 84
End: 2024-10-28
Payer: COMMERCIAL

## 2024-10-28 NOTE — TELEPHONE ENCOUNTER
ANTICOAGULATION     Chidi Dubon is overdue for an INR check.     Left message for patient to call and schedule lab appointment as soon as possible. If returning call, please schedule.     Solomon Rebolledo RN  10/28/2024  Anticoagulation Clinic  [facility] for routing messages: dennis SARGENT  [facility] patient phone line: [phone]

## 2024-10-29 ENCOUNTER — TELEPHONE (OUTPATIENT)
Dept: FAMILY MEDICINE | Facility: CLINIC | Age: 84
End: 2024-10-29
Payer: COMMERCIAL

## 2024-10-29 NOTE — LETTER
November 7, 2024      Chidi Dubon  [address]  [place] [address]        Dear ,      We are writing to inform you of your test results.    Recent testosterone level was with in normal limits and your kidney function remains nice and stable.        If you have any questions or concerns, please call the clinic at the number listed above.       Sincerely,      Martin Schultz PA-C

## 2024-10-29 NOTE — TELEPHONE ENCOUNTER
Pt called regarding his prrevious test results. 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

## 2024-11-01 ENCOUNTER — THERAPY VISIT (OUTPATIENT)
Dept: PHYSICAL THERAPY | Facility: CLINIC | Age: 84
End: 2024-11-01
Attending: ORTHOPAEDIC SURGERY
Payer: COMMERCIAL

## 2024-11-01 DIAGNOSIS — G89.29 CHRONIC PAIN OF BOTH KNEES: Primary | ICD-10-CM

## 2024-11-01 DIAGNOSIS — R29.898 RIGHT LEG WEAKNESS: ICD-10-CM

## 2024-11-01 DIAGNOSIS — M25.561 CHRONIC PAIN OF BOTH KNEES: Primary | ICD-10-CM

## 2024-11-01 DIAGNOSIS — M25.562 CHRONIC PAIN OF BOTH KNEES: Primary | ICD-10-CM

## 2024-11-01 PROCEDURE — 97110 THERAPEUTIC EXERCISES: CPT | Mod: GP | Performed by: PHYSICAL THERAPIST

## 2024-11-01 PROCEDURE — 97161 PT EVAL LOW COMPLEX 20 MIN: CPT | Mod: GP | Performed by: PHYSICAL THERAPIST

## 2024-11-01 NOTE — PROGRESS NOTES
PHYSICAL THERAPY EVALUATION  Type of Visit: Evaluation       Fall Risk Screen:  Fall screen completed by: PT  Have you fallen 2 or more times in the past year?: (Patient-Rptd) No  Have you fallen and had an injury in the past year?: (Patient-Rptd) Yes  Is patient a fall risk?: No  Fall screen comments: fell x 1 in past year while pulling weeds - stepped in a hole    Subjective         Presenting condition or subjective complaint: (Patient-Rptd) knees hurt when walking or getting up  Date of onset: 11/01/19 (approx)    Relevant medical history: (Patient-Rptd) Arthritis; Depression   Dates & types of surgery:      Prior diagnostic imaging/testing results: (Patient-Rptd) MRI; CT scan; X-ray   Postoperative changes right total knee replacement.  Hardware is well seated without evidence of acute fracture or  periprosthetic lucency to suggest hardware loosening. No significant  joint effusion.     Left knee shows tricompartmental degenerative changes which are severe  in the medial and patellofemoral compartment. There is a joint  effusion. No evidence of an acute fracture.  Prior therapy history for the same diagnosis, illness or injury:        Prior Level of Function  Transfers: Independent  Ambulation: Independent  ADL: Independent  IADL:     Living Environment  Social support: (Patient-Rptd) Alone   Type of home: (Patient-Rptd) House; 1 level   Stairs to enter the home:         Ramp: (Patient-Rptd) No   Stairs inside the home: (Patient-Rptd) Yes (Patient-Rptd) 30 Is there a railing: (Patient-Rptd) Yes     Help at home: (Patient-Rptd) None  Equipment owned: (Patient-Rptd) Straight Cane; Four-point cane; Walker; Walker with wheels     Employment:      Hobbies/Interests:      Patient goals for therapy: (Patient-Rptd) i can do anything i did befor except with pain    Pain assessment:      Objective   KNEE EVALUATION  PAIN: Pain Level at Rest: 0/10  Pain Level with Use: 6/10  Pain Location: knee  Pain Quality: Aching and  Dull  Pain Frequency: intermittent  Pain is Worst: daytime  Pain is Exacerbated By: walking;  get out of chairs;  prolong sitting;  stairs  Pain is Relieved By: light movement  Pain Progression: Unchanged  INTEGUMENTARY (edema, incisions):  bilat LE swelling  POSTURE:   GAIT:  Weightbearing Status: WBAT  Assistive Device(s): Cane (single end)  Gait Deviations: Antalgic  Stride length decreased  Jennifer decreased  Cane in L hand  BALANCE/PROPRIOCEPTION:  not assessed  WEIGHTBEARING ALIGNMENT: Knee WB Alignment:Genu varus L  NON-WEIGHTBEARING ALIGNMENT:   ROM:   (Degrees) Left AROM Left PROM  Right AROM Right PROM   Knee Flexion 113 crepitus 97    Knee Extension 0 crepitus 0    Pain:   End feel:     STRENGTH:   Pain: - none + mild ++ moderate +++ severe  Strength Scale: 0-5/5 Left Right   Knee Flexion 5- 5   Knee Extension 4+ 4+   Quad Set       FLEXIBILITY:   SPECIAL TESTS:   FUNCTIONAL TESTS:   PALPATION:   + Tenderness At Location Left Right   Medial Joint Line +    Lateral Joint Line +    Patellar Tendon     IT Band     Incisional     Popliteal     Biceps Femoris     Semitendinosis     Semimembranosis     Glut Medius     Patellar Medial     Patellar Lateral     Patellar Superior     Patellar Inferior        JOINT MOBILITY:     Assessment & Plan   CLINICAL IMPRESSIONS  Medical Diagnosis: R leg weakness    Treatment Diagnosis: bilat knee pain   Impression/Assessment: Patient is a 84 year old male with bilat knee complaints.  The following significant findings have been identified: Pain, Decreased ROM/flexibility, Decreased joint mobility, Decreased strength, Impaired gait, Impaired muscle performance, and Decreased activity tolerance. These impairments interfere with their ability to perform self care tasks, recreational activities, household chores, driving , household mobility, and community mobility as compared to previous level of function.     Clinical Decision Making (Complexity):  Clinical Presentation:  Stable/Uncomplicated  Clinical Presentation Rationale: based on medical and personal factors listed in PT evaluation  Clinical Decision Making (Complexity): Low complexity    PLAN OF CARE  Treatment Interventions:  Interventions: Neuromuscular Re-education, Therapeutic Activity, Therapeutic Exercise    Long Term Goals     PT Goal 1  Goal Identifier: knees  Goal Description: Pt able to sit to stand with 2/10 pain  Rationale: to maximize safety and independence with performance of ADLs and functional tasks;to maximize safety and independence within the home;to maximize safety and independence within the community;to maximize safety and independence with transportation;to maximize safety and independence with self cares  Target Date: 01/29/25      Frequency of Treatment: 2x/ month  Duration of Treatment: 3 month    Recommended Referrals to Other Professionals:   Education Assessment:   Learner/Method: Patient;Demonstration;Pictures/Video    Risks and benefits of evaluation/treatment have been explained.   Patient/Family/caregiver agrees with Plan of Care.     Evaluation Time:     PT Eval, Low Complexity Minutes (08145): 23       Signing Clinician: José Miguel Garay PT        [facility]                                                                                   OUTPATIENT PHYSICAL THERAPY      PLAN OF TREATMENT FOR OUTPATIENT REHABILITATION   Patient's Last Name, First Name, Chdii Jarquin YOB: 1940   Provider's Name   [facility]   Medical Record No.  [id number]     Onset Date: 11/01/19 (approx)  Start of Care Date: 11/01/24     Medical Diagnosis:  R leg weakness      PT Treatment Diagnosis:  bilat knee pain Plan of Treatment  Frequency/Duration: 2x/ month/ 3 month    Certification date from 11/01/24 to 01/29/25         See note for plan of treatment details and functional goals     José Miguel Garay, PT                          I CERTIFY THE NEED FOR THESE SERVICES FURNISHED UNDER        THIS PLAN OF TREATMENT AND WHILE UNDER MY CARE     (Physician attestation of this document indicates review and certification of the therapy plan).              Referring Provider:  Phu Landeros    Initial Assessment  See Epic Evaluation- Start of Care Date: 11/01/24

## 2024-11-01 NOTE — TELEPHONE ENCOUNTER
Recent testosterone level was with in normal limits and your kidney function remains nice and stable.

## 2024-11-05 NOTE — TELEPHONE ENCOUNTER
Called [phone] (home)   Did they answer the phone: No, left a message on voicemail to return call to the [facility] at [phone], and to ask for any available triage nurse.  (RN did not leave specific details on voicemail for confidential reasons)    Henny REEVES RN  Triage Nurse  [facility]

## 2024-11-07 NOTE — TELEPHONE ENCOUNTER
Please mail lab results to patient as requested in first message.     Thank you,  Reyna Zafar RN

## 2024-11-08 ENCOUNTER — ANTICOAGULATION THERAPY VISIT (OUTPATIENT)
Dept: ANTICOAGULATION | Facility: CLINIC | Age: 84
End: 2024-11-08

## 2024-11-08 ENCOUNTER — LAB (OUTPATIENT)
Dept: LAB | Facility: CLINIC | Age: 84
End: 2024-11-08
Payer: COMMERCIAL

## 2024-11-08 DIAGNOSIS — I48.20 CHRONIC ATRIAL FIBRILLATION (H): ICD-10-CM

## 2024-11-08 DIAGNOSIS — Z79.01 LONG TERM CURRENT USE OF ANTICOAGULANT THERAPY: Primary | ICD-10-CM

## 2024-11-08 LAB — INR BLD: 2 (ref 0.9–1.1)

## 2024-11-08 PROCEDURE — 36415 COLL VENOUS BLD VENIPUNCTURE: CPT

## 2024-11-08 PROCEDURE — 85610 PROTHROMBIN TIME: CPT

## 2024-11-08 NOTE — PROGRESS NOTES
ANTICOAGULATION MANAGEMENT     Chidi Dubon 84 year old male is on warfarin with therapeutic INR result. (Goal INR 2.0-3.0)    Recent labs: (last 7 days)     11/08/24  1445   INR 2.0*       ASSESSMENT     Source(s): Chart Review  Previous INR was Therapeutic last visit; previously outside of goal range  Medication, diet, health changes since last INR chart reviewed; none identified         PLAN     Recommended plan for no diet, medication or health factor changes affecting INR     Dosing Instructions: Continue your current warfarin dose with next INR in 2 weeks       Summary  As of 11/8/2024      Full warfarin instructions:  1.25 mg every Sun; 2.5 mg all other days   Next INR check:  11/22/2024               Detailed voice message left for Chidi with dosing instructions and follow up date.     Contact [phone] to schedule and with any changes, questions or concerns.     Education provided: Please call back if any changes to your diet, medications or how you've been taking warfarin    Plan made per [facility] anticoagulation protocol    Serene Fermin RN  11/8/2024  Anticoagulation Clinic  [facility] for routing messages: dennis SARGNET  [facility] patient phone line: [phone]        _______________________________________________________________________     Anticoagulation Episode Summary       Current INR goal:  2.0-3.0   TTR:  48.1% (1 y)   Target end date:  Indefinite   Send INR reminders to:  EMELIA SARGENT    Indications    Long-term (current) use of anticoagulants [Z79.01] [Z79.01]  Atrial Fibrillation [I48.20]             Comments:  --             Anticoagulation Care Providers       Provider Role Specialty Phone number    Martin Schultz PA-C Referring Family Medicine [phone]    Gen Marti PA Referring  [phone]

## 2024-11-25 ENCOUNTER — TELEPHONE (OUTPATIENT)
Dept: ANTICOAGULATION | Facility: CLINIC | Age: 84
End: 2024-11-25
Payer: COMMERCIAL

## 2024-11-25 NOTE — TELEPHONE ENCOUNTER
ANTICOAGULATION     Chidi Dubon is overdue for an INR check.     Left message for patient to call and schedule lab appointment as soon as possible. If returning call, please schedule.     Camilla Gil RN  11/25/2024  Anticoagulation Clinic  [facility] for routing messages: dennis SARGENT  [facility] patient phone line: [phone]

## 2024-11-26 ENCOUNTER — TELEPHONE (OUTPATIENT)
Dept: FAMILY MEDICINE | Facility: CLINIC | Age: 84
End: 2024-11-26
Payer: COMMERCIAL

## 2024-11-26 RX ORDER — SPIRONOLACTONE 25 MG/1
25 TABLET ORAL DAILY
Status: CANCELLED | OUTPATIENT
Start: 2024-11-26

## 2024-11-26 NOTE — TELEPHONE ENCOUNTER
Historical med. Please call patient to get further information about request.     Zuleyka Bartholomew RN

## 2024-11-27 DIAGNOSIS — I50.32 CHRONIC HEART FAILURE WITH PRESERVED EJECTION FRACTION (H): Primary | ICD-10-CM

## 2024-11-27 RX ORDER — SPIRONOLACTONE 25 MG/1
25 TABLET ORAL DAILY
Qty: 90 TABLET | Refills: 0 | Status: SHIPPED | OUTPATIENT
Start: 2024-11-27

## 2024-11-27 NOTE — TELEPHONE ENCOUNTER
Patient called back, he was going to stop taking the medication. Notified the patient that a refill was sent in today.     Reyna Zafar RN

## 2024-11-27 NOTE — TELEPHONE ENCOUNTER
Left message on voicemail for patient to return call. When call is returned please advise as below.

## 2024-11-27 NOTE — TELEPHONE ENCOUNTER
[facility]. Please have patient reach out to her cardiologist about refills for spironolactone.     Thanks,  MAGDY Hutson  [facility]

## 2024-12-02 ENCOUNTER — TELEPHONE (OUTPATIENT)
Dept: ANTICOAGULATION | Facility: CLINIC | Age: 84
End: 2024-12-02
Payer: COMMERCIAL

## 2024-12-02 NOTE — TELEPHONE ENCOUNTER
ANTICOAGULATION     Chidi Dubon is overdue for an INR check.     Left message for patient to call and schedule lab appointment as soon as possible. If returning call, please schedule.     Camilla Gil RN  12/2/2024  Anticoagulation Clinic  [facility] for routing messages: dennis SARGENT  [facility] patient phone line: [phone]

## 2024-12-09 ENCOUNTER — DOCUMENTATION ONLY (OUTPATIENT)
Dept: ANTICOAGULATION | Facility: CLINIC | Age: 84
End: 2024-12-09
Payer: COMMERCIAL

## 2024-12-09 NOTE — PROGRESS NOTES
ANTICOAGULATION     Chidi Dubon is overdue for an INR check.     Reminder letter sent    Mark LOPEZ RN  12/9/2024  Anticoagulation Clinic  [facility] for routing messages: dennis SARGENT  [facility] patient phone line: [phone]

## 2024-12-09 NOTE — PROGRESS NOTES
Mailed from [place].    Priyanka Hinds RN    [facility] Anticoagulation [facility]

## 2024-12-09 NOTE — LETTER
"     [facility] ANTICOAGULATION CLINIC  [address]  [facility] [address]  Phone: [phone]  Fax: [phone]     December 9, 2024        Chidi Dubon  [address]  [place] [address]            Dear Chidi,    You are currently under the care of [facility] for your warfarin (Coumadin , Jantoven ) therapy.  We are contacting you because our records show you were due for an INR on 11/22/24.    There are potentially serious risks when taking warfarin without careful monitoring and we want to make sure you are safely managed.  Routine lab monitoring is required for warfarin refills.     Please schedule a lab appointment as soon as possible either by calling [phone] or scheduling directly in Hashdoc. To schedule in Hashdoc open the \"schedule an appointment section\"  then select \"lab only\" as the reason you are coming in and \"INR\" as the lab test. If it is difficult for you to get to lab, please call us to discuss options.  If there has been a change in your care or other concerns, please let us know so we can help and/or update our records.         Sincerely,       [facility]    "

## 2024-12-16 ENCOUNTER — LAB (OUTPATIENT)
Dept: LAB | Facility: CLINIC | Age: 84
End: 2024-12-16
Payer: COMMERCIAL

## 2024-12-16 ENCOUNTER — ANTICOAGULATION THERAPY VISIT (OUTPATIENT)
Dept: ANTICOAGULATION | Facility: CLINIC | Age: 84
End: 2024-12-16

## 2024-12-16 DIAGNOSIS — I48.20 CHRONIC ATRIAL FIBRILLATION (H): ICD-10-CM

## 2024-12-16 DIAGNOSIS — I25.119 CORONARY ARTERY DISEASE INVOLVING NATIVE HEART WITH ANGINA PECTORIS, UNSPECIFIED VESSEL OR LESION TYPE (H): Primary | ICD-10-CM

## 2024-12-16 DIAGNOSIS — Z79.01 LONG TERM CURRENT USE OF ANTICOAGULANT THERAPY: Primary | ICD-10-CM

## 2024-12-16 LAB — INR BLD: 2.2 (ref 0.9–1.1)

## 2024-12-16 PROCEDURE — 36415 COLL VENOUS BLD VENIPUNCTURE: CPT

## 2024-12-16 PROCEDURE — 80061 LIPID PANEL: CPT

## 2024-12-16 PROCEDURE — 36416 COLLJ CAPILLARY BLOOD SPEC: CPT

## 2024-12-16 PROCEDURE — 85610 PROTHROMBIN TIME: CPT

## 2024-12-16 NOTE — PROGRESS NOTES
ANTICOAGULATION MANAGEMENT     Chidi Dubon 84 year old male is on warfarin with therapeutic INR result. (Goal INR 2.0-3.0)    Recent labs: (last 7 days)     12/16/24  1503   INR 2.2*       ASSESSMENT     Source(s): Chart Review  Previous INR was Therapeutic last 2(+) visits  Medication, diet, health changes since last INR chart reviewed; none identified         PLAN     Recommended plan for no diet, medication or health factor changes and previous 2 INR results within goal affecting INR     Dosing Instructions: Continue your current warfarin dose with next INR in 6 weeks       Summary  As of 12/16/2024      Full warfarin instructions:  1.25 mg every Sun; 2.5 mg all other days   Next INR check:  1/27/2025               Detailed voice message left for Chidi with dosing instructions and follow up date.     Contact [phone] to schedule and with any changes, questions or concerns.     Education provided: Contact [phone] with any changes, questions or concerns.     Plan made per [facility] anticoagulation protocol    Camilla Gil RN  12/16/2024  Anticoagulation Clinic  [employer] for routing messages: dennis SARGENT  [facility] patient phone line: [phone]        _______________________________________________________________________     Anticoagulation Episode Summary       Current INR goal:  2.0-3.0   TTR:  48.1% (1 y)   Target end date:  Indefinite   Send INR reminders to:  EMELIA SARGENT    Indications    Long-term (current) use of anticoagulants [Z79.01] [Z79.01]  Atrial Fibrillation [I48.20]             Comments:  --             Anticoagulation Care Providers       Provider Role Specialty Phone number    Martin Schultz PA-C Referring Family Medicine [phone]    Gen Marti PA Referring  [phone]

## 2024-12-17 LAB
CHOLEST SERPL-MCNC: 119 MG/DL
FASTING STATUS PATIENT QL REPORTED: NORMAL
HDLC SERPL-MCNC: 40 MG/DL
LDLC SERPL CALC-MCNC: 61 MG/DL
NONHDLC SERPL-MCNC: 79 MG/DL
TRIGL SERPL-MCNC: 92 MG/DL

## 2024-12-23 DIAGNOSIS — I48.20 CHRONIC ATRIAL FIBRILLATION (H): ICD-10-CM

## 2024-12-23 DIAGNOSIS — Z79.01 LONG TERM CURRENT USE OF ANTICOAGULANT THERAPY: ICD-10-CM

## 2024-12-23 RX ORDER — WARFARIN SODIUM 2.5 MG/1
TABLET ORAL
Qty: 80 TABLET | Refills: 0 | Status: SHIPPED | OUTPATIENT
Start: 2024-12-23

## 2024-12-23 NOTE — TELEPHONE ENCOUNTER
ANTICOAGULATION MANAGEMENT:  Medication Refill    Anticoagulation Summary  As of 12/16/2024      Warfarin maintenance plan:  1.25 mg (2.5 mg x 0.5) every Sun; 2.5 mg (2.5 mg x 1) all other days   Next INR check:  1/27/2025   Target end date:  Indefinite    Indications    Long-term (current) use of anticoagulants [Z79.01] [Z79.01]  Atrial Fibrillation [I48.20]                 Anticoagulation Care Providers       Provider Role Specialty Phone number    Martin Schultz PA-C Referring Family Medicine [phone]    Gen Marti PA Referring  [phone]            Refill Criteria    Visit with referring provider/group: Meets criteria: visit within referring provider group in the last 15 months on 10/7/24    [facility] referral last signed: 04/19/2024; within last year:  Yes    Lab monitoring not exceeding 2 weeks overdue: No    Chidi meets all criteria for refill. Rx instructions and quantity supplied updated to match patient's current dosing plan. Warfarin 90 day supply with 1 refill granted per [facility] protocol     Mark LOPEZ RN  Anticoagulation Clinic

## 2025-01-17 PROBLEM — M25.561 CHRONIC PAIN OF BOTH KNEES: Status: RESOLVED | Noted: 2024-11-01 | Resolved: 2025-01-17

## 2025-01-17 PROBLEM — M25.562 CHRONIC PAIN OF BOTH KNEES: Status: RESOLVED | Noted: 2024-11-01 | Resolved: 2025-01-17

## 2025-01-17 PROBLEM — G89.29 CHRONIC PAIN OF BOTH KNEES: Status: RESOLVED | Noted: 2024-11-01 | Resolved: 2025-01-17

## 2025-01-28 NOTE — PROGRESS NOTES
Listed phone numbers state no voicemail set up. No my chart available. Will try again 8/23/2022   2003    Skin cancer destruction Right 01/26/Comfort Norwood, CNP  Electronically signed by: Comfort LISA  Collaborating MD: Inés Adan MD

## 2025-02-10 ENCOUNTER — OFFICE VISIT (OUTPATIENT)
Dept: FAMILY MEDICINE | Facility: CLINIC | Age: 85
End: 2025-02-10
Payer: COMMERCIAL

## 2025-02-10 VITALS
DIASTOLIC BLOOD PRESSURE: 68 MMHG | HEIGHT: 73 IN | TEMPERATURE: 97.4 F | RESPIRATION RATE: 20 BRPM | SYSTOLIC BLOOD PRESSURE: 126 MMHG | BODY MASS INDEX: 35.25 KG/M2 | HEART RATE: 65 BPM | OXYGEN SATURATION: 96 % | WEIGHT: 266 LBS

## 2025-02-10 DIAGNOSIS — I50.32 CHRONIC HEART FAILURE WITH PRESERVED EJECTION FRACTION (H): ICD-10-CM

## 2025-02-10 DIAGNOSIS — I48.20 CHRONIC ATRIAL FIBRILLATION (H): ICD-10-CM

## 2025-02-10 DIAGNOSIS — E87.6 HYPOKALEMIA: ICD-10-CM

## 2025-02-10 DIAGNOSIS — M25.562 CHRONIC PAIN OF BOTH KNEES: ICD-10-CM

## 2025-02-10 DIAGNOSIS — T50.905A MEDICATION SIDE EFFECTS, INITIAL ENCOUNTER: Primary | ICD-10-CM

## 2025-02-10 DIAGNOSIS — E78.5 HYPERLIPIDEMIA LDL GOAL <70: ICD-10-CM

## 2025-02-10 DIAGNOSIS — N18.32 STAGE 3B CHRONIC KIDNEY DISEASE (H): ICD-10-CM

## 2025-02-10 DIAGNOSIS — Z79.01 LONG TERM CURRENT USE OF ANTICOAGULANT THERAPY: ICD-10-CM

## 2025-02-10 DIAGNOSIS — M25.561 CHRONIC PAIN OF BOTH KNEES: ICD-10-CM

## 2025-02-10 DIAGNOSIS — F33.1 MODERATE RECURRENT MAJOR DEPRESSION (H): ICD-10-CM

## 2025-02-10 DIAGNOSIS — E66.01 MORBID OBESITY (H): ICD-10-CM

## 2025-02-10 DIAGNOSIS — I48.91 ATRIAL FIBRILLATION, UNSPECIFIED TYPE (H): ICD-10-CM

## 2025-02-10 DIAGNOSIS — G89.29 CHRONIC PAIN OF BOTH KNEES: ICD-10-CM

## 2025-02-10 DIAGNOSIS — I50.33 ACUTE ON CHRONIC HEART FAILURE WITH PRESERVED EJECTION FRACTION (H): ICD-10-CM

## 2025-02-10 DIAGNOSIS — G25.81 RESTLESS LEGS SYNDROME: ICD-10-CM

## 2025-02-10 DIAGNOSIS — I10 BENIGN ESSENTIAL HYPERTENSION: ICD-10-CM

## 2025-02-10 LAB
ALBUMIN SERPL BCG-MCNC: 3.9 G/DL (ref 3.5–5.2)
ALP SERPL-CCNC: 98 U/L (ref 40–150)
ALT SERPL W P-5'-P-CCNC: 12 U/L (ref 0–70)
ANION GAP SERPL CALCULATED.3IONS-SCNC: 12 MMOL/L (ref 7–15)
AST SERPL W P-5'-P-CCNC: 30 U/L (ref 0–45)
BILIRUB SERPL-MCNC: 3.1 MG/DL
BUN SERPL-MCNC: 20.2 MG/DL (ref 8–23)
CALCIUM SERPL-MCNC: 9.6 MG/DL (ref 8.8–10.4)
CHLORIDE SERPL-SCNC: 107 MMOL/L (ref 98–107)
CREAT SERPL-MCNC: 1.33 MG/DL (ref 0.67–1.17)
EGFRCR SERPLBLD CKD-EPI 2021: 53 ML/MIN/1.73M2
GLUCOSE SERPL-MCNC: 95 MG/DL (ref 70–99)
HCO3 SERPL-SCNC: 20 MMOL/L (ref 22–29)
POTASSIUM SERPL-SCNC: 4.9 MMOL/L (ref 3.4–5.3)
PROT SERPL-MCNC: 7 G/DL (ref 6.4–8.3)
SODIUM SERPL-SCNC: 139 MMOL/L (ref 135–145)

## 2025-02-10 PROCEDURE — 80053 COMPREHEN METABOLIC PANEL: CPT | Performed by: PHYSICIAN ASSISTANT

## 2025-02-10 PROCEDURE — 99214 OFFICE O/P EST MOD 30 MIN: CPT | Performed by: PHYSICIAN ASSISTANT

## 2025-02-10 PROCEDURE — 36415 COLL VENOUS BLD VENIPUNCTURE: CPT | Performed by: PHYSICIAN ASSISTANT

## 2025-02-10 RX ORDER — SPIRONOLACTONE 25 MG/1
25 TABLET ORAL DAILY
Qty: 90 TABLET | Refills: 0 | Status: SHIPPED | OUTPATIENT
Start: 2025-02-10 | End: 2025-02-10

## 2025-02-10 RX ORDER — LOSARTAN POTASSIUM 100 MG/1
100 TABLET ORAL DAILY
Qty: 90 TABLET | Refills: 0 | Status: SHIPPED | OUTPATIENT
Start: 2025-02-10

## 2025-02-10 RX ORDER — WARFARIN SODIUM 2.5 MG/1
TABLET ORAL
Qty: 80 TABLET | Refills: 0 | Status: SHIPPED | OUTPATIENT
Start: 2025-02-10

## 2025-02-10 RX ORDER — ATORVASTATIN CALCIUM 80 MG/1
80 TABLET, FILM COATED ORAL DAILY
Qty: 90 TABLET | Refills: 1 | Status: SHIPPED | OUTPATIENT
Start: 2025-02-10

## 2025-02-10 RX ORDER — POTASSIUM CHLORIDE 1.5 G/1.58G
20 POWDER, FOR SOLUTION ORAL DAILY
Qty: 90 EACH | Refills: 3 | Status: SHIPPED | OUTPATIENT
Start: 2025-02-10

## 2025-02-10 RX ORDER — PRAMIPEXOLE DIHYDROCHLORIDE 0.12 MG/1
0.12 TABLET ORAL 3 TIMES DAILY
Qty: 90 TABLET | Refills: 0 | Status: SHIPPED | OUTPATIENT
Start: 2025-02-10

## 2025-02-10 ASSESSMENT — PATIENT HEALTH QUESTIONNAIRE - PHQ9
SUM OF ALL RESPONSES TO PHQ QUESTIONS 1-9: 18
10. IF YOU CHECKED OFF ANY PROBLEMS, HOW DIFFICULT HAVE THESE PROBLEMS MADE IT FOR YOU TO DO YOUR WORK, TAKE CARE OF THINGS AT HOME, OR GET ALONG WITH OTHER PEOPLE: EXTREMELY DIFFICULT
SUM OF ALL RESPONSES TO PHQ QUESTIONS 1-9: 18

## 2025-02-10 NOTE — PROGRESS NOTES
"    Kareen Murillo is a 84 year old, presenting for the following health issues:  Medication Problem (Reaction to spironolactone?/Nipples hurt, body itching fatigue/6-8 months)        2/10/2025     1:05 PM   Additional Questions   Roomed by Jana Dixon CMA   Accompanied by None         2/10/2025     1:05 PM   Patient Reported Additional Medications   Patient reports taking the following new medications none     History of Present Illness       Reason for visit:  Possible medication reaction - spironolactone   He is taking medications regularly.  Gynecomastia from spironolactone. Patient dc'd 10 days ago and is resolving.   History of heart failure. Stable. No orthopnea. No profound KRAUSE. Followed by cardiology. Walking consistently.   Recheck of htn.  Blood pressure looks great  No chest pain/sob/palpitations/dizziness/ha's    Recheck of obesity. Discussed a lower calorie diet and consistent mix of aerobic exercise and strength training. This will help maintain/treat his blood pressure.  History of a fib. Chronic but stable . On warfarin.      Review of Systems  Constitutional, HEENT, cardiovascular, pulmonary, GI, , musculoskeletal, neuro, skin, endocrine and psych systems are negative, except as otherwise noted.      Objective    /68   Pulse 65   Temp 97.4  F (36.3  C) (Oral)   Resp 20   Ht 1.854 m (6' 1\")   Wt 120.7 kg (266 lb)   SpO2 96%   BMI 35.09 kg/m    Body mass index is 35.09 kg/m .  Physical Exam   Eye exam - right eye normal lid, conjunctiva, cornea, pupil and fundus, left eye normal lid, conjunctiva, cornea, pupil and fundus.  Thyroid not palpable, not enlarged, no nodules detected.  CHEST:chest clear to IPPA, no tachypnea, retractions or cyanosis, and Heart exam detail:irregularly irregular rhythm, 1/6 JOES murmur is heard at 2nd left intercostal space, chest is clear without rales or wheezing, no pedal edema, no JVD, no hepatosplenomegaly.  Trace pretibial edema.  Chidi was seen " today for medication problem.  Taking his potassium supplement 20 meq, 1/2 tab daily.   Diagnoses and all orders for this visit:    Medication side effects, initial encounter  Remain off of spironolactone.   Hyperlipidemia LDL goal <70  -     atorvastatin (LIPITOR) 80 MG tablet; Take 1 tablet (80 mg) by mouth daily.    Benign essential hypertension  -     COMPREHENSIVE METABOLIC PANEL; Future  -     losartan (COZAAR) 100 MG tablet; Take 1 tablet (100 mg) by mouth daily.    Acute on chronic heart failure with preserved ejection fraction (H)    Moderate recurrent major depression (H)    Atrial fibrillation, unspecified type (H)    Morbid obesity (H)    Stage 3b chronic kidney disease (H)    Chronic pain of both knees  -     diclofenac (VOLTAREN) 1 % topical gel; Apply 2 g topically 4 times daily.    Hypokalemia  -     potassium chloride (KLOR-CON) 20 MEQ packet; Take 20 mEq by mouth daily.    Restless legs syndrome  -     pramipexole (MIRAPEX) 0.125 MG tablet; Take 1 tablet (0.125 mg) by mouth 3 times daily.    Chronic heart failure with preserved ejection fraction (H)  -     Discontinue: spironolactone (ALDACTONE) 25 MG tablet; Take 1 tablet (25 mg) by mouth daily.    Chronic atrial fibrillation (H)  -     warfarin ANTICOAGULANT (COUMADIN) 2.5 MG tablet; Take 1.25 mg every Sun; 2.5 mg all other days or as directed by the INR clinic.    Long term current use of anticoagulant therapy  -     warfarin ANTICOAGULANT (COUMADIN) 2.5 MG tablet; Take 1.25 mg every Sun; 2.5 mg all other days or as directed by the INR clinic.    Continue to work on a lower sugar/starch diet and more exercise.  Lower fat, higher fiber diet and consistent exercise.  Recheck in 6 mos .        Signed Electronically by: Martin Schultz PA-C

## 2025-03-24 ENCOUNTER — TELEPHONE (OUTPATIENT)
Dept: ANTICOAGULATION | Facility: CLINIC | Age: 85
End: 2025-03-24
Payer: COMMERCIAL

## 2025-03-24 NOTE — TELEPHONE ENCOUNTER
ANTICOAGULATION     Chidi Dubon is overdue for an INR check.     Left message for patient to call and schedule lab appointment as soon as possible. If returning call, please schedule.     Serene Fermin RN  3/24/2025  Anticoagulation Clinic  [facility] for routing messages: dennis SARGENT  [facility] patient phone line: [phone]

## 2025-03-27 DIAGNOSIS — G25.81 RESTLESS LEGS SYNDROME: ICD-10-CM

## 2025-03-27 RX ORDER — PRAMIPEXOLE DIHYDROCHLORIDE 0.12 MG/1
0.12 TABLET ORAL 3 TIMES DAILY
Qty: 90 TABLET | Refills: 0 | Status: SHIPPED | OUTPATIENT
Start: 2025-03-27

## 2025-03-31 ENCOUNTER — TELEPHONE (OUTPATIENT)
Dept: ANTICOAGULATION | Facility: CLINIC | Age: 85
End: 2025-03-31
Payer: COMMERCIAL

## 2025-03-31 NOTE — TELEPHONE ENCOUNTER
ANTICOAGULATION     Chidi Dubon is overdue for an INR check.     Left message for patient to call and schedule lab appointment as soon as possible. If returning call, please schedule.     Solomon Rebolledo, RN  3/31/2025  Anticoagulation Clinic  [facility] for routing messages: dennis SARGENT  [facility] patient phone line: [phone]

## 2025-04-07 ENCOUNTER — DOCUMENTATION ONLY (OUTPATIENT)
Dept: ANTICOAGULATION | Facility: CLINIC | Age: 85
End: 2025-04-07
Payer: COMMERCIAL

## 2025-04-07 NOTE — PROGRESS NOTES
Mailed from [place] on 4/7/25.    Priyanka Hinds RN    [facility] Anticoagulation [facility]

## 2025-04-07 NOTE — LETTER
"     [facility] ANTICOAGULATION CLINIC  [address]  [facility] [address]  Phone: [phone]  Fax: [phone]   April 7, 2025        Chidi Dubon  [address]  [place] [address]            Dear Chidi,    You are currently under the care of [facility]tico[facility] for your warfarin (Coumadin , Jantoven ) therapy.  We are contacting you because our records show you were due for an INR on 3/14/25.    There are potentially serious risks when taking warfarin without careful monitoring and we want to make sure you are safely managed.  Routine lab monitoring is required for warfarin refills.     Please schedule a lab appointment as soon as possible either by calling [phone] or scheduling directly in [email or web address]. To schedule in [email or web address] open the \"schedule an appointment\" section then select \"lab only\" as the reason you are coming in and \"INR\" as the lab test. If it is difficult for you to get to lab, please call us to discuss options.  If there has been a change in your care or other concerns, please let us know so we can help and/or update our records.         Sincerely,       [facility] Anticoagulation [facility]    "

## 2025-04-07 NOTE — PROGRESS NOTES
ANTICOAGULATION     Chidi Dubon is overdue for an INR check.     Reminder letter sent    Camilla Gil RN  4/7/2025  Anticoagulation Clinic  [facility] for routing messages: dennis SARGENT  [facility] patient phone line: [phone]

## 2025-04-21 ENCOUNTER — TELEPHONE (OUTPATIENT)
Dept: ANTICOAGULATION | Facility: CLINIC | Age: 85
End: 2025-04-21
Payer: COMMERCIAL

## 2025-04-21 NOTE — TELEPHONE ENCOUNTER
ANTICOAGULATION     Chidi Dubon is overdue for an INR check.     Left message for patient to call and schedule lab appointment as soon as possible. If returning call, please schedule.  and Reminder letter sent    Solomon Rebolledo RN  4/21/2025  Anticoagulation Clinic  [facility] for routing messages: dennis SARGENT  [facility] patient phone line: [phone]

## 2025-04-21 NOTE — LETTER
"     [facility] ANTICOAGULATION CLINIC  [address]  [facility] [address]  Phone: [phone]  Fax: [phone]   April 21, 2025        Chidi Dubon  [address]  [place] [address]            Dear Chidi,    You are currently under the care of [facility]tico[facility] for your warfarin (Coumadin , Jantoven ) therapy.  We are contacting you because our records show you were due for an INR on 3/14/25.    There are potentially serious risks when taking warfarin without careful monitoring and we want to make sure you are safely managed.  Routine lab monitoring is required for warfarin refills.     Please schedule a lab appointment as soon as possible either by calling [phone] or scheduling directly in [employer]. To schedule in [employer] open the \"schedule an appointment\" section then select \"lab only\" as the reason you are coming in and \"INR\" as the lab test. If it is difficult for you to get to lab, please call us to discuss options.  If there has been a change in your care or other concerns, please let us know so we can help and/or update our records.         Sincerely,       [facility]ticoagulation [facility]    "

## 2025-04-21 NOTE — TELEPHONE ENCOUNTER
Anticoagulation Clinic Notification    Chidi, is past due for an INR. Their last result was 2.3 on 1/31/25 and was due to come back on 3/14/25.    he received phone calls and letters over the last several weeks in attempt to arrange follow up labs. Chidi CAGLE Dubon will be contacted again today.     Please contact patient directly to discuss compliance with monitoring or schedule visit to review ongoing anticoagulation therapy.    Thank you,     [facility] Anticoagulation Clinic

## 2025-06-03 ENCOUNTER — OFFICE VISIT (OUTPATIENT)
Dept: FAMILY MEDICINE | Facility: CLINIC | Age: 85
End: 2025-06-03
Payer: COMMERCIAL

## 2025-06-03 ENCOUNTER — RESULTS FOLLOW-UP (OUTPATIENT)
Dept: ANTICOAGULATION | Facility: CLINIC | Age: 85
End: 2025-06-03

## 2025-06-03 ENCOUNTER — ANTICOAGULATION THERAPY VISIT (OUTPATIENT)
Dept: ANTICOAGULATION | Facility: CLINIC | Age: 85
End: 2025-06-03

## 2025-06-03 VITALS
TEMPERATURE: 97.4 F | BODY MASS INDEX: 37.93 KG/M2 | OXYGEN SATURATION: 93 % | SYSTOLIC BLOOD PRESSURE: 128 MMHG | HEIGHT: 73 IN | DIASTOLIC BLOOD PRESSURE: 70 MMHG | RESPIRATION RATE: 20 BRPM | HEART RATE: 63 BPM | WEIGHT: 286.2 LBS

## 2025-06-03 DIAGNOSIS — Z79.01 LONG TERM CURRENT USE OF ANTICOAGULANT THERAPY: Primary | ICD-10-CM

## 2025-06-03 DIAGNOSIS — G89.29 CHRONIC PAIN OF BOTH KNEES: ICD-10-CM

## 2025-06-03 DIAGNOSIS — I48.91 ATRIAL FIBRILLATION, UNSPECIFIED TYPE (H): ICD-10-CM

## 2025-06-03 DIAGNOSIS — I48.20 CHRONIC ATRIAL FIBRILLATION (H): ICD-10-CM

## 2025-06-03 DIAGNOSIS — I50.33 ACUTE ON CHRONIC HEART FAILURE WITH PRESERVED EJECTION FRACTION (H): Primary | ICD-10-CM

## 2025-06-03 DIAGNOSIS — R53.83 OTHER FATIGUE: ICD-10-CM

## 2025-06-03 DIAGNOSIS — N18.32 STAGE 3B CHRONIC KIDNEY DISEASE (H): ICD-10-CM

## 2025-06-03 DIAGNOSIS — Z13.1 SCREENING FOR DIABETES MELLITUS: ICD-10-CM

## 2025-06-03 DIAGNOSIS — M25.561 CHRONIC PAIN OF BOTH KNEES: ICD-10-CM

## 2025-06-03 DIAGNOSIS — G89.29 CHRONIC MIDLINE LOW BACK PAIN WITH RIGHT-SIDED SCIATICA: ICD-10-CM

## 2025-06-03 DIAGNOSIS — M25.562 CHRONIC PAIN OF BOTH KNEES: ICD-10-CM

## 2025-06-03 DIAGNOSIS — I10 BENIGN ESSENTIAL HYPERTENSION: ICD-10-CM

## 2025-06-03 DIAGNOSIS — M54.41 CHRONIC MIDLINE LOW BACK PAIN WITH RIGHT-SIDED SCIATICA: ICD-10-CM

## 2025-06-03 LAB
ERYTHROCYTE [DISTWIDTH] IN BLOOD BY AUTOMATED COUNT: 17.1 % (ref 10–15)
EST. AVERAGE GLUCOSE BLD GHB EST-MCNC: 114 MG/DL
HBA1C MFR BLD: 5.6 % (ref 0–5.6)
HCT VFR BLD AUTO: 39.7 % (ref 40–53)
HGB BLD-MCNC: 12.5 G/DL (ref 13.3–17.7)
INR BLD: 3 (ref 0.9–1.1)
MCH RBC QN AUTO: 27.2 PG (ref 26.5–33)
MCHC RBC AUTO-ENTMCNC: 31.5 G/DL (ref 31.5–36.5)
MCV RBC AUTO: 86 FL (ref 78–100)
PLATELET # BLD AUTO: 147 10E3/UL (ref 150–450)
RBC # BLD AUTO: 4.6 10E6/UL (ref 4.4–5.9)
WBC # BLD AUTO: 5 10E3/UL (ref 4–11)

## 2025-06-03 PROCEDURE — 99214 OFFICE O/P EST MOD 30 MIN: CPT | Performed by: PHYSICIAN ASSISTANT

## 2025-06-03 PROCEDURE — 85610 PROTHROMBIN TIME: CPT | Performed by: PHYSICIAN ASSISTANT

## 2025-06-03 PROCEDURE — 80053 COMPREHEN METABOLIC PANEL: CPT | Performed by: PHYSICIAN ASSISTANT

## 2025-06-03 PROCEDURE — 36415 COLL VENOUS BLD VENIPUNCTURE: CPT | Performed by: PHYSICIAN ASSISTANT

## 2025-06-03 PROCEDURE — 85027 COMPLETE CBC AUTOMATED: CPT | Performed by: PHYSICIAN ASSISTANT

## 2025-06-03 PROCEDURE — 3074F SYST BP LT 130 MM HG: CPT | Performed by: PHYSICIAN ASSISTANT

## 2025-06-03 PROCEDURE — 83880 ASSAY OF NATRIURETIC PEPTIDE: CPT | Performed by: PHYSICIAN ASSISTANT

## 2025-06-03 PROCEDURE — 3078F DIAST BP <80 MM HG: CPT | Performed by: PHYSICIAN ASSISTANT

## 2025-06-03 PROCEDURE — 84443 ASSAY THYROID STIM HORMONE: CPT | Performed by: PHYSICIAN ASSISTANT

## 2025-06-03 PROCEDURE — 83036 HEMOGLOBIN GLYCOSYLATED A1C: CPT | Performed by: PHYSICIAN ASSISTANT

## 2025-06-03 RX ORDER — FUROSEMIDE 20 MG/1
40 TABLET ORAL
Qty: 360 TABLET | Refills: 1 | Status: SHIPPED | OUTPATIENT
Start: 2025-06-03

## 2025-06-03 RX ORDER — OXYCODONE AND ACETAMINOPHEN 2.5; 325 MG/1; MG/1
1 TABLET ORAL AT BEDTIME
Qty: 60 TABLET | Refills: 0 | Status: SHIPPED | OUTPATIENT
Start: 2025-06-03

## 2025-06-03 RX ORDER — PREDNISONE 20 MG/1
20 TABLET ORAL 2 TIMES DAILY
Qty: 14 TABLET | Refills: 0 | Status: SHIPPED | OUTPATIENT
Start: 2025-06-03 | End: 2025-06-10

## 2025-06-03 NOTE — PROGRESS NOTES
ANTICOAGULATION MANAGEMENT     Chidi Dubon 84 year old male is on warfarin with therapeutic INR result. (Goal INR 2.0-3.0)    Recent labs: (last 7 days)     06/03/25  1556   INR 3.0*       ASSESSMENT     Source(s): Chart Review  Previous INR was Therapeutic last 2(+) visits  Medication, diet, health changes since last INR chart reviewed; none identified  Last INR checked on 1/31/25, referral was renewed by his PCP today.  He was seen today and given an RX for prednisone x one week for back pain           PLAN     Recommended plan for temporary change(s) affecting INR     Dosing Instructions: Continue your current warfarin dose with next INR in 5-7 days       Summary  As of 6/3/2025      Full warfarin instructions:  1.25 mg every Sun; 2.5 mg all other days   Next INR check:  6/9/2025               Detailed voice message left for Chidi with dosing instructions and follow up date.     Contact [phone] to schedule and with any changes, questions or concerns.     Education provided: Contact [phone] with any changes, questions or concerns.     Plan made per [facility] anticoagulation protocol    Camilla Gil RN  6/3/2025  Anticoagulation Clinic  [employer] for routing messages: dennis SARGENT  [facility] patient phone line: [phone]        _______________________________________________________________________     Anticoagulation Episode Summary       Current INR goal:  2.0-3.0   TTR:  81.4% (8.1 mo)   Target end date:  Indefinite   Send INR reminders to:  EMELIA SARGENT    Indications    Long-term (current) use of anticoagulants [Z79.01] [Z79.01]  Atrial Fibrillation [I48.20]  Atrial fibrillation  unspecified type (H) [I48.91]             Comments:  --             Anticoagulation Care Providers       Provider Role Specialty Phone number    Martin Schultz PA-C Referring Family Medicine [phone]    Gen Marti PA Referring Family Medicine [phone]

## 2025-06-03 NOTE — PROGRESS NOTES
"Kareen Murillo is a 84 year old, presenting for the following health issues:  Back Pain (Lower back pain, radiating down right leg/2 months), Recheck Medication, LAB REQUEST (INR), Knee Pain (Bilateral knee pain recheck - getting worse), Edema (Bilateral legs and feet), and Fatigue (Frequently falling asleep throughout the day)        6/3/2025     1:08 PM   Additional Questions   Roomed by Jana Dixon CMA   Accompanied by Daughter         6/3/2025     1:08 PM   Patient Reported Additional Medications   Patient reports taking the following new medications non     HPI      Back Pain  -lower pain, radiates down right leg  -2 mos    Knee Pain  - Bilateral knee pain  - getting worse    Edema  - Bilateral leg and feet edema  Some sob. Some weight gain.  Fatigue  - Frequently falling asleep throughout the day, not sleeping well at night due to pain    Lab Request  - INR      Review of Systems  Constitutional, HEENT, cardiovascular, pulmonary, GI, , musculoskeletal, neuro, skin, endocrine and psych systems are negative, except as otherwise noted.      Objective    /70   Pulse 63   Temp 97.4  F (36.3  C) (Oral)   Resp 20   Ht 1.854 m (6' 1\")   Wt 129.8 kg (286 lb 3.2 oz)   SpO2 93%   BMI 37.76 kg/m    Body mass index is 37.76 kg/m .  Physical Exam   Bilateral pitting edema.  Eye exam - right eye normal lid, conjunctiva, cornea, pupil and fundus, left eye normal lid, conjunctiva, cornea, pupil and fundus.  Thyroid not palpable, not enlarged, no nodules detected.  CHEST:chest clear to IPPA, no tachypnea, retractions or cyanosis, and Heart exam detail:irregularly irregular rhythm, normal rate response. 1/6 systolic murmur chest is clear without rales or wheezing, no pedal edema, no JVD, no hepatosplenomegaly.  BACK: Lumbosacral spine area reveals local tenderness.  Painful and reduced LS ROM noted. Straight leg raise is negative  DTR's, motor strength and sensation normal, including heel and toe " gait.  Perifpheral pulses are palpable.  Hipes and knees have full range of motion without pain.  No abdominal tenderness, mass or organomegaly.    Chidi was seen today for back pain, recheck medication, lab request, knee pain, edema and fatigue.    Diagnoses and all orders for this visit:    Acute on chronic heart failure with preserved ejection fraction (H)  -     CBC with Platelets; Future  -     Comprehensive metabolic panel (BMP + Alb, Alk Phos, ALT, AST, Total. Bili, TP); Future  -     NT-proBNP; Future  -     furosemide (LASIX) 20 MG tablet; Take 2 tablets (40 mg) by mouth 2 times daily.    Stage 3b chronic kidney disease (H)  -     CBC with Platelets; Future  -     Comprehensive metabolic panel (BMP + Alb, Alk Phos, ALT, AST, Total. Bili, TP); Future    Benign essential hypertension    Other fatigue  -     TSH with free T4 reflex; Future    Screening for diabetes mellitus  -     Hemoglobin A1c; Future    Chronic midline low back pain with right-sided sciatica  -     predniSONE (DELTASONE) 20 MG tablet; Take 1 tablet (20 mg) by mouth 2 times daily for 7 days.  -     oxyCODONE-acetaminophen 2.5-325 MG TABS; Take 1 tablet by mouth at bedtime.    Chronic pain of both knees  -     oxyCODONE-acetaminophen 2.5-325 MG TABS; Take 1 tablet by mouth at bedtime.  -     Orthopedic  Referral; Future    Other orders  -     REVIEW OF HEALTH MAINTENANCE PROTOCOL ORDERS      Advised supportive and symptomatic treatment.  Follow up with Provider - if condition persists or worsens.   Lower sodium diet.  Continue to work on a lower sugar/starch diet and more exercise.  Lower fat, higher fiber diet and consistent exercise.  Inc furosemide dose from 40 mg to 80 mg per day.  Chidi refuses to do a sleep study.   Signed Electronically by: Martin Schultz PA-C

## 2025-06-04 LAB
ALBUMIN SERPL BCG-MCNC: 3.8 G/DL (ref 3.5–5.2)
ALP SERPL-CCNC: 115 U/L (ref 40–150)
ALT SERPL W P-5'-P-CCNC: 17 U/L (ref 0–70)
ANION GAP SERPL CALCULATED.3IONS-SCNC: 12 MMOL/L (ref 7–15)
AST SERPL W P-5'-P-CCNC: 34 U/L (ref 0–45)
BILIRUB SERPL-MCNC: 4.4 MG/DL
BUN SERPL-MCNC: 17.6 MG/DL (ref 8–23)
CALCIUM SERPL-MCNC: 9.2 MG/DL (ref 8.8–10.4)
CHLORIDE SERPL-SCNC: 111 MMOL/L (ref 98–107)
CREAT SERPL-MCNC: 1.23 MG/DL (ref 0.67–1.17)
EGFRCR SERPLBLD CKD-EPI 2021: 58 ML/MIN/1.73M2
GLUCOSE SERPL-MCNC: 87 MG/DL (ref 70–99)
HCO3 SERPL-SCNC: 19 MMOL/L (ref 22–29)
NT-PROBNP SERPL-MCNC: 949 PG/ML (ref 0–852)
POTASSIUM SERPL-SCNC: 4 MMOL/L (ref 3.4–5.3)
PROT SERPL-MCNC: 6.6 G/DL (ref 6.4–8.3)
SODIUM SERPL-SCNC: 142 MMOL/L (ref 135–145)
TSH SERPL DL<=0.005 MIU/L-ACNC: 1.94 UIU/ML (ref 0.3–4.2)

## 2025-06-11 DIAGNOSIS — M25.561 CHRONIC PAIN OF BOTH KNEES: Primary | ICD-10-CM

## 2025-06-11 DIAGNOSIS — M25.562 CHRONIC PAIN OF BOTH KNEES: Primary | ICD-10-CM

## 2025-06-11 DIAGNOSIS — G89.29 CHRONIC PAIN OF BOTH KNEES: Primary | ICD-10-CM

## 2025-06-12 RX ORDER — OXYCODONE AND ACETAMINOPHEN 5; 325 MG/1; MG/1
1 TABLET ORAL EVERY 6 HOURS PRN
Qty: 12 TABLET | Refills: 0 | Status: SHIPPED | OUTPATIENT
Start: 2025-06-12 | End: 2025-06-15

## 2025-06-16 ENCOUNTER — TELEPHONE (OUTPATIENT)
Dept: ANTICOAGULATION | Facility: CLINIC | Age: 85
End: 2025-06-16
Payer: COMMERCIAL

## 2025-06-16 NOTE — TELEPHONE ENCOUNTER
ANTICOAGULATION     Chidi Dubon is overdue for an INR check.     Spoke with Chidi and he currently admitted to [facility] - he will make arrangements to have his INR checked upon discharge.     Solomon Rebolledo RN  6/16/2025  Anticoagulation Clinic  [facility] for routing messages: dennis SARGENT  [facility] patient phone line: [phone]

## 2025-06-18 LAB — INR (EXTERNAL): 2.4

## 2025-06-20 ENCOUNTER — TELEPHONE (OUTPATIENT)
Dept: FAMILY MEDICINE | Facility: CLINIC | Age: 85
End: 2025-06-20
Payer: COMMERCIAL

## 2025-06-20 NOTE — TELEPHONE ENCOUNTER
Martin,    Patient called stated he was in the hospital 6/13 and wanted us to check on a medication (spironolactone) that was listed on the discharge that he does not take and does not want to take as he had bad effects such as pain in his nipples and headaches. Quit using this around [place] this last year due to the side effects.     Patient stated he has pneumonia, a randhawa catheter, and bed bound. He did not want to schedule a follow up at this time due to this. He is asking for advice on what to do about the medication.     Thanks,  MAGDY Hutson  [facility]

## 2025-06-21 ENCOUNTER — MEDICAL CORRESPONDENCE (OUTPATIENT)
Dept: HEALTH INFORMATION MANAGEMENT | Facility: CLINIC | Age: 85
End: 2025-06-21

## 2025-06-22 NOTE — TELEPHONE ENCOUNTER
He's been put back on it due to his heart failure. I would defer to cardiology as to whether or not they would support patient discontinuing this med. Have him contact his cardiologist.

## 2025-06-23 ENCOUNTER — TELEPHONE (OUTPATIENT)
Dept: FAMILY MEDICINE | Facility: CLINIC | Age: 85
End: 2025-06-23

## 2025-06-23 ENCOUNTER — ANTICOAGULATION THERAPY VISIT (OUTPATIENT)
Dept: ANTICOAGULATION | Facility: CLINIC | Age: 85
End: 2025-06-23

## 2025-06-23 ENCOUNTER — DOCUMENTATION ONLY (OUTPATIENT)
Dept: ANTICOAGULATION | Facility: CLINIC | Age: 85
End: 2025-06-23

## 2025-06-23 DIAGNOSIS — I48.91 ATRIAL FIBRILLATION, UNSPECIFIED TYPE (H): ICD-10-CM

## 2025-06-23 DIAGNOSIS — I48.20 CHRONIC ATRIAL FIBRILLATION (H): ICD-10-CM

## 2025-06-23 DIAGNOSIS — Z79.01 LONG TERM CURRENT USE OF ANTICOAGULANT THERAPY: Primary | ICD-10-CM

## 2025-06-23 LAB — INR (EXTERNAL): 2.5

## 2025-06-23 NOTE — PROGRESS NOTES
Hospital Admission on 6/13/25 for Pneumococcal pneumonia. He was started on empiric treatment for community-acquired pneumonia. Pneumococcal urinary antigen was positive. He completed 5 days of IV ceftriaxone.      Poole catheter was initially placed. This was removed but he had urinary retention the following morning. Urology was consulted. Poole was replaced. He was having some hematuria but no large clots. Aspirin was discontinued but warfarin was continued. Urine then cleared. He will discharge with follow-up with urology for voiding trial in about a week. I recommended holding aspirin for another week. He can be restarted if urine remains clear. Most likely cause of the blood in the urine is catheter placement.       6/13 3.2 0 mg    6/14 2.6 2.5 mg   6/15 2.5 1.25 mg   6/16 2.6 2.5 mg   6/17 2.6 2.5 mg    6/18 2.4 2.5 mg    6/19 2.5      Continue home warfarin regimen: take 1.25 mg by mouth on Sunday and 2.5 mg all other days. Recheck INR: 2-3 days after discharge.       ANTICOAGULATION MANAGEMENT     Chidi Dubon 84 year old male is on warfarin with therapeutic INR result. (Goal INR 2.0-3.0)    Recent labs: (last 7 days)     06/23/25  1510   INR 2.5       ASSESSMENT     Source(s): Chart Review and Home Care/Facility Nurse     Warfarin doses taken: Warfarin taken as instructed  Diet: No new diet changes identified  Medication/supplement changes: None noted  New illness, injury, or hospitalization: Hospital stay 6/13-6/19/25 Yes pneumonia and urinary retension  Signs or symptoms of bleeding or clotting: No  Previous result: Therapeutic last 2(+) visits  Additional findings: Poole Catheter. Following up with Urology for voiding trial. Aspirin on hold due to Hematuria initially. See note above.         PLAN     Recommended plan for temporary change(s) affecting INR     Dosing Instructions: Continue your current warfarin dose with next INR in 1 week       Summary  As of 6/23/2025      Full warfarin  instructions:  1.25 mg every Sun; 2.5 mg all other days   Next INR check:  6/30/2025               Detailed voice message left for [facility] nurse with dosing instructions and follow up date.     Orders given to  Homecare nurse/facility to recheck    Education provided: Please call back if any changes to your diet, medications or how you've been taking warfarin    Plan made per [facility] anticoagulation protocol    Aline Hart RN  6/23/2025  Anticoagulation Clinic  [facility] for routing messages: dennis MANRIQUEZ  [facility] patient phone line: [phone]        _______________________________________________________________________     Anticoagulation Episode Summary       Current INR goal:  2.0-3.0   TTR:  82.7% (8.1 mo)   Target end date:  Indefinite   Send INR reminders to:  EMELIA MARNIQUEZ    Indications    Long-term (current) use of anticoagulants [Z79.01] [Z79.01]  Atrial Fibrillation [I48.20]  Atrial fibrillation  unspecified type (H) [I48.91]             Comments:  6/23/25 [facility].              Anticoagulation Care Providers       Provider Role Specialty Phone number    Martin Schultz PA-C Referring Family Medicine [phone]    Gen Marti PA Referring Family Medicine [phone]

## 2025-06-23 NOTE — TELEPHONE ENCOUNTER
See [facility] encounter dated today for further details. INR result addressed.  Aline Hart, RN  Anticoagulation Nurse - [facility], [place]

## 2025-06-23 NOTE — TELEPHONE ENCOUNTER
FYI - Status Update    Who is Calling: nurse, Home Care    Update: Patients INR results were 2.5 on 6/23/25    Does caller want a call/response back: Yes     Okay to leave a detailed message?: Yes at Other phone number:  [facility] Nurse [phone]*

## 2025-06-23 NOTE — TELEPHONE ENCOUNTER
I reviewed the instructions from Martin Schultz PA-C with patient and he verbalized a good understanding.     Chidi states that he will be seeing his Cardiologist on July 1 & he will discuss the Spironolactone during that office visit.     Patient was scheduled to see Martin Schultz today for his Hospital Follow up visit, however patient states he  was not aware of this appointment. He is not able to come in today (due to randhawa catheter and immobility issues). He states he will call back to reschedule this appointment (after he sees Cardiology & Urology).    Angela Adrian RN BSN  [facility]

## 2025-06-23 NOTE — PROGRESS NOTES
FYI - Status Update     Who is Calling: nurse, Home Care     Update: Patients INR results were 2.5 on 6/23/25     Does caller want a call/response back: Yes      Okay to leave a detailed message?: Yes at Other phone number:  [facility] Nurse [phone]*

## 2025-06-23 NOTE — PROGRESS NOTES
ANTICOAGULATION  MANAGEMENT: Discharge Review    Chidi Dubon chart reviewed for anticoagulation continuity of care    Hospital Admission on 6/13/25 for Pneumococcal pneumonia. He was started on empiric treatment for community-acquired pneumonia. Pneumococcal urinary antigen was positive. He completed 5 days of IV ceftriaxone.     Poole catheter was initially placed. This was removed but he had urinary retention the following morning. Urology was consulted. Poole was replaced. He was having some hematuria but no large clots. Aspirin was discontinued but warfarin was continued. Urine then cleared. He will discharge with follow-up with urology for voiding trial in about a week. I recommended holding aspirin for another week. He can be restarted if urine remains clear. Most likely cause of the blood in the urine is catheter placement.     Discharge disposition: Home with Home [facility]    Results:    Date INR/CFX Warfarin Dose Diet/Amount Consumed Comments   6/13 3.2 0 mg Not recorded   6/14 2.6 2.5 mg Cardiac/not recorded   6/15 2.5 1.25 mg Regular/100%   6/16 2.6 2.5 mg Cardiac 100%   6/17 2.6 2.5 mg Cardiac   6/18 2.4 2.5 mg Cardiac/not recorded   6/19 2.5 Cardiac/30%       Anticoagulation inpatient management:     anticoagulation calendar updated with inpatient dosing    Anticoagulation discharge instructions:     Warfarin dosing: Dosing recommendation: Continue home warfarin regimen: take 1.25 mg by mouth on Sunday and 2.5 mg all other days. Recheck INR: 2-3 days after discharge.    Bridging: No   INR goal change: No      Medication changes affecting anticoagulation: Yes: ASA on hold for a week for hematuria.    Additional factors affecting anticoagulation: Yes: Post hospitalization for infection and hematuria.     PLAN     Agree with dosing adjustment on discharge    Left a detailed message for [facility]. Requested a call back for more detailed instructions. JESUS was generic.     Writer spoke  with Joanna. She states she did an INR on Saturday which was 2.4. She called it in to an on call, Dr. Cesar, and was given orders to continue same warfarin dose. Writer does not see this documented. Home care is planning to see the patient today to check the INR.    Anticoagulation Calendar updated    Mark LOPEZ RN  6/23/2025  Anticoagulation Clinic  [facility] for routing messages: dennis SARGENT  [facility] patient phone line: [phone]

## 2025-06-26 ENCOUNTER — TRANSFERRED RECORDS (OUTPATIENT)
Dept: HEALTH INFORMATION MANAGEMENT | Facility: CLINIC | Age: 85
End: 2025-06-26
Payer: COMMERCIAL

## 2025-06-30 ENCOUNTER — MEDICAL CORRESPONDENCE (OUTPATIENT)
Dept: HEALTH INFORMATION MANAGEMENT | Facility: CLINIC | Age: 85
End: 2025-06-30
Payer: COMMERCIAL

## 2025-06-30 ENCOUNTER — ANTICOAGULATION THERAPY VISIT (OUTPATIENT)
Dept: ANTICOAGULATION | Facility: CLINIC | Age: 85
End: 2025-06-30
Payer: COMMERCIAL

## 2025-06-30 ENCOUNTER — TELEPHONE (OUTPATIENT)
Dept: FAMILY MEDICINE | Facility: CLINIC | Age: 85
End: 2025-06-30
Payer: COMMERCIAL

## 2025-06-30 DIAGNOSIS — B37.2 YEAST INFECTION OF THE SKIN: Primary | ICD-10-CM

## 2025-06-30 DIAGNOSIS — I48.91 ATRIAL FIBRILLATION, UNSPECIFIED TYPE (H): ICD-10-CM

## 2025-06-30 DIAGNOSIS — I48.20 CHRONIC ATRIAL FIBRILLATION (H): ICD-10-CM

## 2025-06-30 DIAGNOSIS — Z79.01 LONG TERM CURRENT USE OF ANTICOAGULANT THERAPY: Primary | ICD-10-CM

## 2025-06-30 LAB — INR (EXTERNAL): 2.1 (ref 0.9–1.1)

## 2025-06-30 NOTE — PROGRESS NOTES
ANTICOAGULATION MANAGEMENT     Chidi Dubon 84 year old male is on warfarin with therapeutic INR result. (Goal INR 2.0-3.0)    Recent labs: (last 7 days)     06/30/25  1222   INR 2.1*       ASSESSMENT     Source(s): Chart Review, Patient/Caregiver Call, and Home Care/Facility Nurse     Warfarin doses taken: Less warfarin taken than planned which may be affecting INR  Diet: No new diet changes identified-low sodium diet. More fruits and veggies  Medication/supplement changes: continues to hold ASA  New illness, injury, or hospitalization: No  Signs or symptoms of bleeding or clotting: No  Previous result: Therapeutic last 2(+) visits  Additional findings: None       PLAN     Recommended plan for no diet, medication or health factor changes affecting INR     Dosing Instructions: Continue your current warfarin dose with next INR in 1 week       Summary  As of 6/30/2025      Full warfarin instructions:  1.25 mg every Sun; 2.5 mg all other days   Next INR check:  7/7/2025               Telephone call with Brenda home care nurse who verbalizes understanding and agrees to plan    Orders given to  Homecare nurse/facility to recheck    Education provided: Dietary considerations: importance of consistent vitamin K intake and impact of vitamin K foods on INR    Plan made per [facility] anticoagulation protocol    Ngozi Valencia RN  6/30/2025  Anticoagulation Clinic  [employer] for routing messages: dennis MANRIQUEZ  [facility] patient phone line: [phone]        _______________________________________________________________________     Anticoagulation Episode Summary       Current INR goal:  2.0-3.0   TTR:  82.7% (8.1 mo)   Target end date:  Indefinite   Send INR reminders to:  EMELIA MANRIQUEZ    Indications    Long-term (current) use of anticoagulants [Z79.01] [Z79.01]  Atrial Fibrillation [I48.20]  Atrial fibrillation  unspecified type (H) [I48.91]             Comments:  6/23/25 [facility].              Anticoagulation  Care Providers       Provider Role Specialty Phone number    Martin Schultz PA-C Referring Family Medicine [phone]    Gen Marti PA Referring Family Medicine [phone]

## 2025-07-01 ENCOUNTER — TELEPHONE (OUTPATIENT)
Dept: FAMILY MEDICINE | Facility: CLINIC | Age: 85
End: 2025-07-01
Payer: COMMERCIAL

## 2025-07-01 NOTE — TELEPHONE ENCOUNTER
Brenda RN, from [facility] is calling to check on the status of her request below.  Please call Brenda back ASAP and if she does not answer please leave a message she has a confidential voicemail.    CHEKO Solorio  [facility]

## 2025-07-01 NOTE — TELEPHONE ENCOUNTER
"Mert [facility] W[facility] Specialist, agrees with the nystatin powder recommendation. Will send over care plan. If nystatin powder cannot be prescribed please call Mert at: [phone]    Summary of other notes:  Brenda-She is calling to report that patient seems to have an infection in his groin area. On both sides of the groin area there is a lot of moisture, it is reddened and has a \"yeast\" smell. She is requesting Nystatin (powder) be prescribed for patient.     She is also requesting clarification if patient should be taking Aspirin or not. He has not been taking it but some of his papers state he should restart as of 6/26/25.    Heidi Barger RN on 7/1/2025 at 10:19 AM    "

## 2025-07-01 NOTE — TELEPHONE ENCOUNTER
Forms Request    PCP listed: Martin Schultz    Date of last visit: 6/30/2025     Which provider to complete form: Martin Schultz PA-C    Type of form/letter: Nursing Home/Assisted Living Orders      Who is the form from? [facility]    When is form needed by:     How would you like the form returned: Fax to [phone]    Where was the form placed for completion: provider basket

## 2025-07-03 RX ORDER — NYSTATIN 100000 [USP'U]/G
POWDER TOPICAL
Qty: 60 G | Refills: 2 | Status: SHIPPED | OUTPATIENT
Start: 2025-07-03

## 2025-07-03 NOTE — TELEPHONE ENCOUNTER
Routing message to PCP.    Home care RN wondering if patient should be taking the Aspirin 81 mg. States it is on his med list but patient has not been taking.     Please advise-should patient be taking daily aspirin 81mg?    Kaylie Mistry, ALSHONN/RN  [facility]

## 2025-07-07 ENCOUNTER — MEDICAL CORRESPONDENCE (OUTPATIENT)
Dept: HEALTH INFORMATION MANAGEMENT | Facility: CLINIC | Age: 85
End: 2025-07-07
Payer: COMMERCIAL

## 2025-07-07 ENCOUNTER — ANTICOAGULATION THERAPY VISIT (OUTPATIENT)
Dept: ANTICOAGULATION | Facility: CLINIC | Age: 85
End: 2025-07-07
Payer: COMMERCIAL

## 2025-07-07 ENCOUNTER — TELEPHONE (OUTPATIENT)
Dept: FAMILY MEDICINE | Facility: CLINIC | Age: 85
End: 2025-07-07
Payer: COMMERCIAL

## 2025-07-07 DIAGNOSIS — I48.91 ATRIAL FIBRILLATION, UNSPECIFIED TYPE (H): ICD-10-CM

## 2025-07-07 DIAGNOSIS — I48.20 CHRONIC ATRIAL FIBRILLATION (H): ICD-10-CM

## 2025-07-07 DIAGNOSIS — Z79.01 LONG TERM CURRENT USE OF ANTICOAGULANT THERAPY: Primary | ICD-10-CM

## 2025-07-07 LAB — INR (EXTERNAL): 2.8 (ref 0.9–1.1)

## 2025-07-07 NOTE — PROGRESS NOTES
ANTICOAGULATION MANAGEMENT     Chidi Dubon 84 year old male is on warfarin with therapeutic INR result. (Goal INR 2.0-3.0)    Recent labs: (last 7 days)     07/07/25  0000   INR 2.8*       ASSESSMENT     Source(s): Chart Review and Home Care/Facility Nurse [facility] [phone]     Warfarin doses taken: Warfarin taken as instructed  Diet: No new diet changes identified  Medication/supplement changes: Per Brenda, pt stopped asa about a month ago and restarted last week. However he started to have blood in his urine so he has stopped it again.   New illness, injury, or hospitalization: No  Signs or symptoms of bleeding or clotting: No  Previous result: Therapeutic last 2(+) visits  Additional findings: None       PLAN     Recommended plan for no diet, medication or health factor changes affecting INR     Dosing Instructions: Continue your current warfarin dose with next INR in 1-2 weeks       Summary  As of 7/7/2025      Full warfarin instructions:  1.25 mg every Sun; 2.5 mg all other days   Next INR check:  7/14/2025               Detailed voice message left for Brenda home care nurse with dosing instructions and follow up date.     Orders given to  Homecare nurse/facility to recheck    Education provided: Please call back if any changes to your diet, medications or how you've been taking warfarin    Plan made per [facility] anticoagulation protocol    Stephany Berry RN  7/7/2025  Anticoagulation Clinic  [facility] for routing messages: dennis MANRIQUEZ  [facility] patient phone line: [phone]        _______________________________________________________________________     Anticoagulation Episode Summary       Current INR goal:  2.0-3.0   TTR:  82.7% (8 mo)   Target end date:  Indefinite   Send INR reminders to:  EMELIA MANRIQUEZ    Indications    Long-term (current) use of anticoagulants [Z79.01] [Z79.01]  Atrial Fibrillation [I48.20]  Atrial fibrillation  unspecified type (H) [I48.91]             Comments:   6/23/25 [facility].              Anticoagulation Care Providers       Provider Role Specialty Phone number    Martin Schultz PA-C Referring Family Medicine [phone]    Gen Marti PA Referring Family Medicine [phone]

## 2025-07-07 NOTE — TELEPHONE ENCOUNTER
Forms Request    PCP listed: Martin Schultz    Date of last visit: 7/2/2025     Which provider to complete form: Priyanka Benitez, KELSY, FNP-BC    Type of form/letter: Nursing Home/Assisted Living Orders      Who is the form from? [facility]    When is form needed by:     How would you like the form returned: Fax to [phone]    Where was the form placed for completion: covering provider in basket (pcp out of office this week)

## 2025-07-08 ENCOUNTER — TELEPHONE (OUTPATIENT)
Dept: FAMILY MEDICINE | Facility: CLINIC | Age: 85
End: 2025-07-08
Payer: COMMERCIAL

## 2025-07-08 NOTE — TELEPHONE ENCOUNTER
I have not been prescribing this for him. Since on warfarin, would advise against him taking it at this time.

## 2025-07-08 NOTE — TELEPHONE ENCOUNTER
Forms Request    PCP listed: Martin Schultz    Date of last visit: 7/7/2025     Which provider to complete form: Kurt Blanco, DO    Type of form/letter: Nursing Home/Assisted Living Orders      Who is the form from? [facility]    When is form needed by:     How would you like the form returned: Fax to [phone]    Where was the form placed for completion: covering provider in basket

## 2025-07-08 NOTE — TELEPHONE ENCOUNTER
Called and notified Brenda of the orders below, to not take ASA, and that Nystatin has been sent to the pharmacy.    Henny BSN RN  Triage Nurse  [facility]

## 2025-07-09 ENCOUNTER — MEDICAL CORRESPONDENCE (OUTPATIENT)
Dept: HEALTH INFORMATION MANAGEMENT | Facility: CLINIC | Age: 85
End: 2025-07-09
Payer: COMMERCIAL

## 2025-07-10 NOTE — TELEPHONE ENCOUNTER
He should be seeing me for a hospital follow up. Schedule him for this. We'll discuss meds at his visit.

## 2025-07-10 NOTE — TELEPHONE ENCOUNTER
Called and gave VO as requested via DVM. Also notified her that we will need a follow-up appt with PCP, also notified patient.     Left voicemail for patient to return call for follow-up appt. Gave cb# [phone].      Home Care is calling regarding an established patient with [facility].  Requesting orders from: Martin Schultz RN APPROVED: RN able to provide verbal orders.  Home Care will send orders for signature.  RN will close encounter.  Is this a request for a temporary pause in the home care episode?  No    Orders Requested    Skilled Nursing  Request for continuation of care with no increase or decrease in frequency  Frequency: 1-4 visits as needed, then 2x a week for 3 weeks.   RN gave verbal order: Yes        Physical Therapy  Request for initial evaluation and treatment (one time)   RN gave verbal order: Yes      Social Work  Request for initial evaluation and treatment (one time)   RN gave verbal order: Yes      Phone number Home Care can be reached at: [phone]   Okay to leave a detailed message?: Yes    Contacts       Contact Date/Time Type Contact Phone/Fax    06/23/2025 08:27 AM CDT Phone (Incoming) [facility] [phone]          Nellie Lee RN

## 2025-07-13 ENCOUNTER — MEDICAL CORRESPONDENCE (OUTPATIENT)
Dept: HEALTH INFORMATION MANAGEMENT | Facility: CLINIC | Age: 85
End: 2025-07-13
Payer: COMMERCIAL

## 2025-07-14 ENCOUNTER — ANTICOAGULATION THERAPY VISIT (OUTPATIENT)
Dept: ANTICOAGULATION | Facility: CLINIC | Age: 85
End: 2025-07-14
Payer: COMMERCIAL

## 2025-07-14 DIAGNOSIS — I48.91 ATRIAL FIBRILLATION, UNSPECIFIED TYPE (H): ICD-10-CM

## 2025-07-14 DIAGNOSIS — I48.20 CHRONIC ATRIAL FIBRILLATION (H): ICD-10-CM

## 2025-07-14 DIAGNOSIS — Z79.01 LONG TERM CURRENT USE OF ANTICOAGULANT THERAPY: Primary | ICD-10-CM

## 2025-07-14 LAB — INR (EXTERNAL): 1.8 (ref 0.9–1.1)

## 2025-07-14 NOTE — PROGRESS NOTES
ANTICOAGULATION MANAGEMENT     Chidi Dubon 84 year old male is on warfarin with subtherapeutic INR result. (Goal INR 2.0-3.0)    Recent labs: (last 7 days)     07/14/25  1407   INR 1.8*       ASSESSMENT     Source(s): Chart Review, Patient/Caregiver Call, and Home Care/Facility Nurse     Warfarin doses taken: Less warfarin taken than planned which may be affecting INR  Diet: eating a little less  Medication/supplement changes: levofloxacin: on through 7/20; started ASA 81 mg again.  New illness, injury, or hospitalization: Yes: [facility] 7/9-7/12 for hematuria, diarrhea is gone.  Signs or symptoms of bleeding or clotting: Yes: noting hematuria in bag today.  Previous result: Therapeutic last visit; previously outside of goal range; INR 4  Additional findings: None       PLAN     Recommended plan for temporary change(s) affecting INR     Dosing Instructions: about 20% decrease for the medication interaction with next INR in 3 days       Summary  As of 7/14/2025      Full warfarin instructions:  7/16: 1.25 mg; 7/18: 1.25 mg; Otherwise 1.25 mg every Sun; 2.5 mg all other days   Next INR check:  7/17/2025               Telephone call with Mary CURRAN, [facility] home care nurse who agrees to plan and repeated back plan correctly    Check at provider office visit    Education provided: Interaction IS anticipated between warfarin and levofloxacin    Plan made with [facility] Pharmacist Jill Hinds RN  7/14/2025  Anticoagulation Clinic  [employer] for routing messages: dennis MANRIQUEZ  [facility] patient phone line: [phone]        _______________________________________________________________________     Anticoagulation Episode Summary       Current INR goal:  2.0-3.0   TTR:  82.1% (8.1 mo)   Target end date:  Indefinite   Send INR reminders to:  EMELIA MANRIQUEZ    Indications    Long-term (current) use of anticoagulants [Z79.01] [Z79.01]  Atrial Fibrillation [I48.20]  Atrial fibrillation  unspecified  type (H) [I48.91]             Comments:  6/23/25 [facility].              Anticoagulation Care Providers       Provider Role Specialty Phone number    Martin Schultz PA-C Referring Family Medicine [phone]    Gen Marti PA Referring Family Medicine [phone]

## 2025-07-15 ENCOUNTER — MEDICAL CORRESPONDENCE (OUTPATIENT)
Dept: HEALTH INFORMATION MANAGEMENT | Facility: CLINIC | Age: 85
End: 2025-07-15
Payer: COMMERCIAL

## 2025-07-15 ENCOUNTER — TELEPHONE (OUTPATIENT)
Dept: FAMILY MEDICINE | Facility: CLINIC | Age: 85
End: 2025-07-15
Payer: COMMERCIAL

## 2025-07-15 NOTE — TELEPHONE ENCOUNTER
Forms Request    PCP listed: Martin Schultz    Date of last visit: 7/8/2025     Which provider to complete form: Martin Schultz PA-C    Type of form/letter: Nursing Home/Assisted Living Orders      Who is the form from? [facility]    When is form needed by:     How would you like the form returned: Fax to [phone]    Where was the form placed for completion: provider basket

## 2025-07-16 ENCOUNTER — TELEPHONE (OUTPATIENT)
Dept: FAMILY MEDICINE | Facility: CLINIC | Age: 85
End: 2025-07-16
Payer: COMMERCIAL

## 2025-07-16 NOTE — TELEPHONE ENCOUNTER
Forms Request    PCP listed: Martin Schultz    Date of last visit: 7/15/2025     Which provider to complete form: Martin Schultz PA-C    Type of form/letter: Nursing Home/Assisted Living Orders      Who is the form from? [facility]    When is form needed by:     How would you like the form returned: Fax to [phone]    Where was the form placed for completion: provider basket

## 2025-07-17 ENCOUNTER — TELEPHONE (OUTPATIENT)
Dept: VASCULAR SURGERY | Facility: CLINIC | Age: 85
End: 2025-07-17

## 2025-07-17 ENCOUNTER — OFFICE VISIT (OUTPATIENT)
Dept: FAMILY MEDICINE | Facility: CLINIC | Age: 85
End: 2025-07-17
Payer: COMMERCIAL

## 2025-07-17 ENCOUNTER — ANTICOAGULATION THERAPY VISIT (OUTPATIENT)
Dept: ANTICOAGULATION | Facility: CLINIC | Age: 85
End: 2025-07-17

## 2025-07-17 ENCOUNTER — TELEPHONE (OUTPATIENT)
Dept: FAMILY MEDICINE | Facility: CLINIC | Age: 85
End: 2025-07-17

## 2025-07-17 VITALS
BODY MASS INDEX: 33.5 KG/M2 | HEART RATE: 72 BPM | SYSTOLIC BLOOD PRESSURE: 122 MMHG | TEMPERATURE: 98.2 F | HEIGHT: 73 IN | WEIGHT: 252.8 LBS | DIASTOLIC BLOOD PRESSURE: 58 MMHG | RESPIRATION RATE: 24 BRPM | OXYGEN SATURATION: 96 %

## 2025-07-17 DIAGNOSIS — R31.0 GROSS HEMATURIA: ICD-10-CM

## 2025-07-17 DIAGNOSIS — I48.20 CHRONIC ATRIAL FIBRILLATION (H): ICD-10-CM

## 2025-07-17 DIAGNOSIS — I48.91 ATRIAL FIBRILLATION, UNSPECIFIED TYPE (H): ICD-10-CM

## 2025-07-17 DIAGNOSIS — Z09 HOSPITAL DISCHARGE FOLLOW-UP: Primary | ICD-10-CM

## 2025-07-17 DIAGNOSIS — Z79.01 LONG TERM CURRENT USE OF ANTICOAGULANT THERAPY: Primary | ICD-10-CM

## 2025-07-17 DIAGNOSIS — I50.33 ACUTE ON CHRONIC HEART FAILURE WITH PRESERVED EJECTION FRACTION (H): ICD-10-CM

## 2025-07-17 DIAGNOSIS — I72.3 ANEURYSM OF LEFT COMMON ILIAC ARTERY: ICD-10-CM

## 2025-07-17 DIAGNOSIS — I71.43 INFRARENAL ABDOMINAL AORTIC ANEURYSM (AAA) WITHOUT RUPTURE: ICD-10-CM

## 2025-07-17 LAB — INR BLD: 2 (ref 0.9–1.1)

## 2025-07-17 RX ORDER — TAMSULOSIN HYDROCHLORIDE 0.4 MG/1
CAPSULE ORAL
COMMUNITY
Start: 2025-06-27

## 2025-07-17 RX ORDER — FUROSEMIDE 20 MG/1
20 TABLET ORAL DAILY
Qty: 90 TABLET | Refills: 1 | Status: SHIPPED | OUTPATIENT
Start: 2025-07-17

## 2025-07-17 RX ORDER — LEVOFLOXACIN 750 MG/1
750 TABLET, FILM COATED ORAL
COMMUNITY
Start: 2025-07-13 | End: 2025-07-20

## 2025-07-17 RX ORDER — PREDNISONE 20 MG/1
TABLET ORAL
COMMUNITY

## 2025-07-17 ASSESSMENT — PATIENT HEALTH QUESTIONNAIRE - PHQ9: SUM OF ALL RESPONSES TO PHQ QUESTIONS 1-9: 10

## 2025-07-17 NOTE — TELEPHONE ENCOUNTER
Vascular Referral Intake    Appointment note (to be pasted into appt note. 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 Elidia Boo or Dr. Viri Calix    Visit Type: New    Time Frame: Next Available    Testing/Imaging Needed Before Consult: CTA abdomen/pelvis [id number]    [facility] or bed needed: Unknown- please verify     Special Instructions: No    *Schedulers: Please send welcome letter to patient after appointment(s) scheduled*

## 2025-07-17 NOTE — PROGRESS NOTES
ANTICOAGULATION MANAGEMENT     Chidi Dubon 84 year old male is on warfarin with therapeutic INR result. (Goal INR 2.0-3.0)    Recent labs: (last 7 days)     07/17/25  1210   INR 2.0*       ASSESSMENT     Source(s): Chart Review and Home Care/Facility Nurse     Warfarin doses taken: Warfarin taken as instructed  Diet: No new diet changes identified  Medication/supplement changes: Levaquin 7/13-7/20/25  New illness, injury, or hospitalization: Yes: [facility] 7/9-7/12/25 for hematuria w/UTI  Signs or symptoms of bleeding or clotting: No  Previous result: Subtherapeutic  Additional findings: Virgilio (see notes below)       PLAN     Recommended plan for temporary change(s) affecting INR     Dosing Instructions: partial hold then continue your current warfarin dose with next INR in 4 days       Summary  As of 7/17/2025      Full warfarin instructions:  7/18: 1.25 mg; Otherwise 1.25 mg every Sun; 2.5 mg all other days   Next INR check:  7/21/2025               Telephone call with Mary home care nurse who verbalizes understanding and agrees to plan    Orders given to  Homecare nurse/facility to recheck    Education provided: Please call back if any changes to your diet, medications or how you've been taking warfarin    Plan made per [facility] anticoagulation protocol    Aline Hart RN  7/17/2025  Anticoagulation Clinic  [employer] for routing messages: dennis MANRIQUEZ  [facility] patient phone line: [phone]        _______________________________________________________________________     Anticoagulation Episode Summary       Current INR goal:  2.0-3.0   TTR:  80.9% (8.1 mo)   Target end date:  Indefinite   Send INR reminders to:  EMELIA MANRIQUEZ    Indications    Long-term (current) use of anticoagulants [Z79.01] [Z79.01]  Atrial Fibrillation [I48.20]  Atrial fibrillation  unspecified type (H) [I48.91]             Comments:  6/23/25 [facility] home [facility].              Anticoagulation Care Providers       Provider Role  Specialty Phone number    Martin Schultz PA-C Referring Family Medicine [phone]    Gen Marti PA Referring Family Medicine [phone]

## 2025-07-17 NOTE — PROGRESS NOTES
ANTICOAGULATION MANAGEMENT     Chidi Dubon 84 year old male is on warfarin with therapeutic INR result. (Goal INR 2.0-3.0)    Recent labs: (last 7 days)     07/17/25  1210   INR 2.0*       ASSESSMENT     Source(s): Chart Review     Warfarin doses taken: Warfarin taken as instructed  Diet: na  Medication/supplement changes: Levaquin thru 7/20/25  New illness, injury, or hospitalization: No  Signs or symptoms of bleeding or clotting: No  Previous result: Subtherapeutic  Additional findings: None       PLAN     Unable to reach homecare nurse today.    Left message with dosing until Monday, will need to try again to ensure HC nurse received message.    Follow up required to discuss dosing instructions and confirm understanding of instructions    Serene Fermin RN  7/17/2025  Anticoagulation Clinic  [facility] for routing messages: dennis MANRIQUEZ  [facility] patient phone line: [phone]

## 2025-07-17 NOTE — TELEPHONE ENCOUNTER
Forms Request    PCP listed: Martin Schultz    Date of last visit: 7/16/2025     Which provider to complete form: Martin Schultz PA-C    Type of form/letter: Nursing Home/Assisted Living Orders      Who is the form from? [facility]    When is form needed by:     How would you like the form returned: Fax to [phone]    Where was the form placed for completion: provider basket

## 2025-07-17 NOTE — PROGRESS NOTES
"    Kareen Murillo is a 84 year old, presenting for the following health issues:  Hospital F/U (7/9/25 - 7/12/25/[facility]/Acute cystitis)      7/17/2025    11:13 AM   Additional Questions   Roomed by Jana Dixon CMA   Accompanied by None         7/17/2025    11:13 AM   Patient Reported Additional Medications   Patient reports taking the following new medications none     HPI    Gross hematuria has resolved.   No other signs of bruising or bleeding.    Incidental finding on ct revealed an aortic and left common iliac artery aneurysm.  These continue to enlarge ever so slowly.  Liver contour changes  noted as well. This was seen 1.5 yrs ago. A follow up US revealed no evidence of concerning findings.    Appetite improving. No chest pain/sob/palpitations/dizziness/ha's    Reviewed lab and test results.   No fevers or cold symptoms.    Warfarin dose: 1/2 tab on wed and 1 tab all other days         Hospital Follow-up Visit:    Hospital/Nursing Home/IP Rehab Facility: [facility]  Most Recent Admission Date: 6/3/2024   Most Recent Admission Diagnosis: Reducible right inguinal hernia - K40.90     Most Recent Discharge Date: 6/3/2024   Most Recent Discharge Diagnosis: Right inguinal hernia - K40.90         Review of Systems  Constitutional, HEENT, cardiovascular, pulmonary, GI, , musculoskeletal, neuro, skin, endocrine and psych systems are negative, except as otherwise noted.      Objective    Pulse 72   Temp 98.2  F (36.8  C) (Oral)   Resp 24   Ht 1.854 m (6' 1\")   Wt 114.7 kg (252 lb 12.8 oz)   SpO2 96%   BMI 33.35 kg/m    Body mass index is 33.35 kg/m .  Physical Exam   Eye exam - right eye normal lid, conjunctiva, cornea, pupil and fundus, left eye normal lid, conjunctiva, cornea, pupil and fundus.  Thyroid not palpable, not enlarged, no nodules detected.  CHEST:chest clear to IPPA, no tachypnea, retractions or cyanosis, and Heart exam detail:irregularly irregular rhythm with normal " ventricular rate response. , chest is clear without rales or wheezing, no pedal edema, no JVD, no hepatosplenomegaly.  No profound edema.  The abdomen is soft without tenderness, guarding, mass, rebound or organomegaly. Bowel sounds are normal. No CVA tenderness or inguinal adenopathy noted.    Chidi was seen today for hospital f/u.    Diagnoses and all orders for this visit:    Hospital discharge follow-up    Gross hematuria  -     INR POCT, Enter/Edit Result    Infrarenal abdominal aortic aneurysm (AAA) without rupture  -     Vascular Surgery Referral; Future    Aneurysm of left common iliac artery  -     Vascular Surgery Referral; Future    Atrial fibrillation, unspecified type (H)  -     INR POCT, Enter/Edit Result    Acute on chronic heart failure with preserved ejection fraction (H)  -     furosemide (LASIX) 20 MG tablet; Take 1 tablet (20 mg) by mouth daily.    Follow up with urology   Restart furosemide low dose.  For now I will have him remain off of his losartan.  Recheck in 4-6 wks.  Continue current warfarin dose and recheck an inr in 1 week through the inr clinic.  Signed Electronically by: Martin Schultz PA-C

## 2025-07-21 ENCOUNTER — TELEPHONE (OUTPATIENT)
Dept: FAMILY MEDICINE | Facility: CLINIC | Age: 85
End: 2025-07-21
Payer: COMMERCIAL

## 2025-07-21 NOTE — TELEPHONE ENCOUNTER
Forms Request    PCP listed: Martin Schultz    Date of last visit: 7/17/2025     Which provider to complete form: Martin Schultz PA-C    Type of form/letter: Nursing Home/Assisted Living Orders      Who is the form from? [facility]    When is form needed by:     How would you like the form returned: Fax to [phone]    Where was the form placed for completion: provider basket

## 2025-07-22 ENCOUNTER — TELEPHONE (OUTPATIENT)
Dept: FAMILY MEDICINE | Facility: CLINIC | Age: 85
End: 2025-07-22
Payer: COMMERCIAL

## 2025-07-22 ENCOUNTER — TRANSFERRED RECORDS (OUTPATIENT)
Dept: HEALTH INFORMATION MANAGEMENT | Facility: CLINIC | Age: 85
End: 2025-07-22
Payer: COMMERCIAL

## 2025-07-22 NOTE — TELEPHONE ENCOUNTER
Forms Request    PCP listed: Martin Schultz    Date of last visit: 7/21/2025     Which provider to complete form: Martin Schultz PA-C    Type of form/letter: Nursing Home/Assisted Living Orders      Who is the form from? [facility]    When is form needed by:     How would you like the form returned: Fax to [phone]    Where was the form placed for completion: provider basket

## 2025-07-23 ENCOUNTER — TELEPHONE (OUTPATIENT)
Dept: FAMILY MEDICINE | Facility: CLINIC | Age: 85
End: 2025-07-23
Payer: COMMERCIAL

## 2025-07-23 ENCOUNTER — ANTICOAGULATION THERAPY VISIT (OUTPATIENT)
Dept: ANTICOAGULATION | Facility: CLINIC | Age: 85
End: 2025-07-23
Payer: COMMERCIAL

## 2025-07-23 DIAGNOSIS — I48.91 ATRIAL FIBRILLATION, UNSPECIFIED TYPE (H): ICD-10-CM

## 2025-07-23 DIAGNOSIS — Z79.01 LONG TERM CURRENT USE OF ANTICOAGULANT THERAPY: Primary | ICD-10-CM

## 2025-07-23 DIAGNOSIS — I48.20 CHRONIC ATRIAL FIBRILLATION (H): ICD-10-CM

## 2025-07-23 LAB — INR (EXTERNAL): 2.5

## 2025-07-23 NOTE — TELEPHONE ENCOUNTER
See [facility] encounter 7/23 for more information. Updated home care RN with nurse line.  Latrell Avila RN

## 2025-07-23 NOTE — TELEPHONE ENCOUNTER
General Call      Reason for Call:  INR    What are your questions or concerns:  Peter from [facility] called with pts INR for today. 2.5. INR was to be done on 7/21. No other changes to report.     Date of last appointment with provider:     Okay to leave a detailed message?: Yes at Other phone number:  [phone] Peter

## 2025-07-23 NOTE — PROGRESS NOTES
ANTICOAGULATION MANAGEMENT     Chidi Dubon 84 year old male is on warfarin with therapeutic INR result. (Goal INR 2.0-3.0)    Recent labs: (last 7 days)     07/23/25  0000   INR 2.5       ASSESSMENT     Source(s): Chart Review and Patient/Caregiver Call     Warfarin doses taken: Warfarin taken as instructed  Diet: No new diet changes identified  Medication/supplement changes: None noted  New illness, injury, or hospitalization: No  Signs or symptoms of bleeding or clotting: No  Previous result: Therapeutic last visit; previously outside of goal range  Additional findings: None       PLAN     Recommended plan for no diet, medication or health factor changes affecting INR     Dosing Instructions: Continue your current warfarin dose with next INR in 1 week       Summary  As of 7/23/2025      Full warfarin instructions:  1.25 mg every Sun; 2.5 mg all other days   Next INR check:  7/30/2025               [facility] verbalizes understanding and agrees to plan and who agrees to plan and repeated back plan correctly    Orders given to  Homecare nurse/facility to recheck    Education provided: Please call back if any changes to your diet, medications or how you've been taking warfarin    Plan made per [facility] anticoagulation protocol    Latrell Avila RN  7/23/2025  Anticoagulation Clinic  [facility] for routing messages: dennis MANRIQUEZ  [facility] patient phone line: [phone]        _______________________________________________________________________     Anticoagulation Episode Summary       Current INR goal:  2.0-3.0   TTR:  80.8% (8 mo)   Target end date:  Indefinite   Send INR reminders to:  EMELIA MANRIQUEZ    Indications    Long-term (current) use of anticoagulants [Z79.01] [Z79.01]  Atrial Fibrillation [I48.20]  Atrial fibrillation  unspecified type (H) [I48.91]             Comments:  6/23/25 [facility] home [facility].              Anticoagulation Care Providers       Provider Role  Specialty Phone number    Martin Schultz PA-C Referring Family Medicine [phone]    Gen Marti PA Referring Family Medicine [phone]

## 2025-07-24 ENCOUNTER — MEDICAL CORRESPONDENCE (OUTPATIENT)
Dept: HEALTH INFORMATION MANAGEMENT | Facility: CLINIC | Age: 85
End: 2025-07-24
Payer: COMMERCIAL

## 2025-07-28 ENCOUNTER — TELEPHONE (OUTPATIENT)
Dept: FAMILY MEDICINE | Facility: CLINIC | Age: 85
End: 2025-07-28
Payer: COMMERCIAL

## 2025-07-28 NOTE — TELEPHONE ENCOUNTER
Forms Request    PCP listed: Martin Schultz    Date of last visit (Office visit/WCC/Physical/Wellness): 7/17/25    Which provider to complete form: Martin Schultz PA-C    Type of form/letter: Nursing Home/Assisted Living Orders      Who is the form from? [facility]    When is form needed by:     How would you like the form returned: Fax to [phone]    Where was the form placed for completion: provider basket

## 2025-07-30 ENCOUNTER — MEDICAL CORRESPONDENCE (OUTPATIENT)
Dept: HEALTH INFORMATION MANAGEMENT | Facility: CLINIC | Age: 85
End: 2025-07-30
Payer: COMMERCIAL

## 2025-07-31 ENCOUNTER — ANTICOAGULATION THERAPY VISIT (OUTPATIENT)
Dept: ANTICOAGULATION | Facility: CLINIC | Age: 85
End: 2025-07-31
Payer: COMMERCIAL

## 2025-07-31 DIAGNOSIS — I48.20 CHRONIC ATRIAL FIBRILLATION (H): ICD-10-CM

## 2025-07-31 DIAGNOSIS — I48.91 ATRIAL FIBRILLATION, UNSPECIFIED TYPE (H): ICD-10-CM

## 2025-07-31 DIAGNOSIS — Z79.01 LONG TERM CURRENT USE OF ANTICOAGULANT THERAPY: Primary | ICD-10-CM

## 2025-07-31 LAB — INR (EXTERNAL): 2.6

## 2025-07-31 NOTE — PROGRESS NOTES
Go back to Furosemide 20 mg take 2 tablets once daily in the morning     ANTICOAGULATION MANAGEMENT     Chidi Dubon 84 year old male is on warfarin with therapeutic INR result. (Goal INR 2.0-3.0)    Recent labs: (last 7 days)     07/31/25  1547   INR 2.6       ASSESSMENT     Source(s): Chart Review and Home Care/Facility Nurse     Warfarin doses taken: Warfarin taken differently, but did not change total weekly dose  Diet: No new diet changes identified  Medication/supplement changes: Furosemide increased to 40 mg from 20 mg.   New illness, injury, or hospitalization: No  Signs or symptoms of bleeding or clotting: No  Previous result: Therapeutic last 2(+) visits  Additional findings: Home care planning to discharge patient next week.        PLAN     Recommended plan for ongoing change(s) affecting INR     Dosing Instructions: Continue your current warfarin dose with next INR in 1 week       Summary  As of 7/31/2025      Full warfarin instructions:  1.25 mg every Sun; 2.5 mg all other days   Next INR check:  8/7/2025               Telephone call with Mary [facility] home care nurse who verbalizes understanding and agrees to plan and who agrees to plan and repeated back plan correctly    Orders given to  Homecare nurse/facility to recheck    Education provided: Please call back if any changes to your diet, medications or how you've been taking warfarin    Plan made per [facility] anticoagulation protocol    Aline Hart RN  7/31/2025  Anticoagulation Clinic  [facility] for routing messages: dennis MANRIQUEZ  [facility] patient phone line: [phone]        _______________________________________________________________________     Anticoagulation Episode Summary       Current INR goal:  2.0-3.0   TTR:  80.9% (8.1 mo)   Target end date:  Indefinite   Send INR reminders to:  EMELIA MANRIQUEZ    Indications    Long-term (current) use of anticoagulants [Z79.01] [Z79.01]  Atrial Fibrillation [I48.20]  Atrial  fibrillation  unspecified type (H) [I48.91]             Comments:  6/23/25 [facility].              Anticoagulation Care Providers       Provider Role Specialty Phone number    Martin Schultz PA-C Referring Family Medicine [phone]    Gen Marti PA Referring Family Medicine [phone]

## 2025-08-07 ENCOUNTER — ANTICOAGULATION THERAPY VISIT (OUTPATIENT)
Dept: ANTICOAGULATION | Facility: CLINIC | Age: 85
End: 2025-08-07
Payer: COMMERCIAL

## 2025-08-07 ENCOUNTER — MEDICAL CORRESPONDENCE (OUTPATIENT)
Dept: HEALTH INFORMATION MANAGEMENT | Facility: CLINIC | Age: 85
End: 2025-08-07
Payer: COMMERCIAL

## 2025-08-07 DIAGNOSIS — Z79.01 LONG TERM CURRENT USE OF ANTICOAGULANT THERAPY: Primary | ICD-10-CM

## 2025-08-07 DIAGNOSIS — I48.20 CHRONIC ATRIAL FIBRILLATION (H): ICD-10-CM

## 2025-08-07 DIAGNOSIS — I48.91 ATRIAL FIBRILLATION, UNSPECIFIED TYPE (H): ICD-10-CM

## 2025-08-07 LAB — INR (EXTERNAL): 3 (ref 0.9–1.1)

## 2025-08-14 ENCOUNTER — MEDICAL CORRESPONDENCE (OUTPATIENT)
Dept: HEALTH INFORMATION MANAGEMENT | Facility: CLINIC | Age: 85
End: 2025-08-14
Payer: COMMERCIAL

## 2025-08-21 ENCOUNTER — RESULTS FOLLOW-UP (OUTPATIENT)
Dept: ANTICOAGULATION | Facility: CLINIC | Age: 85
End: 2025-08-21

## 2025-08-21 ENCOUNTER — LAB (OUTPATIENT)
Dept: LAB | Facility: CLINIC | Age: 85
End: 2025-08-21
Payer: COMMERCIAL

## 2025-08-21 ENCOUNTER — ANTICOAGULATION THERAPY VISIT (OUTPATIENT)
Dept: ANTICOAGULATION | Facility: CLINIC | Age: 85
End: 2025-08-21

## 2025-08-21 DIAGNOSIS — R35.0 URINARY FREQUENCY: ICD-10-CM

## 2025-08-21 DIAGNOSIS — Z79.01 LONG TERM CURRENT USE OF ANTICOAGULANT THERAPY: ICD-10-CM

## 2025-08-21 DIAGNOSIS — Z79.01 LONG TERM CURRENT USE OF ANTICOAGULANT THERAPY: Primary | ICD-10-CM

## 2025-08-21 DIAGNOSIS — I48.20 CHRONIC ATRIAL FIBRILLATION (H): ICD-10-CM

## 2025-08-21 DIAGNOSIS — I48.91 ATRIAL FIBRILLATION, UNSPECIFIED TYPE (H): ICD-10-CM

## 2025-08-21 LAB
ALBUMIN UR-MCNC: ABNORMAL MG/DL
APPEARANCE UR: CLEAR
BILIRUB UR QL STRIP: ABNORMAL
COLOR UR AUTO: YELLOW
GLUCOSE UR STRIP-MCNC: NEGATIVE MG/DL
GRAN CASTS #/AREA URNS LPF: ABNORMAL /LPF
HGB UR QL STRIP: NEGATIVE
INR BLD: 3.4 (ref 0.9–1.1)
KETONES UR STRIP-MCNC: NEGATIVE MG/DL
LEUKOCYTE ESTERASE UR QL STRIP: NEGATIVE
MUCOUS THREADS #/AREA URNS LPF: PRESENT /LPF
NITRATE UR QL: NEGATIVE
PH UR STRIP: 5.5 [PH] (ref 5–7)
RBC #/AREA URNS AUTO: ABNORMAL /HPF
SP GR UR STRIP: 1.02 (ref 1–1.03)
SQUAMOUS #/AREA URNS AUTO: ABNORMAL /LPF
UROBILINOGEN UR STRIP-ACNC: 1 E.U./DL
WBC #/AREA URNS AUTO: ABNORMAL /HPF

## 2025-08-28 ENCOUNTER — TELEPHONE (OUTPATIENT)
Dept: FAMILY MEDICINE | Facility: CLINIC | Age: 85
End: 2025-08-28
Payer: COMMERCIAL

## 2025-09-03 DIAGNOSIS — I71.40 ABDOMINAL AORTIC ANEURYSM (AAA) WITHOUT RUPTURE, UNSPECIFIED PART: Primary | ICD-10-CM

## 2025-09-04 ENCOUNTER — LAB (OUTPATIENT)
Dept: LAB | Facility: CLINIC | Age: 85
End: 2025-09-04
Payer: COMMERCIAL

## 2025-09-04 DIAGNOSIS — Z79.01 LONG TERM CURRENT USE OF ANTICOAGULANT THERAPY: ICD-10-CM

## 2025-09-04 DIAGNOSIS — I48.91 ATRIAL FIBRILLATION, UNSPECIFIED TYPE (H): ICD-10-CM

## 2025-09-04 LAB — INR BLD: 3.1 (ref 0.9–1.1)

## (undated) DEVICE — SU MONOCRYL 4-0 PS-2 18" UND Y496G

## (undated) DEVICE — STOCKING SLEEVE COMPRESSION CALF LG

## (undated) DEVICE — GOWN XLG DISP 9545

## (undated) DEVICE — BLADE KNIFE SURG 15 371115

## (undated) DEVICE — SU VICRYL 3-0 SH 27" UND J416H

## (undated) DEVICE — SYR BULB IRRIG 50ML LATEX FREE 0035280

## (undated) DEVICE — SPONGE KITTNER 31001010

## (undated) DEVICE — GLOVE BIOGEL PI MICRO INDICATOR UNDERGLOVE SZ 8.0 48980

## (undated) DEVICE — SU VICRYL 0 CT-2 27" UND J270H

## (undated) DEVICE — SOL NACL 0.9% IRRIG 1000ML BOTTLE 07138-09

## (undated) DEVICE — PACK LAPAROTOMY CUSTOM LAKES

## (undated) DEVICE — GLOVE BIOGEL PI MICRO SZ 7.5 48575

## (undated) DEVICE — BLADE CLIPPER 4406

## (undated) DEVICE — SU DERMABOND ADVANCED .7ML DNX12

## (undated) DEVICE — SUCTION MANIFOLD NEPTUNE 2 SYS 1 PORT 702-025-000

## (undated) DEVICE — SPONGE RAY-TEC 4X8" 7318

## (undated) DEVICE — DRAIN PENROSE 3/4X1/2X18" LATEX 8888515205

## (undated) DEVICE — SU VICRYL 2-0 SH 27" UND J417H

## (undated) DEVICE — PREP CHLORAPREP 26ML TINTED ORANGE  260815

## (undated) RX ORDER — GABAPENTIN 100 MG/1
CAPSULE ORAL
Status: DISPENSED
Start: 2024-06-03

## (undated) RX ORDER — CEFAZOLIN SODIUM/WATER 2 G/20 ML
SYRINGE (ML) INTRAVENOUS
Status: DISPENSED
Start: 2024-06-03

## (undated) RX ORDER — LIDOCAINE HYDROCHLORIDE AND EPINEPHRINE 10; 10 MG/ML; UG/ML
INJECTION, SOLUTION INFILTRATION; PERINEURAL
Status: DISPENSED
Start: 2024-06-03

## (undated) RX ORDER — PROPOFOL 10 MG/ML
INJECTION, EMULSION INTRAVENOUS
Status: DISPENSED
Start: 2024-06-03

## (undated) RX ORDER — ONDANSETRON 2 MG/ML
INJECTION INTRAMUSCULAR; INTRAVENOUS
Status: DISPENSED
Start: 2024-06-03

## (undated) RX ORDER — FENTANYL CITRATE 50 UG/ML
INJECTION, SOLUTION INTRAMUSCULAR; INTRAVENOUS
Status: DISPENSED
Start: 2024-06-03

## (undated) RX ORDER — BUPIVACAINE HYDROCHLORIDE 5 MG/ML
INJECTION, SOLUTION EPIDURAL; INTRACAUDAL
Status: DISPENSED
Start: 2024-06-03

## (undated) RX ORDER — LIDOCAINE HYDROCHLORIDE 10 MG/ML
INJECTION, SOLUTION EPIDURAL; INFILTRATION; INTRACAUDAL; PERINEURAL
Status: DISPENSED
Start: 2024-06-03

## (undated) RX ORDER — ACETAMINOPHEN 325 MG/1
TABLET ORAL
Status: DISPENSED
Start: 2024-06-03

## (undated) RX ORDER — OXYCODONE HYDROCHLORIDE 5 MG/1
TABLET ORAL
Status: DISPENSED
Start: 2024-06-03